# Patient Record
Sex: MALE | Race: BLACK OR AFRICAN AMERICAN | Employment: FULL TIME | URBAN - METROPOLITAN AREA
[De-identification: names, ages, dates, MRNs, and addresses within clinical notes are randomized per-mention and may not be internally consistent; named-entity substitution may affect disease eponyms.]

---

## 2018-01-21 ENCOUNTER — APPOINTMENT (OUTPATIENT)
Dept: GENERAL RADIOLOGY | Age: 63
End: 2018-01-21
Attending: PHYSICIAN ASSISTANT
Payer: COMMERCIAL

## 2018-01-21 ENCOUNTER — HOSPITAL ENCOUNTER (EMERGENCY)
Age: 63
Discharge: HOME | End: 2018-01-21
Attending: EMERGENCY MEDICINE
Payer: COMMERCIAL

## 2018-01-21 VITALS
OXYGEN SATURATION: 100 % | DIASTOLIC BLOOD PRESSURE: 99 MMHG | SYSTOLIC BLOOD PRESSURE: 189 MMHG | HEIGHT: 65 IN | TEMPERATURE: 98.2 F | BODY MASS INDEX: 34.16 KG/M2 | HEART RATE: 79 BPM | WEIGHT: 205.03 LBS | RESPIRATION RATE: 16 BRPM

## 2018-01-21 DIAGNOSIS — M54.31 BILATERAL SCIATICA: ICD-10-CM

## 2018-01-21 DIAGNOSIS — M54.32 BILATERAL SCIATICA: ICD-10-CM

## 2018-01-21 DIAGNOSIS — M54.42 ACUTE BILATERAL LOW BACK PAIN WITH BILATERAL SCIATICA: Primary | ICD-10-CM

## 2018-01-21 DIAGNOSIS — M54.41 ACUTE BILATERAL LOW BACK PAIN WITH BILATERAL SCIATICA: Primary | ICD-10-CM

## 2018-01-21 PROCEDURE — 74011250637 HC RX REV CODE- 250/637: Performed by: PHYSICIAN ASSISTANT

## 2018-01-21 PROCEDURE — 99283 EMERGENCY DEPT VISIT LOW MDM: CPT

## 2018-01-21 PROCEDURE — 72100 X-RAY EXAM L-S SPINE 2/3 VWS: CPT

## 2018-01-21 RX ORDER — OXYCODONE AND ACETAMINOPHEN 5; 325 MG/1; MG/1
1 TABLET ORAL
Status: COMPLETED | OUTPATIENT
Start: 2018-01-21 | End: 2018-01-21

## 2018-01-21 RX ORDER — OXYCODONE AND ACETAMINOPHEN 5; 325 MG/1; MG/1
1 TABLET ORAL
Qty: 12 TAB | Refills: 0 | Status: SHIPPED | OUTPATIENT
Start: 2018-01-21 | End: 2018-01-21

## 2018-01-21 RX ORDER — DIAZEPAM 5 MG/1
5 TABLET ORAL
Qty: 10 TAB | Refills: 0 | OUTPATIENT
Start: 2018-01-21 | End: 2022-04-02

## 2018-01-21 RX ORDER — DIAZEPAM 5 MG/1
5 TABLET ORAL
Status: COMPLETED | OUTPATIENT
Start: 2018-01-21 | End: 2018-01-21

## 2018-01-21 RX ORDER — OXYCODONE AND ACETAMINOPHEN 5; 325 MG/1; MG/1
1 TABLET ORAL
Qty: 12 TAB | Refills: 0 | OUTPATIENT
Start: 2018-01-21 | End: 2022-04-02

## 2018-01-21 RX ORDER — PREDNISONE 5 MG/1
TABLET ORAL
Qty: 21 TAB | Refills: 0 | Status: SHIPPED | OUTPATIENT
Start: 2018-01-21

## 2018-01-21 RX ADMIN — DIAZEPAM 5 MG: 5 TABLET ORAL at 10:54

## 2018-01-21 RX ADMIN — OXYCODONE HYDROCHLORIDE AND ACETAMINOPHEN 1 TABLET: 5; 325 TABLET ORAL at 10:54

## 2018-01-21 NOTE — ED PROVIDER NOTES
EMERGENCY DEPARTMENT HISTORY AND PHYSICAL EXAM      Date: 1/21/2018  Patient Name: Debra Cao    History of Presenting Illness     Chief Complaint   Patient presents with    Leg Pain     patient ambulatory to [facility] with steady gait and complain of left hip pain radiating down leg while laying down. patient states that he slipped and fell last week        History Provided By: Patient    HPI: Debra Cao, 63 y.o. male with PMHx significant for DM and HTN, presents ambulatory to the ED with cc of gradual onset, worsening, moderate left hip/knee and leg pain that began 5 days ago. Pt describes the pain as \"throbbing\" and \"cramping\". The pain is exacerbated after sitting for long periods of time. He notes that he hit his leg 1 month ago and twisted it 1 week ago. He did not seek medical evaluation for his sxs at that time. Pt does not have any allergies. He specifically denies back pain or fever. PCP: Jacobo Tripp NP     Social Hx: - Tobacco, + EtOH, - Illicit Drugs    There are no other complaints, changes, or physical findings at this time. Current Facility-Administered Medications   Medication Dose Route Frequency Provider Last Rate Last Dose    oxyCODONE-acetaminophen (PERCOCET) 5-325 mg per tablet 1 Tab  1 Tab Oral NOW ANTONIETTA Alva        diazePAM (VALIUM) tablet 5 mg  5 mg Oral NOW ANTONIETTA Alva         Current Outpatient Prescriptions   Medication Sig Dispense Refill    predniSONE (STERAPRED) 5 mg dose pack See administration instruction per 5mg dose pack 21 Tab 0    diazePAM (VALIUM) 5 mg tablet Take 1 Tab by mouth every twelve (12) hours as needed (spasm). Max Daily Amount: 10 mg. 10 Tab 0    oxyCODONE-acetaminophen (PERCOCET) 5-325 mg per tablet Take 1 Tab by mouth every six (6) hours as needed for Pain. Max Daily Amount: 4 Tabs. 12 Tab 0    glipiZIDE (GLUCOTROL) 5 mg tablet Take 1 Tab by mouth two (2) times a day.  1/2 hour ac breakfast and supper 60 Tab 1    lisinopril (PRINIVIL, ZESTRIL) 40 mg tablet Take 1 Tab by mouth daily. For blood pressure. 90 Tab 1    Hydrochlorothiazide (HYDRODIURIL) 12.5 mg tablet Take 1 Tab by mouth daily. 90 Tab 1    metFORMIN ER (GLUCOPHAGE XR) 500 mg tablet Take 1 Tab by mouth daily (with dinner). 90 Tab 1    simvastatin (ZOCOR) 20 mg tablet Take 1 Tab by mouth nightly. For cholesterol 90 Tab 1     Past History     Past Medical History:  Past Medical History:   Diagnosis Date    Diabetes mellitus, type 2 ([address] 2010    Hypertension      Past Surgical History:  Past Surgical History:   Procedure Laterality Date    HX COLONOSCOPY  2009    Had polyps told to return 5 yrs, not done     Family History:  Family History   Problem Relation Age of Onset    Hypertension Mother      Social History:  Social History   Substance Use Topics    Smoking status: Former Smoker     Quit date: 3/25/1985    Smokeless tobacco: Not on file      Comment: pt quit smoking 35 years ago    Alcohol use 7.2 oz/week     12 Standard drinks or equivalent per week      Comment: 12 beers on weekend     Allergies:  No Known Allergies    Review of Systems   Review of Systems   Constitutional: Negative for fever. Musculoskeletal: Positive for arthralgias (L hip pain) and myalgias (L leg pain). Negative for back pain. All other systems reviewed and are negative. Physical Exam   Physical Exam   Constitutional: He is oriented to person, place, and time. He appears well-developed and well-nourished. No distress. HENT:   Head: Normocephalic and atraumatic. Right Ear: External ear normal.   Left Ear: External ear normal.   Nose: Nose normal.   Mouth/Throat: Oropharynx is clear and moist. No oropharyngeal exudate. Eyes: Conjunctivae and EOM are normal. Pupils are equal, round, and reactive to light. Right eye exhibits no discharge. Left eye exhibits no discharge. No scleral icterus. Neck: Normal range of motion. Neck supple. No JVD present.  No tracheal deviation present. Cardiovascular: Normal rate, regular rhythm, normal heart sounds and intact distal pulses. Exam reveals no gallop and no friction rub. No murmur heard. Pulmonary/Chest: Effort normal and breath sounds normal. No respiratory distress. He has no wheezes. He has no rales. He exhibits no tenderness. Abdominal: Soft. Bowel sounds are normal. He exhibits no distension and no mass. There is no tenderness. There is no rebound and no guarding. Musculoskeletal: Normal range of motion. He exhibits no edema or tenderness. Negative straight leg raise    Lymphadenopathy:     He has no cervical adenopathy. Neurological: He is alert and oriented to person, place, and time. He has normal reflexes. No cranial nerve deficit. He exhibits normal muscle tone. Coordination normal.   Skin: Skin is warm and dry. He is not diaphoretic. Psychiatric: He has a normal mood and affect. His behavior is normal. Judgment and thought content normal.   Nursing note and vitals reviewed. Diagnostic Study Results     Labs -   No results found for this or any previous visit (from the past 12 hour(s)). Radiologic Studies -   EXAM:  XR SPINE LUMB 2 OR 3 V     INDICATION:   Back Pain     COMPARISON: None.     FINDINGS: AP, lateral and spot lateral views of the lumbar spine demonstrate  normal bone mineralization and vertebral body height. Minimal wedge-shaped  contour of the T11 and T12 vertebral bodies could be projectional. There is  borderline retrolisthesis at L4-5  there is mild degenerative disc space  narrowing through T10-11. There is moderate L2-3 and L4-5 disc space narrowing  with small endplate marginal osteophytes. There is probably moderate bilateral  facet osteoarthrosis at L4-5 and L5-S1. There is no pedicle asymmetry.  The  visualized portions of the sacrum and SI joints are normal.      IMPRESSION  IMPRESSION:  Degenerative findings as above.     Medical Decision Making   I am the first provider for this patient. I reviewed the vital signs, available nursing notes, past medical history, past surgical history, family history and social history. Vital Signs-Reviewed the patient's vital signs. Patient Vitals for the past 12 hrs:   Temp Pulse Resp BP SpO2   01/21/18 0931 98.2 °F (36.8 °C) 79 16 (!) 189/99 100 %     Pulse Oximetry Analysis - 100% on RA    Cardiac Monitor:   Rate: 79 bpm  Rhythm: Normal Sinus Rhythm     Records Reviewed: Nursing Notes and Old Medical Records    Provider Notes (Medical Decision Making):   DDx: DJD, DDD, sciatica     ED Course:   Initial assessment performed. The patients presenting problems have been discussed, and they are in agreement with the care plan formulated and outlined with them. I have encouraged them to ask questions as they arise throughout their visit. Disposition:  Discharge Note:  10:52 AM  The patient has been re-evaluated and is ready for discharge. Reviewed available results with patient. Counseled patient/parent/guardian on diagnosis and care plan. Patient has expressed understanding, and all questions have been answered. Patient agrees with plan and agrees to follow up as recommended, or return to the ED if their symptoms worsen. Discharge instructions have been provided and explained to the patient, along with reasons to return to the ED. PLAN:  1. Current Discharge Medication List      START taking these medications    Details   predniSONE (STERAPRED) 5 mg dose pack See administration instruction per 5mg dose pack  Qty: 21 Tab, Refills: 0      diazePAM (VALIUM) 5 mg tablet Take 1 Tab by mouth every twelve (12) hours as needed (spasm). Max Daily Amount: 10 mg.  Qty: 10 Tab, Refills: 0    Associated Diagnoses: Acute bilateral low back pain with bilateral sciatica; Bilateral sciatica      oxyCODONE-acetaminophen (PERCOCET) 5-325 mg per tablet Take 1 Tab by mouth every six (6) hours as needed for Pain. Max Daily Amount: 4 Tabs.   Qty: 12 Tab, Refills: 0    Associated Diagnoses: Acute bilateral low back pain with bilateral sciatica; Bilateral sciatica           2. Follow-up Information     Follow up With Details Comments [address] Carissa Schreiber NP In 2 days As needed [facility]  [address] [facility]      Kaylan Kearney MD In 2 days As needed [address]  [address] [address]  [facility] [place]  [phone]          Return to ED if worse     Diagnosis     Clinical Impression:   1. Acute bilateral low back pain with bilateral sciatica    2. Bilateral sciatica      Attestations:    Attestation: This note is prepared by Manasa Burk, acting as scribe for YANI Michel PA-C: The scribe's documentation has been prepared under my direction and personally reviewed by me in its entirety. I confirm that the note above accurately reflects all work, treatment, procedures, and medical decision making performed by me.

## 2018-01-21 NOTE — DISCHARGE INSTRUCTIONS
Back Pain: Care Instructions  Your Care Instructions    Back pain has many possible causes. It is often related to problems with muscles and ligaments of the back. It may also be related to problems with the nerves, discs, or bones of the back. Moving, lifting, standing, sitting, or sleeping in an awkward way can strain the back. Sometimes you don't notice the injury until later. Arthritis is another common cause of back pain. Although it may hurt a lot, back pain usually improves on its own within several weeks. Most people recover in 12 weeks or less. Using good home treatment and being careful not to stress your back can help you feel better sooner. Follow-up care is a key part of your treatment and safety. Be sure to make and go to all appointments, and call your doctor if you are having problems. It's also a good idea to know your test results and keep a list of the medicines you take. How can you care for yourself at home? · Sit or lie in positions that are most comfortable and reduce your pain. Try one of these positions when you lie down:  ¨ Lie on your back with your knees bent and supported by large pillows. ¨ Lie on the floor with your legs on the seat of a sofa or chair. [place] on your side with your knees and hips bent and a pillow between your legs. ¨ Lie on your stomach if it does not make pain worse. · Do not sit up in bed, and avoid soft couches and twisted positions. Bed rest can help relieve pain at first, but it delays healing. Avoid bed rest after the first day of back pain. · Change positions every 30 minutes. If you must sit for long periods of time, take breaks from sitting. Get up and walk around, or lie in a comfortable position. · Try using a heating pad on a low or medium setting for 15 to 20 minutes every 2 or 3 hours. Try a warm shower in place of one session with the heating pad. · You can also try an ice pack for 10 to 15 minutes every 2 to 3 hours.  Put a thin cloth between the ice pack and your skin. · Take pain medicines exactly as directed. ¨ If the doctor gave you a prescription medicine for pain, take it as prescribed. ¨ If you are not taking a prescription pain medicine, ask your doctor if you can take an over-the-counter medicine. · Take short walks several times a day. You can start with 5 to 10 minutes, 3 or 4 times a day, and work up to longer walks. Walk on level surfaces and avoid hills and stairs until your back is better. · Return to work and other activities as soon as you can. Continued rest without activity is usually not good for your back. · To prevent future back pain, do exercises to stretch and strengthen your back and stomach. Learn how to use good posture, safe lifting techniques, and proper body mechanics. When should you call for help? Call your doctor now or seek immediate medical care if:  ? · You have new or worsening numbness in your legs. ? · You have new or worsening weakness in your legs. (This could make it hard to stand up.)   ? · You lose control of your bladder or bowels. ? Watch closely for changes in your health, and be sure to contact your doctor if:  ? · Your pain gets worse. ? · You are not getting better after 2 weeks. Where can you learn more? Go to http://nay-muriel.info/. Enter F344 in the search box to learn more about \"Back Pain: Care Instructions. \"  Current as of: March 21, 2017  Content Version: 11.4  © 8856-2496 [employer]. Care instructions adapted under license by [employer] (which disclaims liability or warranty for this information). If you have questions about a medical condition or this instruction, always ask your healthcare professional. [place] [address] any warranty or liability for your use of this information.          Sciatica: Care Instructions  Your Care Instructions    Sciatica (say \"bst-FA-ga-kuh\") is an irritation of one of the sciatic nerves, which come from the spinal cord in the lower back. The sciatic nerves and their branches extend down through the buttock to the foot. Sciatica can develop when an injured disc in the back presses against a spinal nerve root. Its main symptom is pain, numbness, or weakness that is often worse in the leg or foot than in the back. Sciatica often will improve and go away with time. Early treatment usually includes medicines and exercises to relieve pain. Follow-up care is a key part of your treatment and safety. Be sure to make and go to all appointments, and call your doctor if you are having problems. It's also a good idea to know your test results and keep a list of the medicines you take. How can you care for yourself at home? · Take pain medicines exactly as directed. ¨ If the doctor gave you a prescription medicine for pain, take it as prescribed. ¨ If you are not taking a prescription pain medicine, ask your doctor if you can take an over-the-counter medicine. · Use heat or ice to relieve pain. ¨ To apply heat, put a warm water bottle, heating pad set on low, or warm cloth on your back. Do not go to sleep with a heating pad on your skin. ¨ To use ice, put ice or a cold pack on the area for 10 to 20 minutes at a time. Put a thin cloth between the ice and your skin. · Avoid sitting if possible, unless it feels better than standing. · Alternate lying down with short walks. Increase your walking distance as you are able to without making your symptoms worse. · Do not do anything that makes your symptoms worse. When should you call for help? Call 911 anytime you think you may need emergency care. For example, call if:  · You are unable to move a leg at all. Call your doctor now or seek immediate medical care if:  · You have new or worse symptoms in your legs or buttocks. Symptoms may include:  ¨ Numbness or tingling. ¨ Weakness. ¨ Pain. · You lose bladder or bowel control.   Watch closely for changes in your health, and be sure to contact your doctor if:  · You are not getting better as expected. Where can you learn more? Go to http://nay-muriel.info/. Enter [phone] in the search box to learn more about \"Sciatica: Care Instructions. \"  Current as of: March 21, 2017  Content Version: 11.4  © 3125-0654 [employer]. Care instructions adapted under license by [employer] (which disclaims liability or warranty for this information). If you have questions about a medical condition or this instruction, always ask your healthcare professional. [place] [address] any warranty or liability for your use of this information.

## 2018-01-21 NOTE — LETTER
[address] 
[facility] EMERGENCY DEPT 
[address] [place] [address] [address] [id number] 
[phone] Work/School Note Date: 1/21/2018 To Whom It May concern: 
 
Demi Russell was seen and treated today in the emergency room by the following provider(s): 
Attending Provider: Wily Merino. Sonu Driver MD 
Physician Assistant: ANTONIETTA Cheema. Demi Russell No work 48 hours. Sincerely, ANTONIETTA Cheema

## 2018-01-21 NOTE — ED NOTES
Assumed care of patient. Patient is alert and oriented, does not appear to be in distress. Patient ambulatory to Ed with c/o left hip pain x one week radiating down left hip. Patient positioned for comfort with call bell within reach. Side rails up for safety.  PA bedside for exam .

## 2022-04-02 ENCOUNTER — HOSPITAL ENCOUNTER (EMERGENCY)
Age: 67
Discharge: HOME | End: 2022-04-02
Attending: EMERGENCY MEDICINE

## 2022-04-02 ENCOUNTER — APPOINTMENT (OUTPATIENT)
Dept: CT IMAGING | Age: 67
End: 2022-04-02
Attending: EMERGENCY MEDICINE

## 2022-04-02 VITALS
RESPIRATION RATE: 18 BRPM | SYSTOLIC BLOOD PRESSURE: 194 MMHG | DIASTOLIC BLOOD PRESSURE: 114 MMHG | HEART RATE: 93 BPM | TEMPERATURE: 99 F | HEIGHT: 67 IN | OXYGEN SATURATION: 98 % | WEIGHT: 195.33 LBS | BODY MASS INDEX: 30.66 KG/M2

## 2022-04-02 DIAGNOSIS — S02.92XA MULTIPLE CLOSED FRACTURES OF FACIAL BONE, INITIAL ENCOUNTER (HCC): Primary | ICD-10-CM

## 2022-04-02 PROCEDURE — 99284 EMERGENCY DEPT VISIT MOD MDM: CPT

## 2022-04-02 PROCEDURE — 70486 CT MAXILLOFACIAL W/O DYE: CPT

## 2022-04-02 RX ORDER — HYDROCODONE BITARTRATE AND ACETAMINOPHEN 5; 325 MG/1; MG/1
1 TABLET ORAL
Status: DISCONTINUED | OUTPATIENT
Start: 2022-04-02 | End: 2022-04-02

## 2022-04-02 RX ORDER — NAPROXEN 500 MG/1
500 TABLET ORAL 2 TIMES DAILY WITH MEALS
Qty: 20 TABLET | Refills: 0 | Status: SHIPPED | OUTPATIENT
Start: 2022-04-02 | End: 2022-04-12

## 2022-04-02 RX ORDER — HYDROCODONE BITARTRATE AND ACETAMINOPHEN 5; 325 MG/1; MG/1
1 TABLET ORAL
Qty: 10 TABLET | Refills: 0 | Status: SHIPPED | OUTPATIENT
Start: 2022-04-02 | End: 2022-04-05

## 2022-04-03 NOTE — ED NOTES
Discharge instructions reviewed with patient. Pt provided with an ice pack prior to d/c. Pt verbalizes understanding of plan of care and follow up. Pt left the ED with all personal belongings. Pt states that his brother is driving him home.
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
## 2022-04-03 NOTE — ED NOTES
Dr. Arnie Giron notified of high blood pressures. Pt has hx of HTN and is med compliant. No new orders. Ok to discharge home per Dr. Arnie Giron.

## 2022-04-03 NOTE — DISCHARGE INSTRUCTIONS
Your CT:  Bones: Left inferior orbital wall fracture extends into the infraorbital neural foramen. 5 mm depression into the left maxillary sinus. Close approximation to the left inferior rectus muscle. Nondisplaced fracture of the left lateral orbital wall. 7 mm depressed fracture of the anterior wall of the left maxillary  sinus. Subtle nondisplaced fracture of the posterior wall of left maxillary sinus. Nondisplaced left zygomatic arch fractures. Mandible is intact. Please make sure to keep a cool compress or ice to that area to help with swelling. Please sleep with your head up at 30 degrees. Please do not blow your nose very hard. Can take Tylenol for pain but if you need something stronger can take Norco which is hydrocodone with acetaminophen. Can take Colace stool softener if you start to become constipated. Can also take anti-inflammatories. You need to follow-up with ENT for these facial fractures. Follow-up with your eye doctor as needed.

## 2022-07-07 ENCOUNTER — HOSPITAL ENCOUNTER (EMERGENCY)
Age: 67
Discharge: HOME | End: 2022-07-07
Attending: EMERGENCY MEDICINE | Admitting: EMERGENCY MEDICINE
Payer: COMMERCIAL

## 2022-07-07 VITALS
WEIGHT: 191.8 LBS | HEIGHT: 66 IN | HEART RATE: 71 BPM | SYSTOLIC BLOOD PRESSURE: 183 MMHG | BODY MASS INDEX: 30.82 KG/M2 | RESPIRATION RATE: 18 BRPM | OXYGEN SATURATION: 97 % | DIASTOLIC BLOOD PRESSURE: 97 MMHG | TEMPERATURE: 98.5 F

## 2022-07-07 DIAGNOSIS — R51.9 FACIAL PAIN: Primary | ICD-10-CM

## 2022-07-07 DIAGNOSIS — R03.0 ELEVATED BLOOD PRESSURE READING: ICD-10-CM

## 2022-07-07 PROCEDURE — 99283 EMERGENCY DEPT VISIT LOW MDM: CPT

## 2022-07-07 RX ORDER — GABAPENTIN 100 MG/1
100 CAPSULE ORAL 3 TIMES DAILY
Qty: 10 CAPSULE | Refills: 0 | Status: SHIPPED | OUTPATIENT
Start: 2022-07-07 | End: 2022-07-10
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
## 2023-05-21 RX ORDER — LISINOPRIL 40 MG/1
40 TABLET ORAL DAILY
COMMUNITY
Start: 2015-12-09

## 2023-05-21 RX ORDER — GLIPIZIDE 5 MG/1
5 TABLET ORAL 2 TIMES DAILY
COMMUNITY
Start: 2016-02-03

## 2023-05-21 RX ORDER — HYDROCHLOROTHIAZIDE 12.5 MG/1
12.5 TABLET ORAL DAILY
COMMUNITY
Start: 2015-12-09

## 2023-05-21 RX ORDER — METFORMIN HYDROCHLORIDE 500 MG/1
500 TABLET, EXTENDED RELEASE ORAL
COMMUNITY
Start: 2015-12-09

## 2023-05-21 RX ORDER — PREDNISONE 5 MG/1
TABLET ORAL
COMMUNITY
Start: 2018-01-21

## 2023-05-21 RX ORDER — SIMVASTATIN 20 MG
20 TABLET ORAL
COMMUNITY
Start: 2015-12-09

## 2023-10-04 ENCOUNTER — APPOINTMENT (OUTPATIENT)
Facility: HOSPITAL | Age: 68
DRG: 853 | End: 2023-10-04
Payer: COMMERCIAL

## 2023-10-04 ENCOUNTER — HOSPITAL ENCOUNTER (INPATIENT)
Facility: HOSPITAL | Age: 68
LOS: 15 days | Discharge: NURSING, REHAB OR LONG-TERM CARE | DRG: 853 | End: 2023-10-19
Attending: INTERNAL MEDICINE | Admitting: INTERNAL MEDICINE
Payer: COMMERCIAL

## 2023-10-04 DIAGNOSIS — E86.0 DEHYDRATION: ICD-10-CM

## 2023-10-04 DIAGNOSIS — E11.29 TYPE 2 DIABETES MELLITUS WITH OTHER DIABETIC KIDNEY COMPLICATION, WITHOUT LONG-TERM CURRENT USE OF INSULIN (HCC): ICD-10-CM

## 2023-10-04 DIAGNOSIS — R07.9 CHEST PAIN: ICD-10-CM

## 2023-10-04 DIAGNOSIS — A41.9 SEPTICEMIA (HCC): ICD-10-CM

## 2023-10-04 DIAGNOSIS — I21.4 NSTEMI (NON-ST ELEVATED MYOCARDIAL INFARCTION) (HCC): ICD-10-CM

## 2023-10-04 DIAGNOSIS — E87.6 HYPOKALEMIA: ICD-10-CM

## 2023-10-04 DIAGNOSIS — N17.9 ACUTE KIDNEY INJURY (HCC): ICD-10-CM

## 2023-10-04 LAB
ALBUMIN SERPL-MCNC: 2.3 G/DL (ref 3.5–5)
ALBUMIN SERPL-MCNC: 2.4 G/DL (ref 3.5–5)
ALBUMIN/GLOB SERPL: 0.5 (ref 1.1–2.2)
ALBUMIN/GLOB SERPL: 0.5 (ref 1.1–2.2)
ALP SERPL-CCNC: 58 U/L (ref 45–117)
ALP SERPL-CCNC: 60 U/L (ref 45–117)
ALT SERPL-CCNC: 57 U/L (ref 12–78)
ALT SERPL-CCNC: 95 U/L (ref 12–78)
AMMONIA PLAS-SCNC: 37 UMOL/L
ANION GAP BLD CALC-SCNC: 13 (ref 10–20)
ANION GAP BLD CALC-SCNC: 15 (ref 10–20)
ANION GAP SERPL CALC-SCNC: 9 MMOL/L (ref 5–15)
ANION GAP SERPL CALC-SCNC: 9 MMOL/L (ref 5–15)
APPEARANCE UR: CLEAR
ARTERIAL PATENCY WRIST A: ABNORMAL
AST SERPL-CCNC: 196 U/L (ref 15–37)
AST SERPL-CCNC: 350 U/L (ref 15–37)
BACTERIA URNS QL MICRO: ABNORMAL /HPF
BASE DEFICIT BLD-SCNC: 2.1 MMOL/L
BASE DEFICIT BLDA-SCNC: 2.8 MMOL/L
BASE EXCESS BLD CALC-SCNC: 0.5 MMOL/L
BASOPHILS # BLD: 0 K/UL (ref 0–0.1)
BASOPHILS # BLD: 0 K/UL (ref 0–0.1)
BASOPHILS NFR BLD: 0 % (ref 0–1)
BASOPHILS NFR BLD: 0 % (ref 0–1)
BDY SITE: ABNORMAL
BILIRUB DIRECT SERPL-MCNC: 0.4 MG/DL (ref 0–0.2)
BILIRUB SERPL-MCNC: 1.5 MG/DL (ref 0.2–1)
BILIRUB SERPL-MCNC: 1.5 MG/DL (ref 0.2–1)
BILIRUB UR QL: NEGATIVE
BUN SERPL-MCNC: 20 MG/DL (ref 6–20)
BUN SERPL-MCNC: 20 MG/DL (ref 6–20)
BUN/CREAT SERPL: 9 (ref 12–20)
BUN/CREAT SERPL: 9 (ref 12–20)
CA-I BLD-MCNC: 1.02 MMOL/L (ref 1.12–1.32)
CA-I BLD-MCNC: 1.02 MMOL/L (ref 1.12–1.32)
CALCIUM SERPL-MCNC: 7.4 MG/DL (ref 8.5–10.1)
CALCIUM SERPL-MCNC: 7.8 MG/DL (ref 8.5–10.1)
CHLORIDE BLD-SCNC: 95 MMOL/L (ref 100–108)
CHLORIDE BLD-SCNC: 98 MMOL/L (ref 100–108)
CHLORIDE SERPL-SCNC: 100 MMOL/L (ref 97–108)
CHLORIDE SERPL-SCNC: 96 MMOL/L (ref 97–108)
CO2 BLD-SCNC: 22 MMOL/L (ref 19–24)
CO2 BLD-SCNC: 23 MMOL/L (ref 19–24)
CO2 SERPL-SCNC: 22 MMOL/L (ref 21–32)
CO2 SERPL-SCNC: 26 MMOL/L (ref 21–32)
COLOR UR: ABNORMAL
COMMENT:: NORMAL
CREAT SERPL-MCNC: 2.25 MG/DL (ref 0.7–1.3)
CREAT SERPL-MCNC: 2.29 MG/DL (ref 0.7–1.3)
CREAT UR-MCNC: 1.5 MG/DL (ref 0.6–1.3)
CREAT UR-MCNC: 1.9 MG/DL (ref 0.6–1.3)
D DIMER PPP FEU-MCNC: 1.64 MG/L FEU (ref 0–0.65)
DIFFERENTIAL METHOD BLD: ABNORMAL
DIFFERENTIAL METHOD BLD: ABNORMAL
EKG ATRIAL RATE: 96 BPM
EKG DIAGNOSIS: NORMAL
EKG P AXIS: 65 DEGREES
EKG P-R INTERVAL: 188 MS
EKG Q-T INTERVAL: 346 MS
EKG QRS DURATION: 114 MS
EKG QTC CALCULATION (BAZETT): 437 MS
EKG R AXIS: 0 DEGREES
EKG T AXIS: 33 DEGREES
EKG VENTRICULAR RATE: 96 BPM
EOSINOPHIL # BLD: 0 K/UL (ref 0–0.4)
EOSINOPHIL # BLD: 0.1 K/UL (ref 0–0.4)
EOSINOPHIL NFR BLD: 0 % (ref 0–7)
EOSINOPHIL NFR BLD: 1 % (ref 0–7)
EPITH CASTS URNS QL MICRO: ABNORMAL /LPF
ERYTHROCYTE [DISTWIDTH] IN BLOOD BY AUTOMATED COUNT: 12.5 % (ref 11.5–14.5)
ERYTHROCYTE [DISTWIDTH] IN BLOOD BY AUTOMATED COUNT: 12.6 % (ref 11.5–14.5)
FIO2 ON VENT: 100 %
FLUAV AG NPH QL IA: NEGATIVE
FLUBV AG NOSE QL IA: NEGATIVE
GAS FLOW.O2 O2 DELIVERY SYS: 6 L/MIN
GLOBULIN SER CALC-MCNC: 4.2 G/DL (ref 2–4)
GLOBULIN SER CALC-MCNC: 4.8 G/DL (ref 2–4)
GLUCOSE BLD STRIP.AUTO-MCNC: 129 MG/DL (ref 65–117)
GLUCOSE BLD STRIP.AUTO-MCNC: 145 MG/DL (ref 65–117)
GLUCOSE BLD STRIP.AUTO-MCNC: 171 MG/DL (ref 74–106)
GLUCOSE BLD STRIP.AUTO-MCNC: 215 MG/DL (ref 74–106)
GLUCOSE SERPL-MCNC: 211 MG/DL (ref 65–100)
GLUCOSE SERPL-MCNC: 216 MG/DL (ref 65–100)
GLUCOSE UR STRIP.AUTO-MCNC: 500 MG/DL
HCO3 BLDA-SCNC: 21 MMOL/L (ref 22–26)
HCO3 BLDA-SCNC: 22 MMOL/L
HCO3 BLDA-SCNC: 23 MMOL/L
HCT VFR BLD AUTO: 39.3 % (ref 36.6–50.3)
HCT VFR BLD AUTO: 39.8 % (ref 36.6–50.3)
HGB BLD-MCNC: 13.7 G/DL (ref 12.1–17)
HGB BLD-MCNC: 14.2 G/DL (ref 12.1–17)
HGB UR QL STRIP: ABNORMAL
IMM GRANULOCYTES # BLD AUTO: 0 K/UL (ref 0–0.04)
IMM GRANULOCYTES # BLD AUTO: 0.1 K/UL (ref 0–0.04)
IMM GRANULOCYTES NFR BLD AUTO: 0 % (ref 0–0.5)
IMM GRANULOCYTES NFR BLD AUTO: 1 % (ref 0–0.5)
KETONES UR QL STRIP.AUTO: ABNORMAL MG/DL
LACTATE BLD-SCNC: 0.99 MMOL/L (ref 0.4–2)
LACTATE BLD-SCNC: 2 MMOL/L (ref 0.4–2)
LACTATE BLD-SCNC: 2.7 MMOL/L (ref 0.4–2)
LACTATE BLD-SCNC: 2.79 MMOL/L (ref 0.4–2)
LACTATE SERPL-SCNC: 2.1 MMOL/L (ref 0.4–2)
LEUKOCYTE ESTERASE UR QL STRIP.AUTO: NEGATIVE
LIPASE SERPL-CCNC: 46 U/L (ref 13–75)
LYMPHOCYTES # BLD: 1.7 K/UL (ref 0.8–3.5)
LYMPHOCYTES # BLD: 1.9 K/UL (ref 0.8–3.5)
LYMPHOCYTES NFR BLD: 13 % (ref 12–49)
LYMPHOCYTES NFR BLD: 14 % (ref 12–49)
MAGNESIUM SERPL-MCNC: 1.8 MG/DL (ref 1.6–2.4)
MCH RBC QN AUTO: 28.3 PG (ref 26–34)
MCH RBC QN AUTO: 28.5 PG (ref 26–34)
MCHC RBC AUTO-ENTMCNC: 34.9 G/DL (ref 30–36.5)
MCHC RBC AUTO-ENTMCNC: 35.7 G/DL (ref 30–36.5)
MCV RBC AUTO: 79.9 FL (ref 80–99)
MCV RBC AUTO: 81.2 FL (ref 80–99)
MONOCYTES # BLD: 0.9 K/UL (ref 0–1)
MONOCYTES # BLD: 1.2 K/UL (ref 0–1)
MONOCYTES NFR BLD: 7 % (ref 5–13)
MONOCYTES NFR BLD: 8 % (ref 5–13)
NEUTS BAND NFR BLD MANUAL: 1 %
NEUTS SEG # BLD: 11.3 K/UL (ref 1.8–8)
NEUTS SEG # BLD: 9.8 K/UL (ref 1.8–8)
NEUTS SEG NFR BLD: 77 % (ref 32–75)
NEUTS SEG NFR BLD: 78 % (ref 32–75)
NITRITE UR QL STRIP.AUTO: NEGATIVE
NRBC # BLD: 0 K/UL (ref 0–0.01)
NRBC # BLD: 0 K/UL (ref 0–0.01)
NRBC BLD-RTO: 0 PER 100 WBC
NRBC BLD-RTO: 0 PER 100 WBC
PCO2 BLDA: 33 MMHG (ref 35–45)
PCO2 BLDV: 31.1 MMHG (ref 41–51)
PCO2 BLDV: 36.9 MMHG (ref 41–51)
PH BLDA: 7.42 (ref 7.35–7.45)
PH BLDV: 7.39 (ref 7.32–7.42)
PH BLDV: 7.48 (ref 7.32–7.42)
PH UR STRIP: 6.5 (ref 5–8)
PLATELET # BLD AUTO: 238 K/UL (ref 150–400)
PLATELET # BLD AUTO: 244 K/UL (ref 150–400)
PMV BLD AUTO: 11.1 FL (ref 8.9–12.9)
PMV BLD AUTO: 11.3 FL (ref 8.9–12.9)
PO2 BLDA: 161 MMHG (ref 80–100)
PO2 BLDV: 28 MMHG (ref 25–40)
PO2 BLDV: 31 MMHG (ref 25–40)
POTASSIUM BLD-SCNC: 2.9 MMOL/L (ref 3.5–5.5)
POTASSIUM BLD-SCNC: 3.5 MMOL/L (ref 3.5–5.5)
POTASSIUM SERPL-SCNC: 2.9 MMOL/L (ref 3.5–5.1)
POTASSIUM SERPL-SCNC: 3.7 MMOL/L (ref 3.5–5.1)
PROCALCITONIN SERPL-MCNC: 0.22 NG/ML
PROT SERPL-MCNC: 6.5 G/DL (ref 6.4–8.2)
PROT SERPL-MCNC: 7.2 G/DL (ref 6.4–8.2)
PROT UR STRIP-MCNC: >300 MG/DL
RBC # BLD AUTO: 4.84 M/UL (ref 4.1–5.7)
RBC # BLD AUTO: 4.98 M/UL (ref 4.1–5.7)
RBC #/AREA URNS HPF: ABNORMAL /HPF (ref 0–5)
RBC MORPH BLD: ABNORMAL
SAO2 % BLD: 59 %
SAO2 % BLD: 60 %
SAO2 % BLD: 99 % (ref 92–97)
SAO2% DEVICE SAO2% SENSOR NAME: ABNORMAL
SARS-COV-2 RDRP RESP QL NAA+PROBE: NOT DETECTED
SERVICE CMNT-IMP: ABNORMAL
SODIUM BLD-SCNC: 133 MMOL/L (ref 136–145)
SODIUM BLD-SCNC: 133 MMOL/L (ref 136–145)
SODIUM SERPL-SCNC: 131 MMOL/L (ref 136–145)
SODIUM SERPL-SCNC: 131 MMOL/L (ref 136–145)
SOURCE: NORMAL
SP GR UR REFRACTOMETRY: 1.02
SPECIMEN HOLD: NORMAL
SPECIMEN SITE: ABNORMAL
TROPONIN I SERPL HS-MCNC: 4166 NG/L (ref 0–76)
TROPONIN I SERPL HS-MCNC: 4260 NG/L (ref 0–76)
UFH PPP CHRO-ACNC: 0.67 IU/ML
UROBILINOGEN UR QL STRIP.AUTO: 0.2 EU/DL (ref 0.2–1)
WBC # BLD AUTO: 12.4 K/UL (ref 4.1–11.1)
WBC # BLD AUTO: 14.6 K/UL (ref 4.1–11.1)
WBC MORPH BLD: ABNORMAL
WBC URNS QL MICRO: ABNORMAL /HPF (ref 0–4)

## 2023-10-04 PROCEDURE — 83690 ASSAY OF LIPASE: CPT

## 2023-10-04 PROCEDURE — 82330 ASSAY OF CALCIUM: CPT

## 2023-10-04 PROCEDURE — 80053 COMPREHEN METABOLIC PANEL: CPT

## 2023-10-04 PROCEDURE — 84145 PROCALCITONIN (PCT): CPT

## 2023-10-04 PROCEDURE — 71045 X-RAY EXAM CHEST 1 VIEW: CPT

## 2023-10-04 PROCEDURE — 84295 ASSAY OF SERUM SODIUM: CPT

## 2023-10-04 PROCEDURE — 2580000003 HC RX 258: Performed by: INTERNAL MEDICINE

## 2023-10-04 PROCEDURE — 36600 WITHDRAWAL OF ARTERIAL BLOOD: CPT

## 2023-10-04 PROCEDURE — 96375 TX/PRO/DX INJ NEW DRUG ADDON: CPT

## 2023-10-04 PROCEDURE — 6370000000 HC RX 637 (ALT 250 FOR IP): Performed by: INTERNAL MEDICINE

## 2023-10-04 PROCEDURE — 70450 CT HEAD/BRAIN W/O DYE: CPT

## 2023-10-04 PROCEDURE — 3430000000 HC RX DIAGNOSTIC RADIOPHARMACEUTICAL: Performed by: INTERNAL MEDICINE

## 2023-10-04 PROCEDURE — 6360000002 HC RX W HCPCS

## 2023-10-04 PROCEDURE — 6370000000 HC RX 637 (ALT 250 FOR IP)

## 2023-10-04 PROCEDURE — 96374 THER/PROPH/DIAG INJ IV PUSH: CPT

## 2023-10-04 PROCEDURE — 82962 GLUCOSE BLOOD TEST: CPT

## 2023-10-04 PROCEDURE — 84132 ASSAY OF SERUM POTASSIUM: CPT

## 2023-10-04 PROCEDURE — 82803 BLOOD GASES ANY COMBINATION: CPT

## 2023-10-04 PROCEDURE — 84484 ASSAY OF TROPONIN QUANT: CPT

## 2023-10-04 PROCEDURE — 83605 ASSAY OF LACTIC ACID: CPT

## 2023-10-04 PROCEDURE — 96361 HYDRATE IV INFUSION ADD-ON: CPT

## 2023-10-04 PROCEDURE — 6360000002 HC RX W HCPCS: Performed by: INTERNAL MEDICINE

## 2023-10-04 PROCEDURE — 2000000000 HC ICU R&B

## 2023-10-04 PROCEDURE — 82140 ASSAY OF AMMONIA: CPT

## 2023-10-04 PROCEDURE — 87804 INFLUENZA ASSAY W/OPTIC: CPT

## 2023-10-04 PROCEDURE — 85520 HEPARIN ASSAY: CPT

## 2023-10-04 PROCEDURE — 93005 ELECTROCARDIOGRAM TRACING: CPT

## 2023-10-04 PROCEDURE — 85379 FIBRIN DEGRADATION QUANT: CPT

## 2023-10-04 PROCEDURE — 74176 CT ABD & PELVIS W/O CONTRAST: CPT

## 2023-10-04 PROCEDURE — 85025 COMPLETE CBC W/AUTO DIFF WBC: CPT

## 2023-10-04 PROCEDURE — A9540 TC99M MAA: HCPCS | Performed by: INTERNAL MEDICINE

## 2023-10-04 PROCEDURE — 2580000003 HC RX 258

## 2023-10-04 PROCEDURE — 81001 URINALYSIS AUTO W/SCOPE: CPT

## 2023-10-04 PROCEDURE — 87040 BLOOD CULTURE FOR BACTERIA: CPT

## 2023-10-04 PROCEDURE — 6370000000 HC RX 637 (ALT 250 FOR IP): Performed by: STUDENT IN AN ORGANIZED HEALTH CARE EDUCATION/TRAINING PROGRAM

## 2023-10-04 PROCEDURE — 80076 HEPATIC FUNCTION PANEL: CPT

## 2023-10-04 PROCEDURE — 96376 TX/PRO/DX INJ SAME DRUG ADON: CPT

## 2023-10-04 PROCEDURE — 36415 COLL VENOUS BLD VENIPUNCTURE: CPT

## 2023-10-04 PROCEDURE — 6360000002 HC RX W HCPCS: Performed by: NURSE PRACTITIONER

## 2023-10-04 PROCEDURE — 87635 SARS-COV-2 COVID-19 AMP PRB: CPT

## 2023-10-04 PROCEDURE — 99285 EMERGENCY DEPT VISIT HI MDM: CPT

## 2023-10-04 PROCEDURE — 83735 ASSAY OF MAGNESIUM: CPT

## 2023-10-04 PROCEDURE — 82947 ASSAY GLUCOSE BLOOD QUANT: CPT

## 2023-10-04 PROCEDURE — 78580 LUNG PERFUSION IMAGING: CPT

## 2023-10-04 RX ORDER — HEPARIN SODIUM 1000 [USP'U]/ML
4000 INJECTION, SOLUTION INTRAVENOUS; SUBCUTANEOUS PRN
Status: DISCONTINUED | OUTPATIENT
Start: 2023-10-04 | End: 2023-10-07

## 2023-10-04 RX ORDER — HEPARIN SODIUM 1000 [USP'U]/ML
2000 INJECTION, SOLUTION INTRAVENOUS; SUBCUTANEOUS PRN
Status: DISCONTINUED | OUTPATIENT
Start: 2023-10-04 | End: 2023-10-07

## 2023-10-04 RX ORDER — ACETAMINOPHEN 325 MG/1
650 TABLET ORAL EVERY 6 HOURS PRN
Status: DISCONTINUED | OUTPATIENT
Start: 2023-10-04 | End: 2023-10-19 | Stop reason: HOSPADM

## 2023-10-04 RX ORDER — LISINOPRIL 20 MG/1
20 TABLET ORAL ONCE
Status: COMPLETED | OUTPATIENT
Start: 2023-10-04 | End: 2023-10-04

## 2023-10-04 RX ORDER — LORAZEPAM 1 MG/1
2 TABLET ORAL
Status: DISCONTINUED | OUTPATIENT
Start: 2023-10-04 | End: 2023-10-05

## 2023-10-04 RX ORDER — AMLODIPINE BESYLATE 5 MG/1
5 TABLET ORAL ONCE
Status: COMPLETED | OUTPATIENT
Start: 2023-10-04 | End: 2023-10-04

## 2023-10-04 RX ORDER — HEPARIN SODIUM 10000 [USP'U]/100ML
5-30 INJECTION, SOLUTION INTRAVENOUS CONTINUOUS
Status: DISCONTINUED | OUTPATIENT
Start: 2023-10-04 | End: 2023-10-07

## 2023-10-04 RX ORDER — SODIUM CHLORIDE 0.9 % (FLUSH) 0.9 %
5-40 SYRINGE (ML) INJECTION PRN
Status: DISCONTINUED | OUTPATIENT
Start: 2023-10-04 | End: 2023-10-08

## 2023-10-04 RX ORDER — THIAMINE HYDROCHLORIDE 100 MG/ML
100 INJECTION, SOLUTION INTRAMUSCULAR; INTRAVENOUS DAILY
Status: DISCONTINUED | OUTPATIENT
Start: 2023-10-05 | End: 2023-10-04

## 2023-10-04 RX ORDER — LORAZEPAM 1 MG/1
1 TABLET ORAL
Status: DISCONTINUED | OUTPATIENT
Start: 2023-10-04 | End: 2023-10-07

## 2023-10-04 RX ORDER — HYDRALAZINE HYDROCHLORIDE 20 MG/ML
10 INJECTION INTRAMUSCULAR; INTRAVENOUS EVERY 6 HOURS PRN
Status: DISCONTINUED | OUTPATIENT
Start: 2023-10-04 | End: 2023-10-19 | Stop reason: HOSPADM

## 2023-10-04 RX ORDER — LORAZEPAM 2 MG/ML
3 INJECTION INTRAMUSCULAR
Status: DISCONTINUED | OUTPATIENT
Start: 2023-10-04 | End: 2023-10-04

## 2023-10-04 RX ORDER — ACETAMINOPHEN 650 MG/1
650 SUPPOSITORY RECTAL EVERY 6 HOURS PRN
Status: DISCONTINUED | OUTPATIENT
Start: 2023-10-04 | End: 2023-10-19 | Stop reason: HOSPADM

## 2023-10-04 RX ORDER — ASPIRIN 81 MG/1
81 TABLET, CHEWABLE ORAL DAILY
Status: DISCONTINUED | OUTPATIENT
Start: 2023-10-04 | End: 2023-10-06

## 2023-10-04 RX ORDER — POTASSIUM CHLORIDE 7.45 MG/ML
10 INJECTION INTRAVENOUS
Status: COMPLETED | OUTPATIENT
Start: 2023-10-04 | End: 2023-10-04

## 2023-10-04 RX ORDER — ONDANSETRON 2 MG/ML
4 INJECTION INTRAMUSCULAR; INTRAVENOUS EVERY 6 HOURS PRN
Status: DISCONTINUED | OUTPATIENT
Start: 2023-10-04 | End: 2023-10-19 | Stop reason: HOSPADM

## 2023-10-04 RX ORDER — ENOXAPARIN SODIUM 100 MG/ML
40 INJECTION SUBCUTANEOUS DAILY
Status: DISCONTINUED | OUTPATIENT
Start: 2023-10-04 | End: 2023-10-04

## 2023-10-04 RX ORDER — INSULIN LISPRO 100 [IU]/ML
0-4 INJECTION, SOLUTION INTRAVENOUS; SUBCUTANEOUS NIGHTLY
Status: DISCONTINUED | OUTPATIENT
Start: 2023-10-04 | End: 2023-10-05

## 2023-10-04 RX ORDER — 0.9 % SODIUM CHLORIDE 0.9 %
1000 INTRAVENOUS SOLUTION INTRAVENOUS ONCE
Status: COMPLETED | OUTPATIENT
Start: 2023-10-04 | End: 2023-10-04

## 2023-10-04 RX ORDER — LORAZEPAM 2 MG/ML
1 INJECTION INTRAMUSCULAR
Status: DISCONTINUED | OUTPATIENT
Start: 2023-10-04 | End: 2023-10-07

## 2023-10-04 RX ORDER — ONDANSETRON 4 MG/1
4 TABLET, ORALLY DISINTEGRATING ORAL EVERY 8 HOURS PRN
Status: DISCONTINUED | OUTPATIENT
Start: 2023-10-04 | End: 2023-10-19 | Stop reason: HOSPADM

## 2023-10-04 RX ORDER — LORAZEPAM 1 MG/1
4 TABLET ORAL
Status: DISCONTINUED | OUTPATIENT
Start: 2023-10-04 | End: 2023-10-04

## 2023-10-04 RX ORDER — SODIUM CHLORIDE 0.9 % (FLUSH) 0.9 %
5-40 SYRINGE (ML) INJECTION EVERY 12 HOURS SCHEDULED
Status: DISCONTINUED | OUTPATIENT
Start: 2023-10-05 | End: 2023-10-08

## 2023-10-04 RX ORDER — AMLODIPINE BESYLATE 5 MG/1
5 TABLET ORAL DAILY
Status: DISCONTINUED | OUTPATIENT
Start: 2023-10-04 | End: 2023-10-06

## 2023-10-04 RX ORDER — POTASSIUM CHLORIDE 750 MG/1
40 TABLET, FILM COATED, EXTENDED RELEASE ORAL 2 TIMES DAILY
Status: DISCONTINUED | OUTPATIENT
Start: 2023-10-04 | End: 2023-10-04

## 2023-10-04 RX ORDER — SODIUM CHLORIDE 9 MG/ML
INJECTION, SOLUTION INTRAVENOUS CONTINUOUS
Status: DISCONTINUED | OUTPATIENT
Start: 2023-10-04 | End: 2023-10-05

## 2023-10-04 RX ORDER — ATORVASTATIN CALCIUM 20 MG/1
20 TABLET, FILM COATED ORAL DAILY
Status: DISCONTINUED | OUTPATIENT
Start: 2023-10-04 | End: 2023-10-19 | Stop reason: HOSPADM

## 2023-10-04 RX ORDER — POLYETHYLENE GLYCOL 3350 17 G/17G
17 POWDER, FOR SOLUTION ORAL DAILY PRN
Status: DISCONTINUED | OUTPATIENT
Start: 2023-10-04 | End: 2023-10-19 | Stop reason: HOSPADM

## 2023-10-04 RX ORDER — POTASSIUM CHLORIDE 750 MG/1
40 TABLET, FILM COATED, EXTENDED RELEASE ORAL ONCE
Status: COMPLETED | OUTPATIENT
Start: 2023-10-04 | End: 2023-10-04

## 2023-10-04 RX ORDER — LORAZEPAM 2 MG/ML
0.5 INJECTION INTRAMUSCULAR ONCE
Status: COMPLETED | OUTPATIENT
Start: 2023-10-04 | End: 2023-10-04

## 2023-10-04 RX ORDER — AMLODIPINE BESYLATE 5 MG/1
5 TABLET ORAL DAILY
Status: ON HOLD | COMMUNITY
End: 2023-10-19 | Stop reason: SDUPTHER

## 2023-10-04 RX ORDER — SODIUM CHLORIDE 0.9 % (FLUSH) 0.9 %
5-40 SYRINGE (ML) INJECTION EVERY 12 HOURS SCHEDULED
Status: DISCONTINUED | OUTPATIENT
Start: 2023-10-04 | End: 2023-10-08

## 2023-10-04 RX ORDER — SODIUM CHLORIDE 9 MG/ML
INJECTION, SOLUTION INTRAVENOUS PRN
Status: DISCONTINUED | OUTPATIENT
Start: 2023-10-04 | End: 2023-10-07

## 2023-10-04 RX ORDER — LORAZEPAM 2 MG/ML
2 INJECTION INTRAMUSCULAR
Status: DISCONTINUED | OUTPATIENT
Start: 2023-10-04 | End: 2023-10-05

## 2023-10-04 RX ORDER — HEPARIN SODIUM 1000 [USP'U]/ML
4000 INJECTION, SOLUTION INTRAVENOUS; SUBCUTANEOUS ONCE
Status: COMPLETED | OUTPATIENT
Start: 2023-10-04 | End: 2023-10-04

## 2023-10-04 RX ORDER — INSULIN LISPRO 100 [IU]/ML
0-4 INJECTION, SOLUTION INTRAVENOUS; SUBCUTANEOUS
Status: DISCONTINUED | OUTPATIENT
Start: 2023-10-04 | End: 2023-10-05

## 2023-10-04 RX ORDER — ATORVASTATIN CALCIUM 20 MG/1
20 TABLET, FILM COATED ORAL DAILY
Status: ON HOLD | COMMUNITY
End: 2023-10-19 | Stop reason: SDUPTHER

## 2023-10-04 RX ORDER — LORAZEPAM 2 MG/ML
4 INJECTION INTRAMUSCULAR
Status: DISCONTINUED | OUTPATIENT
Start: 2023-10-04 | End: 2023-10-04

## 2023-10-04 RX ORDER — SODIUM CHLORIDE 9 MG/ML
INJECTION, SOLUTION INTRAVENOUS PRN
Status: DISCONTINUED | OUTPATIENT
Start: 2023-10-04 | End: 2023-10-08

## 2023-10-04 RX ORDER — ONDANSETRON 2 MG/ML
4 INJECTION INTRAMUSCULAR; INTRAVENOUS ONCE
Status: COMPLETED | OUTPATIENT
Start: 2023-10-04 | End: 2023-10-04

## 2023-10-04 RX ORDER — LORAZEPAM 1 MG/1
3 TABLET ORAL
Status: DISCONTINUED | OUTPATIENT
Start: 2023-10-04 | End: 2023-10-04

## 2023-10-04 RX ORDER — 0.9 % SODIUM CHLORIDE 0.9 %
2000 INTRAVENOUS SOLUTION INTRAVENOUS ONCE
Status: COMPLETED | OUTPATIENT
Start: 2023-10-04 | End: 2023-10-04

## 2023-10-04 RX ADMIN — VANCOMYCIN HYDROCHLORIDE 2000 MG: 10 INJECTION, POWDER, LYOPHILIZED, FOR SOLUTION INTRAVENOUS at 18:53

## 2023-10-04 RX ADMIN — ENOXAPARIN SODIUM 40 MG: 100 INJECTION SUBCUTANEOUS at 12:37

## 2023-10-04 RX ADMIN — ACETAMINOPHEN 650 MG: 325 TABLET ORAL at 12:51

## 2023-10-04 RX ADMIN — AMLODIPINE BESYLATE 5 MG: 5 TABLET ORAL at 11:20

## 2023-10-04 RX ADMIN — SODIUM CHLORIDE 1000 ML: 9 INJECTION, SOLUTION INTRAVENOUS at 09:20

## 2023-10-04 RX ADMIN — ASPIRIN 81 MG: 81 TABLET, CHEWABLE ORAL at 16:44

## 2023-10-04 RX ADMIN — SODIUM CHLORIDE 1000 ML: 9 INJECTION, SOLUTION INTRAVENOUS at 08:41

## 2023-10-04 RX ADMIN — SODIUM CHLORIDE, PRESERVATIVE FREE 10 ML: 5 INJECTION INTRAVENOUS at 20:31

## 2023-10-04 RX ADMIN — KIT FOR THE PREPARATION OF TECHNETIUM TC 99M ALBUMIN AGGREGATED 4.1 MILLICURIE: 2.5 INJECTION, POWDER, FOR SOLUTION INTRAVENOUS at 15:04

## 2023-10-04 RX ADMIN — POTASSIUM CHLORIDE 40 MEQ: 750 TABLET, FILM COATED, EXTENDED RELEASE ORAL at 12:37

## 2023-10-04 RX ADMIN — AMLODIPINE BESYLATE 5 MG: 5 TABLET ORAL at 17:43

## 2023-10-04 RX ADMIN — ATORVASTATIN CALCIUM 20 MG: 20 TABLET, FILM COATED ORAL at 17:43

## 2023-10-04 RX ADMIN — POTASSIUM CHLORIDE 10 MEQ: 7.46 INJECTION, SOLUTION INTRAVENOUS at 10:33

## 2023-10-04 RX ADMIN — POTASSIUM CHLORIDE 10 MEQ: 7.46 INJECTION, SOLUTION INTRAVENOUS at 11:39

## 2023-10-04 RX ADMIN — HEPARIN SODIUM 4000 UNITS: 1000 INJECTION INTRAVENOUS; SUBCUTANEOUS at 16:01

## 2023-10-04 RX ADMIN — SODIUM CHLORIDE: 9 INJECTION, SOLUTION INTRAVENOUS at 12:45

## 2023-10-04 RX ADMIN — PIPERACILLIN AND TAZOBACTAM 3375 MG: 3; .375 INJECTION, POWDER, LYOPHILIZED, FOR SOLUTION INTRAVENOUS at 23:17

## 2023-10-04 RX ADMIN — POTASSIUM CHLORIDE 40 MEQ: 750 TABLET, FILM COATED, EXTENDED RELEASE ORAL at 09:11

## 2023-10-04 RX ADMIN — ONDANSETRON 4 MG: 2 INJECTION INTRAMUSCULAR; INTRAVENOUS at 08:41

## 2023-10-04 RX ADMIN — LORAZEPAM 0.5 MG: 2 INJECTION INTRAMUSCULAR; INTRAVENOUS at 20:55

## 2023-10-04 RX ADMIN — SODIUM CHLORIDE 1000 MG: 900 INJECTION INTRAVENOUS at 12:37

## 2023-10-04 RX ADMIN — POTASSIUM CHLORIDE 10 MEQ: 7.46 INJECTION, SOLUTION INTRAVENOUS at 12:39

## 2023-10-04 RX ADMIN — PIPERACILLIN AND TAZOBACTAM 4500 MG: 4; .5 INJECTION, POWDER, LYOPHILIZED, FOR SOLUTION INTRAVENOUS at 17:38

## 2023-10-04 RX ADMIN — POTASSIUM CHLORIDE 10 MEQ: 7.46 INJECTION, SOLUTION INTRAVENOUS at 09:20

## 2023-10-04 RX ADMIN — ACETAMINOPHEN 650 MG: 650 SUPPOSITORY RECTAL at 19:41

## 2023-10-04 RX ADMIN — SODIUM CHLORIDE 2000 ML: 9 INJECTION, SOLUTION INTRAVENOUS at 11:27

## 2023-10-04 RX ADMIN — LISINOPRIL 20 MG: 20 TABLET ORAL at 11:20

## 2023-10-04 ASSESSMENT — ENCOUNTER SYMPTOMS
SINUS PAIN: 0
CHEST TIGHTNESS: 0
SHORTNESS OF BREATH: 0
VOMITING: 0
DIARRHEA: 1
SORE THROAT: 0
NAUSEA: 1
ABDOMINAL PAIN: 0
COUGH: 0

## 2023-10-04 ASSESSMENT — PAIN SCALES - GENERAL
PAINLEVEL_OUTOF10: 0

## 2023-10-04 NOTE — ED NOTES
Pharmacy called to state that the blue top that was drawn prior to heparin drip was hemolyzed. Krishna with [facility] in pharmacy and was told to hold off drawing another until the 6 hour aleksandr.      Eda Ramirez RN  10/04/23 1132

## 2023-10-04 NOTE — ED NOTES
Spoke with provider and confirmed to d/c additional fluids as sepsis fluids protocol met. Also discussed pt has had minimal urine output since fluids initiated. Per provider, bladder scan. Zev Domingo RN  10/04/23 1205    Bladder scan completed; 165 mL.      Zev Domingo RN  10/04/23 1202

## 2023-10-04 NOTE — ED NOTES
Spoke with MD Anahi regarding additional PO Potassium 40 mEq. Pt was ordered and administered previous at 0911. Provider confirmed to administer additional dose.      Fredo Angelo RN  10/04/23 3472

## 2023-10-04 NOTE — Clinical Note
RAPID RESPONSE TEAM    Overhead rapid response paged to room #2110 at ****     Reason for rapid response: AMS, hypoxia     Initial assessment: Pt in bed, spo2 80's noted on monitor, pt tachypnic, briefly awakened to voice. Respiratory placing patient on NRB, spo2 improved. Per RN pt recently transferred from ED, became progressively weaker and lethargic before becoming hypoxic. Dr. Lexx Carney at bedside, orders received for the following Interventions:    Outcome:  pt to remain in room #     Please call with any questions or concerns      Rapid Response Team  Ext [phone]    Recent Results (from the past 8 hour(s))   POCT Blood Gas & Electrolytes    Collection Time: 10/04/23 11:55 AM   Result Value Ref Range    PH, VENOUS (POC) 7.39 7.32 - 7.42      PCO2, Rosy, POC 36.9 (L) 41 - 51 MMHG    PO2, VENOUS (POC) 31 25 - 40 mmHg    HCO3, Arterial 22 mmol/L    BASE DEFICIT (POC) 2.1 mmol/L    POC O2 SAT 60 %    POC Sodium 133 (L) 136 - 145 MMOL/L    POC Potassium 3.5 3.5 - 5.5 MMOL/L    POC Chloride 98 (L) 100 - 108 MMOL/L    POC TCO2 22 19 - 24 MMOL/L    Anion Gap, POC 13 10 - 20      POC Glucose 171 (H) 74 - 106 MG/DL    POC Creatinine 1.9 (H) 0.6 - 1.3 MG/DL    eGFR, POC 38 (L) >60 ml/min/1.73m2    POC Ionized Calcium 1.02 (L) 1.12 - 1.32 mmol/L    POC Lactic Acid 2.00 0.40 - 2.00 mmol/L    Source VENOUS BLOOD     Procalcitonin    Collection Time: 10/04/23 12:05 PM   Result Value Ref Range    Procalcitonin 0.22 ng/mL   Troponin    Collection Time: 10/04/23 12:05 PM   Result Value Ref Range    Troponin, High Sensitivity 4,260 (HH) 0 - 76 ng/L   Extra Tubes Hold    Collection Time: 10/04/23 12:11 PM   Result Value Ref Range    Specimen HOld LAV,SST     Comment:        Add-on orders for these samples will be processed based on acceptable specimen integrity and analyte stability, which may vary by analyte.    COVID-19, Rapid    Collection Time: 10/04/23  1:12 PM    Specimen: Nasopharyngeal   Result Value Ref Range    Source

## 2023-10-04 NOTE — ED NOTES
Sent the following message to provider:    Lab called with critical Troponin of 4,260. Domingo Sanchez RN  10/04/23 5061      Provider responded with an order for a cardiology consult.      Domingo Sanchez RN  10/04/23 8171

## 2023-10-04 NOTE — ED NOTES
TRANSFER - OUT REPORT:    Verbal report given to CHARIS Hutton on Gurjit Yo  being transferred to [facility] for routine progression of patient care       Report consisted of patient's Situation, Background, Assessment and   Recommendations(SBAR). Information from the following report(s) Nurse Handoff Report, ED Encounter Summary, ED SBAR, and [facility] was reviewed with the receiving nurse. [place] Fall Assessment:    Presents to emergency department  because of falls (Syncope, seizure, or loss of consciousness): No  Age > 70: No  Altered Mental Status, Intoxication with alcohol or substance confusion (Disorientation, impaired judgment, poor safety awaremess, or inability to follow instructions): No  Impaired Mobility: Ambulates or transfers with assistive devices or assistance; Unable to ambulate or transer.: No  Nursing Judgement: No          Lines:   Peripheral IV 10/04/23 Right Antecubital (Active)       Peripheral IV 10/04/23 Left Antecubital (Active)   Site Assessment Clean, dry & intact 10/04/23 1607   Line Status Blood return noted 10/04/23 1607   Phlebitis Assessment No symptoms 10/04/23 1607   Infiltration Assessment 0 10/04/23 1607   Dressing Status New dressing applied 10/04/23 1607        Opportunity for questions and clarification was provided.       Patient transported with:  Monitor, O2 @ 2lpm, and Registered Nurse           Esthela Green RN  10/04/23 9793

## 2023-10-04 NOTE — ED PROVIDER NOTES
Physician in the absence of a cardiologist.  Please see their note for interpretation of EKG. RADIOLOGY:  Non-plain film images such as CT, Ultrasound and MRI are read by the radiologist. Plain radiographic images are visualized and preliminarily interpreted by the ED Provider with the below findings:     Interpretation per the Radiologist below, if available at the time of this note:     NM LUNG SCAN PERFUSION ONLY    Result Date: 10/4/2023  INDICATION:  Sepsis, unspecified organism / Reason for exam:->elev dimer elev dimer EXAM: Nuclear perfusion lung scan is performed with planar scintigraphic pulmonary imaging in multiple projections with 4.1 mCi Tc 99m MAA IV. COMPARISON: CXR, same date. FINDINGS: Pulmonary perfusion is relatively uniform. There are no well-defined wedge shaped segmental or subsegmental sized defects. Low probability for pulmonary embolism. XR CHEST PORTABLE    Result Date: 10/4/2023  EXAM: XR CHEST PORTABLE INDICATION: Weakness COMPARISON: 3/19/2015 TECHNIQUE: AP portable upright view of the chest. FINDINGS: The lungs are hypoinflated, resulting in accentuation of lung markings. No definite acute infiltrates. The cardiomediastinal configuration is within normal limits. No acute bony abnormalities. No acute cardiopulmonary abnormalities. Lungs are hypoinflated.        PROCEDURES   Unless otherwise noted below, none  Procedures     CRITICAL CARE TIME   CRITICAL CARE NOTE :    3:49 PM    IMPENDING DETERIORATION -Metabolic  ASSOCIATED RISK FACTORS - Shock, Metabolic changes, and Dehydration  MANAGEMENT- Bedside Assessment and Supervision of Care  INTERPRETATION -  Xrays, Blood Gases, ECG, Blood Pressure, and Cardiac Output Measures   INTERVENTIONS - hemodynamic mgmt and Metabolic interventions  CASE REVIEW - Hospitalist/Intensivist, Nursing, and Family  TREATMENT RESPONSE -Stable  PERFORMED BY - Self    NOTES   :  I have spent 30 minutes of critical care time involved in lab

## 2023-10-04 NOTE — ED NOTES
Provider sent the following perfect serve:    Repeat trop was 6791 cardiolology consulted. Demarcus Caro RN  10/04/23 6775    Dr. Brooke Arthur responded with dw cards, recommended heparin drip.        Demarcus Caro RN  10/04/23 8585

## 2023-10-04 NOTE — ED NOTES
Pt O2 dropped between 86-88%. Pt placed on 2L NC. Provider aware.      Antonio Machado RN  10/04/23 1862

## 2023-10-04 NOTE — ED NOTES
Perfect Served MD Christian:    \"Pt temp was 98.7 at 3:28 pm. In process of getting pt ready for transport noticed pt was visibly shaking, less arousable, and felt warm. Oral temp 100. 0. \"       Temo Bahena RN  10/04/23 6669    MD Christian aware of sudden onset of changes in pt status. Provider eval'd pt prior to transport. Pt transported to unit. Unit CHARIS Irvin made aware of sudden onset of changes in pt status as well.       Temo Bahena RN  10/04/23 4058

## 2023-10-05 ENCOUNTER — APPOINTMENT (OUTPATIENT)
Facility: HOSPITAL | Age: 68
DRG: 853 | End: 2023-10-05
Attending: INTERNAL MEDICINE
Payer: COMMERCIAL

## 2023-10-05 ENCOUNTER — APPOINTMENT (OUTPATIENT)
Facility: HOSPITAL | Age: 68
DRG: 853 | End: 2023-10-05
Payer: COMMERCIAL

## 2023-10-05 ENCOUNTER — HOSPITAL ENCOUNTER (INPATIENT)
Facility: HOSPITAL | Age: 68
Discharge: HOME | DRG: 853 | End: 2023-10-08
Payer: COMMERCIAL

## 2023-10-05 LAB
-: NORMAL
ALBUMIN SERPL-MCNC: 1.9 G/DL (ref 3.5–5)
ALBUMIN SERPL-MCNC: 1.9 G/DL (ref 3.5–5)
ALBUMIN SERPL-MCNC: 2.2 G/DL (ref 3.5–5)
ALBUMIN/GLOB SERPL: 0.5 (ref 1.1–2.2)
ALBUMIN/GLOB SERPL: 0.5 (ref 1.1–2.2)
ALBUMIN/GLOB SERPL: 0.6 (ref 1.1–2.2)
ALP SERPL-CCNC: 45 U/L (ref 45–117)
ALP SERPL-CCNC: 50 U/L (ref 45–117)
ALP SERPL-CCNC: 59 U/L (ref 45–117)
ALT SERPL-CCNC: 102 U/L (ref 12–78)
ALT SERPL-CCNC: 116 U/L (ref 12–78)
ALT SERPL-CCNC: 120 U/L (ref 12–78)
AMMONIA PLAS-SCNC: 25 UMOL/L
AMPHET UR QL SCN: NEGATIVE
ANION GAP SERPL CALC-SCNC: 6 MMOL/L (ref 5–15)
ANION GAP SERPL CALC-SCNC: 7 MMOL/L (ref 5–15)
ANION GAP SERPL CALC-SCNC: 8 MMOL/L (ref 5–15)
APPEARANCE CSF: ABNORMAL
APPEARANCE CSF: ABNORMAL
APPEARANCE UR: ABNORMAL
APPEARANCE UR: CLEAR
ARTERIAL PATENCY WRIST A: POSITIVE
ARTERIAL PATENCY WRIST A: YES
AST SERPL-CCNC: 370 U/L (ref 15–37)
AST SERPL-CCNC: 404 U/L (ref 15–37)
AST SERPL-CCNC: 414 U/L (ref 15–37)
B PERT DNA SPEC QL NAA+PROBE: NOT DETECTED
BACTERIA URNS QL MICRO: NEGATIVE /HPF
BARBITURATES UR QL SCN: NEGATIVE
BASE DEFICIT BLD-SCNC: 4 MMOL/L
BASE DEFICIT BLDA-SCNC: 5.4 MMOL/L
BASOPHILS # BLD: 0.1 K/UL (ref 0–0.1)
BASOPHILS NFR BLD: 0 % (ref 0–1)
BDY SITE: ABNORMAL
BDY SITE: ABNORMAL
BENZODIAZ UR QL: NEGATIVE
BILIRUB DIRECT SERPL-MCNC: 0.4 MG/DL (ref 0–0.2)
BILIRUB SERPL-MCNC: 0.8 MG/DL (ref 0.2–1)
BILIRUB SERPL-MCNC: 0.9 MG/DL (ref 0.2–1)
BILIRUB SERPL-MCNC: 1.7 MG/DL (ref 0.2–1)
BILIRUB UR QL: NEGATIVE
BILIRUB UR QL: NEGATIVE
BORDETELLA PARAPERTUSSIS BY PCR: NOT DETECTED
BUN SERPL-MCNC: 21 MG/DL (ref 6–20)
BUN SERPL-MCNC: 21 MG/DL (ref 6–20)
BUN SERPL-MCNC: 26 MG/DL (ref 6–20)
BUN SERPL-MCNC: 27 MG/DL (ref 6–20)
BUN SERPL-MCNC: 29 MG/DL (ref 6–20)
BUN/CREAT SERPL: 10 (ref 12–20)
BUN/CREAT SERPL: 11 (ref 12–20)
BUN/CREAT SERPL: 12 (ref 12–20)
BUN/CREAT SERPL: 13 (ref 12–20)
BUN/CREAT SERPL: 9 (ref 12–20)
C GATTII+NEOFOR DNA CSF QL NAA+NON-PROBE: NOT DETECTED
C PNEUM DNA SPEC QL NAA+PROBE: NOT DETECTED
CALCIUM SERPL-MCNC: 6.7 MG/DL (ref 8.5–10.1)
CALCIUM SERPL-MCNC: 6.7 MG/DL (ref 8.5–10.1)
CALCIUM SERPL-MCNC: 6.8 MG/DL (ref 8.5–10.1)
CALCIUM SERPL-MCNC: 6.9 MG/DL (ref 8.5–10.1)
CALCIUM SERPL-MCNC: 7.5 MG/DL (ref 8.5–10.1)
CANNABINOIDS UR QL SCN: NEGATIVE
CHLORIDE SERPL-SCNC: 106 MMOL/L (ref 97–108)
CHLORIDE SERPL-SCNC: 107 MMOL/L (ref 97–108)
CHLORIDE SERPL-SCNC: 107 MMOL/L (ref 97–108)
CHLORIDE SERPL-SCNC: 108 MMOL/L (ref 97–108)
CHLORIDE SERPL-SCNC: 111 MMOL/L (ref 97–108)
CK SERPL-CCNC: ABNORMAL U/L (ref 39–308)
CMV DNA CSF QL NAA+NON-PROBE: NOT DETECTED
CO2 SERPL-SCNC: 19 MMOL/L (ref 21–32)
CO2 SERPL-SCNC: 21 MMOL/L (ref 21–32)
CO2 SERPL-SCNC: 25 MMOL/L (ref 21–32)
CO2 SERPL-SCNC: 25 MMOL/L (ref 21–32)
CO2 SERPL-SCNC: 26 MMOL/L (ref 21–32)
COCAINE UR QL SCN: NEGATIVE
COLOR CSF: COLORLESS
COLOR CSF: COLORLESS
COLOR UR: ABNORMAL
COLOR UR: ABNORMAL
COMMENT:: NORMAL
COMMENT:: NORMAL
CREAT SERPL-MCNC: 2.13 MG/DL (ref 0.7–1.3)
CREAT SERPL-MCNC: 2.24 MG/DL (ref 0.7–1.3)
CREAT SERPL-MCNC: 2.29 MG/DL (ref 0.7–1.3)
CREAT SERPL-MCNC: 2.31 MG/DL (ref 0.7–1.3)
CREAT SERPL-MCNC: 2.33 MG/DL (ref 0.7–1.3)
CRP SERPL-MCNC: <0.29 MG/DL (ref 0–0.6)
DIFFERENTIAL METHOD BLD: ABNORMAL
E COLI K1 DNA CSF QL NAA+NON-PROBE: NOT DETECTED
ECHO AO ASC DIAM: 3.3 CM
ECHO AO ASCENDING AORTA INDEX: 1.72 CM/M2
ECHO AV AREA PEAK VELOCITY: 1 CM2
ECHO AV AREA VTI: 1.3 CM2
ECHO AV AREA/BSA PEAK VELOCITY: 0.5 CM2/M2
ECHO AV AREA/BSA VTI: 0.7 CM2/M2
ECHO AV MEAN GRADIENT: 27 MMHG
ECHO AV MEAN VELOCITY: 2.5 M/S
ECHO AV PEAK GRADIENT: 45 MMHG
ECHO AV PEAK VELOCITY: 3.4 M/S
ECHO AV VELOCITY RATIO: 0.32
ECHO AV VTI: 48.3 CM
ECHO BSA: 1.95 M2
ECHO LA DIAMETER INDEX: 2.34 CM/M2
ECHO LA DIAMETER: 4.5 CM
ECHO LA VOL 2C: 56 ML (ref 18–58)
ECHO LA VOL 2C: 58 ML (ref 18–58)
ECHO LA VOL 4C: 57 ML (ref 18–58)
ECHO LA VOL 4C: 63 ML (ref 18–58)
ECHO LA VOLUME AREA LENGTH: 62 ML
ECHO LA VOLUME INDEX AREA LENGTH: 32 ML/M2 (ref 16–34)
ECHO LV E' LATERAL VELOCITY: 8 CM/S
ECHO LV E' SEPTAL VELOCITY: 5 CM/S
ECHO LV EDV A4C: 105 ML
ECHO LV EDV INDEX A4C: 55 ML/M2
ECHO LV EJECTION FRACTION A4C: 52 %
ECHO LV ESV A4C: 50 ML
ECHO LV ESV INDEX A4C: 26 ML/M2
ECHO LV FRACTIONAL SHORTENING: 24 % (ref 28–44)
ECHO LV INTERNAL DIMENSION DIASTOLE INDEX: 2.55 CM/M2
ECHO LV INTERNAL DIMENSION DIASTOLIC: 4.9 CM (ref 4.2–5.9)
ECHO LV INTERNAL DIMENSION SYSTOLIC INDEX: 1.93 CM/M2
ECHO LV INTERNAL DIMENSION SYSTOLIC: 3.7 CM
ECHO LV IVSD: 1.5 CM (ref 0.6–1)
ECHO LV MASS 2D: 282.6 G (ref 88–224)
ECHO LV MASS INDEX 2D: 147.2 G/M2 (ref 49–115)
ECHO LV POSTERIOR WALL DIASTOLIC: 1.3 CM (ref 0.6–1)
ECHO LV RELATIVE WALL THICKNESS RATIO: 0.53
ECHO LVOT AREA: 3.1 CM2
ECHO LVOT AV VTI INDEX: 0.4
ECHO LVOT DIAM: 2 CM
ECHO LVOT MEAN GRADIENT: 2 MMHG
ECHO LVOT PEAK GRADIENT: 5 MMHG
ECHO LVOT PEAK VELOCITY: 1.1 M/S
ECHO LVOT STROKE VOLUME INDEX: 31.7 ML/M2
ECHO LVOT SV: 60.9 ML
ECHO LVOT VTI: 19.4 CM
ECHO MV AREA VTI: 3.5 CM2
ECHO MV LVOT VTI INDEX: 0.9
ECHO MV MAX VELOCITY: 1.2 M/S
ECHO MV MEAN GRADIENT: 3 MMHG
ECHO MV MEAN VELOCITY: 0.9 M/S
ECHO MV PEAK GRADIENT: 6 MMHG
ECHO MV VTI: 17.5 CM
ECHO RV FREE WALL PEAK S': 29 CM/S
ECHO RV TAPSE: 2.2 CM (ref 1.7–?)
ECHO TV REGURGITANT MAX VELOCITY: 2.53 M/S
ECHO TV REGURGITANT PEAK GRADIENT: 26 MMHG
EOSINOPHIL # BLD: 0 K/UL (ref 0–0.4)
EOSINOPHIL NFR BLD: 0 % (ref 0–7)
EPITH CASTS URNS QL MICRO: ABNORMAL /LPF
ERYTHROCYTE [DISTWIDTH] IN BLOOD BY AUTOMATED COUNT: 12.6 % (ref 11.5–14.5)
ERYTHROCYTE [DISTWIDTH] IN BLOOD BY AUTOMATED COUNT: 13.1 % (ref 11.5–14.5)
ERYTHROCYTE [SEDIMENTATION RATE] IN BLOOD: 49 MM/HR (ref 0–20)
EV RNA CSF QL NAA+NON-PROBE: NOT DETECTED
FLUAV SUBTYP SPEC NAA+PROBE: NOT DETECTED
FLUBV RNA SPEC QL NAA+PROBE: NOT DETECTED
GAS FLOW.O2 O2 DELIVERY SYS: ABNORMAL
GLOBULIN SER CALC-MCNC: 3.3 G/DL (ref 2–4)
GLOBULIN SER CALC-MCNC: 3.9 G/DL (ref 2–4)
GLOBULIN SER CALC-MCNC: 4.4 G/DL (ref 2–4)
GLUCOSE BLD STRIP.AUTO-MCNC: 130 MG/DL (ref 65–117)
GLUCOSE BLD STRIP.AUTO-MCNC: 131 MG/DL (ref 65–117)
GLUCOSE BLD STRIP.AUTO-MCNC: 153 MG/DL (ref 65–117)
GLUCOSE CSF-MCNC: 68 MG/DL (ref 40–70)
GLUCOSE SERPL-MCNC: 113 MG/DL (ref 65–100)
GLUCOSE SERPL-MCNC: 119 MG/DL (ref 65–100)
GLUCOSE SERPL-MCNC: 125 MG/DL (ref 65–100)
GLUCOSE SERPL-MCNC: 159 MG/DL (ref 65–100)
GLUCOSE SERPL-MCNC: 162 MG/DL (ref 65–100)
GLUCOSE UR STRIP.AUTO-MCNC: 250 MG/DL
GLUCOSE UR STRIP.AUTO-MCNC: 500 MG/DL
GP B STREP DNA CSF QL NAA+NON-PROBE: NOT DETECTED
HADV DNA SPEC QL NAA+PROBE: NOT DETECTED
HAEM INFLU DNA CSF QL NAA+NON-PROBE: NOT DETECTED
HAV IGM SER QL: NONREACTIVE
HAV IGM SER QL: NONREACTIVE
HBV CORE IGM SER QL: NONREACTIVE
HBV SURFACE AG SER QL: <0.1 INDEX
HBV SURFACE AG SER QL: NEGATIVE
HCO3 BLD-SCNC: 19.9 MMOL/L (ref 22–26)
HCO3 BLDA-SCNC: 18 MMOL/L (ref 22–26)
HCOV 229E RNA SPEC QL NAA+PROBE: NOT DETECTED
HCOV HKU1 RNA SPEC QL NAA+PROBE: NOT DETECTED
HCOV NL63 RNA SPEC QL NAA+PROBE: NOT DETECTED
HCOV OC43 RNA SPEC QL NAA+PROBE: NOT DETECTED
HCT VFR BLD AUTO: 33.4 % (ref 36.6–50.3)
HCT VFR BLD AUTO: 40.3 % (ref 36.6–50.3)
HCV AB SER IA-ACNC: <0.02 INDEX
HCV AB SERPL QL IA: NONREACTIVE
HGB BLD-MCNC: 11.7 G/DL (ref 12.1–17)
HGB BLD-MCNC: 14.2 G/DL (ref 12.1–17)
HGB UR QL STRIP: ABNORMAL
HGB UR QL STRIP: ABNORMAL
HHV6 DNA CSF QL NAA+NON-PROBE: NOT DETECTED
HIV 1+2 AB+HIV1 P24 AG SERPL QL IA: NONREACTIVE
HIV 1/2 RESULT COMMENT: NORMAL
HMPV RNA SPEC QL NAA+PROBE: NOT DETECTED
HPIV1 RNA SPEC QL NAA+PROBE: NOT DETECTED
HPIV2 RNA SPEC QL NAA+PROBE: NOT DETECTED
HPIV3 RNA SPEC QL NAA+PROBE: NOT DETECTED
HPIV4 RNA SPEC QL NAA+PROBE: NOT DETECTED
HSV1 DNA CSF QL NAA+PROBE: NOT DETECTED
HSV2 DNA CSF QL NAA+NON-PROBE: NOT DETECTED
IMM GRANULOCYTES # BLD AUTO: 0.1 K/UL (ref 0–0.04)
IMM GRANULOCYTES NFR BLD AUTO: 0 % (ref 0–0.5)
INR PPP: 1.1 (ref 0.9–1.1)
KETONES UR QL STRIP.AUTO: ABNORMAL MG/DL
KETONES UR QL STRIP.AUTO: NEGATIVE MG/DL
L MONOCYTOG DNA CSF QL NAA+NON-PROBE: NOT DETECTED
LACTATE SERPL-SCNC: 1.6 MMOL/L (ref 0.4–2)
LACTATE SERPL-SCNC: 1.9 MMOL/L (ref 0.4–2)
LEUKOCYTE ESTERASE UR QL STRIP.AUTO: ABNORMAL
LEUKOCYTE ESTERASE UR QL STRIP.AUTO: NEGATIVE
LYMPHOCYTES # BLD: 2.5 K/UL (ref 0.8–3.5)
LYMPHOCYTES NFR BLD: 15 % (ref 12–49)
LYMPHOCYTES NFR CSF MANUAL: 22 %
LYMPHOCYTES NFR CSF MANUAL: 25 % (ref 28–96)
Lab: NORMAL
M PNEUMO DNA SPEC QL NAA+PROBE: NOT DETECTED
MAGNESIUM SERPL-MCNC: 1.8 MG/DL (ref 1.6–2.4)
MAGNESIUM SERPL-MCNC: 1.9 MG/DL (ref 1.6–2.4)
MCH RBC QN AUTO: 28.3 PG (ref 26–34)
MCH RBC QN AUTO: 28.9 PG (ref 26–34)
MCHC RBC AUTO-ENTMCNC: 35 G/DL (ref 30–36.5)
MCHC RBC AUTO-ENTMCNC: 35.2 G/DL (ref 30–36.5)
MCV RBC AUTO: 80.7 FL (ref 80–99)
MCV RBC AUTO: 81.9 FL (ref 80–99)
METHADONE UR QL: NEGATIVE
MONOCYTES # BLD: 1.6 K/UL (ref 0–1)
MONOCYTES NFR BLD: 9 % (ref 5–13)
MONOCYTES NFR CSF MANUAL: 6 % (ref 16–56)
MONOCYTES NFR CSF MANUAL: 7 %
MONONUC CELLS NFR CSF MANUAL: 1 %
MONONUC CELLS NFR CSF MANUAL: 1 %
N MEN DNA CSF QL NAA+NON-PROBE: NOT DETECTED
NEUTROPHILS NFR CSF MANUAL: 68 % (ref 0–7)
NEUTROPHILS NFR CSF MANUAL: 70 %
NEUTS SEG # BLD: 13.3 K/UL (ref 1.8–8)
NEUTS SEG NFR BLD: 76 % (ref 32–75)
NITRITE UR QL STRIP.AUTO: NEGATIVE
NITRITE UR QL STRIP.AUTO: NEGATIVE
NRBC # BLD: 0 K/UL (ref 0–0.01)
NRBC # BLD: 0 K/UL (ref 0–0.01)
NRBC BLD-RTO: 0 PER 100 WBC
NRBC BLD-RTO: 0 PER 100 WBC
O2/TOTAL GAS SETTING VFR VENT: 10 %
OPIATES UR QL: NEGATIVE
PARECHOVIRUS A RNA CSF QL NAA+NON-PROBE: NOT DETECTED
PCO2 BLD: 32.2 MMHG (ref 35–45)
PCO2 BLDA: 29 MMHG (ref 35–45)
PCP UR QL: NEGATIVE
PH BLD: 7.4 (ref 7.35–7.45)
PH BLDA: 7.41 (ref 7.35–7.45)
PH UR STRIP: 6 (ref 5–8)
PH UR STRIP: 6.5 (ref 5–8)
PHOSPHATE SERPL-MCNC: 2.1 MG/DL (ref 2.6–4.7)
PLATELET # BLD AUTO: 204 K/UL (ref 150–400)
PLATELET # BLD AUTO: 253 K/UL (ref 150–400)
PMV BLD AUTO: 11.1 FL (ref 8.9–12.9)
PMV BLD AUTO: 11.4 FL (ref 8.9–12.9)
PO2 BLD: 98 MMHG (ref 80–100)
PO2 BLDA: 147 MMHG (ref 80–100)
POTASSIUM SERPL-SCNC: 3.5 MMOL/L (ref 3.5–5.1)
POTASSIUM SERPL-SCNC: 3.6 MMOL/L (ref 3.5–5.1)
POTASSIUM SERPL-SCNC: 3.7 MMOL/L (ref 3.5–5.1)
POTASSIUM SERPL-SCNC: 3.8 MMOL/L (ref 3.5–5.1)
POTASSIUM SERPL-SCNC: 4 MMOL/L (ref 3.5–5.1)
PROCALCITONIN SERPL-MCNC: 0.25 NG/ML
PROCALCITONIN SERPL-MCNC: 0.79 NG/ML
PROT CSF-MCNC: 109 MG/DL (ref 15–45)
PROT SERPL-MCNC: 5.2 G/DL (ref 6.4–8.2)
PROT SERPL-MCNC: 5.8 G/DL (ref 6.4–8.2)
PROT SERPL-MCNC: 6.6 G/DL (ref 6.4–8.2)
PROT UR STRIP-MCNC: >300 MG/DL
PROT UR STRIP-MCNC: >300 MG/DL
PROTHROMBIN TIME: 11.6 SEC (ref 9–11.1)
RBC # BLD AUTO: 4.14 M/UL (ref 4.1–5.7)
RBC # BLD AUTO: 4.92 M/UL (ref 4.1–5.7)
RBC # CSF: 28 /CU MM
RBC # CSF: 35 /CU MM
RBC #/AREA URNS HPF: >100 /HPF (ref 0–5)
RSV RNA SPEC QL NAA+PROBE: NOT DETECTED
RV+EV RNA SPEC QL NAA+PROBE: NOT DETECTED
S PNEUM DNA CSF QL NAA+NON-PROBE: NOT DETECTED
SAO2 % BLD: 97.7 % (ref 92–97)
SAO2 % BLD: 99 % (ref 92–97)
SAO2% DEVICE SAO2% SENSOR NAME: ABNORMAL
SARS-COV-2 RNA RESP QL NAA+PROBE: NOT DETECTED
SERVICE CMNT-IMP: ABNORMAL
SODIUM SERPL-SCNC: 136 MMOL/L (ref 136–145)
SODIUM SERPL-SCNC: 137 MMOL/L (ref 136–145)
SODIUM SERPL-SCNC: 139 MMOL/L (ref 136–145)
SODIUM SERPL-SCNC: 140 MMOL/L (ref 136–145)
SODIUM SERPL-SCNC: 140 MMOL/L (ref 136–145)
SP GR UR REFRACTOMETRY: 1.02
SP GR UR REFRACTOMETRY: 1.02 (ref 1–1.03)
SPECIMEN HOLD: NORMAL
SPECIMEN HOLD: NORMAL
SPECIMEN SITE: ABNORMAL
SPECIMEN TYPE: ABNORMAL
TROPONIN I SERPL HS-MCNC: 4244 NG/L (ref 0–76)
TROPONIN I SERPL HS-MCNC: 4781 NG/L (ref 0–76)
TROPONIN I SERPL HS-MCNC: 5522 NG/L (ref 0–76)
TUBE # CSF: 1
TUBE # CSF: 1
TUBE # CSF: 4
TUBE # CSF: 4
UFH PPP CHRO-ACNC: <0.1 IU/ML
URINE CULTURE IF INDICATED: ABNORMAL
UROBILINOGEN UR QL STRIP.AUTO: 0.2 EU/DL (ref 0.2–1)
UROBILINOGEN UR QL STRIP.AUTO: 1 EU/DL (ref 0.2–1)
VZV DNA CSF QL NAA+NON-PROBE: NOT DETECTED
WBC # BLD AUTO: 16.9 K/UL (ref 4.1–11.1)
WBC # BLD AUTO: 17.6 K/UL (ref 4.1–11.1)
WBC # CSF: 144 /CU MM (ref 0–5)
WBC # CSF: 335 /CU MM (ref 0–5)
WBC URNS QL MICRO: ABNORMAL /HPF (ref 0–4)

## 2023-10-05 PROCEDURE — 2580000003 HC RX 258: Performed by: NURSE PRACTITIONER

## 2023-10-05 PROCEDURE — 2700000000 HC OXYGEN THERAPY PER DAY

## 2023-10-05 PROCEDURE — 84484 ASSAY OF TROPONIN QUANT: CPT

## 2023-10-05 PROCEDURE — 6360000002 HC RX W HCPCS: Performed by: HOSPITALIST

## 2023-10-05 PROCEDURE — 6360000002 HC RX W HCPCS: Performed by: INTERNAL MEDICINE

## 2023-10-05 PROCEDURE — 87476 LYME DIS DNA AMP PROBE: CPT

## 2023-10-05 PROCEDURE — 82140 ASSAY OF AMMONIA: CPT

## 2023-10-05 PROCEDURE — 87205 SMEAR GRAM STAIN: CPT

## 2023-10-05 PROCEDURE — 82962 GLUCOSE BLOOD TEST: CPT

## 2023-10-05 PROCEDURE — 6370000000 HC RX 637 (ALT 250 FOR IP): Performed by: INTERNAL MEDICINE

## 2023-10-05 PROCEDURE — 86709 HEPATITIS A IGM ANTIBODY: CPT

## 2023-10-05 PROCEDURE — 80076 HEPATIC FUNCTION PANEL: CPT

## 2023-10-05 PROCEDURE — 0202U NFCT DS 22 TRGT SARS-COV-2: CPT

## 2023-10-05 PROCEDURE — 82945 GLUCOSE OTHER FLUID: CPT

## 2023-10-05 PROCEDURE — 87449 NOS EACH ORGANISM AG IA: CPT

## 2023-10-05 PROCEDURE — 80048 BASIC METABOLIC PNL TOTAL CA: CPT

## 2023-10-05 PROCEDURE — 74018 RADEX ABDOMEN 1 VIEW: CPT

## 2023-10-05 PROCEDURE — 81001 URINALYSIS AUTO W/SCOPE: CPT

## 2023-10-05 PROCEDURE — 84145 PROCALCITONIN (PCT): CPT

## 2023-10-05 PROCEDURE — 36556 INSERT NON-TUNNEL CV CATH: CPT

## 2023-10-05 PROCEDURE — 85025 COMPLETE CBC W/AUTO DIFF WBC: CPT

## 2023-10-05 PROCEDURE — 2500000003 HC RX 250 WO HCPCS: Performed by: INTERNAL MEDICINE

## 2023-10-05 PROCEDURE — 88108 CYTOPATH CONCENTRATE TECH: CPT

## 2023-10-05 PROCEDURE — 86140 C-REACTIVE PROTEIN: CPT

## 2023-10-05 PROCEDURE — 80053 COMPREHEN METABOLIC PANEL: CPT

## 2023-10-05 PROCEDURE — 82550 ASSAY OF CK (CPK): CPT

## 2023-10-05 PROCEDURE — 87389 HIV-1 AG W/HIV-1&-2 AB AG IA: CPT

## 2023-10-05 PROCEDURE — 87483 CNS DNA AMP PROBE TYPE 12-25: CPT

## 2023-10-05 PROCEDURE — 6360000002 HC RX W HCPCS: Performed by: NURSE PRACTITIONER

## 2023-10-05 PROCEDURE — 85652 RBC SED RATE AUTOMATED: CPT

## 2023-10-05 PROCEDURE — 86789 WEST NILE VIRUS ANTIBODY: CPT

## 2023-10-05 PROCEDURE — 009U3ZX DRAINAGE OF SPINAL CANAL, PERCUTANEOUS APPROACH, DIAGNOSTIC: ICD-10-PCS | Performed by: STUDENT IN AN ORGANIZED HEALTH CARE EDUCATION/TRAINING PROGRAM

## 2023-10-05 PROCEDURE — 02HV33Z INSERTION OF INFUSION DEVICE INTO SUPERIOR VENA CAVA, PERCUTANEOUS APPROACH: ICD-10-PCS | Performed by: INTERNAL MEDICINE

## 2023-10-05 PROCEDURE — 36415 COLL VENOUS BLD VENIPUNCTURE: CPT

## 2023-10-05 PROCEDURE — 86592 SYPHILIS TEST NON-TREP QUAL: CPT

## 2023-10-05 PROCEDURE — 2580000003 HC RX 258: Performed by: INTERNAL MEDICINE

## 2023-10-05 PROCEDURE — 85027 COMPLETE CBC AUTOMATED: CPT

## 2023-10-05 PROCEDURE — 87040 BLOOD CULTURE FOR BACTERIA: CPT

## 2023-10-05 PROCEDURE — 85610 PROTHROMBIN TIME: CPT

## 2023-10-05 PROCEDURE — 2000000000 HC ICU R&B

## 2023-10-05 PROCEDURE — 80074 ACUTE HEPATITIS PANEL: CPT

## 2023-10-05 PROCEDURE — 86788 WEST NILE VIRUS AB IGM: CPT

## 2023-10-05 PROCEDURE — 86780 TREPONEMA PALLIDUM: CPT

## 2023-10-05 PROCEDURE — 89050 BODY FLUID CELL COUNT: CPT

## 2023-10-05 PROCEDURE — 83735 ASSAY OF MAGNESIUM: CPT

## 2023-10-05 PROCEDURE — 84100 ASSAY OF PHOSPHORUS: CPT

## 2023-10-05 PROCEDURE — 36600 WITHDRAWAL OF ARTERIAL BLOOD: CPT

## 2023-10-05 PROCEDURE — 71045 X-RAY EXAM CHEST 1 VIEW: CPT

## 2023-10-05 PROCEDURE — 87070 CULTURE OTHR SPECIMN AEROBIC: CPT

## 2023-10-05 PROCEDURE — 82803 BLOOD GASES ANY COMBINATION: CPT

## 2023-10-05 PROCEDURE — 82164 ANGIOTENSIN I ENZYME TEST: CPT

## 2023-10-05 PROCEDURE — 2580000003 HC RX 258: Performed by: HOSPITALIST

## 2023-10-05 PROCEDURE — 86618 LYME DISEASE ANTIBODY: CPT

## 2023-10-05 PROCEDURE — 87255 GENET VIRUS ISOLATE HSV: CPT

## 2023-10-05 PROCEDURE — 62328 DX LMBR SPI PNXR W/FLUOR/CT: CPT

## 2023-10-05 PROCEDURE — 85520 HEPARIN ASSAY: CPT

## 2023-10-05 PROCEDURE — 80307 DRUG TEST PRSMV CHEM ANLYZR: CPT

## 2023-10-05 PROCEDURE — 84157 ASSAY OF PROTEIN OTHER: CPT

## 2023-10-05 PROCEDURE — 93306 TTE W/DOPPLER COMPLETE: CPT

## 2023-10-05 PROCEDURE — 2500000003 HC RX 250 WO HCPCS: Performed by: NURSE PRACTITIONER

## 2023-10-05 PROCEDURE — 83605 ASSAY OF LACTIC ACID: CPT

## 2023-10-05 RX ORDER — SODIUM CHLORIDE 0.9 % (FLUSH) 0.9 %
5-40 SYRINGE (ML) INJECTION EVERY 12 HOURS SCHEDULED
Status: DISCONTINUED | OUTPATIENT
Start: 2023-10-05 | End: 2023-10-19 | Stop reason: HOSPADM

## 2023-10-05 RX ORDER — POTASSIUM CHLORIDE 29.8 MG/ML
20 INJECTION INTRAVENOUS ONCE
Status: COMPLETED | OUTPATIENT
Start: 2023-10-05 | End: 2023-10-05

## 2023-10-05 RX ORDER — DEXTROSE MONOHYDRATE 100 MG/ML
INJECTION, SOLUTION INTRAVENOUS CONTINUOUS PRN
Status: DISCONTINUED | OUTPATIENT
Start: 2023-10-05 | End: 2023-10-19 | Stop reason: HOSPADM

## 2023-10-05 RX ORDER — INSULIN LISPRO 100 [IU]/ML
0-8 INJECTION, SOLUTION INTRAVENOUS; SUBCUTANEOUS EVERY 6 HOURS
Status: DISCONTINUED | OUTPATIENT
Start: 2023-10-05 | End: 2023-10-07

## 2023-10-05 RX ORDER — KETOROLAC TROMETHAMINE 30 MG/ML
15 INJECTION, SOLUTION INTRAMUSCULAR; INTRAVENOUS ONCE
Status: COMPLETED | OUTPATIENT
Start: 2023-10-05 | End: 2023-10-05

## 2023-10-05 RX ORDER — MEPERIDINE HYDROCHLORIDE 25 MG/ML
25 INJECTION INTRAMUSCULAR; INTRAVENOUS; SUBCUTANEOUS EVERY 4 HOURS PRN
Status: DISCONTINUED | OUTPATIENT
Start: 2023-10-05 | End: 2023-10-07

## 2023-10-05 RX ORDER — FENTANYL CITRATE 50 UG/ML
25 INJECTION, SOLUTION INTRAMUSCULAR; INTRAVENOUS ONCE
Status: COMPLETED | OUTPATIENT
Start: 2023-10-05 | End: 2023-10-05

## 2023-10-05 RX ORDER — SODIUM CHLORIDE 9 MG/ML
INJECTION, SOLUTION INTRAVENOUS PRN
Status: DISCONTINUED | OUTPATIENT
Start: 2023-10-05 | End: 2023-10-19 | Stop reason: SDUPTHER

## 2023-10-05 RX ORDER — METOPROLOL TARTRATE 5 MG/5ML
5 INJECTION INTRAVENOUS EVERY 4 HOURS PRN
Status: DISCONTINUED | OUTPATIENT
Start: 2023-10-05 | End: 2023-10-07

## 2023-10-05 RX ORDER — SODIUM CHLORIDE 0.9 % (FLUSH) 0.9 %
5-40 SYRINGE (ML) INJECTION PRN
Status: DISCONTINUED | OUTPATIENT
Start: 2023-10-05 | End: 2023-10-19 | Stop reason: HOSPADM

## 2023-10-05 RX ORDER — SODIUM CHLORIDE 9 MG/ML
INJECTION, SOLUTION INTRAVENOUS CONTINUOUS
Status: DISCONTINUED | OUTPATIENT
Start: 2023-10-05 | End: 2023-10-05

## 2023-10-05 RX ORDER — LORAZEPAM 2 MG/ML
1 INJECTION INTRAMUSCULAR EVERY 5 MIN PRN
Status: DISCONTINUED | OUTPATIENT
Start: 2023-10-05 | End: 2023-10-05

## 2023-10-05 RX ADMIN — ACETAMINOPHEN 650 MG: 650 SUPPOSITORY RECTAL at 03:21

## 2023-10-05 RX ADMIN — SODIUM CHLORIDE, PRESERVATIVE FREE 10 ML: 5 INJECTION INTRAVENOUS at 20:12

## 2023-10-05 RX ADMIN — ACETAMINOPHEN 650 MG: 650 SUPPOSITORY RECTAL at 23:04

## 2023-10-05 RX ADMIN — MEPERIDINE HYDROCHLORIDE 25 MG: 25 INJECTION INTRAMUSCULAR; INTRAVENOUS; SUBCUTANEOUS at 23:51

## 2023-10-05 RX ADMIN — Medication 1 AMPULE: at 20:16

## 2023-10-05 RX ADMIN — AMPICILLIN SODIUM 2000 MG: 2 INJECTION, POWDER, FOR SOLUTION INTRAVENOUS at 23:58

## 2023-10-05 RX ADMIN — VANCOMYCIN HYDROCHLORIDE 1000 MG: 1 INJECTION, POWDER, LYOPHILIZED, FOR SOLUTION INTRAVENOUS at 17:12

## 2023-10-05 RX ADMIN — POTASSIUM CHLORIDE 20 MEQ: 29.8 INJECTION, SOLUTION INTRAVENOUS at 20:12

## 2023-10-05 RX ADMIN — ACETAMINOPHEN 650 MG: 650 SUPPOSITORY RECTAL at 00:17

## 2023-10-05 RX ADMIN — AMPICILLIN SODIUM 2000 MG: 2 INJECTION, POWDER, FOR SOLUTION INTRAVENOUS at 17:40

## 2023-10-05 RX ADMIN — ACYCLOVIR SODIUM 650 MG: 50 INJECTION, SOLUTION INTRAVENOUS at 03:48

## 2023-10-05 RX ADMIN — AMPICILLIN SODIUM 2000 MG: 2 INJECTION, POWDER, FOR SOLUTION INTRAVENOUS at 06:45

## 2023-10-05 RX ADMIN — CEFTRIAXONE SODIUM 2000 MG: 2 INJECTION, POWDER, FOR SOLUTION INTRAMUSCULAR; INTRAVENOUS at 01:29

## 2023-10-05 RX ADMIN — DEXMEDETOMIDINE 0.2 MCG/KG/HR: 100 INJECTION, SOLUTION INTRAVENOUS at 17:50

## 2023-10-05 RX ADMIN — MEPERIDINE HYDROCHLORIDE 25 MG: 25 INJECTION INTRAMUSCULAR; INTRAVENOUS; SUBCUTANEOUS at 09:49

## 2023-10-05 RX ADMIN — ACYCLOVIR SODIUM 650 MG: 50 INJECTION, SOLUTION INTRAVENOUS at 17:24

## 2023-10-05 RX ADMIN — SODIUM CHLORIDE, PRESERVATIVE FREE 10 ML: 5 INJECTION INTRAVENOUS at 09:15

## 2023-10-05 RX ADMIN — SODIUM CHLORIDE, PRESERVATIVE FREE 10 ML: 5 INJECTION INTRAVENOUS at 20:13

## 2023-10-05 RX ADMIN — SODIUM CHLORIDE, PRESERVATIVE FREE 10 ML: 5 INJECTION INTRAVENOUS at 08:30

## 2023-10-05 RX ADMIN — SODIUM BICARBONATE 50 MEQ: 84 INJECTION, SOLUTION INTRAVENOUS at 11:41

## 2023-10-05 RX ADMIN — SODIUM CHLORIDE: 9 INJECTION, SOLUTION INTRAVENOUS at 01:28

## 2023-10-05 RX ADMIN — KETOROLAC TROMETHAMINE 15 MG: 30 INJECTION, SOLUTION INTRAMUSCULAR; INTRAVENOUS at 11:41

## 2023-10-05 RX ADMIN — AMPICILLIN SODIUM 2000 MG: 2 INJECTION, POWDER, FOR SOLUTION INTRAVENOUS at 02:02

## 2023-10-05 RX ADMIN — FENTANYL CITRATE 25 MCG: 50 INJECTION INTRAMUSCULAR; INTRAVENOUS at 08:37

## 2023-10-05 RX ADMIN — CEFTRIAXONE SODIUM 2000 MG: 2 INJECTION, POWDER, FOR SOLUTION INTRAMUSCULAR; INTRAVENOUS at 14:53

## 2023-10-05 RX ADMIN — THIAMINE HYDROCHLORIDE 100 MG: 100 INJECTION, SOLUTION INTRAMUSCULAR; INTRAVENOUS at 11:25

## 2023-10-05 RX ADMIN — LORAZEPAM 1 MG: 2 INJECTION INTRAMUSCULAR; INTRAVENOUS at 08:34

## 2023-10-05 RX ADMIN — SODIUM CHLORIDE, PRESERVATIVE FREE 10 ML: 5 INJECTION INTRAVENOUS at 08:50

## 2023-10-05 RX ADMIN — AMPICILLIN SODIUM 2000 MG: 2 INJECTION, POWDER, FOR SOLUTION INTRAVENOUS at 12:01

## 2023-10-05 RX ADMIN — HYDRALAZINE HYDROCHLORIDE 10 MG: 20 INJECTION INTRAMUSCULAR; INTRAVENOUS at 06:26

## 2023-10-05 RX ADMIN — SODIUM BICARBONATE: 84 INJECTION, SOLUTION INTRAVENOUS at 11:00

## 2023-10-05 RX ADMIN — Medication 1 AMPULE: at 11:33

## 2023-10-05 NOTE — INTERDISCIPLINARY ROUNDS
Critical care interdisciplinary rounds today. Following members present: Case Management, , Clinical Lead, Diabetes team, Nutrition, Pharmacy, and Physician. Family invited to participate. Plan of care discussed. See clinical pathway for plan of care and interventions and desired outcomes. Plan is to go to IR for lumbar puncture today. Zoll CVC insertion for temperature management at some point today. Keep Fitzgerald per .

## 2023-10-06 ENCOUNTER — APPOINTMENT (OUTPATIENT)
Facility: HOSPITAL | Age: 68
DRG: 853 | End: 2023-10-06
Payer: COMMERCIAL

## 2023-10-06 PROBLEM — G03.9 MENINGITIS: Status: ACTIVE | Noted: 2023-10-06

## 2023-10-06 LAB
-: NORMAL
ALBUMIN SERPL-MCNC: 2.1 G/DL (ref 3.5–5)
ALBUMIN/GLOB SERPL: 0.6 (ref 1.1–2.2)
ALP SERPL-CCNC: 43 U/L (ref 45–117)
ALT SERPL-CCNC: 98 U/L (ref 12–78)
ANION GAP SERPL CALC-SCNC: 5 MMOL/L (ref 5–15)
ANION GAP SERPL CALC-SCNC: 6 MMOL/L (ref 5–15)
ANION GAP SERPL CALC-SCNC: 7 MMOL/L (ref 5–15)
APTT PPP: 54.2 SEC (ref 22.1–31)
AST SERPL-CCNC: 307 U/L (ref 15–37)
BILIRUB SERPL-MCNC: 0.8 MG/DL (ref 0.2–1)
BUN SERPL-MCNC: 30 MG/DL (ref 6–20)
BUN SERPL-MCNC: 31 MG/DL (ref 6–20)
BUN SERPL-MCNC: 32 MG/DL (ref 6–20)
BUN/CREAT SERPL: 13 (ref 12–20)
BUN/CREAT SERPL: 14 (ref 12–20)
BUN/CREAT SERPL: 14 (ref 12–20)
CALCIUM SERPL-MCNC: 6.6 MG/DL (ref 8.5–10.1)
CALCIUM SERPL-MCNC: 6.7 MG/DL (ref 8.5–10.1)
CALCIUM SERPL-MCNC: 6.9 MG/DL (ref 8.5–10.1)
CHLORIDE SERPL-SCNC: 109 MMOL/L (ref 97–108)
CHLORIDE SERPL-SCNC: 110 MMOL/L (ref 97–108)
CHLORIDE SERPL-SCNC: 111 MMOL/L (ref 97–108)
CK SERPL-CCNC: ABNORMAL U/L (ref 39–308)
CK SERPL-CCNC: ABNORMAL U/L (ref 39–308)
CO2 SERPL-SCNC: 24 MMOL/L (ref 21–32)
CO2 SERPL-SCNC: 25 MMOL/L (ref 21–32)
CO2 SERPL-SCNC: 26 MMOL/L (ref 21–32)
COMMENT:: NORMAL
COMMENT:: NORMAL
CREAT SERPL-MCNC: 2.16 MG/DL (ref 0.7–1.3)
CREAT SERPL-MCNC: 2.27 MG/DL (ref 0.7–1.3)
CREAT SERPL-MCNC: 2.3 MG/DL (ref 0.7–1.3)
ERYTHROCYTE [DISTWIDTH] IN BLOOD BY AUTOMATED COUNT: 13.2 % (ref 11.5–14.5)
GLOBULIN SER CALC-MCNC: 3.5 G/DL (ref 2–4)
GLUCOSE BLD STRIP.AUTO-MCNC: 103 MG/DL (ref 65–117)
GLUCOSE BLD STRIP.AUTO-MCNC: 112 MG/DL (ref 65–117)
GLUCOSE BLD STRIP.AUTO-MCNC: 116 MG/DL (ref 65–117)
GLUCOSE BLD STRIP.AUTO-MCNC: 134 MG/DL (ref 65–117)
GLUCOSE SERPL-MCNC: 113 MG/DL (ref 65–100)
GLUCOSE SERPL-MCNC: 126 MG/DL (ref 65–100)
GLUCOSE SERPL-MCNC: 130 MG/DL (ref 65–100)
HAV IGM SER QL: NONREACTIVE
HBV CORE IGM SER QL: NONREACTIVE
HBV SURFACE AG SER QL: <0.1 INDEX
HBV SURFACE AG SER QL: NEGATIVE
HCT VFR BLD AUTO: 30.1 % (ref 36.6–50.3)
HCV AB SER IA-ACNC: <0.02 INDEX
HCV AB SERPL QL IA: NONREACTIVE
HGB BLD-MCNC: 10.5 G/DL (ref 12.1–17)
HIV 1+2 AB+HIV1 P24 AG SERPL QL IA: NONREACTIVE
HIV 1/2 RESULT COMMENT: NORMAL
MAGNESIUM SERPL-MCNC: 1.9 MG/DL (ref 1.6–2.4)
MAGNESIUM SERPL-MCNC: 2 MG/DL (ref 1.6–2.4)
MCH RBC QN AUTO: 29 PG (ref 26–34)
MCHC RBC AUTO-ENTMCNC: 34.9 G/DL (ref 30–36.5)
MCV RBC AUTO: 83.1 FL (ref 80–99)
NRBC # BLD: 0 K/UL (ref 0–0.01)
NRBC BLD-RTO: 0 PER 100 WBC
PHOSPHATE SERPL-MCNC: 2.4 MG/DL (ref 2.6–4.7)
PLATELET # BLD AUTO: 175 K/UL (ref 150–400)
PMV BLD AUTO: 11.5 FL (ref 8.9–12.9)
POTASSIUM SERPL-SCNC: 3.2 MMOL/L (ref 3.5–5.1)
POTASSIUM SERPL-SCNC: 3.6 MMOL/L (ref 3.5–5.1)
POTASSIUM SERPL-SCNC: 3.9 MMOL/L (ref 3.5–5.1)
PROT SERPL-MCNC: 5.6 G/DL (ref 6.4–8.2)
RBC # BLD AUTO: 3.62 M/UL (ref 4.1–5.7)
RPR SER QL: NONREACTIVE
SERVICE CMNT-IMP: ABNORMAL
SERVICE CMNT-IMP: NORMAL
SODIUM SERPL-SCNC: 140 MMOL/L (ref 136–145)
SODIUM SERPL-SCNC: 141 MMOL/L (ref 136–145)
SODIUM SERPL-SCNC: 142 MMOL/L (ref 136–145)
SPECIMEN HOLD: NORMAL
SPECIMEN HOLD: NORMAL
T PALLIDUM AB SER QL IA: NON REACTIVE
THERAPEUTIC RANGE: ABNORMAL SECS (ref 58–77)
UFH PPP CHRO-ACNC: 0.47 IU/ML
UFH PPP CHRO-ACNC: 0.68 IU/ML
UFH PPP CHRO-ACNC: 0.69 IU/ML
VANCOMYCIN SERPL-MCNC: 13 UG/ML
VANCOMYCIN SERPL-MCNC: 13.5 UG/ML
WBC # BLD AUTO: 13.9 K/UL (ref 4.1–11.1)

## 2023-10-06 PROCEDURE — 99223 1ST HOSP IP/OBS HIGH 75: CPT | Performed by: INTERNAL MEDICINE

## 2023-10-06 PROCEDURE — 36415 COLL VENOUS BLD VENIPUNCTURE: CPT

## 2023-10-06 PROCEDURE — 80053 COMPREHEN METABOLIC PANEL: CPT

## 2023-10-06 PROCEDURE — 87327 CRYPTOCOCCUS NEOFORM AG IA: CPT

## 2023-10-06 PROCEDURE — 85730 THROMBOPLASTIN TIME PARTIAL: CPT

## 2023-10-06 PROCEDURE — 83735 ASSAY OF MAGNESIUM: CPT

## 2023-10-06 PROCEDURE — 99291 CRITICAL CARE FIRST HOUR: CPT | Performed by: INTERNAL MEDICINE

## 2023-10-06 PROCEDURE — 2580000003 HC RX 258: Performed by: INTERNAL MEDICINE

## 2023-10-06 PROCEDURE — 6360000002 HC RX W HCPCS: Performed by: NURSE PRACTITIONER

## 2023-10-06 PROCEDURE — P9045 ALBUMIN (HUMAN), 5%, 250 ML: HCPCS | Performed by: NURSE PRACTITIONER

## 2023-10-06 PROCEDURE — 70551 MRI BRAIN STEM W/O DYE: CPT

## 2023-10-06 PROCEDURE — 6360000002 HC RX W HCPCS: Performed by: HOSPITALIST

## 2023-10-06 PROCEDURE — 82962 GLUCOSE BLOOD TEST: CPT

## 2023-10-06 PROCEDURE — 95816 EEG AWAKE AND DROWSY: CPT | Performed by: INTERNAL MEDICINE

## 2023-10-06 PROCEDURE — 2000000000 HC ICU R&B

## 2023-10-06 PROCEDURE — 80048 BASIC METABOLIC PNL TOTAL CA: CPT

## 2023-10-06 PROCEDURE — 80074 ACUTE HEPATITIS PANEL: CPT

## 2023-10-06 PROCEDURE — 6360000002 HC RX W HCPCS: Performed by: INTERNAL MEDICINE

## 2023-10-06 PROCEDURE — 6360000002 HC RX W HCPCS

## 2023-10-06 PROCEDURE — 6370000000 HC RX 637 (ALT 250 FOR IP): Performed by: INTERNAL MEDICINE

## 2023-10-06 PROCEDURE — 84100 ASSAY OF PHOSPHORUS: CPT

## 2023-10-06 PROCEDURE — 82550 ASSAY OF CK (CPK): CPT

## 2023-10-06 PROCEDURE — 85520 HEPARIN ASSAY: CPT

## 2023-10-06 PROCEDURE — 87389 HIV-1 AG W/HIV-1&-2 AB AG IA: CPT

## 2023-10-06 PROCEDURE — 86592 SYPHILIS TEST NON-TREP QUAL: CPT

## 2023-10-06 PROCEDURE — 6370000000 HC RX 637 (ALT 250 FOR IP): Performed by: STUDENT IN AN ORGANIZED HEALTH CARE EDUCATION/TRAINING PROGRAM

## 2023-10-06 PROCEDURE — 2700000000 HC OXYGEN THERAPY PER DAY

## 2023-10-06 PROCEDURE — 2500000003 HC RX 250 WO HCPCS: Performed by: NURSE PRACTITIONER

## 2023-10-06 PROCEDURE — 80202 ASSAY OF VANCOMYCIN: CPT

## 2023-10-06 PROCEDURE — 87040 BLOOD CULTURE FOR BACTERIA: CPT

## 2023-10-06 PROCEDURE — 2580000003 HC RX 258: Performed by: HOSPITALIST

## 2023-10-06 PROCEDURE — 85027 COMPLETE CBC AUTOMATED: CPT

## 2023-10-06 PROCEDURE — 95819 EEG AWAKE AND ASLEEP: CPT

## 2023-10-06 PROCEDURE — 2580000003 HC RX 258: Performed by: NURSE PRACTITIONER

## 2023-10-06 RX ORDER — ALBUMIN, HUMAN INJ 5% 5 %
25 SOLUTION INTRAVENOUS EVERY 6 HOURS
Status: COMPLETED | OUTPATIENT
Start: 2023-10-06 | End: 2023-10-06

## 2023-10-06 RX ORDER — ASPIRIN 300 MG/1
300 SUPPOSITORY RECTAL DAILY
Status: DISCONTINUED | OUTPATIENT
Start: 2023-10-06 | End: 2023-10-10

## 2023-10-06 RX ORDER — DEXTROSE, SODIUM CHLORIDE, SODIUM LACTATE, POTASSIUM CHLORIDE, AND CALCIUM CHLORIDE 5; .6; .31; .03; .02 G/100ML; G/100ML; G/100ML; G/100ML; G/100ML
INJECTION, SOLUTION INTRAVENOUS CONTINUOUS
Status: DISCONTINUED | OUTPATIENT
Start: 2023-10-06 | End: 2023-10-07

## 2023-10-06 RX ORDER — SODIUM CHLORIDE, SODIUM LACTATE, POTASSIUM CHLORIDE, AND CALCIUM CHLORIDE .6; .31; .03; .02 G/100ML; G/100ML; G/100ML; G/100ML
500 INJECTION, SOLUTION INTRAVENOUS ONCE
Status: COMPLETED | OUTPATIENT
Start: 2023-10-06 | End: 2023-10-06

## 2023-10-06 RX ORDER — HYDRALAZINE HYDROCHLORIDE 20 MG/ML
INJECTION INTRAMUSCULAR; INTRAVENOUS
Status: COMPLETED
Start: 2023-10-06 | End: 2023-10-06

## 2023-10-06 RX ADMIN — SODIUM CHLORIDE, PRESERVATIVE FREE 10 ML: 5 INJECTION INTRAVENOUS at 20:29

## 2023-10-06 RX ADMIN — AMPICILLIN SODIUM 2000 MG: 2 INJECTION, POWDER, FOR SOLUTION INTRAVENOUS at 18:15

## 2023-10-06 RX ADMIN — LORAZEPAM 1 MG: 2 INJECTION INTRAMUSCULAR; INTRAVENOUS at 23:25

## 2023-10-06 RX ADMIN — SODIUM CHLORIDE, PRESERVATIVE FREE 10 ML: 5 INJECTION INTRAVENOUS at 20:33

## 2023-10-06 RX ADMIN — Medication 1 AMPULE: at 08:53

## 2023-10-06 RX ADMIN — SODIUM CHLORIDE, SODIUM LACTATE, POTASSIUM CHLORIDE, CALCIUM CHLORIDE AND DEXTROSE MONOHYDRATE: 5; 600; 310; 30; 20 INJECTION, SOLUTION INTRAVENOUS at 10:49

## 2023-10-06 RX ADMIN — VANCOMYCIN HYDROCHLORIDE 1000 MG: 1 INJECTION, POWDER, LYOPHILIZED, FOR SOLUTION INTRAVENOUS at 17:04

## 2023-10-06 RX ADMIN — ACETAMINOPHEN 650 MG: 650 SUPPOSITORY RECTAL at 15:10

## 2023-10-06 RX ADMIN — ASPIRIN 300 MG: 300 SUPPOSITORY RECTAL at 09:14

## 2023-10-06 RX ADMIN — SODIUM CHLORIDE, PRESERVATIVE FREE 10 ML: 5 INJECTION INTRAVENOUS at 08:52

## 2023-10-06 RX ADMIN — HEPARIN SODIUM AND DEXTROSE 14 UNITS/KG/HR: 10000; 5 INJECTION INTRAVENOUS at 17:38

## 2023-10-06 RX ADMIN — CEFTRIAXONE SODIUM 2000 MG: 2 INJECTION, POWDER, FOR SOLUTION INTRAMUSCULAR; INTRAVENOUS at 00:48

## 2023-10-06 RX ADMIN — SODIUM CHLORIDE, PRESERVATIVE FREE 10 ML: 5 INJECTION INTRAVENOUS at 20:31

## 2023-10-06 RX ADMIN — HYDRALAZINE HYDROCHLORIDE 10 MG: 20 INJECTION INTRAMUSCULAR; INTRAVENOUS at 15:30

## 2023-10-06 RX ADMIN — ACYCLOVIR SODIUM 650 MG: 50 INJECTION, SOLUTION INTRAVENOUS at 15:51

## 2023-10-06 RX ADMIN — SODIUM CHLORIDE, POTASSIUM CHLORIDE, SODIUM LACTATE AND CALCIUM CHLORIDE 500 ML: 600; 310; 30; 20 INJECTION, SOLUTION INTRAVENOUS at 00:44

## 2023-10-06 RX ADMIN — AMPICILLIN SODIUM 2000 MG: 2 INJECTION, POWDER, FOR SOLUTION INTRAVENOUS at 23:33

## 2023-10-06 RX ADMIN — HYDRALAZINE HYDROCHLORIDE 20 MG: 20 INJECTION, SOLUTION INTRAMUSCULAR; INTRAVENOUS at 22:14

## 2023-10-06 RX ADMIN — AMPICILLIN SODIUM 2000 MG: 2 INJECTION, POWDER, FOR SOLUTION INTRAVENOUS at 06:03

## 2023-10-06 RX ADMIN — ACYCLOVIR SODIUM 650 MG: 50 INJECTION, SOLUTION INTRAVENOUS at 03:38

## 2023-10-06 RX ADMIN — MEPERIDINE HYDROCHLORIDE 25 MG: 25 INJECTION INTRAMUSCULAR; INTRAVENOUS; SUBCUTANEOUS at 15:18

## 2023-10-06 RX ADMIN — MEPERIDINE HYDROCHLORIDE 25 MG: 25 INJECTION INTRAMUSCULAR; INTRAVENOUS; SUBCUTANEOUS at 23:23

## 2023-10-06 RX ADMIN — AMPICILLIN SODIUM 2000 MG: 2 INJECTION, POWDER, FOR SOLUTION INTRAVENOUS at 12:34

## 2023-10-06 RX ADMIN — SODIUM CHLORIDE, PRESERVATIVE FREE 10 ML: 5 INJECTION INTRAVENOUS at 08:53

## 2023-10-06 RX ADMIN — SODIUM CHLORIDE, PRESERVATIVE FREE 10 ML: 5 INJECTION INTRAVENOUS at 20:30

## 2023-10-06 RX ADMIN — ALBUMIN (HUMAN) 25 G: 12.5 INJECTION, SOLUTION INTRAVENOUS at 06:07

## 2023-10-06 RX ADMIN — ALBUMIN (HUMAN) 25 G: 12.5 INJECTION, SOLUTION INTRAVENOUS at 00:42

## 2023-10-06 RX ADMIN — ACETAMINOPHEN 650 MG: 650 SUPPOSITORY RECTAL at 05:56

## 2023-10-06 RX ADMIN — DEXMEDETOMIDINE 0.4 MCG/KG/HR: 100 INJECTION, SOLUTION INTRAVENOUS at 03:54

## 2023-10-06 RX ADMIN — HEPARIN SODIUM 2000 UNITS: 1000 INJECTION INTRAVENOUS; SUBCUTANEOUS at 03:12

## 2023-10-06 RX ADMIN — THIAMINE HYDROCHLORIDE 100 MG: 100 INJECTION, SOLUTION INTRAMUSCULAR; INTRAVENOUS at 08:51

## 2023-10-06 RX ADMIN — Medication 1 AMPULE: at 20:23

## 2023-10-06 RX ADMIN — ACETAMINOPHEN 650 MG: 650 SUPPOSITORY RECTAL at 23:30

## 2023-10-06 RX ADMIN — CEFTRIAXONE SODIUM 2000 MG: 2 INJECTION, POWDER, FOR SOLUTION INTRAMUSCULAR; INTRAVENOUS at 13:10

## 2023-10-06 RX ADMIN — DEXMEDETOMIDINE 0.3 MCG/KG/HR: 100 INJECTION, SOLUTION INTRAVENOUS at 17:41

## 2023-10-06 ASSESSMENT — PAIN SCALES - GENERAL: PAINLEVEL_OUTOF10: 0

## 2023-10-06 NOTE — INTERDISCIPLINARY ROUNDS
Critical care interdisciplinary rounds today. Following members present: Case Management, Diabetes Treatment, Nursing, Nutrition, Pharmacy, and Physician. Plan of care discussed. See clinical pathway for plan of care and interventions and desired outcomes.

## 2023-10-06 NOTE — PROCEDURES
EEG REPORT    Patient Name: Jackelyn Kimball  :   Age: 79 y.o. Ordering physician: No referring provider defined for this encounter. Date of EEG start time: 10/6/2023 at 10:55 AM  Date of EEG end time: 10/6/2023 at 11:28 AM  EEG procedure number: [id number]  Diagnosis: AMS  Interpreting physician: Alcides Hensley. Bob Ziegler. Procedure: EEG    CLINICAL INDICATION: The patient is a 79 y.o. male who is being evaluated for baseline electro cerebral activities and to rule out seizure focus.       Current Facility-Administered Medications   Medication Dose Route Frequency    aspirin suppository 300 mg  300 mg Rectal Daily    dextrose 5 % in lactated ringers infusion   IntraVENous Continuous    cefTRIAXone (ROCEPHIN) 2,000 mg in sodium chloride 0.9 % 50 mL IVPB (mini-bag)  2,000 mg IntraVENous Q12H    acyclovir (ZOVIRAX) 650 mg in sodium chloride 0.9 % 100 mL IVPB  10 mg/kg (Ideal) IntraVENous Q12H    insulin lispro (HUMALOG) injection vial 0-8 Units  0-8 Units SubCUTAneous Q6H    glucose chewable tablet 16 g  4 tablet Oral PRN    dextrose bolus 10% 125 mL  125 mL IntraVENous PRN    Or    dextrose bolus 10% 250 mL  250 mL IntraVENous PRN    glucagon injection 1 mg  1 mg SubCUTAneous PRN    dextrose 10 % infusion   IntraVENous Continuous PRN    ampicillin (OMNIPEN) 2,000 mg in sodium chloride 0.9 % 100 mL IVPB (mini-bag)  2,000 mg IntraVENous 4 times per day    alcohol 62% (NOZIN) nasal  1 ampule  1 ampule Topical Q12H    sodium chloride flush 0.9 % injection 5-40 mL  5-40 mL IntraVENous 2 times per day    sodium chloride flush 0.9 % injection 5-40 mL  5-40 mL IntraVENous PRN    0.9 % sodium chloride infusion   IntraVENous PRN    metoprolol (LOPRESSOR) injection 5 mg  5 mg IntraVENous Q4H PRN    meperidine (DEMEROL) injection 25 mg  25 mg IntraVENous Q4H PRN    sodium chloride flush 0.9 % injection 5-40 mL  5-40 mL IntraVENous 2 times per day    sodium chloride flush 0.9 % injection 5-40 mL  5-40 mL
[facility]      Procedure Note - Central Venous Access:   Performed by Calli Dee MD    Obtained informed Consent. Immediately prior to the procedure, the patient was reevaluated and found suitable for the planned procedure and any planned medications. Immediately prior to the procedure a time out was called to verify the correct patient, procedure, equipment, staff, and marking as appropriate. The site was prepped with ChloraPrep and Sterile draping. Using Seldinger technique a triple lumen zoll cooling cath was placed in the Right, Femoral Vein via direct cannulation with 2 number of attempts for Monitoring, Blood Drawing, IV Access, and temperature management. Ultrasound Guidance was utilized. Verbally/Visually confirmed wire removal with RN. Wire in IJ confirmation with US prior to dilation. There was good blood return. The following complications were encountered: None. A follow-up chest x-ray was ordered post procedure. The procedure was tolerated well.       Calli Dee MD [address] [place] [address]  [phone]
No

## 2023-10-07 PROBLEM — M62.82 NON-TRAUMATIC RHABDOMYOLYSIS: Status: ACTIVE | Noted: 2023-10-07

## 2023-10-07 PROBLEM — J69.0 ASPIRATION PNEUMONITIS (HCC): Status: ACTIVE | Noted: 2023-10-07

## 2023-10-07 PROBLEM — F10.10 ALCOHOL ABUSE: Status: ACTIVE | Noted: 2023-10-07

## 2023-10-07 PROBLEM — Z87.898 HISTORY OF DIARRHEA: Status: ACTIVE | Noted: 2023-10-07

## 2023-10-07 PROBLEM — G93.41 ACUTE METABOLIC ENCEPHALOPATHY: Status: ACTIVE | Noted: 2023-10-07

## 2023-10-07 PROBLEM — G03.0 ASEPTIC MENINGITIS: Status: ACTIVE | Noted: 2023-10-06

## 2023-10-07 PROBLEM — I21.4 NSTEMI (NON-ST ELEVATED MYOCARDIAL INFARCTION) (HCC): Status: ACTIVE | Noted: 2023-10-07

## 2023-10-07 PROBLEM — R65.20 SEVERE SEPSIS (HCC): Status: ACTIVE | Noted: 2023-10-04

## 2023-10-07 PROBLEM — R19.7 DIARRHEA: Status: ACTIVE | Noted: 2023-10-07

## 2023-10-07 LAB
ALBUMIN SERPL-MCNC: 1.9 G/DL (ref 3.5–5)
ALBUMIN/GLOB SERPL: 0.5 (ref 1.1–2.2)
ALP SERPL-CCNC: 39 U/L (ref 45–117)
ALT SERPL-CCNC: 93 U/L (ref 12–78)
ANION GAP SERPL CALC-SCNC: 8 MMOL/L (ref 5–15)
AST SERPL-CCNC: 264 U/L (ref 15–37)
B BURGDOR DNA SPEC QL NAA+PROBE: NEGATIVE
B BURGDOR DNA SPEC QL NAA+PROBE: NEGATIVE
BILIRUB DIRECT SERPL-MCNC: 0.1 MG/DL (ref 0–0.2)
BILIRUB SERPL-MCNC: 0.5 MG/DL (ref 0.2–1)
BUN SERPL-MCNC: 33 MG/DL (ref 6–20)
BUN/CREAT SERPL: 14 (ref 12–20)
CALCIUM SERPL-MCNC: 6.8 MG/DL (ref 8.5–10.1)
CHLORIDE SERPL-SCNC: 111 MMOL/L (ref 97–108)
CO2 SERPL-SCNC: 24 MMOL/L (ref 21–32)
COMMENT:: NORMAL
CREAT SERPL-MCNC: 2.42 MG/DL (ref 0.7–1.3)
CRYPTOC AG CSF QL IA: NEGATIVE
ERYTHROCYTE [DISTWIDTH] IN BLOOD BY AUTOMATED COUNT: 13.5 % (ref 11.5–14.5)
GLOBULIN SER CALC-MCNC: 3.6 G/DL (ref 2–4)
GLUCOSE BLD STRIP.AUTO-MCNC: 187 MG/DL (ref 65–117)
GLUCOSE BLD STRIP.AUTO-MCNC: 198 MG/DL (ref 65–117)
GLUCOSE BLD STRIP.AUTO-MCNC: 209 MG/DL (ref 65–117)
GLUCOSE BLD STRIP.AUTO-MCNC: 225 MG/DL (ref 65–117)
GLUCOSE BLD STRIP.AUTO-MCNC: 271 MG/DL (ref 65–117)
GLUCOSE SERPL-MCNC: 187 MG/DL (ref 65–100)
HCT VFR BLD AUTO: 29.3 % (ref 36.6–50.3)
HGB BLD-MCNC: 10.2 G/DL (ref 12.1–17)
MAGNESIUM SERPL-MCNC: 2 MG/DL (ref 1.6–2.4)
MCH RBC QN AUTO: 29.3 PG (ref 26–34)
MCHC RBC AUTO-ENTMCNC: 34.8 G/DL (ref 30–36.5)
MCV RBC AUTO: 84.2 FL (ref 80–99)
NRBC # BLD: 0 K/UL (ref 0–0.01)
NRBC BLD-RTO: 0 PER 100 WBC
PHOSPHATE SERPL-MCNC: 2.5 MG/DL (ref 2.6–4.7)
PLATELET # BLD AUTO: 173 K/UL (ref 150–400)
PMV BLD AUTO: 11.6 FL (ref 8.9–12.9)
POTASSIUM SERPL-SCNC: 3.3 MMOL/L (ref 3.5–5.1)
PROT SERPL-MCNC: 5.5 G/DL (ref 6.4–8.2)
RBC # BLD AUTO: 3.48 M/UL (ref 4.1–5.7)
SERVICE CMNT-IMP: ABNORMAL
SODIUM SERPL-SCNC: 143 MMOL/L (ref 136–145)
SPECIMEN HOLD: NORMAL
SPECIMEN SOURCE: NORMAL
SPECIMEN SOURCE: NORMAL
UFH PPP CHRO-ACNC: 0.6 IU/ML
WBC # BLD AUTO: 10.1 K/UL (ref 4.1–11.1)

## 2023-10-07 PROCEDURE — 6360000002 HC RX W HCPCS: Performed by: HOSPITALIST

## 2023-10-07 PROCEDURE — 2580000003 HC RX 258: Performed by: INTERNAL MEDICINE

## 2023-10-07 PROCEDURE — 6360000002 HC RX W HCPCS: Performed by: NURSE PRACTITIONER

## 2023-10-07 PROCEDURE — 82164 ANGIOTENSIN I ENZYME TEST: CPT

## 2023-10-07 PROCEDURE — 84100 ASSAY OF PHOSPHORUS: CPT

## 2023-10-07 PROCEDURE — 85520 HEPARIN ASSAY: CPT

## 2023-10-07 PROCEDURE — 2000000000 HC ICU R&B

## 2023-10-07 PROCEDURE — 6360000002 HC RX W HCPCS: Performed by: INTERNAL MEDICINE

## 2023-10-07 PROCEDURE — 2700000000 HC OXYGEN THERAPY PER DAY

## 2023-10-07 PROCEDURE — 85027 COMPLETE CBC AUTOMATED: CPT

## 2023-10-07 PROCEDURE — 2580000003 HC RX 258: Performed by: NURSE PRACTITIONER

## 2023-10-07 PROCEDURE — 36415 COLL VENOUS BLD VENIPUNCTURE: CPT

## 2023-10-07 PROCEDURE — 80048 BASIC METABOLIC PNL TOTAL CA: CPT

## 2023-10-07 PROCEDURE — 6370000000 HC RX 637 (ALT 250 FOR IP): Performed by: INTERNAL MEDICINE

## 2023-10-07 PROCEDURE — 80076 HEPATIC FUNCTION PANEL: CPT

## 2023-10-07 PROCEDURE — 83735 ASSAY OF MAGNESIUM: CPT

## 2023-10-07 PROCEDURE — 2580000003 HC RX 258: Performed by: HOSPITALIST

## 2023-10-07 PROCEDURE — 99233 SBSQ HOSP IP/OBS HIGH 50: CPT | Performed by: PSYCHIATRY & NEUROLOGY

## 2023-10-07 PROCEDURE — 6370000000 HC RX 637 (ALT 250 FOR IP): Performed by: STUDENT IN AN ORGANIZED HEALTH CARE EDUCATION/TRAINING PROGRAM

## 2023-10-07 PROCEDURE — 86617 LYME DISEASE ANTIBODY: CPT

## 2023-10-07 PROCEDURE — 82962 GLUCOSE BLOOD TEST: CPT

## 2023-10-07 PROCEDURE — 6360000002 HC RX W HCPCS: Performed by: STUDENT IN AN ORGANIZED HEALTH CARE EDUCATION/TRAINING PROGRAM

## 2023-10-07 PROCEDURE — 2500000003 HC RX 250 WO HCPCS: Performed by: INTERNAL MEDICINE

## 2023-10-07 PROCEDURE — 6370000000 HC RX 637 (ALT 250 FOR IP): Performed by: NURSE PRACTITIONER

## 2023-10-07 RX ORDER — POTASSIUM CHLORIDE 29.8 MG/ML
20 INJECTION INTRAVENOUS ONCE
Status: COMPLETED | OUTPATIENT
Start: 2023-10-07 | End: 2023-10-07

## 2023-10-07 RX ORDER — MEPERIDINE HYDROCHLORIDE 25 MG/ML
25 INJECTION INTRAMUSCULAR; INTRAVENOUS; SUBCUTANEOUS EVERY 6 HOURS PRN
Status: DISCONTINUED | OUTPATIENT
Start: 2023-10-07 | End: 2023-10-09

## 2023-10-07 RX ORDER — ENOXAPARIN SODIUM 100 MG/ML
40 INJECTION SUBCUTANEOUS DAILY
Status: DISCONTINUED | OUTPATIENT
Start: 2023-10-07 | End: 2023-10-19 | Stop reason: HOSPADM

## 2023-10-07 RX ORDER — METOPROLOL TARTRATE 5 MG/5ML
5 INJECTION INTRAVENOUS EVERY 4 HOURS PRN
Status: DISCONTINUED | OUTPATIENT
Start: 2023-10-07 | End: 2023-10-19 | Stop reason: HOSPADM

## 2023-10-07 RX ORDER — SODIUM CHLORIDE, SODIUM LACTATE, POTASSIUM CHLORIDE, CALCIUM CHLORIDE 600; 310; 30; 20 MG/100ML; MG/100ML; MG/100ML; MG/100ML
INJECTION, SOLUTION INTRAVENOUS CONTINUOUS
Status: DISCONTINUED | OUTPATIENT
Start: 2023-10-07 | End: 2023-10-11

## 2023-10-07 RX ORDER — INSULIN LISPRO 100 [IU]/ML
0-8 INJECTION, SOLUTION INTRAVENOUS; SUBCUTANEOUS EVERY 4 HOURS
Status: DISCONTINUED | OUTPATIENT
Start: 2023-10-08 | End: 2023-10-09

## 2023-10-07 RX ORDER — METRONIDAZOLE 500 MG/100ML
500 INJECTION, SOLUTION INTRAVENOUS EVERY 12 HOURS
Status: DISCONTINUED | OUTPATIENT
Start: 2023-10-07 | End: 2023-10-08

## 2023-10-07 RX ORDER — CASTOR OIL AND BALSAM, PERU 788; 87 MG/G; MG/G
OINTMENT TOPICAL 2 TIMES DAILY
Status: DISCONTINUED | OUTPATIENT
Start: 2023-10-07 | End: 2023-10-19 | Stop reason: HOSPADM

## 2023-10-07 RX ADMIN — CEFTRIAXONE SODIUM 2000 MG: 2 INJECTION, POWDER, FOR SOLUTION INTRAMUSCULAR; INTRAVENOUS at 13:03

## 2023-10-07 RX ADMIN — SODIUM CHLORIDE, PRESERVATIVE FREE 10 ML: 5 INJECTION INTRAVENOUS at 20:37

## 2023-10-07 RX ADMIN — SODIUM CHLORIDE, SODIUM LACTATE, POTASSIUM CHLORIDE, CALCIUM CHLORIDE AND DEXTROSE MONOHYDRATE: 5; 600; 310; 30; 20 INJECTION, SOLUTION INTRAVENOUS at 11:57

## 2023-10-07 RX ADMIN — SODIUM CHLORIDE, PRESERVATIVE FREE 10 ML: 5 INJECTION INTRAVENOUS at 09:27

## 2023-10-07 RX ADMIN — THIAMINE HYDROCHLORIDE 100 MG: 100 INJECTION, SOLUTION INTRAMUSCULAR; INTRAVENOUS at 09:25

## 2023-10-07 RX ADMIN — SODIUM CHLORIDE, SODIUM LACTATE, POTASSIUM CHLORIDE, CALCIUM CHLORIDE AND DEXTROSE MONOHYDRATE: 5; 600; 310; 30; 20 INJECTION, SOLUTION INTRAVENOUS at 00:49

## 2023-10-07 RX ADMIN — METRONIDAZOLE 500 MG: 500 INJECTION, SOLUTION INTRAVENOUS at 13:40

## 2023-10-07 RX ADMIN — AMPICILLIN SODIUM 2000 MG: 2 INJECTION, POWDER, FOR SOLUTION INTRAVENOUS at 23:33

## 2023-10-07 RX ADMIN — Medication 1 AMPULE: at 20:38

## 2023-10-07 RX ADMIN — ASPIRIN 300 MG: 300 SUPPOSITORY RECTAL at 10:32

## 2023-10-07 RX ADMIN — HYDRALAZINE HYDROCHLORIDE 10 MG: 20 INJECTION INTRAMUSCULAR; INTRAVENOUS at 22:22

## 2023-10-07 RX ADMIN — MEPERIDINE HYDROCHLORIDE 25 MG: 25 INJECTION INTRAMUSCULAR; INTRAVENOUS; SUBCUTANEOUS at 18:24

## 2023-10-07 RX ADMIN — METRONIDAZOLE 500 MG: 500 INJECTION, SOLUTION INTRAVENOUS at 01:42

## 2023-10-07 RX ADMIN — HYDRALAZINE HYDROCHLORIDE 10 MG: 20 INJECTION INTRAMUSCULAR; INTRAVENOUS at 15:05

## 2023-10-07 RX ADMIN — SODIUM CHLORIDE, PRESERVATIVE FREE 10 ML: 5 INJECTION INTRAVENOUS at 09:28

## 2023-10-07 RX ADMIN — VANCOMYCIN HYDROCHLORIDE 1000 MG: 1 INJECTION, POWDER, LYOPHILIZED, FOR SOLUTION INTRAVENOUS at 17:06

## 2023-10-07 RX ADMIN — POTASSIUM CHLORIDE 20 MEQ: 29.8 INJECTION, SOLUTION INTRAVENOUS at 10:25

## 2023-10-07 RX ADMIN — Medication: at 20:37

## 2023-10-07 RX ADMIN — MEPERIDINE HYDROCHLORIDE 25 MG: 25 INJECTION INTRAMUSCULAR; INTRAVENOUS; SUBCUTANEOUS at 03:57

## 2023-10-07 RX ADMIN — ENOXAPARIN SODIUM 40 MG: 100 INJECTION SUBCUTANEOUS at 15:07

## 2023-10-07 RX ADMIN — SODIUM CHLORIDE, POTASSIUM CHLORIDE, SODIUM LACTATE AND CALCIUM CHLORIDE: 600; 310; 30; 20 INJECTION, SOLUTION INTRAVENOUS at 20:31

## 2023-10-07 RX ADMIN — ACYCLOVIR SODIUM 650 MG: 50 INJECTION, SOLUTION INTRAVENOUS at 03:59

## 2023-10-07 RX ADMIN — POTASSIUM CHLORIDE 20 MEQ: 29.8 INJECTION, SOLUTION INTRAVENOUS at 07:49

## 2023-10-07 RX ADMIN — AMPICILLIN SODIUM 2000 MG: 2 INJECTION, POWDER, FOR SOLUTION INTRAVENOUS at 11:32

## 2023-10-07 RX ADMIN — CEFTRIAXONE SODIUM 2000 MG: 2 INJECTION, POWDER, FOR SOLUTION INTRAMUSCULAR; INTRAVENOUS at 00:58

## 2023-10-07 RX ADMIN — AMPICILLIN SODIUM 2000 MG: 2 INJECTION, POWDER, FOR SOLUTION INTRAVENOUS at 18:16

## 2023-10-07 RX ADMIN — INSULIN LISPRO 4 UNITS: 100 INJECTION, SOLUTION INTRAVENOUS; SUBCUTANEOUS at 18:35

## 2023-10-07 RX ADMIN — SODIUM CHLORIDE, PRESERVATIVE FREE 10 ML: 5 INJECTION INTRAVENOUS at 20:36

## 2023-10-07 RX ADMIN — AMPICILLIN SODIUM 2000 MG: 2 INJECTION, POWDER, FOR SOLUTION INTRAVENOUS at 05:59

## 2023-10-07 RX ADMIN — INSULIN LISPRO 2 UNITS: 100 INJECTION, SOLUTION INTRAVENOUS; SUBCUTANEOUS at 23:40

## 2023-10-07 RX ADMIN — HYDRALAZINE HYDROCHLORIDE 10 MG: 20 INJECTION INTRAMUSCULAR; INTRAVENOUS at 08:37

## 2023-10-07 RX ADMIN — Medication 1 AMPULE: at 09:26

## 2023-10-07 RX ADMIN — INSULIN LISPRO 2 UNITS: 100 INJECTION, SOLUTION INTRAVENOUS; SUBCUTANEOUS at 01:48

## 2023-10-07 RX ADMIN — METOPROLOL TARTRATE 5 MG: 5 INJECTION INTRAVENOUS at 17:06

## 2023-10-07 ASSESSMENT — PAIN SCALES - GENERAL
PAINLEVEL_OUTOF10: 2
PAINLEVEL_OUTOF10: 3
PAINLEVEL_OUTOF10: 2
PAINLEVEL_OUTOF10: 3

## 2023-10-07 ASSESSMENT — PAIN DESCRIPTION - LOCATION
LOCATION: BACK
LOCATION: BACK
LOCATION: SHOULDER

## 2023-10-07 ASSESSMENT — PAIN - FUNCTIONAL ASSESSMENT
PAIN_FUNCTIONAL_ASSESSMENT: PREVENTS OR INTERFERES SOME ACTIVE ACTIVITIES AND ADLS
PAIN_FUNCTIONAL_ASSESSMENT: ACTIVITIES ARE NOT PREVENTED

## 2023-10-07 ASSESSMENT — PAIN SCALES - WONG BAKER
WONGBAKER_NUMERICALRESPONSE: 2
WONGBAKER_NUMERICALRESPONSE: 2

## 2023-10-07 ASSESSMENT — PAIN DESCRIPTION - PAIN TYPE: TYPE: ACUTE PAIN

## 2023-10-07 ASSESSMENT — PAIN DESCRIPTION - ORIENTATION: ORIENTATION: LOWER

## 2023-10-08 ENCOUNTER — APPOINTMENT (OUTPATIENT)
Facility: HOSPITAL | Age: 68
DRG: 853 | End: 2023-10-08
Payer: COMMERCIAL

## 2023-10-08 LAB
ALBUMIN SERPL-MCNC: 1.9 G/DL (ref 3.5–5)
ALBUMIN/GLOB SERPL: 0.5 (ref 1.1–2.2)
ALP SERPL-CCNC: 45 U/L (ref 45–117)
ALT SERPL-CCNC: 89 U/L (ref 12–78)
ANION GAP SERPL CALC-SCNC: 4 MMOL/L (ref 5–15)
AST SERPL-CCNC: 208 U/L (ref 15–37)
BILIRUB DIRECT SERPL-MCNC: 0.1 MG/DL (ref 0–0.2)
BILIRUB SERPL-MCNC: 0.5 MG/DL (ref 0.2–1)
BUN SERPL-MCNC: 30 MG/DL (ref 6–20)
BUN/CREAT SERPL: 13 (ref 12–20)
CALCIUM SERPL-MCNC: 7.2 MG/DL (ref 8.5–10.1)
CHLORIDE SERPL-SCNC: 116 MMOL/L (ref 97–108)
CK SERPL-CCNC: 4853 U/L (ref 39–308)
CO2 SERPL-SCNC: 26 MMOL/L (ref 21–32)
CREAT SERPL-MCNC: 2.3 MG/DL (ref 0.7–1.3)
ERYTHROCYTE [DISTWIDTH] IN BLOOD BY AUTOMATED COUNT: 13.8 % (ref 11.5–14.5)
GLOBULIN SER CALC-MCNC: 3.8 G/DL (ref 2–4)
GLUCOSE BLD STRIP.AUTO-MCNC: 156 MG/DL (ref 65–117)
GLUCOSE BLD STRIP.AUTO-MCNC: 165 MG/DL (ref 65–117)
GLUCOSE BLD STRIP.AUTO-MCNC: 170 MG/DL (ref 65–117)
GLUCOSE BLD STRIP.AUTO-MCNC: 174 MG/DL (ref 65–117)
GLUCOSE BLD STRIP.AUTO-MCNC: 181 MG/DL (ref 65–117)
GLUCOSE BLD STRIP.AUTO-MCNC: 188 MG/DL (ref 65–117)
GLUCOSE SERPL-MCNC: 197 MG/DL (ref 65–100)
HCT VFR BLD AUTO: 27.1 % (ref 36.6–50.3)
HGB BLD-MCNC: 9.3 G/DL (ref 12.1–17)
HSV SPEC CULT: NEGATIVE
MAGNESIUM SERPL-MCNC: 2.2 MG/DL (ref 1.6–2.4)
MCH RBC QN AUTO: 29 PG (ref 26–34)
MCHC RBC AUTO-ENTMCNC: 34.3 G/DL (ref 30–36.5)
MCV RBC AUTO: 84.4 FL (ref 80–99)
NRBC # BLD: 0 K/UL (ref 0–0.01)
NRBC BLD-RTO: 0 PER 100 WBC
PHOSPHATE SERPL-MCNC: 2 MG/DL (ref 2.6–4.7)
PLATELET # BLD AUTO: 210 K/UL (ref 150–400)
PMV BLD AUTO: 11.9 FL (ref 8.9–12.9)
POTASSIUM SERPL-SCNC: 3.2 MMOL/L (ref 3.5–5.1)
PROCALCITONIN SERPL-MCNC: 0.51 NG/ML
PROT SERPL-MCNC: 5.7 G/DL (ref 6.4–8.2)
RBC # BLD AUTO: 3.21 M/UL (ref 4.1–5.7)
SERVICE CMNT-IMP: ABNORMAL
SODIUM SERPL-SCNC: 146 MMOL/L (ref 136–145)
SPECIMEN SOURCE: NORMAL
WBC # BLD AUTO: 9.3 K/UL (ref 4.1–11.1)

## 2023-10-08 PROCEDURE — 2580000003 HC RX 258: Performed by: INTERNAL MEDICINE

## 2023-10-08 PROCEDURE — 2000000000 HC ICU R&B

## 2023-10-08 PROCEDURE — 2500000003 HC RX 250 WO HCPCS: Performed by: NURSE PRACTITIONER

## 2023-10-08 PROCEDURE — 6370000000 HC RX 637 (ALT 250 FOR IP): Performed by: STUDENT IN AN ORGANIZED HEALTH CARE EDUCATION/TRAINING PROGRAM

## 2023-10-08 PROCEDURE — 2700000000 HC OXYGEN THERAPY PER DAY

## 2023-10-08 PROCEDURE — 86225 DNA ANTIBODY NATIVE: CPT

## 2023-10-08 PROCEDURE — 80048 BASIC METABOLIC PNL TOTAL CA: CPT

## 2023-10-08 PROCEDURE — 6360000002 HC RX W HCPCS: Performed by: INTERNAL MEDICINE

## 2023-10-08 PROCEDURE — 84100 ASSAY OF PHOSPHORUS: CPT

## 2023-10-08 PROCEDURE — 86256 FLUORESCENT ANTIBODY TITER: CPT

## 2023-10-08 PROCEDURE — 85027 COMPLETE CBC AUTOMATED: CPT

## 2023-10-08 PROCEDURE — 6360000002 HC RX W HCPCS: Performed by: NURSE PRACTITIONER

## 2023-10-08 PROCEDURE — 82164 ANGIOTENSIN I ENZYME TEST: CPT

## 2023-10-08 PROCEDURE — 86235 NUCLEAR ANTIGEN ANTIBODY: CPT

## 2023-10-08 PROCEDURE — 99232 SBSQ HOSP IP/OBS MODERATE 35: CPT | Performed by: PSYCHIATRY & NEUROLOGY

## 2023-10-08 PROCEDURE — 71045 X-RAY EXAM CHEST 1 VIEW: CPT

## 2023-10-08 PROCEDURE — 6360000002 HC RX W HCPCS: Performed by: STUDENT IN AN ORGANIZED HEALTH CARE EDUCATION/TRAINING PROGRAM

## 2023-10-08 PROCEDURE — 2500000003 HC RX 250 WO HCPCS: Performed by: INTERNAL MEDICINE

## 2023-10-08 PROCEDURE — 83735 ASSAY OF MAGNESIUM: CPT

## 2023-10-08 PROCEDURE — 6370000000 HC RX 637 (ALT 250 FOR IP): Performed by: INTERNAL MEDICINE

## 2023-10-08 PROCEDURE — 80076 HEPATIC FUNCTION PANEL: CPT

## 2023-10-08 PROCEDURE — 2580000003 HC RX 258: Performed by: HOSPITALIST

## 2023-10-08 PROCEDURE — 82962 GLUCOSE BLOOD TEST: CPT

## 2023-10-08 PROCEDURE — 2580000003 HC RX 258: Performed by: NURSE PRACTITIONER

## 2023-10-08 PROCEDURE — 82550 ASSAY OF CK (CPK): CPT

## 2023-10-08 PROCEDURE — 83516 IMMUNOASSAY NONANTIBODY: CPT

## 2023-10-08 PROCEDURE — 99231 SBSQ HOSP IP/OBS SF/LOW 25: CPT | Performed by: INTERNAL MEDICINE

## 2023-10-08 PROCEDURE — 84145 PROCALCITONIN (PCT): CPT

## 2023-10-08 PROCEDURE — 6360000002 HC RX W HCPCS: Performed by: HOSPITALIST

## 2023-10-08 RX ORDER — METRONIDAZOLE 500 MG/100ML
500 INJECTION, SOLUTION INTRAVENOUS EVERY 8 HOURS
Status: DISCONTINUED | OUTPATIENT
Start: 2023-10-08 | End: 2023-10-09

## 2023-10-08 RX ADMIN — CEFTRIAXONE SODIUM 2000 MG: 2 INJECTION, POWDER, FOR SOLUTION INTRAMUSCULAR; INTRAVENOUS at 00:53

## 2023-10-08 RX ADMIN — THIAMINE HYDROCHLORIDE 100 MG: 100 INJECTION, SOLUTION INTRAMUSCULAR; INTRAVENOUS at 09:56

## 2023-10-08 RX ADMIN — METRONIDAZOLE 500 MG: 500 INJECTION, SOLUTION INTRAVENOUS at 10:46

## 2023-10-08 RX ADMIN — AMPICILLIN SODIUM 2000 MG: 2 INJECTION, POWDER, FOR SOLUTION INTRAVENOUS at 17:36

## 2023-10-08 RX ADMIN — METOPROLOL TARTRATE 5 MG: 5 INJECTION INTRAVENOUS at 00:29

## 2023-10-08 RX ADMIN — Medication: at 21:00

## 2023-10-08 RX ADMIN — Medication 1 AMPULE: at 21:00

## 2023-10-08 RX ADMIN — AMPICILLIN SODIUM 2000 MG: 2 INJECTION, POWDER, FOR SOLUTION INTRAVENOUS at 05:48

## 2023-10-08 RX ADMIN — Medication 1 AMPULE: at 08:33

## 2023-10-08 RX ADMIN — AMPICILLIN SODIUM 2000 MG: 2 INJECTION, POWDER, FOR SOLUTION INTRAVENOUS at 11:58

## 2023-10-08 RX ADMIN — SODIUM CHLORIDE, PRESERVATIVE FREE 10 ML: 5 INJECTION INTRAVENOUS at 21:00

## 2023-10-08 RX ADMIN — AMPICILLIN SODIUM 2000 MG: 2 INJECTION, POWDER, FOR SOLUTION INTRAVENOUS at 23:52

## 2023-10-08 RX ADMIN — METRONIDAZOLE 500 MG: 500 INJECTION, SOLUTION INTRAVENOUS at 18:52

## 2023-10-08 RX ADMIN — HYDRALAZINE HYDROCHLORIDE 10 MG: 20 INJECTION INTRAMUSCULAR; INTRAVENOUS at 23:53

## 2023-10-08 RX ADMIN — SODIUM CHLORIDE, POTASSIUM CHLORIDE, SODIUM LACTATE AND CALCIUM CHLORIDE: 600; 310; 30; 20 INJECTION, SOLUTION INTRAVENOUS at 13:05

## 2023-10-08 RX ADMIN — VANCOMYCIN HYDROCHLORIDE 1000 MG: 1 INJECTION, POWDER, LYOPHILIZED, FOR SOLUTION INTRAVENOUS at 16:37

## 2023-10-08 RX ADMIN — HYDRALAZINE HYDROCHLORIDE 10 MG: 20 INJECTION INTRAMUSCULAR; INTRAVENOUS at 17:31

## 2023-10-08 RX ADMIN — METRONIDAZOLE 500 MG: 500 INJECTION, SOLUTION INTRAVENOUS at 01:23

## 2023-10-08 RX ADMIN — CEFTRIAXONE SODIUM 2000 MG: 2 INJECTION, POWDER, FOR SOLUTION INTRAMUSCULAR; INTRAVENOUS at 13:00

## 2023-10-08 RX ADMIN — POTASSIUM PHOSPHATE, MONOBASIC POTASSIUM PHOSPHATE, DIBASIC 20 MMOL: 224; 236 INJECTION, SOLUTION, CONCENTRATE INTRAVENOUS at 08:34

## 2023-10-08 RX ADMIN — HYDRALAZINE HYDROCHLORIDE 10 MG: 20 INJECTION INTRAMUSCULAR; INTRAVENOUS at 11:45

## 2023-10-08 RX ADMIN — ENOXAPARIN SODIUM 40 MG: 100 INJECTION SUBCUTANEOUS at 15:25

## 2023-10-08 RX ADMIN — SODIUM CHLORIDE, PRESERVATIVE FREE 10 ML: 5 INJECTION INTRAVENOUS at 08:35

## 2023-10-08 RX ADMIN — HYDRALAZINE HYDROCHLORIDE 10 MG: 20 INJECTION INTRAMUSCULAR; INTRAVENOUS at 04:57

## 2023-10-08 RX ADMIN — Medication: at 08:33

## 2023-10-08 RX ADMIN — SODIUM CHLORIDE, POTASSIUM CHLORIDE, SODIUM LACTATE AND CALCIUM CHLORIDE: 600; 310; 30; 20 INJECTION, SOLUTION INTRAVENOUS at 04:55

## 2023-10-08 RX ADMIN — ASPIRIN 300 MG: 300 SUPPOSITORY RECTAL at 10:15

## 2023-10-08 ASSESSMENT — PAIN SCALES - GENERAL
PAINLEVEL_OUTOF10: 0

## 2023-10-09 LAB
ACE SERPL-CCNC: 15 U/L (ref 14–82)
ACE SERPL-CCNC: 16 U/L (ref 14–82)
ANION GAP SERPL CALC-SCNC: 8 MMOL/L (ref 5–15)
ANTI-YO (PURKINJE CELL): NEGATIVE TITER
BUN SERPL-MCNC: 23 MG/DL (ref 6–20)
BUN/CREAT SERPL: 13 (ref 12–20)
CALCIUM SERPL-MCNC: 7 MG/DL (ref 8.5–10.1)
CENTROMERE B AB SER-ACNC: <0.2 AI (ref 0–0.9)
CHLORIDE SERPL-SCNC: 112 MMOL/L (ref 97–108)
CHROMATIN AB SERPL-ACNC: <0.2 AI (ref 0–0.9)
CK SERPL-CCNC: 2659 U/L (ref 39–308)
CO2 SERPL-SCNC: 22 MMOL/L (ref 21–32)
CREAT SERPL-MCNC: 1.78 MG/DL (ref 0.7–1.3)
DSDNA AB SER-ACNC: <1 IU/ML (ref 0–9)
ENA JO1 AB SER-ACNC: <0.2 AI (ref 0–0.9)
ENA RNP AB SER-ACNC: <0.2 AI (ref 0–0.9)
ENA SCL70 AB SER-ACNC: <0.2 AI (ref 0–0.9)
ENA SM AB SER-ACNC: <0.2 AI (ref 0–0.9)
ENA SM+RNP AB SER-ACNC: <0.2 AI (ref 0–0.9)
ENA SS-A AB SER-ACNC: <0.2 AI (ref 0–0.9)
ENA SS-B AB SER-ACNC: <0.2 AI (ref 0–0.9)
ERYTHROCYTE [DISTWIDTH] IN BLOOD BY AUTOMATED COUNT: 13.9 % (ref 11.5–14.5)
GLUCOSE BLD STRIP.AUTO-MCNC: 124 MG/DL (ref 65–117)
GLUCOSE BLD STRIP.AUTO-MCNC: 144 MG/DL (ref 65–117)
GLUCOSE BLD STRIP.AUTO-MCNC: 156 MG/DL (ref 65–117)
GLUCOSE BLD STRIP.AUTO-MCNC: 159 MG/DL (ref 65–117)
GLUCOSE SERPL-MCNC: 167 MG/DL (ref 65–100)
HCT VFR BLD AUTO: 26.1 % (ref 36.6–50.3)
HGB BLD-MCNC: 8.9 G/DL (ref 12.1–17)
HU ANTIBODY: NEGATIVE TITER
MAGNESIUM SERPL-MCNC: 2.1 MG/DL (ref 1.6–2.4)
MCH RBC QN AUTO: 28.9 PG (ref 26–34)
MCHC RBC AUTO-ENTMCNC: 34.1 G/DL (ref 30–36.5)
MCV RBC AUTO: 84.7 FL (ref 80–99)
NRBC # BLD: 0 K/UL (ref 0–0.01)
NRBC BLD-RTO: 0 PER 100 WBC
PHOSPHATE SERPL-MCNC: 3 MG/DL (ref 2.6–4.7)
PLATELET # BLD AUTO: 209 K/UL (ref 150–400)
PMV BLD AUTO: 11.1 FL (ref 8.9–12.9)
POTASSIUM SERPL-SCNC: 3.2 MMOL/L (ref 3.5–5.1)
RBC # BLD AUTO: 3.08 M/UL (ref 4.1–5.7)
REAGIN AB CSF QL: NON REACTIVE
RI ANTIBODY: NEGATIVE TITER
RIBOSOMAL P AB SER-ACNC: <0.2 AI (ref 0–0.9)
SEE BELOW:, 164879: NORMAL
SERVICE CMNT-IMP: ABNORMAL
SODIUM SERPL-SCNC: 142 MMOL/L (ref 136–145)
VANCOMYCIN SERPL-MCNC: 12.5 UG/ML
WBC # BLD AUTO: 7.9 K/UL (ref 4.1–11.1)

## 2023-10-09 PROCEDURE — 85027 COMPLETE CBC AUTOMATED: CPT

## 2023-10-09 PROCEDURE — 6360000002 HC RX W HCPCS: Performed by: INTERNAL MEDICINE

## 2023-10-09 PROCEDURE — 82550 ASSAY OF CK (CPK): CPT

## 2023-10-09 PROCEDURE — 6370000000 HC RX 637 (ALT 250 FOR IP): Performed by: STUDENT IN AN ORGANIZED HEALTH CARE EDUCATION/TRAINING PROGRAM

## 2023-10-09 PROCEDURE — 6360000002 HC RX W HCPCS: Performed by: STUDENT IN AN ORGANIZED HEALTH CARE EDUCATION/TRAINING PROGRAM

## 2023-10-09 PROCEDURE — 97535 SELF CARE MNGMENT TRAINING: CPT

## 2023-10-09 PROCEDURE — 82962 GLUCOSE BLOOD TEST: CPT

## 2023-10-09 PROCEDURE — 2580000003 HC RX 258: Performed by: HOSPITALIST

## 2023-10-09 PROCEDURE — 6370000000 HC RX 637 (ALT 250 FOR IP): Performed by: INTERNAL MEDICINE

## 2023-10-09 PROCEDURE — 2700000000 HC OXYGEN THERAPY PER DAY

## 2023-10-09 PROCEDURE — 97162 PT EVAL MOD COMPLEX 30 MIN: CPT

## 2023-10-09 PROCEDURE — 6360000002 HC RX W HCPCS: Performed by: NURSE PRACTITIONER

## 2023-10-09 PROCEDURE — 99233 SBSQ HOSP IP/OBS HIGH 50: CPT | Performed by: INTERNAL MEDICINE

## 2023-10-09 PROCEDURE — 2000000000 HC ICU R&B

## 2023-10-09 PROCEDURE — 80048 BASIC METABOLIC PNL TOTAL CA: CPT

## 2023-10-09 PROCEDURE — 2580000003 HC RX 258: Performed by: NURSE PRACTITIONER

## 2023-10-09 PROCEDURE — 36415 COLL VENOUS BLD VENIPUNCTURE: CPT

## 2023-10-09 PROCEDURE — 6360000002 HC RX W HCPCS: Performed by: HOSPITALIST

## 2023-10-09 PROCEDURE — 80202 ASSAY OF VANCOMYCIN: CPT

## 2023-10-09 PROCEDURE — 97530 THERAPEUTIC ACTIVITIES: CPT

## 2023-10-09 PROCEDURE — 2580000003 HC RX 258: Performed by: INTERNAL MEDICINE

## 2023-10-09 PROCEDURE — 83735 ASSAY OF MAGNESIUM: CPT

## 2023-10-09 PROCEDURE — 84100 ASSAY OF PHOSPHORUS: CPT

## 2023-10-09 PROCEDURE — 99232 SBSQ HOSP IP/OBS MODERATE 35: CPT | Performed by: PSYCHIATRY & NEUROLOGY

## 2023-10-09 PROCEDURE — 97166 OT EVAL MOD COMPLEX 45 MIN: CPT

## 2023-10-09 RX ORDER — POTASSIUM CHLORIDE 29.8 MG/ML
20 INJECTION INTRAVENOUS
Status: COMPLETED | OUTPATIENT
Start: 2023-10-09 | End: 2023-10-09

## 2023-10-09 RX ORDER — INSULIN LISPRO 100 [IU]/ML
0-8 INJECTION, SOLUTION INTRAVENOUS; SUBCUTANEOUS EVERY 6 HOURS SCHEDULED
Status: DISCONTINUED | OUTPATIENT
Start: 2023-10-09 | End: 2023-10-10

## 2023-10-09 RX ADMIN — SODIUM CHLORIDE, POTASSIUM CHLORIDE, SODIUM LACTATE AND CALCIUM CHLORIDE: 600; 310; 30; 20 INJECTION, SOLUTION INTRAVENOUS at 05:37

## 2023-10-09 RX ADMIN — AMPICILLIN SODIUM 2000 MG: 2 INJECTION, POWDER, FOR SOLUTION INTRAVENOUS at 05:40

## 2023-10-09 RX ADMIN — Medication 1 AMPULE: at 08:15

## 2023-10-09 RX ADMIN — AMPICILLIN SODIUM 2000 MG: 2 INJECTION, POWDER, FOR SOLUTION INTRAVENOUS at 17:48

## 2023-10-09 RX ADMIN — ASPIRIN 300 MG: 300 SUPPOSITORY RECTAL at 08:31

## 2023-10-09 RX ADMIN — THIAMINE HYDROCHLORIDE 100 MG: 100 INJECTION, SOLUTION INTRAMUSCULAR; INTRAVENOUS at 08:16

## 2023-10-09 RX ADMIN — SODIUM CHLORIDE, POTASSIUM CHLORIDE, SODIUM LACTATE AND CALCIUM CHLORIDE: 600; 310; 30; 20 INJECTION, SOLUTION INTRAVENOUS at 13:58

## 2023-10-09 RX ADMIN — VANCOMYCIN HYDROCHLORIDE 1500 MG: 1.5 INJECTION, POWDER, LYOPHILIZED, FOR SOLUTION INTRAVENOUS at 13:52

## 2023-10-09 RX ADMIN — Medication: at 20:17

## 2023-10-09 RX ADMIN — HYDRALAZINE HYDROCHLORIDE 10 MG: 20 INJECTION INTRAMUSCULAR; INTRAVENOUS at 13:38

## 2023-10-09 RX ADMIN — HYDRALAZINE HYDROCHLORIDE 10 MG: 20 INJECTION INTRAMUSCULAR; INTRAVENOUS at 05:56

## 2023-10-09 RX ADMIN — POTASSIUM CHLORIDE 20 MEQ: 29.8 INJECTION, SOLUTION INTRAVENOUS at 10:07

## 2023-10-09 RX ADMIN — SODIUM CHLORIDE, PRESERVATIVE FREE 10 ML: 5 INJECTION INTRAVENOUS at 20:17

## 2023-10-09 RX ADMIN — ENOXAPARIN SODIUM 40 MG: 100 INJECTION SUBCUTANEOUS at 15:02

## 2023-10-09 RX ADMIN — HYDRALAZINE HYDROCHLORIDE 10 MG: 20 INJECTION INTRAMUSCULAR; INTRAVENOUS at 20:14

## 2023-10-09 RX ADMIN — Medication 1 AMPULE: at 20:16

## 2023-10-09 RX ADMIN — SODIUM CHLORIDE, POTASSIUM CHLORIDE, SODIUM LACTATE AND CALCIUM CHLORIDE: 600; 310; 30; 20 INJECTION, SOLUTION INTRAVENOUS at 22:19

## 2023-10-09 RX ADMIN — AMPICILLIN SODIUM 2000 MG: 2 INJECTION, POWDER, FOR SOLUTION INTRAVENOUS at 12:10

## 2023-10-09 RX ADMIN — Medication: at 08:15

## 2023-10-09 RX ADMIN — METRONIDAZOLE 500 MG: 500 INJECTION, SOLUTION INTRAVENOUS at 02:59

## 2023-10-09 RX ADMIN — CEFTRIAXONE SODIUM 2000 MG: 2 INJECTION, POWDER, FOR SOLUTION INTRAMUSCULAR; INTRAVENOUS at 01:27

## 2023-10-09 RX ADMIN — SODIUM CHLORIDE, PRESERVATIVE FREE 10 ML: 5 INJECTION INTRAVENOUS at 08:15

## 2023-10-09 RX ADMIN — POTASSIUM CHLORIDE 20 MEQ: 29.8 INJECTION, SOLUTION INTRAVENOUS at 11:01

## 2023-10-09 RX ADMIN — CEFTRIAXONE SODIUM 2000 MG: 2 INJECTION, POWDER, FOR SOLUTION INTRAMUSCULAR; INTRAVENOUS at 13:23

## 2023-10-09 ASSESSMENT — PAIN SCALES - WONG BAKER: WONGBAKER_NUMERICALRESPONSE: 0

## 2023-10-09 ASSESSMENT — PAIN SCALES - GENERAL: PAINLEVEL_OUTOF10: 0

## 2023-10-09 NOTE — WOUND CARE
Wound care nurse consult for POA bruising on hips and right upper back. 78 y/o AAM admitted for acute kidney injury and on r/o for acute bacterial meningitis. Past Medical History:   Diagnosis Date    Diabetes mellitus, type 2 ([address]) 2010    Hypertension      Past Surgical History:   Procedure Laterality Date    COLONOSCOPY  2009    Had polyps told to return 5 yrs, not done     Patient has dark skin and unable to vince even healthy skin due to pigmentation            Patient currently lethargic and drowsy, confused - poor historian    Recommend: Venelex to bruises of back and hips.     [address], RN, CWON

## 2023-10-10 ENCOUNTER — APPOINTMENT (OUTPATIENT)
Facility: HOSPITAL | Age: 68
DRG: 853 | End: 2023-10-10
Payer: COMMERCIAL

## 2023-10-10 LAB
ANGIOTENSIN CONVERTING ENZYME CSF: <1.5 U/L (ref 0–3.1)
ANION GAP SERPL CALC-SCNC: 9 MMOL/L (ref 5–15)
BACTERIA SPEC CULT: NORMAL
BACTERIA SPEC CULT: NORMAL
BASOPHILS # BLD: 0 K/UL (ref 0–0.1)
BASOPHILS NFR BLD: 1 % (ref 0–1)
BUN SERPL-MCNC: 21 MG/DL (ref 6–20)
BUN/CREAT SERPL: 13 (ref 12–20)
CALCIUM SERPL-MCNC: 6.6 MG/DL (ref 8.5–10.1)
CHLORIDE SERPL-SCNC: 111 MMOL/L (ref 97–108)
CO2 SERPL-SCNC: 21 MMOL/L (ref 21–32)
CREAT SERPL-MCNC: 1.65 MG/DL (ref 0.7–1.3)
DIFFERENTIAL METHOD BLD: ABNORMAL
EOSINOPHIL # BLD: 0.2 K/UL (ref 0–0.4)
EOSINOPHIL NFR BLD: 3 % (ref 0–7)
ERYTHROCYTE [DISTWIDTH] IN BLOOD BY AUTOMATED COUNT: 13.8 % (ref 11.5–14.5)
GLUCOSE BLD STRIP.AUTO-MCNC: 115 MG/DL (ref 65–117)
GLUCOSE BLD STRIP.AUTO-MCNC: 120 MG/DL (ref 65–117)
GLUCOSE BLD STRIP.AUTO-MCNC: 129 MG/DL (ref 65–117)
GLUCOSE BLD STRIP.AUTO-MCNC: 132 MG/DL (ref 65–117)
GLUCOSE BLD STRIP.AUTO-MCNC: 208 MG/DL (ref 65–117)
GLUCOSE SERPL-MCNC: 119 MG/DL (ref 65–100)
HCT VFR BLD AUTO: 25.2 % (ref 36.6–50.3)
HGB BLD-MCNC: 8.5 G/DL (ref 12.1–17)
IMM GRANULOCYTES # BLD AUTO: 0.1 K/UL (ref 0–0.04)
IMM GRANULOCYTES NFR BLD AUTO: 1 % (ref 0–0.5)
LYMPHOCYTES # BLD: 0.9 K/UL (ref 0.8–3.5)
LYMPHOCYTES NFR BLD: 14 % (ref 12–49)
MAGNESIUM SERPL-MCNC: 1.9 MG/DL (ref 1.6–2.4)
MCH RBC QN AUTO: 28.7 PG (ref 26–34)
MCHC RBC AUTO-ENTMCNC: 33.7 G/DL (ref 30–36.5)
MCV RBC AUTO: 85.1 FL (ref 80–99)
MONOCYTES # BLD: 0.7 K/UL (ref 0–1)
MONOCYTES NFR BLD: 10 % (ref 5–13)
NEUTS SEG # BLD: 4.6 K/UL (ref 1.8–8)
NEUTS SEG NFR BLD: 71 % (ref 32–75)
NRBC # BLD: 0 K/UL (ref 0–0.01)
NRBC BLD-RTO: 0 PER 100 WBC
PHOSPHATE SERPL-MCNC: 2.9 MG/DL (ref 2.6–4.7)
PLATELET # BLD AUTO: 255 K/UL (ref 150–400)
PMV BLD AUTO: 11.1 FL (ref 8.9–12.9)
POTASSIUM SERPL-SCNC: 3.3 MMOL/L (ref 3.5–5.1)
RBC # BLD AUTO: 2.96 M/UL (ref 4.1–5.7)
SERVICE CMNT-IMP: ABNORMAL
SERVICE CMNT-IMP: NORMAL
SODIUM SERPL-SCNC: 141 MMOL/L (ref 136–145)
WBC # BLD AUTO: 6.5 K/UL (ref 4.1–11.1)
WNV IGG SER QL IA: NEGATIVE
WNV IGM SER QL IA: POSITIVE

## 2023-10-10 PROCEDURE — 2580000003 HC RX 258: Performed by: HOSPITALIST

## 2023-10-10 PROCEDURE — 36415 COLL VENOUS BLD VENIPUNCTURE: CPT

## 2023-10-10 PROCEDURE — 51798 US URINE CAPACITY MEASURE: CPT

## 2023-10-10 PROCEDURE — 6360000002 HC RX W HCPCS: Performed by: HOSPITALIST

## 2023-10-10 PROCEDURE — 2500000003 HC RX 250 WO HCPCS: Performed by: INTERNAL MEDICINE

## 2023-10-10 PROCEDURE — 97530 THERAPEUTIC ACTIVITIES: CPT

## 2023-10-10 PROCEDURE — 2580000003 HC RX 258: Performed by: INTERNAL MEDICINE

## 2023-10-10 PROCEDURE — 83735 ASSAY OF MAGNESIUM: CPT

## 2023-10-10 PROCEDURE — 6360000002 HC RX W HCPCS: Performed by: STUDENT IN AN ORGANIZED HEALTH CARE EDUCATION/TRAINING PROGRAM

## 2023-10-10 PROCEDURE — 6370000000 HC RX 637 (ALT 250 FOR IP): Performed by: INTERNAL MEDICINE

## 2023-10-10 PROCEDURE — 6370000000 HC RX 637 (ALT 250 FOR IP): Performed by: HOSPITALIST

## 2023-10-10 PROCEDURE — 6360000002 HC RX W HCPCS: Performed by: INTERNAL MEDICINE

## 2023-10-10 PROCEDURE — 73020 X-RAY EXAM OF SHOULDER: CPT

## 2023-10-10 PROCEDURE — 2700000000 HC OXYGEN THERAPY PER DAY

## 2023-10-10 PROCEDURE — 6360000002 HC RX W HCPCS: Performed by: NURSE PRACTITIONER

## 2023-10-10 PROCEDURE — 6370000000 HC RX 637 (ALT 250 FOR IP): Performed by: STUDENT IN AN ORGANIZED HEALTH CARE EDUCATION/TRAINING PROGRAM

## 2023-10-10 PROCEDURE — 2580000003 HC RX 258: Performed by: NURSE PRACTITIONER

## 2023-10-10 PROCEDURE — 80048 BASIC METABOLIC PNL TOTAL CA: CPT

## 2023-10-10 PROCEDURE — 92610 EVALUATE SWALLOWING FUNCTION: CPT

## 2023-10-10 PROCEDURE — 2060000000 HC ICU INTERMEDIATE R&B

## 2023-10-10 PROCEDURE — 82962 GLUCOSE BLOOD TEST: CPT

## 2023-10-10 PROCEDURE — 51701 INSERT BLADDER CATHETER: CPT

## 2023-10-10 PROCEDURE — 84100 ASSAY OF PHOSPHORUS: CPT

## 2023-10-10 PROCEDURE — 85025 COMPLETE CBC W/AUTO DIFF WBC: CPT

## 2023-10-10 RX ORDER — INSULIN LISPRO 100 [IU]/ML
0-8 INJECTION, SOLUTION INTRAVENOUS; SUBCUTANEOUS
Status: DISCONTINUED | OUTPATIENT
Start: 2023-10-10 | End: 2023-10-19 | Stop reason: HOSPADM

## 2023-10-10 RX ORDER — METOPROLOL SUCCINATE 25 MG/1
25 TABLET, EXTENDED RELEASE ORAL DAILY
Status: DISCONTINUED | OUTPATIENT
Start: 2023-10-10 | End: 2023-10-12

## 2023-10-10 RX ORDER — ASPIRIN 81 MG/1
81 TABLET ORAL DAILY
Status: DISCONTINUED | OUTPATIENT
Start: 2023-10-10 | End: 2023-10-16

## 2023-10-10 RX ORDER — MAGNESIUM SULFATE 1 G/100ML
1000 INJECTION INTRAVENOUS ONCE
Status: COMPLETED | OUTPATIENT
Start: 2023-10-10 | End: 2023-10-10

## 2023-10-10 RX ORDER — AMLODIPINE BESYLATE 5 MG/1
5 TABLET ORAL DAILY
Status: DISCONTINUED | OUTPATIENT
Start: 2023-10-10 | End: 2023-10-12

## 2023-10-10 RX ORDER — LABETALOL HYDROCHLORIDE 5 MG/ML
5 INJECTION, SOLUTION INTRAVENOUS ONCE AS NEEDED
Status: COMPLETED | OUTPATIENT
Start: 2023-10-10 | End: 2023-10-10

## 2023-10-10 RX ORDER — GAUZE BANDAGE 2" X 2"
100 BANDAGE TOPICAL DAILY
Status: DISCONTINUED | OUTPATIENT
Start: 2023-10-11 | End: 2023-10-19 | Stop reason: HOSPADM

## 2023-10-10 RX ADMIN — SODIUM CHLORIDE, PRESERVATIVE FREE 10 ML: 5 INJECTION INTRAVENOUS at 22:11

## 2023-10-10 RX ADMIN — HYDRALAZINE HYDROCHLORIDE 10 MG: 20 INJECTION INTRAMUSCULAR; INTRAVENOUS at 00:06

## 2023-10-10 RX ADMIN — AMPICILLIN SODIUM 2000 MG: 2 INJECTION, POWDER, FOR SOLUTION INTRAVENOUS at 22:03

## 2023-10-10 RX ADMIN — CEFTRIAXONE SODIUM 2000 MG: 2 INJECTION, POWDER, FOR SOLUTION INTRAMUSCULAR; INTRAVENOUS at 12:12

## 2023-10-10 RX ADMIN — HYDRALAZINE HYDROCHLORIDE 10 MG: 20 INJECTION INTRAMUSCULAR; INTRAVENOUS at 08:17

## 2023-10-10 RX ADMIN — SODIUM CHLORIDE, POTASSIUM CHLORIDE, SODIUM LACTATE AND CALCIUM CHLORIDE: 600; 310; 30; 20 INJECTION, SOLUTION INTRAVENOUS at 13:14

## 2023-10-10 RX ADMIN — METOPROLOL SUCCINATE 25 MG: 25 TABLET, EXTENDED RELEASE ORAL at 11:20

## 2023-10-10 RX ADMIN — SODIUM CHLORIDE, POTASSIUM CHLORIDE, SODIUM LACTATE AND CALCIUM CHLORIDE: 600; 310; 30; 20 INJECTION, SOLUTION INTRAVENOUS at 22:41

## 2023-10-10 RX ADMIN — SODIUM CHLORIDE, POTASSIUM CHLORIDE, SODIUM LACTATE AND CALCIUM CHLORIDE: 600; 310; 30; 20 INJECTION, SOLUTION INTRAVENOUS at 06:27

## 2023-10-10 RX ADMIN — SODIUM CHLORIDE, PRESERVATIVE FREE 10 ML: 5 INJECTION INTRAVENOUS at 08:17

## 2023-10-10 RX ADMIN — AMPICILLIN SODIUM 2000 MG: 2 INJECTION, POWDER, FOR SOLUTION INTRAVENOUS at 05:25

## 2023-10-10 RX ADMIN — POTASSIUM BICARBONATE 40 MEQ: 782 TABLET, EFFERVESCENT ORAL at 05:50

## 2023-10-10 RX ADMIN — THIAMINE HYDROCHLORIDE 100 MG: 100 INJECTION, SOLUTION INTRAMUSCULAR; INTRAVENOUS at 09:21

## 2023-10-10 RX ADMIN — LABETALOL HYDROCHLORIDE 5 MG: 5 INJECTION INTRAVENOUS at 13:06

## 2023-10-10 RX ADMIN — ENOXAPARIN SODIUM 40 MG: 100 INJECTION SUBCUTANEOUS at 15:41

## 2023-10-10 RX ADMIN — AMPICILLIN SODIUM 2000 MG: 2 INJECTION, POWDER, FOR SOLUTION INTRAVENOUS at 11:25

## 2023-10-10 RX ADMIN — ACETAMINOPHEN 650 MG: 325 TABLET ORAL at 12:19

## 2023-10-10 RX ADMIN — METOPROLOL TARTRATE 5 MG: 5 INJECTION INTRAVENOUS at 02:26

## 2023-10-10 RX ADMIN — Medication: at 08:18

## 2023-10-10 RX ADMIN — AMLODIPINE BESYLATE 5 MG: 5 TABLET ORAL at 09:18

## 2023-10-10 RX ADMIN — ASPIRIN 81 MG: 81 TABLET, COATED ORAL at 10:16

## 2023-10-10 RX ADMIN — VANCOMYCIN HYDROCHLORIDE 1500 MG: 1.5 INJECTION, POWDER, LYOPHILIZED, FOR SOLUTION INTRAVENOUS at 13:12

## 2023-10-10 RX ADMIN — MAGNESIUM SULFATE HEPTAHYDRATE 1000 MG: 1 INJECTION, SOLUTION INTRAVENOUS at 09:27

## 2023-10-10 RX ADMIN — Medication 1 AMPULE: at 10:16

## 2023-10-10 RX ADMIN — CEFTRIAXONE SODIUM 2000 MG: 2 INJECTION, POWDER, FOR SOLUTION INTRAMUSCULAR; INTRAVENOUS at 02:05

## 2023-10-10 RX ADMIN — Medication 1 AMPULE: at 03:51

## 2023-10-10 RX ADMIN — AMPICILLIN SODIUM 2000 MG: 2 INJECTION, POWDER, FOR SOLUTION INTRAVENOUS at 00:06

## 2023-10-10 ASSESSMENT — PAIN SCALES - GENERAL
PAINLEVEL_OUTOF10: 0
PAINLEVEL_OUTOF10: 8

## 2023-10-10 ASSESSMENT — PAIN SCALES - WONG BAKER
WONGBAKER_NUMERICALRESPONSE: 0
WONGBAKER_NUMERICALRESPONSE: 0

## 2023-10-10 ASSESSMENT — PAIN DESCRIPTION - LOCATION: LOCATION: SHOULDER

## 2023-10-10 ASSESSMENT — PAIN DESCRIPTION - ORIENTATION: ORIENTATION: RIGHT

## 2023-10-10 NOTE — INTERDISCIPLINARY ROUNDS
Critical care interdisciplinary rounds today. Following members present: Case Management, , Clinical Lead, Diabetes team, Nutrition, Pharmacy, and Physician. Family invited to participate. Plan of care discussed. See clinical pathway for plan of care and interventions and desired outcomes. Remove cecilia per Dr. Abner Huang. Transfer out of CCU.

## 2023-10-11 PROBLEM — A92.30: Status: ACTIVE | Noted: 2023-10-11

## 2023-10-11 LAB
ANION GAP SERPL CALC-SCNC: 10 MMOL/L (ref 5–15)
B BURGDOR IGG PATRN SER IB-IMP: NEGATIVE
B BURGDOR IGM PATRN SER IB-IMP: NEGATIVE
B BURGDOR18KD IGG SER QL IB: NORMAL
B BURGDOR23KD IGG SER QL IB: NORMAL
B BURGDOR23KD IGM SER QL IB: NORMAL
B BURGDOR28KD IGG SER QL IB: NORMAL
B BURGDOR30KD IGG SER QL IB: NORMAL
B BURGDOR39KD IGG SER QL IB: NORMAL
B BURGDOR39KD IGM SER QL IB: NORMAL
B BURGDOR41KD IGG SER QL IB: NORMAL
B BURGDOR41KD IGM SER QL IB: NORMAL
B BURGDOR45KD IGG SER QL IB: NORMAL
B BURGDOR58KD IGG SER QL IB: NORMAL
B BURGDOR66KD IGG SER QL IB: NORMAL
B BURGDOR93KD IGG SER QL IB: NORMAL
BACTERIA SPEC CULT: NORMAL
BASOPHILS # BLD: 0 K/UL (ref 0–0.1)
BASOPHILS NFR BLD: 0 % (ref 0–1)
BUN SERPL-MCNC: 20 MG/DL (ref 6–20)
BUN/CREAT SERPL: 12 (ref 12–20)
CALCIUM SERPL-MCNC: 7 MG/DL (ref 8.5–10.1)
CHLORIDE SERPL-SCNC: 110 MMOL/L (ref 97–108)
CO2 SERPL-SCNC: 21 MMOL/L (ref 21–32)
CREAT SERPL-MCNC: 1.68 MG/DL (ref 0.7–1.3)
DIFFERENTIAL METHOD BLD: ABNORMAL
EOSINOPHIL # BLD: 0.1 K/UL (ref 0–0.4)
EOSINOPHIL NFR BLD: 1 % (ref 0–7)
ERYTHROCYTE [DISTWIDTH] IN BLOOD BY AUTOMATED COUNT: 13.5 % (ref 11.5–14.5)
GLUCOSE BLD STRIP.AUTO-MCNC: 168 MG/DL (ref 65–117)
GLUCOSE BLD STRIP.AUTO-MCNC: 191 MG/DL (ref 65–117)
GLUCOSE BLD STRIP.AUTO-MCNC: 201 MG/DL (ref 65–117)
GLUCOSE BLD STRIP.AUTO-MCNC: 204 MG/DL (ref 65–117)
GLUCOSE SERPL-MCNC: 207 MG/DL (ref 65–100)
HCT VFR BLD AUTO: 25.1 % (ref 36.6–50.3)
HGB BLD-MCNC: 8.5 G/DL (ref 12.1–17)
IMM GRANULOCYTES # BLD AUTO: 0 K/UL
IMM GRANULOCYTES NFR BLD AUTO: 0 %
LYMPHOCYTES # BLD: 0.2 K/UL (ref 0.8–3.5)
LYMPHOCYTES NFR BLD: 4 % (ref 12–49)
MCH RBC QN AUTO: 28.7 PG (ref 26–34)
MCHC RBC AUTO-ENTMCNC: 33.9 G/DL (ref 30–36.5)
MCV RBC AUTO: 84.8 FL (ref 80–99)
MONOCYTES # BLD: 0.4 K/UL (ref 0–1)
MONOCYTES NFR BLD: 7 % (ref 5–13)
NEUTS SEG # BLD: 4.4 K/UL (ref 1.8–8)
NEUTS SEG NFR BLD: 88 % (ref 32–75)
NRBC # BLD: 0 K/UL (ref 0–0.01)
NRBC BLD-RTO: 0 PER 100 WBC
PLATELET # BLD AUTO: 306 K/UL (ref 150–400)
PLATELET COMMENT: ABNORMAL
PMV BLD AUTO: 10.7 FL (ref 8.9–12.9)
POTASSIUM SERPL-SCNC: 3.2 MMOL/L (ref 3.5–5.1)
RBC # BLD AUTO: 2.96 M/UL (ref 4.1–5.7)
RBC MORPH BLD: ABNORMAL
SERVICE CMNT-IMP: ABNORMAL
SERVICE CMNT-IMP: NORMAL
SODIUM SERPL-SCNC: 141 MMOL/L (ref 136–145)
VANCOMYCIN SERPL-MCNC: 19.8 UG/ML
WBC # BLD AUTO: 5.1 K/UL (ref 4.1–11.1)
WNV IGG SPEC QL IA: POSITIVE
WNV IGM CSF QL IA: POSITIVE

## 2023-10-11 PROCEDURE — 6370000000 HC RX 637 (ALT 250 FOR IP): Performed by: STUDENT IN AN ORGANIZED HEALTH CARE EDUCATION/TRAINING PROGRAM

## 2023-10-11 PROCEDURE — 2060000000 HC ICU INTERMEDIATE R&B

## 2023-10-11 PROCEDURE — 6360000002 HC RX W HCPCS: Performed by: HOSPITALIST

## 2023-10-11 PROCEDURE — 6360000002 HC RX W HCPCS: Performed by: INTERNAL MEDICINE

## 2023-10-11 PROCEDURE — 6370000000 HC RX 637 (ALT 250 FOR IP): Performed by: INTERNAL MEDICINE

## 2023-10-11 PROCEDURE — 2580000003 HC RX 258: Performed by: NURSE PRACTITIONER

## 2023-10-11 PROCEDURE — 36415 COLL VENOUS BLD VENIPUNCTURE: CPT

## 2023-10-11 PROCEDURE — 82962 GLUCOSE BLOOD TEST: CPT

## 2023-10-11 PROCEDURE — 85025 COMPLETE CBC W/AUTO DIFF WBC: CPT

## 2023-10-11 PROCEDURE — 86789 WEST NILE VIRUS ANTIBODY: CPT

## 2023-10-11 PROCEDURE — 2580000003 HC RX 258: Performed by: INTERNAL MEDICINE

## 2023-10-11 PROCEDURE — 6360000002 HC RX W HCPCS: Performed by: STUDENT IN AN ORGANIZED HEALTH CARE EDUCATION/TRAINING PROGRAM

## 2023-10-11 PROCEDURE — 6370000000 HC RX 637 (ALT 250 FOR IP): Performed by: HOSPITALIST

## 2023-10-11 PROCEDURE — 99233 SBSQ HOSP IP/OBS HIGH 50: CPT | Performed by: INTERNAL MEDICINE

## 2023-10-11 PROCEDURE — 97530 THERAPEUTIC ACTIVITIES: CPT

## 2023-10-11 PROCEDURE — 86788 WEST NILE VIRUS AB IGM: CPT

## 2023-10-11 PROCEDURE — 2580000003 HC RX 258: Performed by: HOSPITALIST

## 2023-10-11 PROCEDURE — 80048 BASIC METABOLIC PNL TOTAL CA: CPT

## 2023-10-11 PROCEDURE — 6360000002 HC RX W HCPCS: Performed by: NURSE PRACTITIONER

## 2023-10-11 PROCEDURE — 51798 US URINE CAPACITY MEASURE: CPT

## 2023-10-11 PROCEDURE — 80202 ASSAY OF VANCOMYCIN: CPT

## 2023-10-11 PROCEDURE — 92526 ORAL FUNCTION THERAPY: CPT

## 2023-10-11 RX ORDER — FUROSEMIDE 10 MG/ML
20 INJECTION INTRAMUSCULAR; INTRAVENOUS ONCE
Status: COMPLETED | OUTPATIENT
Start: 2023-10-11 | End: 2023-10-11

## 2023-10-11 RX ADMIN — METOPROLOL SUCCINATE 25 MG: 25 TABLET, EXTENDED RELEASE ORAL at 08:25

## 2023-10-11 RX ADMIN — INSULIN LISPRO 2 UNITS: 100 INJECTION, SOLUTION INTRAVENOUS; SUBCUTANEOUS at 16:19

## 2023-10-11 RX ADMIN — ENOXAPARIN SODIUM 40 MG: 100 INJECTION SUBCUTANEOUS at 16:19

## 2023-10-11 RX ADMIN — INSULIN LISPRO 2 UNITS: 100 INJECTION, SOLUTION INTRAVENOUS; SUBCUTANEOUS at 11:48

## 2023-10-11 RX ADMIN — SODIUM CHLORIDE, PRESERVATIVE FREE 10 ML: 5 INJECTION INTRAVENOUS at 08:26

## 2023-10-11 RX ADMIN — FUROSEMIDE 20 MG: 10 INJECTION, SOLUTION INTRAMUSCULAR; INTRAVENOUS at 16:19

## 2023-10-11 RX ADMIN — POTASSIUM BICARBONATE 40 MEQ: 782 TABLET, EFFERVESCENT ORAL at 09:39

## 2023-10-11 RX ADMIN — SODIUM CHLORIDE, POTASSIUM CHLORIDE, SODIUM LACTATE AND CALCIUM CHLORIDE: 600; 310; 30; 20 INJECTION, SOLUTION INTRAVENOUS at 08:37

## 2023-10-11 RX ADMIN — CEFTRIAXONE SODIUM 2000 MG: 2 INJECTION, POWDER, FOR SOLUTION INTRAMUSCULAR; INTRAVENOUS at 12:35

## 2023-10-11 RX ADMIN — Medication: at 21:59

## 2023-10-11 RX ADMIN — AMLODIPINE BESYLATE 5 MG: 5 TABLET ORAL at 08:25

## 2023-10-11 RX ADMIN — CEFTRIAXONE SODIUM 2000 MG: 2 INJECTION, POWDER, FOR SOLUTION INTRAMUSCULAR; INTRAVENOUS at 02:22

## 2023-10-11 RX ADMIN — VANCOMYCIN HYDROCHLORIDE 1500 MG: 1.5 INJECTION, POWDER, LYOPHILIZED, FOR SOLUTION INTRAVENOUS at 12:36

## 2023-10-11 RX ADMIN — AMPICILLIN SODIUM 2000 MG: 2 INJECTION, POWDER, FOR SOLUTION INTRAVENOUS at 18:29

## 2023-10-11 RX ADMIN — Medication 1 AMPULE: at 08:26

## 2023-10-11 RX ADMIN — Medication: at 04:51

## 2023-10-11 RX ADMIN — Medication 100 MG: at 08:25

## 2023-10-11 RX ADMIN — AMPICILLIN SODIUM 2000 MG: 2 INJECTION, POWDER, FOR SOLUTION INTRAVENOUS at 04:50

## 2023-10-11 RX ADMIN — SODIUM CHLORIDE, PRESERVATIVE FREE 10 ML: 5 INJECTION INTRAVENOUS at 21:55

## 2023-10-11 RX ADMIN — Medication: at 08:26

## 2023-10-11 RX ADMIN — AMPICILLIN SODIUM 2000 MG: 2 INJECTION, POWDER, FOR SOLUTION INTRAVENOUS at 11:00

## 2023-10-11 RX ADMIN — HYDRALAZINE HYDROCHLORIDE 10 MG: 20 INJECTION INTRAMUSCULAR; INTRAVENOUS at 12:04

## 2023-10-11 RX ADMIN — ASPIRIN 81 MG: 81 TABLET, COATED ORAL at 08:25

## 2023-10-11 ASSESSMENT — PAIN SCALES - GENERAL: PAINLEVEL_OUTOF10: 0

## 2023-10-12 LAB
ALBUMIN SERPL-MCNC: 1.7 G/DL (ref 3.5–5)
ALBUMIN/GLOB SERPL: 0.4 (ref 1.1–2.2)
ALP SERPL-CCNC: 58 U/L (ref 45–117)
ALT SERPL-CCNC: 119 U/L (ref 12–78)
ANION GAP SERPL CALC-SCNC: 8 MMOL/L (ref 5–15)
AST SERPL-CCNC: 160 U/L (ref 15–37)
B BURGDOR IGG CSF-ACNC: < 0.08 INDEX
B BURGDOR IGM CSF-ACNC: 0.18 INDEX
BACTERIA SPEC CULT: NORMAL
BASOPHILS # BLD: 0 K/UL (ref 0–0.1)
BASOPHILS NFR BLD: 0 % (ref 0–1)
BILIRUB DIRECT SERPL-MCNC: 0.1 MG/DL (ref 0–0.2)
BILIRUB SERPL-MCNC: 0.6 MG/DL (ref 0.2–1)
BUN SERPL-MCNC: 15 MG/DL (ref 6–20)
BUN/CREAT SERPL: 9 (ref 12–20)
CALCIUM SERPL-MCNC: 7.3 MG/DL (ref 8.5–10.1)
CHLORIDE SERPL-SCNC: 110 MMOL/L (ref 97–108)
CK SERPL-CCNC: 1000 U/L (ref 39–308)
CO2 SERPL-SCNC: 24 MMOL/L (ref 21–32)
CREAT SERPL-MCNC: 1.61 MG/DL (ref 0.7–1.3)
DIFFERENTIAL METHOD BLD: ABNORMAL
EOSINOPHIL # BLD: 0.1 K/UL (ref 0–0.4)
EOSINOPHIL NFR BLD: 2 % (ref 0–7)
ERYTHROCYTE [DISTWIDTH] IN BLOOD BY AUTOMATED COUNT: 13.4 % (ref 11.5–14.5)
GLOBULIN SER CALC-MCNC: 4.1 G/DL (ref 2–4)
GLUCOSE BLD STRIP.AUTO-MCNC: 186 MG/DL (ref 65–117)
GLUCOSE BLD STRIP.AUTO-MCNC: 198 MG/DL (ref 65–117)
GLUCOSE BLD STRIP.AUTO-MCNC: 226 MG/DL (ref 65–117)
GLUCOSE BLD STRIP.AUTO-MCNC: 290 MG/DL (ref 65–117)
GLUCOSE SERPL-MCNC: 165 MG/DL (ref 65–100)
GRAM STN SPEC: NORMAL
GRAM STN SPEC: NORMAL
HCT VFR BLD AUTO: 29 % (ref 36.6–50.3)
HGB BLD-MCNC: 9.8 G/DL (ref 12.1–17)
IMM GRANULOCYTES # BLD AUTO: 0 K/UL (ref 0–0.04)
IMM GRANULOCYTES NFR BLD AUTO: 0 % (ref 0–0.5)
LYMPHOCYTES # BLD: 0.5 K/UL (ref 0.8–3.5)
LYMPHOCYTES NFR BLD: 8 % (ref 12–49)
MCH RBC QN AUTO: 28.7 PG (ref 26–34)
MCHC RBC AUTO-ENTMCNC: 33.8 G/DL (ref 30–36.5)
MCV RBC AUTO: 85 FL (ref 80–99)
MONOCYTES # BLD: 0.3 K/UL (ref 0–1)
MONOCYTES NFR BLD: 5 % (ref 5–13)
NEUTS SEG # BLD: 5.3 K/UL (ref 1.8–8)
NEUTS SEG NFR BLD: 85 % (ref 32–75)
NRBC # BLD: 0 K/UL (ref 0–0.01)
NRBC BLD-RTO: 0 PER 100 WBC
PLATELET # BLD AUTO: 400 K/UL (ref 150–400)
PLATELET COMMENT: ABNORMAL
PMV BLD AUTO: 10.3 FL (ref 8.9–12.9)
POTASSIUM SERPL-SCNC: 3.2 MMOL/L (ref 3.5–5.1)
PROT SERPL-MCNC: 5.8 G/DL (ref 6.4–8.2)
RBC # BLD AUTO: 3.41 M/UL (ref 4.1–5.7)
RBC MORPH BLD: ABNORMAL
SERVICE CMNT-IMP: ABNORMAL
SERVICE CMNT-IMP: NORMAL
SODIUM SERPL-SCNC: 142 MMOL/L (ref 136–145)
WBC # BLD AUTO: 6.2 K/UL (ref 4.1–11.1)
WBC MORPH BLD: ABNORMAL

## 2023-10-12 PROCEDURE — 6370000000 HC RX 637 (ALT 250 FOR IP): Performed by: INTERNAL MEDICINE

## 2023-10-12 PROCEDURE — 2060000000 HC ICU INTERMEDIATE R&B

## 2023-10-12 PROCEDURE — 6370000000 HC RX 637 (ALT 250 FOR IP): Performed by: HOSPITALIST

## 2023-10-12 PROCEDURE — 85025 COMPLETE CBC W/AUTO DIFF WBC: CPT

## 2023-10-12 PROCEDURE — 6370000000 HC RX 637 (ALT 250 FOR IP): Performed by: STUDENT IN AN ORGANIZED HEALTH CARE EDUCATION/TRAINING PROGRAM

## 2023-10-12 PROCEDURE — 6360000002 HC RX W HCPCS: Performed by: INTERNAL MEDICINE

## 2023-10-12 PROCEDURE — 80076 HEPATIC FUNCTION PANEL: CPT

## 2023-10-12 PROCEDURE — 97530 THERAPEUTIC ACTIVITIES: CPT

## 2023-10-12 PROCEDURE — 82962 GLUCOSE BLOOD TEST: CPT

## 2023-10-12 PROCEDURE — 36415 COLL VENOUS BLD VENIPUNCTURE: CPT

## 2023-10-12 PROCEDURE — 2580000003 HC RX 258: Performed by: INTERNAL MEDICINE

## 2023-10-12 PROCEDURE — 97116 GAIT TRAINING THERAPY: CPT

## 2023-10-12 PROCEDURE — 6360000002 HC RX W HCPCS: Performed by: STUDENT IN AN ORGANIZED HEALTH CARE EDUCATION/TRAINING PROGRAM

## 2023-10-12 PROCEDURE — 82550 ASSAY OF CK (CPK): CPT

## 2023-10-12 PROCEDURE — 2500000003 HC RX 250 WO HCPCS: Performed by: INTERNAL MEDICINE

## 2023-10-12 PROCEDURE — 80048 BASIC METABOLIC PNL TOTAL CA: CPT

## 2023-10-12 RX ORDER — METOPROLOL TARTRATE 50 MG/1
50 TABLET, FILM COATED ORAL 2 TIMES DAILY
Status: DISCONTINUED | OUTPATIENT
Start: 2023-10-12 | End: 2023-10-13

## 2023-10-12 RX ORDER — METOPROLOL SUCCINATE 50 MG/1
50 TABLET, EXTENDED RELEASE ORAL DAILY
Status: DISCONTINUED | OUTPATIENT
Start: 2023-10-13 | End: 2023-10-12

## 2023-10-12 RX ORDER — METOPROLOL TARTRATE 50 MG/1
50 TABLET, FILM COATED ORAL 2 TIMES DAILY
Status: DISCONTINUED | OUTPATIENT
Start: 2023-10-12 | End: 2023-10-12

## 2023-10-12 RX ORDER — AMLODIPINE BESYLATE 5 MG/1
10 TABLET ORAL DAILY
Status: DISCONTINUED | OUTPATIENT
Start: 2023-10-12 | End: 2023-10-16

## 2023-10-12 RX ADMIN — AMPICILLIN SODIUM 2000 MG: 2 INJECTION, POWDER, FOR SOLUTION INTRAVENOUS at 00:00

## 2023-10-12 RX ADMIN — HYDRALAZINE HYDROCHLORIDE 10 MG: 20 INJECTION INTRAMUSCULAR; INTRAVENOUS at 09:42

## 2023-10-12 RX ADMIN — HYDRALAZINE HYDROCHLORIDE 10 MG: 20 INJECTION INTRAMUSCULAR; INTRAVENOUS at 01:45

## 2023-10-12 RX ADMIN — Medication: at 08:43

## 2023-10-12 RX ADMIN — INSULIN LISPRO 4 UNITS: 100 INJECTION, SOLUTION INTRAVENOUS; SUBCUTANEOUS at 21:59

## 2023-10-12 RX ADMIN — SODIUM CHLORIDE, PRESERVATIVE FREE 10 ML: 5 INJECTION INTRAVENOUS at 22:14

## 2023-10-12 RX ADMIN — Medication 1 AMPULE: at 08:41

## 2023-10-12 RX ADMIN — AMLODIPINE BESYLATE 10 MG: 5 TABLET ORAL at 08:37

## 2023-10-12 RX ADMIN — SODIUM CHLORIDE, PRESERVATIVE FREE 10 ML: 5 INJECTION INTRAVENOUS at 08:42

## 2023-10-12 RX ADMIN — ENOXAPARIN SODIUM 40 MG: 100 INJECTION SUBCUTANEOUS at 17:11

## 2023-10-12 RX ADMIN — ASPIRIN 81 MG: 81 TABLET, COATED ORAL at 08:36

## 2023-10-12 RX ADMIN — METOPROLOL TARTRATE 50 MG: 50 TABLET ORAL at 21:59

## 2023-10-12 RX ADMIN — INSULIN LISPRO 2 UNITS: 100 INJECTION, SOLUTION INTRAVENOUS; SUBCUTANEOUS at 17:10

## 2023-10-12 RX ADMIN — METOPROLOL SUCCINATE 25 MG: 25 TABLET, EXTENDED RELEASE ORAL at 08:41

## 2023-10-12 RX ADMIN — Medication 100 MG: at 08:37

## 2023-10-12 RX ADMIN — METOPROLOL TARTRATE 5 MG: 5 INJECTION INTRAVENOUS at 05:51

## 2023-10-12 RX ADMIN — METOPROLOL TARTRATE 25 MG: 25 TABLET, FILM COATED ORAL at 11:45

## 2023-10-12 ASSESSMENT — PAIN SCALES - GENERAL
PAINLEVEL_OUTOF10: 0
PAINLEVEL_OUTOF10: 0

## 2023-10-13 LAB
ANION GAP SERPL CALC-SCNC: 6 MMOL/L (ref 5–15)
BASOPHILS # BLD: 0.1 K/UL (ref 0–0.1)
BASOPHILS NFR BLD: 1 % (ref 0–1)
BUN SERPL-MCNC: 17 MG/DL (ref 6–20)
BUN/CREAT SERPL: 10 (ref 12–20)
CALCIUM SERPL-MCNC: 7.3 MG/DL (ref 8.5–10.1)
CHLORIDE SERPL-SCNC: 107 MMOL/L (ref 97–108)
CO2 SERPL-SCNC: 25 MMOL/L (ref 21–32)
CREAT SERPL-MCNC: 1.69 MG/DL (ref 0.7–1.3)
DIFFERENTIAL METHOD BLD: ABNORMAL
EOSINOPHIL # BLD: 0 K/UL (ref 0–0.4)
EOSINOPHIL NFR BLD: 0 % (ref 0–7)
ERYTHROCYTE [DISTWIDTH] IN BLOOD BY AUTOMATED COUNT: 13.6 % (ref 11.5–14.5)
EST. AVERAGE GLUCOSE BLD GHB EST-MCNC: 171 MG/DL
GLUCOSE BLD STRIP.AUTO-MCNC: 247 MG/DL (ref 65–117)
GLUCOSE BLD STRIP.AUTO-MCNC: 252 MG/DL (ref 65–117)
GLUCOSE BLD STRIP.AUTO-MCNC: 266 MG/DL (ref 65–117)
GLUCOSE BLD STRIP.AUTO-MCNC: 326 MG/DL (ref 65–117)
GLUCOSE BLD STRIP.AUTO-MCNC: 332 MG/DL (ref 65–117)
GLUCOSE SERPL-MCNC: 280 MG/DL (ref 65–100)
HBA1C MFR BLD: 7.6 % (ref 4–5.6)
HCT VFR BLD AUTO: 27.6 % (ref 36.6–50.3)
HGB BLD-MCNC: 9.3 G/DL (ref 12.1–17)
IMM GRANULOCYTES # BLD AUTO: 0 K/UL
IMM GRANULOCYTES NFR BLD AUTO: 0 %
LYMPHOCYTES # BLD: 0.6 K/UL (ref 0.8–3.5)
LYMPHOCYTES NFR BLD: 8 % (ref 12–49)
MCH RBC QN AUTO: 28.6 PG (ref 26–34)
MCHC RBC AUTO-ENTMCNC: 33.7 G/DL (ref 30–36.5)
MCV RBC AUTO: 84.9 FL (ref 80–99)
MONOCYTES # BLD: 0.9 K/UL (ref 0–1)
MONOCYTES NFR BLD: 12 % (ref 5–13)
MYELOCYTES NFR BLD MANUAL: 1 %
NEUTS SEG # BLD: 5.5 K/UL (ref 1.8–8)
NEUTS SEG NFR BLD: 78 % (ref 32–75)
NRBC # BLD: 0 K/UL (ref 0–0.01)
NRBC BLD-RTO: 0 PER 100 WBC
PLATELET # BLD AUTO: 432 K/UL (ref 150–400)
PMV BLD AUTO: 10.2 FL (ref 8.9–12.9)
POTASSIUM SERPL-SCNC: 3.2 MMOL/L (ref 3.5–5.1)
RBC # BLD AUTO: 3.25 M/UL (ref 4.1–5.7)
RBC MORPH BLD: ABNORMAL
SERVICE CMNT-IMP: ABNORMAL
SODIUM SERPL-SCNC: 138 MMOL/L (ref 136–145)
WBC # BLD AUTO: 7.1 K/UL (ref 4.1–11.1)

## 2023-10-13 PROCEDURE — 6360000002 HC RX W HCPCS: Performed by: STUDENT IN AN ORGANIZED HEALTH CARE EDUCATION/TRAINING PROGRAM

## 2023-10-13 PROCEDURE — 99233 SBSQ HOSP IP/OBS HIGH 50: CPT | Performed by: INTERNAL MEDICINE

## 2023-10-13 PROCEDURE — 36415 COLL VENOUS BLD VENIPUNCTURE: CPT

## 2023-10-13 PROCEDURE — 6370000000 HC RX 637 (ALT 250 FOR IP): Performed by: STUDENT IN AN ORGANIZED HEALTH CARE EDUCATION/TRAINING PROGRAM

## 2023-10-13 PROCEDURE — 97535 SELF CARE MNGMENT TRAINING: CPT

## 2023-10-13 PROCEDURE — 85025 COMPLETE CBC W/AUTO DIFF WBC: CPT

## 2023-10-13 PROCEDURE — 97530 THERAPEUTIC ACTIVITIES: CPT

## 2023-10-13 PROCEDURE — 6370000000 HC RX 637 (ALT 250 FOR IP): Performed by: HOSPITALIST

## 2023-10-13 PROCEDURE — 2580000003 HC RX 258: Performed by: INTERNAL MEDICINE

## 2023-10-13 PROCEDURE — 80048 BASIC METABOLIC PNL TOTAL CA: CPT

## 2023-10-13 PROCEDURE — 82962 GLUCOSE BLOOD TEST: CPT

## 2023-10-13 PROCEDURE — 83036 HEMOGLOBIN GLYCOSYLATED A1C: CPT

## 2023-10-13 PROCEDURE — 2060000000 HC ICU INTERMEDIATE R&B

## 2023-10-13 PROCEDURE — 6370000000 HC RX 637 (ALT 250 FOR IP): Performed by: INTERNAL MEDICINE

## 2023-10-13 RX ORDER — HYDRALAZINE HYDROCHLORIDE 25 MG/1
25 TABLET, FILM COATED ORAL EVERY 8 HOURS SCHEDULED
Status: DISCONTINUED | OUTPATIENT
Start: 2023-10-13 | End: 2023-10-19 | Stop reason: HOSPADM

## 2023-10-13 RX ORDER — INSULIN GLARGINE 100 [IU]/ML
15 INJECTION, SOLUTION SUBCUTANEOUS NIGHTLY
Status: DISCONTINUED | OUTPATIENT
Start: 2023-10-13 | End: 2023-10-15

## 2023-10-13 RX ORDER — POTASSIUM CHLORIDE 750 MG/1
40 TABLET, FILM COATED, EXTENDED RELEASE ORAL ONCE
Status: COMPLETED | OUTPATIENT
Start: 2023-10-13 | End: 2023-10-13

## 2023-10-13 RX ORDER — METOPROLOL TARTRATE 50 MG/1
100 TABLET, FILM COATED ORAL 2 TIMES DAILY
Status: DISCONTINUED | OUTPATIENT
Start: 2023-10-13 | End: 2023-10-19 | Stop reason: HOSPADM

## 2023-10-13 RX ADMIN — SODIUM CHLORIDE, PRESERVATIVE FREE 10 ML: 5 INJECTION INTRAVENOUS at 21:25

## 2023-10-13 RX ADMIN — ENOXAPARIN SODIUM 40 MG: 100 INJECTION SUBCUTANEOUS at 17:26

## 2023-10-13 RX ADMIN — SODIUM CHLORIDE, PRESERVATIVE FREE 10 ML: 5 INJECTION INTRAVENOUS at 08:37

## 2023-10-13 RX ADMIN — INSULIN LISPRO 6 UNITS: 100 INJECTION, SOLUTION INTRAVENOUS; SUBCUTANEOUS at 21:23

## 2023-10-13 RX ADMIN — Medication 1 AMPULE: at 23:26

## 2023-10-13 RX ADMIN — AMLODIPINE BESYLATE 10 MG: 5 TABLET ORAL at 08:37

## 2023-10-13 RX ADMIN — Medication 1 AMPULE: at 08:36

## 2023-10-13 RX ADMIN — METOPROLOL TARTRATE 100 MG: 50 TABLET ORAL at 08:37

## 2023-10-13 RX ADMIN — INSULIN GLARGINE 15 UNITS: 100 INJECTION, SOLUTION SUBCUTANEOUS at 21:24

## 2023-10-13 RX ADMIN — Medication: at 08:43

## 2023-10-13 RX ADMIN — INSULIN LISPRO 4 UNITS: 100 INJECTION, SOLUTION INTRAVENOUS; SUBCUTANEOUS at 17:25

## 2023-10-13 RX ADMIN — POTASSIUM CHLORIDE 40 MEQ: 750 TABLET, FILM COATED, EXTENDED RELEASE ORAL at 17:26

## 2023-10-13 RX ADMIN — Medication: at 21:26

## 2023-10-13 RX ADMIN — INSULIN LISPRO 4 UNITS: 100 INJECTION, SOLUTION INTRAVENOUS; SUBCUTANEOUS at 08:36

## 2023-10-13 RX ADMIN — ASPIRIN 81 MG: 81 TABLET, COATED ORAL at 08:36

## 2023-10-13 RX ADMIN — METOPROLOL TARTRATE 100 MG: 50 TABLET ORAL at 21:25

## 2023-10-13 RX ADMIN — HYDRALAZINE HYDROCHLORIDE 25 MG: 25 TABLET, FILM COATED ORAL at 21:25

## 2023-10-13 RX ADMIN — INSULIN LISPRO 2 UNITS: 100 INJECTION, SOLUTION INTRAVENOUS; SUBCUTANEOUS at 11:51

## 2023-10-13 RX ADMIN — Medication 100 MG: at 08:37

## 2023-10-13 RX ADMIN — HYDRALAZINE HYDROCHLORIDE 25 MG: 25 TABLET, FILM COATED ORAL at 17:26

## 2023-10-13 NOTE — ADT AUTH CERT
10/6/23-10/10/23 CLINICAL FOR [address] [facility] 2 CRITICAL CARE   [address]   [place] [address]   [email or web address] [id number]   [email or web address]   Auth number: [id number]       RETURN CONTACT:   Feleciaalec Garcia Ph. [phone]   Fax: [phone]   Last edited by Felecia Garcia on 10/05/23 at [phone]      Patient Demographics     Name Patient ID SSN Gender Identity Birth Date   Marletta Button [id number]  Male [birth year] (79 yrs)      Address Phone Email Employer     [address] [facility] [phone] (U)   [phone] (M) -- [address] Race Occupation Emp Status     Domingo Ramírez / Jacqueline Guadarrama -- Retired        Reg Status PCP Date Last Verified Next Review Date     Verified Armand Arreguin - BRAYAN  [phone] 10/04/23 11/03/23        Admission Date Discharge Date Admitting Provider       10/04/23 -- Elaina Ruelas MD          Marital Status Restorationism         Single None            Emergency Contact 1 Emergency Contact 2   Mars Das (A)   [phone] (S)   [phone] Archie Mcgowan (C)   [place]   [phone] Antonia Parrish [de-identified]  [address] [facility]  Name/Sex/Relation Subscriber  Subscriber Address/Phone Subscriber Emp/Emp Phone   1.  [facility]   [id number] Marletta Button - Male   (Self)  [address] [place] [address]   [phone](M) RETIRED       Utilization Reviews        10/10   Last Updated by Steven Verdin RN on 10/11/2023 1250     Review Status Review Type Associated Date Created By   In Primary --  Steven Verdin RN      Criteria Review   DATE: 10/10/2023 Intermediate Care        PERTINENT UPDATES:  per RN- 0006: Hydralazine given for BP of 163/74   0226: Metoprolol given for BP of 167/80   0300: Consulted APRN-NP for BP systolic sustained >895, advised order put in for Labetalol for sustained systolic >242  5746: Pt systolic 538-404 sustained, continue to
tender, non distended   Neuro: moves all extremities   Skin: warm and dry   Extremity: no edema, warm to touch            MD CONSULTS/ASSESSMENT AND PLAN:  PER CARDIOLOGY:  Assessment:     Problem list:   NSTEMI with troponin of 4-5ks, stable, in the setting of sepsis  Sepsis, mild leukocytosis, fever of 103, diarrhea, weakness, lethargy  Encephalopathy, aseptic meningitis  Hypoxic respiratory distress   Renal insufficiency with creatinine of 2.25  Hypertension  Hyperlipidemia  Diabetes  Lactic acidosis, resolved  Alcohol use       Works as a , lives by himself, children      Trop 4200 > 4100   WBC count 12.4  Hemoglobin 14.2  Creatinine 2.25  Low probability for PE on VQ scan     Echo 10/4/2023 - EF 55-60%, AV Mean gradient 27       Recommendations:     SOB- d./c IV fluid and give a dose of lasix      Continue aspirin  Completed anticoagulation for 72 hours, LV ejection fraction was normal, okay to stop heparin  CK elevated - holding statin   Treatment of sepsis/meningitis per primary team  Cont  amlodipine and Metoprolol             MEDICATIONS:  Norvasc 5mg poqd  Omnipen 2g iv qid  Rocephin 2 g ivq12h  Lasix 20mg ivx1  Effer K 40meq po x 1  Vancomycin 1500 mg ivx1  [email or web address]        ORDERS:  Reg diet  Droplet isolation  Fall precautions  Accucheck qid  SSI per protocol  PT/OT  Neurochecks  I&O  Seizure precautions        CM ASSESSMENT OR NOTES:  Rehab when stable and insurance auth
elevated - holding statin   Treatment of sepsis per primary team     NEUROLOGY PN:  IMPRESSION/RECOMMENDATIONS:  Raheem Fisher is a 79 y.o. male who presents with prodromal illness with diarrhea for approximately one 1 week prior to presentation admitted on 10/4 for severe sepsis, TERESA and NSTEMI, found to be febrile to 104, tachycardic, with lactic acidosis, TERESA started on broad-spectrum antibiotics and acyclovir as well as heparin drip for NSTEMI with rapid response called for acute hypoxic respiratory distress and altered mental status, transferred to the ICU for further work-up. Concern for meningitis versus encephalitis versus aseptic meningitis. LP was performed which showed colorless, hazy CSF appearance with WBCs 335, RBC 35, neutrophilic predominance, protein 109, glucose 68. Correction for RBCs in the CSF would still allow for approximately 100 WBCs in the CSF, again with a neutrophilic predominance. Meningitis encephalitis panel in the CSF was unremarkable. Preliminary CSF culture is unremarkable. West Nile virus in the CSF is pending, Lyme and VDRL in the CSF is pending. HIV in the serum negative. Initial head CT noncontrast unremarkable for any acute pathology. I suspect given the prodrome of illness as well as sepsis, altered mental status, initial CSF results, that the picture is most consistent with bacterial meningitis given the neutrophilic predominance. However in early cases of viral encephalitis there may be a neutrophilic predominance present. Given that the HSV PCR is negative, I would recommend discontinuing acyclovir. While the meningitis/encephalitis CSF panel checks for many markers, it is still not completely all-encompassing for every possible virus or bacterial etiology. Therefore I would recommend and consider strongly having infectious disease experts on board.   I would continue antibiotics for now, broad-spectrum, until CSF culture has been finalized as well as

## 2023-10-14 LAB
ALBUMIN SERPL-MCNC: 1.8 G/DL (ref 3.5–5)
ALBUMIN/GLOB SERPL: 0.4 (ref 1.1–2.2)
ALP SERPL-CCNC: 55 U/L (ref 45–117)
ALT SERPL-CCNC: 85 U/L (ref 12–78)
ANION GAP SERPL CALC-SCNC: 6 MMOL/L (ref 5–15)
AST SERPL-CCNC: 74 U/L (ref 15–37)
BASOPHILS # BLD: 0 K/UL (ref 0–0.1)
BASOPHILS NFR BLD: 0 % (ref 0–1)
BILIRUB DIRECT SERPL-MCNC: 0.1 MG/DL (ref 0–0.2)
BILIRUB SERPL-MCNC: 0.5 MG/DL (ref 0.2–1)
BUN SERPL-MCNC: 17 MG/DL (ref 6–20)
BUN/CREAT SERPL: 10 (ref 12–20)
CALCIUM SERPL-MCNC: 7.9 MG/DL (ref 8.5–10.1)
CHLORIDE SERPL-SCNC: 112 MMOL/L (ref 97–108)
CK SERPL-CCNC: 501 U/L (ref 39–308)
CO2 SERPL-SCNC: 25 MMOL/L (ref 21–32)
CREAT SERPL-MCNC: 1.78 MG/DL (ref 0.7–1.3)
DIFFERENTIAL METHOD BLD: ABNORMAL
EOSINOPHIL # BLD: 0.1 K/UL (ref 0–0.4)
EOSINOPHIL NFR BLD: 2 % (ref 0–7)
ERYTHROCYTE [DISTWIDTH] IN BLOOD BY AUTOMATED COUNT: 14.2 % (ref 11.5–14.5)
EST. AVERAGE GLUCOSE BLD GHB EST-MCNC: 192 MG/DL
GLOBULIN SER CALC-MCNC: 4.1 G/DL (ref 2–4)
GLUCOSE BLD STRIP.AUTO-MCNC: 104 MG/DL (ref 65–117)
GLUCOSE BLD STRIP.AUTO-MCNC: 111 MG/DL (ref 65–117)
GLUCOSE BLD STRIP.AUTO-MCNC: 215 MG/DL (ref 65–117)
GLUCOSE BLD STRIP.AUTO-MCNC: 231 MG/DL (ref 65–117)
GLUCOSE BLD STRIP.AUTO-MCNC: 273 MG/DL (ref 65–117)
GLUCOSE SERPL-MCNC: 61 MG/DL (ref 65–100)
HBA1C MFR BLD: 8.3 % (ref 4–5.6)
HCT VFR BLD AUTO: 26.3 % (ref 36.6–50.3)
HGB BLD-MCNC: 8.6 G/DL (ref 12.1–17)
IMM GRANULOCYTES # BLD AUTO: 0.2 K/UL (ref 0–0.04)
IMM GRANULOCYTES NFR BLD AUTO: 2 % (ref 0–0.5)
LYMPHOCYTES # BLD: 1.6 K/UL (ref 0.8–3.5)
LYMPHOCYTES NFR BLD: 22 % (ref 12–49)
MCH RBC QN AUTO: 28.5 PG (ref 26–34)
MCHC RBC AUTO-ENTMCNC: 32.7 G/DL (ref 30–36.5)
MCV RBC AUTO: 87.1 FL (ref 80–99)
MONOCYTES # BLD: 0.8 K/UL (ref 0–1)
MONOCYTES NFR BLD: 10 % (ref 5–13)
NEUTS SEG # BLD: 4.7 K/UL (ref 1.8–8)
NEUTS SEG NFR BLD: 64 % (ref 32–75)
NRBC # BLD: 0 K/UL (ref 0–0.01)
NRBC BLD-RTO: 0 PER 100 WBC
PLATELET # BLD AUTO: 483 K/UL (ref 150–400)
PMV BLD AUTO: 10.1 FL (ref 8.9–12.9)
POTASSIUM SERPL-SCNC: 3.3 MMOL/L (ref 3.5–5.1)
PROT SERPL-MCNC: 5.9 G/DL (ref 6.4–8.2)
RBC # BLD AUTO: 3.02 M/UL (ref 4.1–5.7)
SERVICE CMNT-IMP: ABNORMAL
SERVICE CMNT-IMP: NORMAL
SERVICE CMNT-IMP: NORMAL
SODIUM SERPL-SCNC: 143 MMOL/L (ref 136–145)
WBC # BLD AUTO: 7.4 K/UL (ref 4.1–11.1)

## 2023-10-14 PROCEDURE — 6370000000 HC RX 637 (ALT 250 FOR IP): Performed by: STUDENT IN AN ORGANIZED HEALTH CARE EDUCATION/TRAINING PROGRAM

## 2023-10-14 PROCEDURE — 83036 HEMOGLOBIN GLYCOSYLATED A1C: CPT

## 2023-10-14 PROCEDURE — 6370000000 HC RX 637 (ALT 250 FOR IP): Performed by: HOSPITALIST

## 2023-10-14 PROCEDURE — 82550 ASSAY OF CK (CPK): CPT

## 2023-10-14 PROCEDURE — 6360000002 HC RX W HCPCS: Performed by: STUDENT IN AN ORGANIZED HEALTH CARE EDUCATION/TRAINING PROGRAM

## 2023-10-14 PROCEDURE — 2060000000 HC ICU INTERMEDIATE R&B

## 2023-10-14 PROCEDURE — 82962 GLUCOSE BLOOD TEST: CPT

## 2023-10-14 PROCEDURE — 80048 BASIC METABOLIC PNL TOTAL CA: CPT

## 2023-10-14 PROCEDURE — 6370000000 HC RX 637 (ALT 250 FOR IP): Performed by: INTERNAL MEDICINE

## 2023-10-14 PROCEDURE — 2580000003 HC RX 258: Performed by: INTERNAL MEDICINE

## 2023-10-14 PROCEDURE — 36415 COLL VENOUS BLD VENIPUNCTURE: CPT

## 2023-10-14 PROCEDURE — 85025 COMPLETE CBC W/AUTO DIFF WBC: CPT

## 2023-10-14 PROCEDURE — 80076 HEPATIC FUNCTION PANEL: CPT

## 2023-10-14 PROCEDURE — P9047 ALBUMIN (HUMAN), 25%, 50ML: HCPCS | Performed by: STUDENT IN AN ORGANIZED HEALTH CARE EDUCATION/TRAINING PROGRAM

## 2023-10-14 RX ORDER — ALBUMIN (HUMAN) 12.5 G/50ML
25 SOLUTION INTRAVENOUS ONCE
Status: COMPLETED | OUTPATIENT
Start: 2023-10-14 | End: 2023-10-14

## 2023-10-14 RX ADMIN — ENOXAPARIN SODIUM 40 MG: 100 INJECTION SUBCUTANEOUS at 16:06

## 2023-10-14 RX ADMIN — HYDRALAZINE HYDROCHLORIDE 25 MG: 25 TABLET, FILM COATED ORAL at 13:45

## 2023-10-14 RX ADMIN — ASPIRIN 81 MG: 81 TABLET, COATED ORAL at 08:51

## 2023-10-14 RX ADMIN — HYDRALAZINE HYDROCHLORIDE 25 MG: 25 TABLET, FILM COATED ORAL at 22:13

## 2023-10-14 RX ADMIN — HYDRALAZINE HYDROCHLORIDE 25 MG: 25 TABLET, FILM COATED ORAL at 06:31

## 2023-10-14 RX ADMIN — AMLODIPINE BESYLATE 10 MG: 5 TABLET ORAL at 08:51

## 2023-10-14 RX ADMIN — INSULIN LISPRO 2 UNITS: 100 INJECTION, SOLUTION INTRAVENOUS; SUBCUTANEOUS at 12:31

## 2023-10-14 RX ADMIN — Medication: at 22:13

## 2023-10-14 RX ADMIN — SODIUM CHLORIDE, PRESERVATIVE FREE 10 ML: 5 INJECTION INTRAVENOUS at 08:52

## 2023-10-14 RX ADMIN — INSULIN GLARGINE 15 UNITS: 100 INJECTION, SOLUTION SUBCUTANEOUS at 22:12

## 2023-10-14 RX ADMIN — Medication 1 AMPULE: at 09:08

## 2023-10-14 RX ADMIN — INSULIN LISPRO 4 UNITS: 100 INJECTION, SOLUTION INTRAVENOUS; SUBCUTANEOUS at 22:12

## 2023-10-14 RX ADMIN — METOPROLOL TARTRATE 100 MG: 50 TABLET ORAL at 22:13

## 2023-10-14 RX ADMIN — Medication 100 MG: at 08:51

## 2023-10-14 RX ADMIN — INSULIN LISPRO 2 UNITS: 100 INJECTION, SOLUTION INTRAVENOUS; SUBCUTANEOUS at 17:26

## 2023-10-14 RX ADMIN — SODIUM CHLORIDE, PRESERVATIVE FREE 10 ML: 5 INJECTION INTRAVENOUS at 22:14

## 2023-10-14 RX ADMIN — POTASSIUM BICARBONATE 40 MEQ: 782 TABLET, EFFERVESCENT ORAL at 08:50

## 2023-10-14 RX ADMIN — METOPROLOL TARTRATE 100 MG: 50 TABLET ORAL at 08:51

## 2023-10-14 RX ADMIN — ALBUMIN (HUMAN) 25 G: 0.25 INJECTION, SOLUTION INTRAVENOUS at 11:42

## 2023-10-14 RX ADMIN — Medication: at 08:58

## 2023-10-14 ASSESSMENT — PAIN SCALES - GENERAL
PAINLEVEL_OUTOF10: 0

## 2023-10-14 ASSESSMENT — PAIN SCALES - WONG BAKER
WONGBAKER_NUMERICALRESPONSE: 0

## 2023-10-15 LAB
ALBUMIN SERPL-MCNC: 1.9 G/DL (ref 3.5–5)
ALBUMIN/GLOB SERPL: 0.5 (ref 1.1–2.2)
ALP SERPL-CCNC: 55 U/L (ref 45–117)
ALT SERPL-CCNC: 69 U/L (ref 12–78)
ANION GAP SERPL CALC-SCNC: 5 MMOL/L (ref 5–15)
AST SERPL-CCNC: 54 U/L (ref 15–37)
BASOPHILS # BLD: 0 K/UL (ref 0–0.1)
BASOPHILS NFR BLD: 0 % (ref 0–1)
BILIRUB DIRECT SERPL-MCNC: 0.1 MG/DL (ref 0–0.2)
BILIRUB SERPL-MCNC: 0.6 MG/DL (ref 0.2–1)
BUN SERPL-MCNC: 16 MG/DL (ref 6–20)
BUN/CREAT SERPL: 9 (ref 12–20)
CALCIUM SERPL-MCNC: 7.7 MG/DL (ref 8.5–10.1)
CHLORIDE SERPL-SCNC: 110 MMOL/L (ref 97–108)
CK SERPL-CCNC: 364 U/L (ref 39–308)
CO2 SERPL-SCNC: 25 MMOL/L (ref 21–32)
CREAT SERPL-MCNC: 1.87 MG/DL (ref 0.7–1.3)
DIFFERENTIAL METHOD BLD: ABNORMAL
EOSINOPHIL # BLD: 0.1 K/UL (ref 0–0.4)
EOSINOPHIL NFR BLD: 1 % (ref 0–7)
ERYTHROCYTE [DISTWIDTH] IN BLOOD BY AUTOMATED COUNT: 14.7 % (ref 11.5–14.5)
GLOBULIN SER CALC-MCNC: 4 G/DL (ref 2–4)
GLUCOSE BLD STRIP.AUTO-MCNC: 156 MG/DL (ref 65–117)
GLUCOSE BLD STRIP.AUTO-MCNC: 163 MG/DL (ref 65–117)
GLUCOSE BLD STRIP.AUTO-MCNC: 180 MG/DL (ref 65–117)
GLUCOSE BLD STRIP.AUTO-MCNC: 183 MG/DL (ref 65–117)
GLUCOSE BLD STRIP.AUTO-MCNC: 238 MG/DL (ref 65–117)
GLUCOSE BLD STRIP.AUTO-MCNC: 239 MG/DL (ref 65–117)
GLUCOSE BLD STRIP.AUTO-MCNC: 297 MG/DL (ref 65–117)
GLUCOSE BLD STRIP.AUTO-MCNC: 417 MG/DL (ref 65–117)
GLUCOSE SERPL-MCNC: 170 MG/DL (ref 65–100)
HCT VFR BLD AUTO: 25.9 % (ref 36.6–50.3)
HGB BLD-MCNC: 8.4 G/DL (ref 12.1–17)
IMM GRANULOCYTES # BLD AUTO: 0.1 K/UL (ref 0–0.04)
IMM GRANULOCYTES NFR BLD AUTO: 1 % (ref 0–0.5)
LYMPHOCYTES # BLD: 1.4 K/UL (ref 0.8–3.5)
LYMPHOCYTES NFR BLD: 21 % (ref 12–49)
MCH RBC QN AUTO: 29.1 PG (ref 26–34)
MCHC RBC AUTO-ENTMCNC: 32.4 G/DL (ref 30–36.5)
MCV RBC AUTO: 89.6 FL (ref 80–99)
MONOCYTES # BLD: 0.6 K/UL (ref 0–1)
MONOCYTES NFR BLD: 9 % (ref 5–13)
NEUTS SEG # BLD: 4.6 K/UL (ref 1.8–8)
NEUTS SEG NFR BLD: 68 % (ref 32–75)
NRBC # BLD: 0 K/UL (ref 0–0.01)
NRBC BLD-RTO: 0 PER 100 WBC
PLATELET # BLD AUTO: 468 K/UL (ref 150–400)
PMV BLD AUTO: 9.9 FL (ref 8.9–12.9)
POTASSIUM SERPL-SCNC: 3.5 MMOL/L (ref 3.5–5.1)
PROT SERPL-MCNC: 5.9 G/DL (ref 6.4–8.2)
RBC # BLD AUTO: 2.89 M/UL (ref 4.1–5.7)
SERVICE CMNT-IMP: ABNORMAL
SODIUM SERPL-SCNC: 140 MMOL/L (ref 136–145)
WBC # BLD AUTO: 6.8 K/UL (ref 4.1–11.1)

## 2023-10-15 PROCEDURE — 82550 ASSAY OF CK (CPK): CPT

## 2023-10-15 PROCEDURE — 94640 AIRWAY INHALATION TREATMENT: CPT

## 2023-10-15 PROCEDURE — 80076 HEPATIC FUNCTION PANEL: CPT

## 2023-10-15 PROCEDURE — 6360000002 HC RX W HCPCS: Performed by: STUDENT IN AN ORGANIZED HEALTH CARE EDUCATION/TRAINING PROGRAM

## 2023-10-15 PROCEDURE — 2060000000 HC ICU INTERMEDIATE R&B

## 2023-10-15 PROCEDURE — 6360000002 HC RX W HCPCS: Performed by: INTERNAL MEDICINE

## 2023-10-15 PROCEDURE — 6370000000 HC RX 637 (ALT 250 FOR IP): Performed by: STUDENT IN AN ORGANIZED HEALTH CARE EDUCATION/TRAINING PROGRAM

## 2023-10-15 PROCEDURE — 80048 BASIC METABOLIC PNL TOTAL CA: CPT

## 2023-10-15 PROCEDURE — 2700000000 HC OXYGEN THERAPY PER DAY

## 2023-10-15 PROCEDURE — 2500000003 HC RX 250 WO HCPCS: Performed by: INTERNAL MEDICINE

## 2023-10-15 PROCEDURE — 85025 COMPLETE CBC W/AUTO DIFF WBC: CPT

## 2023-10-15 PROCEDURE — 6370000000 HC RX 637 (ALT 250 FOR IP): Performed by: INTERNAL MEDICINE

## 2023-10-15 PROCEDURE — 82962 GLUCOSE BLOOD TEST: CPT

## 2023-10-15 PROCEDURE — 36415 COLL VENOUS BLD VENIPUNCTURE: CPT

## 2023-10-15 PROCEDURE — 6370000000 HC RX 637 (ALT 250 FOR IP): Performed by: HOSPITALIST

## 2023-10-15 PROCEDURE — 2580000003 HC RX 258: Performed by: INTERNAL MEDICINE

## 2023-10-15 RX ORDER — INSULIN GLARGINE 100 [IU]/ML
20 INJECTION, SOLUTION SUBCUTANEOUS NIGHTLY
Status: DISCONTINUED | OUTPATIENT
Start: 2023-10-15 | End: 2023-10-19 | Stop reason: HOSPADM

## 2023-10-15 RX ORDER — BUMETANIDE 0.25 MG/ML
1 INJECTION INTRAMUSCULAR; INTRAVENOUS 2 TIMES DAILY
Status: DISCONTINUED | OUTPATIENT
Start: 2023-10-15 | End: 2023-10-16

## 2023-10-15 RX ADMIN — SODIUM CHLORIDE, PRESERVATIVE FREE 10 ML: 5 INJECTION INTRAVENOUS at 08:37

## 2023-10-15 RX ADMIN — METOPROLOL TARTRATE 100 MG: 50 TABLET ORAL at 08:36

## 2023-10-15 RX ADMIN — Medication 1 AMPULE: at 08:36

## 2023-10-15 RX ADMIN — ENOXAPARIN SODIUM 40 MG: 100 INJECTION SUBCUTANEOUS at 14:46

## 2023-10-15 RX ADMIN — Medication: at 22:33

## 2023-10-15 RX ADMIN — HYDRALAZINE HYDROCHLORIDE 25 MG: 25 TABLET, FILM COATED ORAL at 05:39

## 2023-10-15 RX ADMIN — BUMETANIDE 1 MG: 0.25 INJECTION INTRAMUSCULAR; INTRAVENOUS at 14:47

## 2023-10-15 RX ADMIN — BUMETANIDE 1 MG: 0.25 INJECTION INTRAMUSCULAR; INTRAVENOUS at 22:34

## 2023-10-15 RX ADMIN — ASPIRIN 81 MG: 81 TABLET, COATED ORAL at 08:36

## 2023-10-15 RX ADMIN — HYDRALAZINE HYDROCHLORIDE 25 MG: 25 TABLET, FILM COATED ORAL at 22:34

## 2023-10-15 RX ADMIN — INSULIN LISPRO 4 UNITS: 100 INJECTION, SOLUTION INTRAVENOUS; SUBCUTANEOUS at 22:32

## 2023-10-15 RX ADMIN — ALBUTEROL SULFATE 2.5 MG: 2.5 SOLUTION RESPIRATORY (INHALATION) at 06:07

## 2023-10-15 RX ADMIN — METOPROLOL TARTRATE 100 MG: 50 TABLET ORAL at 22:34

## 2023-10-15 RX ADMIN — SODIUM CHLORIDE, PRESERVATIVE FREE 10 ML: 5 INJECTION INTRAVENOUS at 22:35

## 2023-10-15 RX ADMIN — AMLODIPINE BESYLATE 10 MG: 5 TABLET ORAL at 08:36

## 2023-10-15 RX ADMIN — INSULIN GLARGINE 20 UNITS: 100 INJECTION, SOLUTION SUBCUTANEOUS at 22:23

## 2023-10-15 RX ADMIN — INSULIN LISPRO 2 UNITS: 100 INJECTION, SOLUTION INTRAVENOUS; SUBCUTANEOUS at 12:25

## 2023-10-15 RX ADMIN — Medication: at 08:36

## 2023-10-15 RX ADMIN — HYDRALAZINE HYDROCHLORIDE 25 MG: 25 TABLET, FILM COATED ORAL at 14:47

## 2023-10-15 RX ADMIN — Medication 100 MG: at 08:36

## 2023-10-15 ASSESSMENT — PAIN SCALES - GENERAL
PAINLEVEL_OUTOF10: 0
PAINLEVEL_OUTOF10: 0

## 2023-10-15 ASSESSMENT — PAIN SCALES - WONG BAKER: WONGBAKER_NUMERICALRESPONSE: 0

## 2023-10-16 ENCOUNTER — APPOINTMENT (OUTPATIENT)
Facility: HOSPITAL | Age: 68
DRG: 853 | End: 2023-10-16
Payer: COMMERCIAL

## 2023-10-16 LAB
ALBUMIN SERPL-MCNC: 1.9 G/DL (ref 3.5–5)
ALBUMIN SERPL-MCNC: 2.1 G/DL (ref 3.5–5)
ALBUMIN/GLOB SERPL: 0.5 (ref 1.1–2.2)
ALP SERPL-CCNC: 64 U/L (ref 45–117)
ALT SERPL-CCNC: 63 U/L (ref 12–78)
ANION GAP SERPL CALC-SCNC: 6 MMOL/L (ref 5–15)
AST SERPL-CCNC: 55 U/L (ref 15–37)
BILIRUB DIRECT SERPL-MCNC: 0.2 MG/DL (ref 0–0.2)
BILIRUB SERPL-MCNC: 0.7 MG/DL (ref 0.2–1)
BUN SERPL-MCNC: 15 MG/DL (ref 6–20)
BUN/CREAT SERPL: 9 (ref 12–20)
CALCIUM SERPL-MCNC: 8.2 MG/DL (ref 8.5–10.1)
CHLORIDE SERPL-SCNC: 108 MMOL/L (ref 97–108)
CK SERPL-CCNC: 335 U/L (ref 39–308)
CO2 SERPL-SCNC: 26 MMOL/L (ref 21–32)
CREAT SERPL-MCNC: 1.71 MG/DL (ref 0.7–1.3)
CREAT UR-MCNC: 23.4 MG/DL
CREAT UR-MCNC: 46.2 MG/DL
GLOBULIN SER CALC-MCNC: 3.9 G/DL (ref 2–4)
GLUCOSE BLD STRIP.AUTO-MCNC: 255 MG/DL (ref 65–117)
GLUCOSE BLD STRIP.AUTO-MCNC: 256 MG/DL (ref 65–117)
GLUCOSE BLD STRIP.AUTO-MCNC: 327 MG/DL (ref 65–117)
GLUCOSE BLD STRIP.AUTO-MCNC: 89 MG/DL (ref 65–117)
GLUCOSE BLD STRIP.AUTO-MCNC: NORMAL MG/DL (ref 65–117)
GLUCOSE SERPL-MCNC: 118 MG/DL (ref 65–100)
PHOSPHATE SERPL-MCNC: 4.1 MG/DL (ref 2.6–4.7)
POTASSIUM SERPL-SCNC: 3.3 MMOL/L (ref 3.5–5.1)
PROT SERPL-MCNC: 6 G/DL (ref 6.4–8.2)
PROT UR-MCNC: 150 MG/DL (ref 0–11.9)
PROT UR-MCNC: 243 MG/DL (ref 0–11.9)
PROT/CREAT UR-RTO: 5.3
PROT/CREAT UR-RTO: 6.4
SERVICE CMNT-IMP: ABNORMAL
SERVICE CMNT-IMP: NORMAL
SERVICE CMNT-IMP: NORMAL
SODIUM SERPL-SCNC: 140 MMOL/L (ref 136–145)
WNV IGG SER QL IA: POSITIVE
WNV IGM SER QL IA: POSITIVE

## 2023-10-16 PROCEDURE — 82962 GLUCOSE BLOOD TEST: CPT

## 2023-10-16 PROCEDURE — 80069 RENAL FUNCTION PANEL: CPT

## 2023-10-16 PROCEDURE — 82550 ASSAY OF CK (CPK): CPT

## 2023-10-16 PROCEDURE — 6370000000 HC RX 637 (ALT 250 FOR IP): Performed by: INTERNAL MEDICINE

## 2023-10-16 PROCEDURE — 6370000000 HC RX 637 (ALT 250 FOR IP): Performed by: STUDENT IN AN ORGANIZED HEALTH CARE EDUCATION/TRAINING PROGRAM

## 2023-10-16 PROCEDURE — 2500000003 HC RX 250 WO HCPCS: Performed by: INTERNAL MEDICINE

## 2023-10-16 PROCEDURE — 84156 ASSAY OF PROTEIN URINE: CPT

## 2023-10-16 PROCEDURE — 80076 HEPATIC FUNCTION PANEL: CPT

## 2023-10-16 PROCEDURE — 94640 AIRWAY INHALATION TREATMENT: CPT

## 2023-10-16 PROCEDURE — 82570 ASSAY OF URINE CREATININE: CPT

## 2023-10-16 PROCEDURE — 6370000000 HC RX 637 (ALT 250 FOR IP): Performed by: HOSPITALIST

## 2023-10-16 PROCEDURE — 36415 COLL VENOUS BLD VENIPUNCTURE: CPT

## 2023-10-16 PROCEDURE — 83516 IMMUNOASSAY NONANTIBODY: CPT

## 2023-10-16 PROCEDURE — 6360000002 HC RX W HCPCS: Performed by: INTERNAL MEDICINE

## 2023-10-16 PROCEDURE — 2700000000 HC OXYGEN THERAPY PER DAY

## 2023-10-16 PROCEDURE — 99233 SBSQ HOSP IP/OBS HIGH 50: CPT | Performed by: INTERNAL MEDICINE

## 2023-10-16 PROCEDURE — 86160 COMPLEMENT ANTIGEN: CPT

## 2023-10-16 PROCEDURE — 6360000002 HC RX W HCPCS: Performed by: STUDENT IN AN ORGANIZED HEALTH CARE EDUCATION/TRAINING PROGRAM

## 2023-10-16 PROCEDURE — 84165 PROTEIN E-PHORESIS SERUM: CPT

## 2023-10-16 PROCEDURE — 2060000000 HC ICU INTERMEDIATE R&B

## 2023-10-16 PROCEDURE — 86037 ANCA TITER EACH ANTIBODY: CPT

## 2023-10-16 PROCEDURE — 2580000003 HC RX 258: Performed by: INTERNAL MEDICINE

## 2023-10-16 RX ORDER — POTASSIUM CHLORIDE 20 MEQ/1
40 TABLET, EXTENDED RELEASE ORAL
Status: DISCONTINUED | OUTPATIENT
Start: 2023-10-16 | End: 2023-10-19 | Stop reason: HOSPADM

## 2023-10-16 RX ORDER — BUMETANIDE 0.25 MG/ML
2 INJECTION INTRAMUSCULAR; INTRAVENOUS 2 TIMES DAILY
Status: DISCONTINUED | OUTPATIENT
Start: 2023-10-16 | End: 2023-10-19 | Stop reason: HOSPADM

## 2023-10-16 RX ADMIN — METOPROLOL TARTRATE 100 MG: 50 TABLET ORAL at 21:15

## 2023-10-16 RX ADMIN — ALBUTEROL SULFATE 2.5 MG: 2.5 SOLUTION RESPIRATORY (INHALATION) at 04:52

## 2023-10-16 RX ADMIN — INSULIN LISPRO 4 UNITS: 100 INJECTION, SOLUTION INTRAVENOUS; SUBCUTANEOUS at 16:11

## 2023-10-16 RX ADMIN — Medication 1 AMPULE: at 21:25

## 2023-10-16 RX ADMIN — HYDRALAZINE HYDROCHLORIDE 25 MG: 25 TABLET, FILM COATED ORAL at 05:41

## 2023-10-16 RX ADMIN — Medication 100 MG: at 09:28

## 2023-10-16 RX ADMIN — AMLODIPINE BESYLATE 10 MG: 5 TABLET ORAL at 09:48

## 2023-10-16 RX ADMIN — ENOXAPARIN SODIUM 40 MG: 100 INJECTION SUBCUTANEOUS at 14:30

## 2023-10-16 RX ADMIN — METOPROLOL TARTRATE 100 MG: 50 TABLET ORAL at 09:48

## 2023-10-16 RX ADMIN — BUMETANIDE 1 MG: 0.25 INJECTION INTRAMUSCULAR; INTRAVENOUS at 09:18

## 2023-10-16 RX ADMIN — SODIUM CHLORIDE, PRESERVATIVE FREE 10 ML: 5 INJECTION INTRAVENOUS at 08:06

## 2023-10-16 RX ADMIN — Medication: at 08:06

## 2023-10-16 RX ADMIN — INSULIN GLARGINE 20 UNITS: 100 INJECTION, SOLUTION SUBCUTANEOUS at 21:11

## 2023-10-16 RX ADMIN — HYDRALAZINE HYDROCHLORIDE 25 MG: 25 TABLET, FILM COATED ORAL at 14:03

## 2023-10-16 RX ADMIN — SODIUM CHLORIDE, PRESERVATIVE FREE 10 ML: 5 INJECTION INTRAVENOUS at 21:16

## 2023-10-16 RX ADMIN — Medication 1 AMPULE: at 09:28

## 2023-10-16 RX ADMIN — INSULIN LISPRO 4 UNITS: 100 INJECTION, SOLUTION INTRAVENOUS; SUBCUTANEOUS at 11:45

## 2023-10-16 RX ADMIN — POTASSIUM CHLORIDE 40 MEQ: 1500 TABLET, EXTENDED RELEASE ORAL at 12:13

## 2023-10-16 RX ADMIN — HYDRALAZINE HYDROCHLORIDE 25 MG: 25 TABLET, FILM COATED ORAL at 21:15

## 2023-10-16 RX ADMIN — INSULIN LISPRO 6 UNITS: 100 INJECTION, SOLUTION INTRAVENOUS; SUBCUTANEOUS at 21:32

## 2023-10-16 RX ADMIN — ASPIRIN 81 MG: 81 TABLET, COATED ORAL at 09:28

## 2023-10-16 RX ADMIN — BUMETANIDE 2 MG: 0.25 INJECTION INTRAMUSCULAR; INTRAVENOUS at 21:12

## 2023-10-16 RX ADMIN — Medication: at 21:12

## 2023-10-17 ENCOUNTER — APPOINTMENT (OUTPATIENT)
Facility: HOSPITAL | Age: 68
DRG: 853 | End: 2023-10-17
Payer: COMMERCIAL

## 2023-10-17 LAB
ALBUMIN SERPL-MCNC: 1.9 G/DL (ref 3.5–5)
ANION GAP SERPL CALC-SCNC: 4 MMOL/L (ref 5–15)
BASOPHILS # BLD: 0 K/UL (ref 0–0.1)
BASOPHILS NFR BLD: 0 % (ref 0–1)
BUN SERPL-MCNC: 16 MG/DL (ref 6–20)
BUN/CREAT SERPL: 8 (ref 12–20)
C-ANCA TITR SER IF: NORMAL TITER
C3 SERPL-MCNC: 164 MG/DL (ref 82–167)
C4 SERPL-MCNC: 35 MG/DL (ref 12–38)
CALCIUM SERPL-MCNC: 8.5 MG/DL (ref 8.5–10.1)
CHLORIDE SERPL-SCNC: 107 MMOL/L (ref 97–108)
CO2 SERPL-SCNC: 28 MMOL/L (ref 21–32)
CREAT SERPL-MCNC: 1.96 MG/DL (ref 0.7–1.3)
DIFFERENTIAL METHOD BLD: ABNORMAL
EOSINOPHIL # BLD: 0.1 K/UL (ref 0–0.4)
EOSINOPHIL NFR BLD: 1 % (ref 0–7)
ERYTHROCYTE [DISTWIDTH] IN BLOOD BY AUTOMATED COUNT: 15.4 % (ref 11.5–14.5)
GLUCOSE BLD STRIP.AUTO-MCNC: 159 MG/DL (ref 65–117)
GLUCOSE BLD STRIP.AUTO-MCNC: 231 MG/DL (ref 65–117)
GLUCOSE BLD STRIP.AUTO-MCNC: 241 MG/DL (ref 65–117)
GLUCOSE BLD STRIP.AUTO-MCNC: 378 MG/DL (ref 65–117)
GLUCOSE SERPL-MCNC: 287 MG/DL (ref 65–100)
HCT VFR BLD AUTO: 25.1 % (ref 36.6–50.3)
HGB BLD-MCNC: 8.3 G/DL (ref 12.1–17)
IMM GRANULOCYTES # BLD AUTO: 0 K/UL (ref 0–0.04)
IMM GRANULOCYTES NFR BLD AUTO: 1 % (ref 0–0.5)
LYMPHOCYTES # BLD: 1 K/UL (ref 0.8–3.5)
LYMPHOCYTES NFR BLD: 19 % (ref 12–49)
MCH RBC QN AUTO: 29.2 PG (ref 26–34)
MCHC RBC AUTO-ENTMCNC: 33.1 G/DL (ref 30–36.5)
MCV RBC AUTO: 88.4 FL (ref 80–99)
MONOCYTES # BLD: 0.4 K/UL (ref 0–1)
MONOCYTES NFR BLD: 8 % (ref 5–13)
MYELOPEROXIDASE AB SER IA-ACNC: <0.2 UNITS (ref 0–0.9)
NEUTS SEG # BLD: 3.9 K/UL (ref 1.8–8)
NEUTS SEG NFR BLD: 71 % (ref 32–75)
NRBC # BLD: 0 K/UL (ref 0–0.01)
NRBC BLD-RTO: 0 PER 100 WBC
P-ANCA ATYPICAL TITR SER IF: NORMAL TITER
P-ANCA TITR SER IF: NORMAL TITER
PHOSPHATE SERPL-MCNC: 4.3 MG/DL (ref 2.6–4.7)
PLATELET # BLD AUTO: 445 K/UL (ref 150–400)
PMV BLD AUTO: 10 FL (ref 8.9–12.9)
POTASSIUM SERPL-SCNC: 3.5 MMOL/L (ref 3.5–5.1)
PROTEINASE3 AB SER IA-ACNC: <0.2 UNITS (ref 0–0.9)
RBC # BLD AUTO: 2.84 M/UL (ref 4.1–5.7)
SERVICE CMNT-IMP: ABNORMAL
SODIUM SERPL-SCNC: 139 MMOL/L (ref 136–145)
WBC # BLD AUTO: 5.5 K/UL (ref 4.1–11.1)

## 2023-10-17 PROCEDURE — 6370000000 HC RX 637 (ALT 250 FOR IP): Performed by: STUDENT IN AN ORGANIZED HEALTH CARE EDUCATION/TRAINING PROGRAM

## 2023-10-17 PROCEDURE — 6370000000 HC RX 637 (ALT 250 FOR IP): Performed by: HOSPITALIST

## 2023-10-17 PROCEDURE — 2580000003 HC RX 258: Performed by: INTERNAL MEDICINE

## 2023-10-17 PROCEDURE — 3430000000 HC RX DIAGNOSTIC RADIOPHARMACEUTICAL: Performed by: STUDENT IN AN ORGANIZED HEALTH CARE EDUCATION/TRAINING PROGRAM

## 2023-10-17 PROCEDURE — 97530 THERAPEUTIC ACTIVITIES: CPT

## 2023-10-17 PROCEDURE — 6360000002 HC RX W HCPCS: Performed by: STUDENT IN AN ORGANIZED HEALTH CARE EDUCATION/TRAINING PROGRAM

## 2023-10-17 PROCEDURE — 82962 GLUCOSE BLOOD TEST: CPT

## 2023-10-17 PROCEDURE — 85025 COMPLETE CBC W/AUTO DIFF WBC: CPT

## 2023-10-17 PROCEDURE — A9500 TC99M SESTAMIBI: HCPCS | Performed by: STUDENT IN AN ORGANIZED HEALTH CARE EDUCATION/TRAINING PROGRAM

## 2023-10-17 PROCEDURE — 2500000003 HC RX 250 WO HCPCS: Performed by: INTERNAL MEDICINE

## 2023-10-17 PROCEDURE — 78452 HT MUSCLE IMAGE SPECT MULT: CPT

## 2023-10-17 PROCEDURE — 2060000000 HC ICU INTERMEDIATE R&B

## 2023-10-17 PROCEDURE — 93017 CV STRESS TEST TRACING ONLY: CPT

## 2023-10-17 PROCEDURE — 97535 SELF CARE MNGMENT TRAINING: CPT

## 2023-10-17 PROCEDURE — 80069 RENAL FUNCTION PANEL: CPT

## 2023-10-17 PROCEDURE — 6370000000 HC RX 637 (ALT 250 FOR IP): Performed by: INTERNAL MEDICINE

## 2023-10-17 PROCEDURE — 2700000000 HC OXYGEN THERAPY PER DAY

## 2023-10-17 PROCEDURE — 36415 COLL VENOUS BLD VENIPUNCTURE: CPT

## 2023-10-17 PROCEDURE — 97116 GAIT TRAINING THERAPY: CPT

## 2023-10-17 RX ORDER — TETRAKIS(2-METHOXYISOBUTYLISOCYANIDE)COPPER(I) TETRAFLUOROBORATE 1 MG/ML
30 INJECTION, POWDER, LYOPHILIZED, FOR SOLUTION INTRAVENOUS
Status: COMPLETED | OUTPATIENT
Start: 2023-10-17 | End: 2023-10-17

## 2023-10-17 RX ORDER — TETRAKIS(2-METHOXYISOBUTYLISOCYANIDE)COPPER(I) TETRAFLUOROBORATE 1 MG/ML
10.9 INJECTION, POWDER, LYOPHILIZED, FOR SOLUTION INTRAVENOUS
Status: COMPLETED | OUTPATIENT
Start: 2023-10-17 | End: 2023-10-17

## 2023-10-17 RX ORDER — REGADENOSON 0.08 MG/ML
0.4 INJECTION, SOLUTION INTRAVENOUS
Status: COMPLETED | OUTPATIENT
Start: 2023-10-17 | End: 2023-10-17

## 2023-10-17 RX ADMIN — INSULIN GLARGINE 20 UNITS: 100 INJECTION, SOLUTION SUBCUTANEOUS at 20:33

## 2023-10-17 RX ADMIN — BUMETANIDE 2 MG: 0.25 INJECTION INTRAMUSCULAR; INTRAVENOUS at 20:33

## 2023-10-17 RX ADMIN — METOPROLOL TARTRATE 100 MG: 50 TABLET ORAL at 20:33

## 2023-10-17 RX ADMIN — METOPROLOL TARTRATE 100 MG: 50 TABLET ORAL at 09:09

## 2023-10-17 RX ADMIN — Medication: at 20:33

## 2023-10-17 RX ADMIN — INSULIN LISPRO 8 UNITS: 100 INJECTION, SOLUTION INTRAVENOUS; SUBCUTANEOUS at 20:53

## 2023-10-17 RX ADMIN — HYDRALAZINE HYDROCHLORIDE 25 MG: 25 TABLET, FILM COATED ORAL at 20:33

## 2023-10-17 RX ADMIN — SODIUM CHLORIDE, PRESERVATIVE FREE 10 ML: 5 INJECTION INTRAVENOUS at 11:42

## 2023-10-17 RX ADMIN — Medication 1 AMPULE: at 11:42

## 2023-10-17 RX ADMIN — HYDRALAZINE HYDROCHLORIDE 25 MG: 25 TABLET, FILM COATED ORAL at 13:08

## 2023-10-17 RX ADMIN — INSULIN LISPRO 2 UNITS: 100 INJECTION, SOLUTION INTRAVENOUS; SUBCUTANEOUS at 13:08

## 2023-10-17 RX ADMIN — POTASSIUM CHLORIDE 40 MEQ: 1500 TABLET, EXTENDED RELEASE ORAL at 09:09

## 2023-10-17 RX ADMIN — SODIUM CHLORIDE, PRESERVATIVE FREE 10 ML: 5 INJECTION INTRAVENOUS at 20:34

## 2023-10-17 RX ADMIN — INSULIN LISPRO 2 UNITS: 100 INJECTION, SOLUTION INTRAVENOUS; SUBCUTANEOUS at 09:09

## 2023-10-17 RX ADMIN — Medication 100 MG: at 09:09

## 2023-10-17 RX ADMIN — REGADENOSON 0.4 MG: 0.08 INJECTION, SOLUTION INTRAVENOUS at 10:31

## 2023-10-17 RX ADMIN — TETRAKIS(2-METHOXYISOBUTYLISOCYANIDE)COPPER(I) TETRAFLUOROBORATE 10.9 MILLICURIE: 1 INJECTION, POWDER, LYOPHILIZED, FOR SOLUTION INTRAVENOUS at 08:15

## 2023-10-17 RX ADMIN — HYDRALAZINE HYDROCHLORIDE 25 MG: 25 TABLET, FILM COATED ORAL at 05:35

## 2023-10-17 RX ADMIN — BUMETANIDE 2 MG: 0.25 INJECTION INTRAMUSCULAR; INTRAVENOUS at 09:08

## 2023-10-17 RX ADMIN — Medication 1 AMPULE: at 20:54

## 2023-10-17 RX ADMIN — ENOXAPARIN SODIUM 40 MG: 100 INJECTION SUBCUTANEOUS at 17:35

## 2023-10-17 RX ADMIN — TETRAKIS(2-METHOXYISOBUTYLISOCYANIDE)COPPER(I) TETRAFLUOROBORATE 30 MILLICURIE: 1 INJECTION, POWDER, LYOPHILIZED, FOR SOLUTION INTRAVENOUS at 10:30

## 2023-10-17 RX ADMIN — Medication: at 09:09

## 2023-10-18 LAB
ACT BLD: 245 SECS (ref 79–138)
ALBUMIN SERPL ELPH-MCNC: 2.2 G/DL (ref 2.9–4.4)
ALBUMIN SERPL-MCNC: 1.9 G/DL (ref 3.5–5)
ALBUMIN/GLOB SERPL: 0.7 (ref 0.7–1.7)
ALPHA1 GLOB SERPL ELPH-MCNC: 0.3 G/DL (ref 0–0.4)
ALPHA2 GLOB SERPL ELPH-MCNC: 1 G/DL (ref 0.4–1)
ANION GAP SERPL CALC-SCNC: 6 MMOL/L (ref 5–15)
B-GLOBULIN SERPL ELPH-MCNC: 1 G/DL (ref 0.7–1.3)
BASOPHILS # BLD: 0 K/UL (ref 0–0.1)
BASOPHILS NFR BLD: 1 % (ref 0–1)
BUN SERPL-MCNC: 15 MG/DL (ref 6–20)
BUN/CREAT SERPL: 8 (ref 12–20)
CALCIUM SERPL-MCNC: 8.7 MG/DL (ref 8.5–10.1)
CHLORIDE SERPL-SCNC: 103 MMOL/L (ref 97–108)
CO2 SERPL-SCNC: 29 MMOL/L (ref 21–32)
CREAT SERPL-MCNC: 1.82 MG/DL (ref 0.7–1.3)
DIFFERENTIAL METHOD BLD: ABNORMAL
ECHO BSA: 1.95 M2
EKG DIAGNOSIS: NORMAL
EOSINOPHIL # BLD: 0.1 K/UL (ref 0–0.4)
EOSINOPHIL NFR BLD: 1 % (ref 0–7)
ERYTHROCYTE [DISTWIDTH] IN BLOOD BY AUTOMATED COUNT: 15.5 % (ref 11.5–14.5)
GAMMA GLOB SERPL ELPH-MCNC: 0.8 G/DL (ref 0.4–1.8)
GLOBULIN SER CALC-MCNC: 3.1 G/DL (ref 2.2–3.9)
GLUCOSE BLD STRIP.AUTO-MCNC: 146 MG/DL (ref 65–117)
GLUCOSE BLD STRIP.AUTO-MCNC: 155 MG/DL (ref 65–117)
GLUCOSE BLD STRIP.AUTO-MCNC: 200 MG/DL (ref 65–117)
GLUCOSE BLD STRIP.AUTO-MCNC: 212 MG/DL (ref 65–117)
GLUCOSE BLD STRIP.AUTO-MCNC: 377 MG/DL (ref 65–117)
GLUCOSE BLD STRIP.AUTO-MCNC: 384 MG/DL (ref 65–117)
GLUCOSE SERPL-MCNC: 264 MG/DL (ref 65–100)
HCT VFR BLD AUTO: 27.5 % (ref 36.6–50.3)
HGB BLD-MCNC: 9.2 G/DL (ref 12.1–17)
IMM GRANULOCYTES # BLD AUTO: 0 K/UL (ref 0–0.04)
IMM GRANULOCYTES NFR BLD AUTO: 0 % (ref 0–0.5)
LYMPHOCYTES # BLD: 0.9 K/UL (ref 0.8–3.5)
LYMPHOCYTES NFR BLD: 16 % (ref 12–49)
M PROTEIN SERPL ELPH-MCNC: ABNORMAL G/DL
MCH RBC QN AUTO: 29.2 PG (ref 26–34)
MCHC RBC AUTO-ENTMCNC: 33.5 G/DL (ref 30–36.5)
MCV RBC AUTO: 87.3 FL (ref 80–99)
MONOCYTES # BLD: 0.4 K/UL (ref 0–1)
MONOCYTES NFR BLD: 6 % (ref 5–13)
NEUTS SEG # BLD: 4.4 K/UL (ref 1.8–8)
NEUTS SEG NFR BLD: 76 % (ref 32–75)
NRBC # BLD: 0 K/UL (ref 0–0.01)
NRBC BLD-RTO: 0 PER 100 WBC
PHOSPHATE SERPL-MCNC: 4 MG/DL (ref 2.6–4.7)
PHOSPHOLIPASE A2 RECEPTOR, IGG: <1.8 RU/ML (ref 0–19.9)
PLATELET # BLD AUTO: 498 K/UL (ref 150–400)
PMV BLD AUTO: 10 FL (ref 8.9–12.9)
POTASSIUM SERPL-SCNC: 3.1 MMOL/L (ref 3.5–5.1)
PROT SERPL-MCNC: 5.3 G/DL (ref 6–8.5)
RBC # BLD AUTO: 3.15 M/UL (ref 4.1–5.7)
SERVICE CMNT-IMP: ABNORMAL
SODIUM SERPL-SCNC: 138 MMOL/L (ref 136–145)
STRESS BASELINE DIAS BP: 91 MMHG
STRESS BASELINE HR: 68 BPM
STRESS BASELINE SYS BP: 155 MMHG
STRESS ESTIMATED WORKLOAD: 1 METS
STRESS O2 SAT PEAK: 96 %
STRESS O2 SAT REST: 90 %
STRESS PEAK DIAS BP: 86 MMHG
STRESS PEAK SYS BP: 157 MMHG
STRESS PERCENT HR ACHIEVED: 53 %
STRESS POST PEAK HR: 81 BPM
STRESS RATE PRESSURE PRODUCT: NORMAL BPM*MMHG
STRESS TARGET HR: 154 BPM
WBC # BLD AUTO: 5.8 K/UL (ref 4.1–11.1)

## 2023-10-18 PROCEDURE — 2060000000 HC ICU INTERMEDIATE R&B

## 2023-10-18 PROCEDURE — 99153 MOD SED SAME PHYS/QHP EA: CPT | Performed by: STUDENT IN AN ORGANIZED HEALTH CARE EDUCATION/TRAINING PROGRAM

## 2023-10-18 PROCEDURE — 6370000000 HC RX 637 (ALT 250 FOR IP): Performed by: INTERNAL MEDICINE

## 2023-10-18 PROCEDURE — B2151ZZ FLUOROSCOPY OF LEFT HEART USING LOW OSMOLAR CONTRAST: ICD-10-PCS | Performed by: STUDENT IN AN ORGANIZED HEALTH CARE EDUCATION/TRAINING PROGRAM

## 2023-10-18 PROCEDURE — 97535 SELF CARE MNGMENT TRAINING: CPT | Performed by: OCCUPATIONAL THERAPIST

## 2023-10-18 PROCEDURE — 82962 GLUCOSE BLOOD TEST: CPT

## 2023-10-18 PROCEDURE — 2580000003 HC RX 258: Performed by: INTERNAL MEDICINE

## 2023-10-18 PROCEDURE — 6370000000 HC RX 637 (ALT 250 FOR IP): Performed by: HOSPITALIST

## 2023-10-18 PROCEDURE — 36415 COLL VENOUS BLD VENIPUNCTURE: CPT

## 2023-10-18 PROCEDURE — B2111ZZ FLUOROSCOPY OF MULTIPLE CORONARY ARTERIES USING LOW OSMOLAR CONTRAST: ICD-10-PCS | Performed by: STUDENT IN AN ORGANIZED HEALTH CARE EDUCATION/TRAINING PROGRAM

## 2023-10-18 PROCEDURE — C1753 CATH, INTRAVAS ULTRASOUND: HCPCS | Performed by: STUDENT IN AN ORGANIZED HEALTH CARE EDUCATION/TRAINING PROGRAM

## 2023-10-18 PROCEDURE — C1874 STENT, COATED/COV W/DEL SYS: HCPCS | Performed by: STUDENT IN AN ORGANIZED HEALTH CARE EDUCATION/TRAINING PROGRAM

## 2023-10-18 PROCEDURE — C1713 ANCHOR/SCREW BN/BN,TIS/BN: HCPCS | Performed by: STUDENT IN AN ORGANIZED HEALTH CARE EDUCATION/TRAINING PROGRAM

## 2023-10-18 PROCEDURE — 6370000000 HC RX 637 (ALT 250 FOR IP): Performed by: STUDENT IN AN ORGANIZED HEALTH CARE EDUCATION/TRAINING PROGRAM

## 2023-10-18 PROCEDURE — C1894 INTRO/SHEATH, NON-LASER: HCPCS | Performed by: STUDENT IN AN ORGANIZED HEALTH CARE EDUCATION/TRAINING PROGRAM

## 2023-10-18 PROCEDURE — 92978 ENDOLUMINL IVUS OCT C 1ST: CPT | Performed by: STUDENT IN AN ORGANIZED HEALTH CARE EDUCATION/TRAINING PROGRAM

## 2023-10-18 PROCEDURE — 85347 COAGULATION TIME ACTIVATED: CPT

## 2023-10-18 PROCEDURE — 80069 RENAL FUNCTION PANEL: CPT

## 2023-10-18 PROCEDURE — 4A023N7 MEASUREMENT OF CARDIAC SAMPLING AND PRESSURE, LEFT HEART, PERCUTANEOUS APPROACH: ICD-10-PCS | Performed by: STUDENT IN AN ORGANIZED HEALTH CARE EDUCATION/TRAINING PROGRAM

## 2023-10-18 PROCEDURE — 93005 ELECTROCARDIOGRAM TRACING: CPT | Performed by: STUDENT IN AN ORGANIZED HEALTH CARE EDUCATION/TRAINING PROGRAM

## 2023-10-18 PROCEDURE — C1725 CATH, TRANSLUMIN NON-LASER: HCPCS | Performed by: STUDENT IN AN ORGANIZED HEALTH CARE EDUCATION/TRAINING PROGRAM

## 2023-10-18 PROCEDURE — 93458 L HRT ARTERY/VENTRICLE ANGIO: CPT | Performed by: STUDENT IN AN ORGANIZED HEALTH CARE EDUCATION/TRAINING PROGRAM

## 2023-10-18 PROCEDURE — 2709999900 HC NON-CHARGEABLE SUPPLY: Performed by: STUDENT IN AN ORGANIZED HEALTH CARE EDUCATION/TRAINING PROGRAM

## 2023-10-18 PROCEDURE — 85025 COMPLETE CBC W/AUTO DIFF WBC: CPT

## 2023-10-18 PROCEDURE — 027034Z DILATION OF CORONARY ARTERY, ONE ARTERY WITH DRUG-ELUTING INTRALUMINAL DEVICE, PERCUTANEOUS APPROACH: ICD-10-PCS | Performed by: STUDENT IN AN ORGANIZED HEALTH CARE EDUCATION/TRAINING PROGRAM

## 2023-10-18 PROCEDURE — 6360000004 HC RX CONTRAST MEDICATION: Performed by: STUDENT IN AN ORGANIZED HEALTH CARE EDUCATION/TRAINING PROGRAM

## 2023-10-18 PROCEDURE — C9600 PERC DRUG-EL COR STENT SING: HCPCS | Performed by: STUDENT IN AN ORGANIZED HEALTH CARE EDUCATION/TRAINING PROGRAM

## 2023-10-18 PROCEDURE — C1887 CATHETER, GUIDING: HCPCS | Performed by: STUDENT IN AN ORGANIZED HEALTH CARE EDUCATION/TRAINING PROGRAM

## 2023-10-18 PROCEDURE — 97530 THERAPEUTIC ACTIVITIES: CPT | Performed by: OCCUPATIONAL THERAPIST

## 2023-10-18 PROCEDURE — C1769 GUIDE WIRE: HCPCS | Performed by: STUDENT IN AN ORGANIZED HEALTH CARE EDUCATION/TRAINING PROGRAM

## 2023-10-18 PROCEDURE — 97116 GAIT TRAINING THERAPY: CPT

## 2023-10-18 PROCEDURE — 6360000002 HC RX W HCPCS: Performed by: STUDENT IN AN ORGANIZED HEALTH CARE EDUCATION/TRAINING PROGRAM

## 2023-10-18 PROCEDURE — B241ZZ3 ULTRASONOGRAPHY OF MULTIPLE CORONARY ARTERIES, INTRAVASCULAR: ICD-10-PCS | Performed by: STUDENT IN AN ORGANIZED HEALTH CARE EDUCATION/TRAINING PROGRAM

## 2023-10-18 PROCEDURE — 99152 MOD SED SAME PHYS/QHP 5/>YRS: CPT | Performed by: STUDENT IN AN ORGANIZED HEALTH CARE EDUCATION/TRAINING PROGRAM

## 2023-10-18 PROCEDURE — 2580000003 HC RX 258: Performed by: STUDENT IN AN ORGANIZED HEALTH CARE EDUCATION/TRAINING PROGRAM

## 2023-10-18 DEVICE — STENT CORONARY ONYX FRONTIER RX 3X22 MM ZOTAROLIMUS ELUT: Type: IMPLANTABLE DEVICE | Status: FUNCTIONAL

## 2023-10-18 RX ORDER — CLOPIDOGREL BISULFATE 75 MG/1
75 TABLET ORAL DAILY
Qty: 30 TABLET | Refills: 3 | Status: SHIPPED | OUTPATIENT
Start: 2023-10-18 | End: 2023-10-19 | Stop reason: SDUPTHER

## 2023-10-18 RX ORDER — ACETAMINOPHEN 325 MG/1
650 TABLET ORAL EVERY 4 HOURS PRN
Status: DISCONTINUED | OUTPATIENT
Start: 2023-10-18 | End: 2023-10-19 | Stop reason: SDUPTHER

## 2023-10-18 RX ORDER — AMLODIPINE BESYLATE 5 MG/1
10 TABLET ORAL DAILY
Status: DISCONTINUED | OUTPATIENT
Start: 2023-10-18 | End: 2023-10-19 | Stop reason: HOSPADM

## 2023-10-18 RX ORDER — HYDRALAZINE HYDROCHLORIDE 20 MG/ML
10 INJECTION INTRAMUSCULAR; INTRAVENOUS EVERY 10 MIN PRN
Status: DISCONTINUED | OUTPATIENT
Start: 2023-10-18 | End: 2023-10-19 | Stop reason: HOSPADM

## 2023-10-18 RX ORDER — SODIUM CHLORIDE 9 MG/ML
INJECTION, SOLUTION INTRAVENOUS PRN
Status: DISCONTINUED | OUTPATIENT
Start: 2023-10-18 | End: 2023-10-19 | Stop reason: HOSPADM

## 2023-10-18 RX ORDER — ONDANSETRON 2 MG/ML
4 INJECTION INTRAMUSCULAR; INTRAVENOUS EVERY 6 HOURS PRN
Status: DISCONTINUED | OUTPATIENT
Start: 2023-10-18 | End: 2023-10-19 | Stop reason: SDUPTHER

## 2023-10-18 RX ORDER — ASPIRIN 81 MG/1
81 TABLET, CHEWABLE ORAL DAILY
Status: DISCONTINUED | OUTPATIENT
Start: 2023-10-18 | End: 2023-10-19 | Stop reason: HOSPADM

## 2023-10-18 RX ORDER — FENTANYL CITRATE 50 UG/ML
INJECTION, SOLUTION INTRAMUSCULAR; INTRAVENOUS PRN
Status: DISCONTINUED | OUTPATIENT
Start: 2023-10-18 | End: 2023-10-18 | Stop reason: HOSPADM

## 2023-10-18 RX ORDER — CLOPIDOGREL BISULFATE 75 MG/1
75 TABLET ORAL DAILY
Status: DISCONTINUED | OUTPATIENT
Start: 2023-10-18 | End: 2023-10-19 | Stop reason: HOSPADM

## 2023-10-18 RX ORDER — CLOPIDOGREL 300 MG/1
TABLET, FILM COATED ORAL PRN
Status: DISCONTINUED | OUTPATIENT
Start: 2023-10-18 | End: 2023-10-18 | Stop reason: HOSPADM

## 2023-10-18 RX ORDER — HEPARIN SODIUM 1000 [USP'U]/ML
INJECTION, SOLUTION INTRAVENOUS; SUBCUTANEOUS PRN
Status: DISCONTINUED | OUTPATIENT
Start: 2023-10-18 | End: 2023-10-18 | Stop reason: HOSPADM

## 2023-10-18 RX ORDER — HEPARIN SODIUM 10000 [USP'U]/ML
INJECTION, SOLUTION INTRAVENOUS; SUBCUTANEOUS PRN
Status: DISCONTINUED | OUTPATIENT
Start: 2023-10-18 | End: 2023-10-18 | Stop reason: HOSPADM

## 2023-10-18 RX ORDER — SODIUM CHLORIDE 0.9 % (FLUSH) 0.9 %
5-40 SYRINGE (ML) INJECTION EVERY 12 HOURS SCHEDULED
Status: DISCONTINUED | OUTPATIENT
Start: 2023-10-18 | End: 2023-10-19 | Stop reason: SDUPTHER

## 2023-10-18 RX ORDER — SODIUM CHLORIDE 0.9 % (FLUSH) 0.9 %
5-40 SYRINGE (ML) INJECTION PRN
Status: DISCONTINUED | OUTPATIENT
Start: 2023-10-18 | End: 2023-10-19 | Stop reason: SDUPTHER

## 2023-10-18 RX ADMIN — INSULIN LISPRO 8 UNITS: 100 INJECTION, SOLUTION INTRAVENOUS; SUBCUTANEOUS at 17:43

## 2023-10-18 RX ADMIN — HYDRALAZINE HYDROCHLORIDE 25 MG: 25 TABLET, FILM COATED ORAL at 12:59

## 2023-10-18 RX ADMIN — AMLODIPINE BESYLATE 10 MG: 5 TABLET ORAL at 08:39

## 2023-10-18 RX ADMIN — METOPROLOL TARTRATE 50 MG: 50 TABLET ORAL at 22:49

## 2023-10-18 RX ADMIN — INSULIN GLARGINE 20 UNITS: 100 INJECTION, SOLUTION SUBCUTANEOUS at 22:42

## 2023-10-18 RX ADMIN — ASPIRIN 81 MG: 81 TABLET, CHEWABLE ORAL at 08:39

## 2023-10-18 RX ADMIN — HYDRALAZINE HYDROCHLORIDE 25 MG: 25 TABLET, FILM COATED ORAL at 05:18

## 2023-10-18 RX ADMIN — Medication 1 AMPULE: at 22:51

## 2023-10-18 RX ADMIN — INSULIN LISPRO 8 UNITS: 100 INJECTION, SOLUTION INTRAVENOUS; SUBCUTANEOUS at 22:41

## 2023-10-18 RX ADMIN — SODIUM CHLORIDE, PRESERVATIVE FREE 10 ML: 5 INJECTION INTRAVENOUS at 08:41

## 2023-10-18 RX ADMIN — INSULIN LISPRO 2 UNITS: 100 INJECTION, SOLUTION INTRAVENOUS; SUBCUTANEOUS at 12:59

## 2023-10-18 RX ADMIN — HYDRALAZINE HYDROCHLORIDE 25 MG: 25 TABLET, FILM COATED ORAL at 22:43

## 2023-10-18 RX ADMIN — Medication 100 MG: at 08:39

## 2023-10-18 RX ADMIN — SODIUM CHLORIDE, PRESERVATIVE FREE 10 ML: 5 INJECTION INTRAVENOUS at 22:43

## 2023-10-18 RX ADMIN — Medication: at 22:56

## 2023-10-18 RX ADMIN — POTASSIUM CHLORIDE 40 MEQ: 1500 TABLET, EXTENDED RELEASE ORAL at 08:39

## 2023-10-18 RX ADMIN — Medication: at 08:40

## 2023-10-18 RX ADMIN — CLOPIDOGREL BISULFATE 75 MG: 75 TABLET ORAL at 17:43

## 2023-10-18 RX ADMIN — Medication 1 AMPULE: at 08:45

## 2023-10-18 RX ADMIN — METOPROLOL TARTRATE 100 MG: 50 TABLET ORAL at 08:39

## 2023-10-18 ASSESSMENT — PAIN SCALES - GENERAL
PAINLEVEL_OUTOF10: 0
PAINLEVEL_OUTOF10: 0

## 2023-10-19 VITALS
OXYGEN SATURATION: 96 % | BODY MASS INDEX: 28.63 KG/M2 | TEMPERATURE: 98.2 F | SYSTOLIC BLOOD PRESSURE: 139 MMHG | WEIGHT: 182.38 LBS | HEIGHT: 67 IN | DIASTOLIC BLOOD PRESSURE: 83 MMHG | RESPIRATION RATE: 18 BRPM | HEART RATE: 66 BPM

## 2023-10-19 DIAGNOSIS — I21.4 NSTEMI (NON-ST ELEVATION MYOCARDIAL INFARCTION) (HCC): Primary | ICD-10-CM

## 2023-10-19 LAB
ALBUMIN SERPL-MCNC: 1.7 G/DL (ref 3.5–5)
ANION GAP SERPL CALC-SCNC: 6 MMOL/L (ref 5–15)
BASOPHILS # BLD: 0 K/UL (ref 0–0.1)
BASOPHILS NFR BLD: 1 % (ref 0–1)
BUN SERPL-MCNC: 13 MG/DL (ref 6–20)
BUN/CREAT SERPL: 8 (ref 12–20)
CALCIUM SERPL-MCNC: 8.4 MG/DL (ref 8.5–10.1)
CHLORIDE SERPL-SCNC: 105 MMOL/L (ref 97–108)
CK SERPL-CCNC: 148 U/L (ref 39–308)
CO2 SERPL-SCNC: 29 MMOL/L (ref 21–32)
CREAT SERPL-MCNC: 1.67 MG/DL (ref 0.7–1.3)
DIFFERENTIAL METHOD BLD: ABNORMAL
EOSINOPHIL # BLD: 0.1 K/UL (ref 0–0.4)
EOSINOPHIL NFR BLD: 1 % (ref 0–7)
ERYTHROCYTE [DISTWIDTH] IN BLOOD BY AUTOMATED COUNT: 15.4 % (ref 11.5–14.5)
GLUCOSE BLD STRIP.AUTO-MCNC: 112 MG/DL (ref 65–117)
GLUCOSE BLD STRIP.AUTO-MCNC: 163 MG/DL (ref 65–117)
GLUCOSE BLD STRIP.AUTO-MCNC: 196 MG/DL (ref 65–117)
GLUCOSE SERPL-MCNC: 73 MG/DL (ref 65–100)
HCT VFR BLD AUTO: 26.3 % (ref 36.6–50.3)
HGB BLD-MCNC: 8.8 G/DL (ref 12.1–17)
IMM GRANULOCYTES # BLD AUTO: 0 K/UL (ref 0–0.04)
IMM GRANULOCYTES NFR BLD AUTO: 0 % (ref 0–0.5)
LYMPHOCYTES # BLD: 1.1 K/UL (ref 0.8–3.5)
LYMPHOCYTES NFR BLD: 19 % (ref 12–49)
MCH RBC QN AUTO: 28.9 PG (ref 26–34)
MCHC RBC AUTO-ENTMCNC: 33.5 G/DL (ref 30–36.5)
MCV RBC AUTO: 86.5 FL (ref 80–99)
MONOCYTES # BLD: 0.6 K/UL (ref 0–1)
MONOCYTES NFR BLD: 11 % (ref 5–13)
NEUTS SEG # BLD: 3.7 K/UL (ref 1.8–8)
NEUTS SEG NFR BLD: 68 % (ref 32–75)
NRBC # BLD: 0 K/UL (ref 0–0.01)
NRBC BLD-RTO: 0 PER 100 WBC
PHOSPHATE SERPL-MCNC: 4 MG/DL (ref 2.6–4.7)
PLATELET # BLD AUTO: 473 K/UL (ref 150–400)
PMV BLD AUTO: 9.9 FL (ref 8.9–12.9)
POTASSIUM SERPL-SCNC: 3 MMOL/L (ref 3.5–5.1)
RBC # BLD AUTO: 3.04 M/UL (ref 4.1–5.7)
SERVICE CMNT-IMP: ABNORMAL
SERVICE CMNT-IMP: ABNORMAL
SERVICE CMNT-IMP: NORMAL
SODIUM SERPL-SCNC: 140 MMOL/L (ref 136–145)
WBC # BLD AUTO: 5.5 K/UL (ref 4.1–11.1)

## 2023-10-19 PROCEDURE — 6370000000 HC RX 637 (ALT 250 FOR IP): Performed by: INTERNAL MEDICINE

## 2023-10-19 PROCEDURE — 94761 N-INVAS EAR/PLS OXIMETRY MLT: CPT

## 2023-10-19 PROCEDURE — 82550 ASSAY OF CK (CPK): CPT

## 2023-10-19 PROCEDURE — 85025 COMPLETE CBC W/AUTO DIFF WBC: CPT

## 2023-10-19 PROCEDURE — 36415 COLL VENOUS BLD VENIPUNCTURE: CPT

## 2023-10-19 PROCEDURE — 6370000000 HC RX 637 (ALT 250 FOR IP): Performed by: STUDENT IN AN ORGANIZED HEALTH CARE EDUCATION/TRAINING PROGRAM

## 2023-10-19 PROCEDURE — 80069 RENAL FUNCTION PANEL: CPT

## 2023-10-19 PROCEDURE — 6370000000 HC RX 637 (ALT 250 FOR IP): Performed by: HOSPITALIST

## 2023-10-19 PROCEDURE — 2700000000 HC OXYGEN THERAPY PER DAY

## 2023-10-19 PROCEDURE — 82962 GLUCOSE BLOOD TEST: CPT

## 2023-10-19 RX ORDER — FUROSEMIDE 40 MG/1
40 TABLET ORAL 2 TIMES DAILY
Qty: 60 TABLET | Refills: 3 | Status: SHIPPED
Start: 2023-10-19

## 2023-10-19 RX ORDER — ASPIRIN 81 MG/1
81 TABLET, CHEWABLE ORAL DAILY
Qty: 30 TABLET | Refills: 11 | Status: SHIPPED
Start: 2023-10-20

## 2023-10-19 RX ORDER — THIAMINE MONONITRATE (VIT B1) 100 MG
100 TABLET ORAL DAILY
Qty: 30 TABLET | Refills: 0 | Status: SHIPPED
Start: 2023-10-20 | End: 2023-11-19

## 2023-10-19 RX ORDER — POTASSIUM CHLORIDE 20 MEQ/1
40 TABLET, EXTENDED RELEASE ORAL
Qty: 20 TABLET | Refills: 0 | Status: SHIPPED
Start: 2023-10-20 | End: 2023-10-30

## 2023-10-19 RX ORDER — AMLODIPINE BESYLATE 10 MG/1
10 TABLET ORAL DAILY
Qty: 30 TABLET | Refills: 11 | Status: SHIPPED | OUTPATIENT
Start: 2023-10-19

## 2023-10-19 RX ORDER — CLOPIDOGREL BISULFATE 75 MG/1
75 TABLET ORAL DAILY
Qty: 30 TABLET | Refills: 11 | Status: SHIPPED | OUTPATIENT
Start: 2023-10-19

## 2023-10-19 RX ORDER — ENOXAPARIN SODIUM 100 MG/ML
40 INJECTION SUBCUTANEOUS DAILY
Qty: 5.6 ML | Refills: 0 | Status: SHIPPED
Start: 2023-10-19 | End: 2023-11-02

## 2023-10-19 RX ORDER — POTASSIUM CHLORIDE 750 MG/1
40 TABLET, FILM COATED, EXTENDED RELEASE ORAL ONCE
Status: COMPLETED | OUTPATIENT
Start: 2023-10-19 | End: 2023-10-19

## 2023-10-19 RX ORDER — METOPROLOL TARTRATE 100 MG/1
100 TABLET ORAL 2 TIMES DAILY
Qty: 60 TABLET | Refills: 11 | Status: SHIPPED
Start: 2023-10-19

## 2023-10-19 RX ORDER — INSULIN GLARGINE 100 [IU]/ML
20 INJECTION, SOLUTION SUBCUTANEOUS NIGHTLY
Qty: 10 ML | Refills: 3 | Status: SHIPPED
Start: 2023-10-19

## 2023-10-19 RX ORDER — ATORVASTATIN CALCIUM 20 MG/1
40 TABLET, FILM COATED ORAL
Qty: 30 TABLET | Refills: 11 | Status: SHIPPED
Start: 2023-10-19

## 2023-10-19 RX ORDER — POLYETHYLENE GLYCOL 3350 17 G/17G
17 POWDER, FOR SOLUTION ORAL DAILY PRN
Qty: 527 G | Refills: 0 | Status: SHIPPED
Start: 2023-10-19 | End: 2023-11-18

## 2023-10-19 RX ORDER — HYDRALAZINE HYDROCHLORIDE 25 MG/1
25 TABLET, FILM COATED ORAL EVERY 8 HOURS SCHEDULED
Qty: 90 TABLET | Refills: 5 | Status: SHIPPED
Start: 2023-10-19

## 2023-10-19 RX ADMIN — ATORVASTATIN CALCIUM 20 MG: 20 TABLET, FILM COATED ORAL at 10:10

## 2023-10-19 RX ADMIN — POTASSIUM CHLORIDE 40 MEQ: 1500 TABLET, EXTENDED RELEASE ORAL at 10:10

## 2023-10-19 RX ADMIN — ASPIRIN 81 MG: 81 TABLET, CHEWABLE ORAL at 10:10

## 2023-10-19 RX ADMIN — POTASSIUM CHLORIDE 40 MEQ: 750 TABLET, FILM COATED, EXTENDED RELEASE ORAL at 15:39

## 2023-10-19 RX ADMIN — METOPROLOL TARTRATE 100 MG: 50 TABLET ORAL at 10:09

## 2023-10-19 RX ADMIN — AMLODIPINE BESYLATE 10 MG: 5 TABLET ORAL at 10:10

## 2023-10-19 RX ADMIN — Medication 100 MG: at 10:11

## 2023-10-19 RX ADMIN — HYDRALAZINE HYDROCHLORIDE 25 MG: 25 TABLET, FILM COATED ORAL at 15:39

## 2023-10-19 RX ADMIN — HYDRALAZINE HYDROCHLORIDE 25 MG: 25 TABLET, FILM COATED ORAL at 06:46

## 2023-10-19 RX ADMIN — CLOPIDOGREL BISULFATE 75 MG: 75 TABLET ORAL at 10:09

## 2023-10-19 NOTE — CARDIO/PULMONARY
Chart reviewed: Patient is 79 y.o. male admitted with Dehydration [E86.0]  Hypokalemia [E87.6]  Septicemia ([address]) [A41.9]  Acute renal failure (ARF) (HCC) [N17.9]  Acute kidney injury ([address]) [N17.9]  Sepsis ([address]) [A41.9]  Type 2 diabetes mellitus with other diabetic kidney complication, without long-term current use of insulin ([address]) [E11.29]    Education: MI education folder, with catheterization brochure, to bedside of Samuel Zee. Educated using teach back method. Reviewed MI diagnosis definition and purpose of intervention. Discussed risk factors for CAD to include the following: family history, elevated BMI, hyperlipidemia, hypertension, diabetes, stress, and smoking. Smoking Cessation Program link added to AVS. Discussed Heart Healthy/Low Sodium (2000 mg) diet. Reviewed the importance of medication compliance, and potential side effects. Discussed follow up appointments with cardiologist, signs and symptoms of angina, and what to report to physician after discharge. Emphasized the value of cardiac rehab. Discussed Cardiac Rehab Program format, benefits, and encouraged enrollment to assist with risk modification and management. Patient, who is lethargic with no family present, would appreciate f/u call after d/c. Samuel Zee verbalized understanding with questions answered.      Marilyn Baldwin RN

## 2023-10-19 NOTE — DISCHARGE SUMMARY
home meds and new meds, coordinating care with case management, doing the discharge papers and the D/C summary    [facility]    Discharge Condition: Stable    Summary of admission H+P(copied from Vamshi Alonzo MD Note):     CHIEF COMPLAINT: Diarrhea     HISTORY OF PRESENT ILLNESS:     Kathy Mac is a 79 y.o.  male with PMHx significant for diabetes mellitus type 2, hypertension, hyperlipidemia who lives alone, brought in by son since he found father in room. Unable to move around. Patient diarrhea started 4 days ago multiple episodes, patient stayed at home, went for work on Monday but supervisor found him sick and was sent home. They notified the son. Son went in to check him on Tuesday and found to be in bed unable to move around. Patient was jittery alert. Was not taking any medications o and eating per him   Patient continues to be the same on Wednesday and son decided to bring him in. Denies any fevers cough sick exposure abdominal pain blood in stool. Denies any chest pain shortness of breath     In the ER spiked temperature of 103, tachycardic, blood pressure holding 161/87 UA negative lactic acid 2.79 trending down chest x-ray unremarkable potassium of 2.9 creatinine of 2.25 unclear baseline. Patient appears to be uncomfortable and jittery. History obtained from son at bedside  we were asked to admit for work up and evaluation of the above problems. Admit head CT read by radiology FINDINGS:  No acute infarct is seen. There is no apparent mass on unenhanced imaging. There  is no bleed, shift, obstructive hydrocephalus or significant extra-axial fluid  collection. Bone windows are unremarkable. IMPRESSION:  No acute intracranial abnormality. pCXR read by radiology FINDINGS:   Cardiomediastinal silhouette is stably enlarged. Mild edema pattern, similar to  prior. No definite pleural effusion or pneumothorax. IMPRESSION:  Similar mild edema pattern.     Right

## 2023-10-19 NOTE — CARE COORDINATION
10/19/23 1408   Discharge Planning   Patient expects to be discharged to: Acute rehab   Services At/After Discharge   Transition of Care Consult (CM Consult) Acute Rehab   Mode of Transport at Discharge BLS   Condition of Participation: Discharge Planning   The Plan for Transition of Care is related to the following treatment goals: to go to [facility]     Patient to go to [facility] today. I will visit with patient to let him know. I talked with family member on the phone so they are aware.  No further needs from [facility]
Care Management Initial Assessment       RUR: 9%  Readmission? No  1st IM letter given? No---N/A  1st  letter given: No--N/A            10/05/23 4020   Service Assessment   Patient Orientation Self;Place; Alert and Oriented   Cognition Alert   History Provided By Child/Family  (Spoke with patient's son. He lives 20 min away from his father. Patient also has a daughter and her name added to patient station. Her name is Hermelindo Gonzalez and her telephone number is [phone] and she lives in [facility].)   [address] is: Legal Next of [facility]   PCP Verified by CM Yes   Last Visit to PCP   (Patient unable to verify the name of his pcp. The daughter and son did not know the name of his pcp. Per son states he saw a pcp in March for his physical.)   Prior Functional Level Independent in ADLs/IADLs   Current Functional Level Assistance with the following:;Bathing;Dressing; Toileting;Feeding;Mobility   Can patient return to prior living arrangement Yes   Family able to assist with home care needs: Yes   Would you like for me to discuss the discharge plan with any other family members/significant others, and if so, who? Yes  (His children are involved .)   Financial Resources Other (Comment)  (Patient has [employer] and [employer].)   [employer] None   Social/Functional History   Lives With Alone   Type of [employer]  (Patient lives in a one story home.)   Home Equipment None   Active  Yes  (Patient works as a .)   Discharge [address] Prior To Admission None   Patient expects to be discharged to: Sae Simmons RN BSN CRM        [phone]
Transition of Care Plan:     RUR:  13%    Prior Level of Functioning: independent    Disposition: [facility] IPR  BC/BS Auth is secured. 9:17 AM   Received notification from Kenzie Encarnacion at [facility] and [place] was approved. CM asked for her to let us know auth details Auth Number and dates [place] is good for. 9 AM  CM completed chart review. Pt will be here for 1-2 more days per hospitalist.  Cardiology to do stress test and will need cardiology clearance. If SNF or IPR: Date FOC offered: 10/10/2023  Date [address] received: 10/11/2023  Accepting facility: [facility]  Date authorization started with reference number: 10/13/2023   Date authorization received and expires: 10/16/23. Auth Reference: [id number] approved from 10/16 to 10/22. Follow up appointments: after rehab. DME needed: per facility  Transportation at discharge: likely BLS  IM/IMM Medicare/ letter given: n/a  Is patient a [place] and connected with VA? If yes, was [employer] transfer form completed and VA notified? Caregiver Contact: Son: Swathi Arias: [phone]  Discharge Caregiver contacted prior to discharge? yes  Care Conference needed? no  Barriers to discharge: medical stability, Cardiology clearance     CM will continue to monitor discharge plan.      Haylie Ortiz, [facility]  Ext [phone]
Transition of Care Plan:     RUR: 14    Prior Level of Functioning: Independent    Disposition: Acute Rehab: Pending  Requires Auth:     11:34 AM  Talked to MD and he will be here through the weekend. CM completed chart review. Referrals were sent to Preferences yesterday:   [facility]: Pending: Still reviewing. [facility]: Accepted. [facility]: Declined. No available bed. Once we get an accepting facility. Insurance Cheryl Laser will need to be secured through BC/BS     If SNF or IPR: Date FOC offered: 10/10  Date FOC received: 10/11  Accepting facility:   Date authorization started with reference number:   Date authorization received and expires: Follow up appointments: after rehab  DME needed: n/a  Transportation at discharge: MOD A x 2 BLS. EMTALA  IM/IMM Medicare/ letter given: to be delivered  Is patient a  and connected with VA? N/a              If yes, was [employer] transfer form completed and VA notified? Caregiver Contact: Son: Kelele Coil: [phone]  Discharge Caregiver contacted prior to discharge? yes  Care Conference needed? N/a  Barriers to discharge: ID and Neuro clearance. Placement and auth. CM will continue to monitor discharge plan.       Fadi Carrasco, [address] [facility]  Ext [phone]
Transition of Care Plan:    RUR:  13%  Prior Level of Functioning: independent  Disposition: [facility]  If SNF or IPR: Date FOC offered: 10/10/2023  Date [address] received: 10/11/2023  Accepting facility: [facility]  Date authorization started with reference number: 10/13/2023 requested for Farhan England to start todau  Date authorization received and expires: Follow up appointments:   DME needed: per facility  Transportation at discharge: likely BLS  IM/IMM Medicare/ letter given: n/a  Is patient a [place] and connected with VA? If yes, was [employer] transfer form completed and VA notified? Caregiver Contact: Son: Milagro Licea: [phone]  Discharge Caregiver contacted prior to discharge? yes  Care Conference needed? no  Barriers to discharge: medical stability, insurance authorization for IPR    CM spoke with Farhan England with [facility] and they will accept pending insurance auth to be started today and pending bed availibility. Not likely to receive authorization per IPR before Monday . Nancy LILLYW,ACM  
Transition of Care Plan:    RUR: 14  Prior Level of Functioning: Independent  Disposition: Acute Rehab: Pending  Requires Auth:    5:32 PM   CM completed chart review. Therapy rec Acute Rehab. Offered FOC to Pt and Son  Referrals were sent to Preferences of:   [facility]: Pending  [facility]: Pending  [facility]: Pending. Once we get an accepting facility. Insurance Terisa Bonnie will need to be secured through BC/BS    If SNF or IPR: Date FOC offered: 10/10  Date FOC received: 10/11  Accepting facility:   Date authorization started with reference number:   Date authorization received and expires: Follow up appointments: after rehab  DME needed: n/a  Transportation at discharge: MOD A x 2 BLS. EMTALA  IM/IMM Medicare/ letter given: to be delivered  Is patient a [place] and connected with VA? N/a   If yes, was [employer] transfer form completed and VA notified? Caregiver Contact: Son: Ilana Dario: [phone]  Discharge Caregiver contacted prior to discharge? yes  Care Conference needed? N/a  Barriers to discharge: ID and Neuro clearance. Placement and auth. CM will continue to monitor discharge plan.      Jaspal Judd, [address]  [phone]
Transition of Care Plan:    RUR: 14%  Prior Level of Functioning: Independent  Disposition: Possible Rehab  If SNF or IPR: Date FOC offered: 10/10/23  Date FOC received: Family and patient to review today. Accepting facility: [facility]  Date authorization started with reference number: TBD  Date authorization received and expires: [facility]  Follow up appointments: To be done by facility  DME needed: None  Transportation at discharge: [facility] or family  IM/IMM Medicare/ letter given: N/A  Is patient a [place] and connected with VA? No    If yes, was [employer] transfer form completed and VA notified? N/A  Caregiver Contact: Son and daughter  Discharge Caregiver contacted prior to discharge? Caregiver to be contacted prior to discharge. Care Conference needed? No  Barriers to discharge:  Medical stability        Physical and occupational therapy working with the patient. Spoke with the patient's son about dcp. He agrees patient will need inpatient rehab when medically stable. He will be up later after work to see his father and talk with him about rehab. List of facilities left in the patient's room for family and patient. Instructed son to choose the first , second and third preferences. He understands. Missy Simmons RN BSN CRM        [phone]
Feedings   [][employer] (TPN)    Financial Resources:  []Medicaid Application Completed    []UAI Completed and copy given to pt/family  and copy given to pt/family  []A screening has previously been completed. []Level II Completed    [] Private pay individual who will not become   financially eligible for Medicaid within 6 months from admission to a [address] [place] [address] facility. [] Individual refused to have screening conducted. []Medicaid Application Completed    []The screening denied because it was determined individual did not need/did not qualify for nursing facility level of care. [] Out of state residents seeking direct admission to a [facility]. [] Individuals who are inpatients of an out of state hospital, or in state or out of state veterans/ hospital and seek direct admission to a [facility]  [] Individuals who are pateints or residents of a state owned/operated facility that is licensed by Department of [employer] (DBS) and seek direct admission to [address] [place] [address]  [] A screening not required for enrollment in [address] [place] [address] as set out in 12 [facility]-  [] [facility]) staff shall perform screenings of the [facility] clients. Advanced Care Plan:  []Surrogate Decision Maker of Care  []POA  []Communicated Code Status and copy sent.     Other:

## 2023-10-20 LAB
EKG ATRIAL RATE: 64 BPM
EKG DIAGNOSIS: NORMAL
EKG P AXIS: 44 DEGREES
EKG P-R INTERVAL: 194 MS
EKG Q-T INTERVAL: 430 MS
EKG QRS DURATION: 98 MS
EKG QTC CALCULATION (BAZETT): 444 MS
EKG R AXIS: -11 DEGREES
EKG T AXIS: 13 DEGREES
EKG VENTRICULAR RATE: 64 BPM

## 2023-10-23 LAB
ALBUMIN SERPL ELPH-MCNC: 2.1 G/DL (ref 2.9–4.4)
ALBUMIN/GLOB SERPL: 0.7 (ref 0.7–1.7)
ALPHA1 GLOB SERPL ELPH-MCNC: 0.3 G/DL (ref 0–0.4)
ALPHA2 GLOB SERPL ELPH-MCNC: 0.9 G/DL (ref 0.4–1)
B-GLOBULIN SERPL ELPH-MCNC: 1.1 G/DL (ref 0.7–1.3)
GAMMA GLOB SERPL ELPH-MCNC: 0.7 G/DL (ref 0.4–1.8)
GLOBULIN SER CALC-MCNC: 3 G/DL (ref 2.2–3.9)
IGA SERPL-MCNC: 559 MG/DL (ref 61–437)
IGG SERPL-MCNC: 860 MG/DL (ref 603–1613)
IGM SERPL-MCNC: 121 MG/DL (ref 20–172)
INTERPRETATION SERPL IEP-IMP: ABNORMAL
KAPPA LC FREE SER-MCNC: 103.5 MG/L (ref 3.3–19.4)
KAPPA LC FREE/LAMBDA FREE SER: 2.44 (ref 0.26–1.65)
LABORATORY COMMENT REPORT: ABNORMAL
LAMBDA LC FREE SERPL-MCNC: 42.4 MG/L (ref 5.7–26.3)
M PROTEIN SERPL ELPH-MCNC: ABNORMAL G/DL
PROT SERPL-MCNC: 5.1 G/DL (ref 6–8.5)
REFLEX TESTING: ABNORMAL

## 2024-01-04 ENCOUNTER — HOSPITAL ENCOUNTER (OUTPATIENT)
Facility: HOSPITAL | Age: 69
Setting detail: RECURRING SERIES
Discharge: HOME | End: 2024-01-07
Payer: COMMERCIAL

## 2024-01-04 VITALS — WEIGHT: 170 LBS | BODY MASS INDEX: 26.63 KG/M2

## 2024-01-04 PROCEDURE — 93798 PHYS/QHP OP CAR RHAB W/ECG: CPT

## 2024-01-04 ASSESSMENT — EXERCISE STRESS TEST
PEAK_BP: 141/80
PEAK_HR: 89
PEAK_RPD: 0
PEAK_BP: 141/80
PEAK_RPE: 12
PEAK_METS: 2.3
PEAK_RPE: 12
PEAK_HR: 89

## 2024-01-04 ASSESSMENT — LIFESTYLE VARIABLES
ALCOHOL_USE: SPECIAL
ALCOHOL_AMOUNT: 3
ALCOHOL_TYPE: BEER

## 2024-01-04 ASSESSMENT — EJECTION FRACTION: EF_VALUE: 55

## 2024-01-04 NOTE — CARDIO/PULMONARY
Patient completed 6MWT and first exercise. PCP faxed for PHQ9 score and recent lipid panel.   
I will SWITCH the dose or number of times a day I take the medications listed below when I get home from the hospital:  None

## 2024-01-08 ENCOUNTER — HOSPITAL ENCOUNTER (OUTPATIENT)
Facility: HOSPITAL | Age: 69
Setting detail: RECURRING SERIES
Discharge: HOME | End: 2024-01-11
Payer: COMMERCIAL

## 2024-01-08 VITALS — WEIGHT: 172.6 LBS | BODY MASS INDEX: 27.03 KG/M2

## 2024-01-08 PROCEDURE — 93798 PHYS/QHP OP CAR RHAB W/ECG: CPT

## 2024-01-08 ASSESSMENT — EXERCISE STRESS TEST
PEAK_RPE: 12
PEAK_HR: 104
PEAK_METS: 2.3

## 2024-01-10 ENCOUNTER — HOSPITAL ENCOUNTER (OUTPATIENT)
Facility: HOSPITAL | Age: 69
Setting detail: RECURRING SERIES
Discharge: HOME | End: 2024-01-13
Payer: COMMERCIAL

## 2024-01-10 VITALS — WEIGHT: 174 LBS | BODY MASS INDEX: 27.25 KG/M2

## 2024-01-10 PROCEDURE — 93798 PHYS/QHP OP CAR RHAB W/ECG: CPT

## 2024-01-10 ASSESSMENT — EXERCISE STRESS TEST
PEAK_HR: 100
PEAK_METS: 2.8
PEAK_RPE: 12

## 2024-01-11 ENCOUNTER — HOSPITAL ENCOUNTER (OUTPATIENT)
Facility: HOSPITAL | Age: 69
Setting detail: RECURRING SERIES
Discharge: HOME | End: 2024-01-14
Payer: COMMERCIAL

## 2024-01-11 VITALS — WEIGHT: 173 LBS | BODY MASS INDEX: 27.1 KG/M2

## 2024-01-11 PROCEDURE — 93797 PHYS/QHP OP CAR RHAB WO ECG: CPT

## 2024-01-11 PROCEDURE — 93798 PHYS/QHP OP CAR RHAB W/ECG: CPT

## 2024-01-11 ASSESSMENT — EXERCISE STRESS TEST
PEAK_HR: 94
PEAK_METS: 2.8
PEAK_RPE: 12

## 2024-01-11 ASSESSMENT — LIFESTYLE VARIABLES
ALCOHOL_USE: SPECIAL
ALCOHOL_TYPE: BEER
ALCOHOL_AMOUNT: 3

## 2024-01-11 NOTE — CARDIO/PULMONARY
is scheduled to participate in Cardiac Rehab group nutrition classes.      PATIENT GOALS:    Weight Goals: maintain.     Nutrition Goals:  Daily Recommendations:  Calories: 1990 /day  (for weight maintenance)  Saturated Fat: no more than 13 g/day  Trans Fat: 0 g/day  Sodium: no more than 2000 mg/day  Fruit: 1-2 cups / day  Vegetables: 3 cups/day    Other:  - read and compare food labels  - include non-starchy vegetables with dinner daily       Keeping a food diary was recommended.            Questions addressed. Follow-up plans discussed. Yefri Vigil verbalized understanding.    Jill Connolly RD

## 2024-01-15 ENCOUNTER — HOSPITAL ENCOUNTER (OUTPATIENT)
Facility: HOSPITAL | Age: 69
Setting detail: RECURRING SERIES
Discharge: HOME | End: 2024-01-18
Payer: COMMERCIAL

## 2024-01-15 VITALS — BODY MASS INDEX: 26.94 KG/M2 | WEIGHT: 172 LBS

## 2024-01-15 PROCEDURE — 93798 PHYS/QHP OP CAR RHAB W/ECG: CPT

## 2024-01-15 ASSESSMENT — EXERCISE STRESS TEST
PEAK_METS: 2.8
PEAK_HR: 84
PEAK_RPE: 12

## 2024-01-17 ENCOUNTER — HOSPITAL ENCOUNTER (OUTPATIENT)
Facility: HOSPITAL | Age: 69
Setting detail: RECURRING SERIES
Discharge: HOME | End: 2024-01-20
Payer: COMMERCIAL

## 2024-01-17 VITALS — BODY MASS INDEX: 26.78 KG/M2 | WEIGHT: 171 LBS

## 2024-01-17 PROCEDURE — 93798 PHYS/QHP OP CAR RHAB W/ECG: CPT

## 2024-01-17 ASSESSMENT — EXERCISE STRESS TEST
PEAK_HR: 85
PEAK_METS: 2.8
PEAK_RPE: 12

## 2024-01-19 ENCOUNTER — HOSPITAL ENCOUNTER (OUTPATIENT)
Facility: HOSPITAL | Age: 69
Setting detail: RECURRING SERIES
Discharge: HOME | End: 2024-01-22
Payer: COMMERCIAL

## 2024-01-19 VITALS — BODY MASS INDEX: 26.63 KG/M2 | WEIGHT: 170 LBS

## 2024-01-19 PROCEDURE — 93798 PHYS/QHP OP CAR RHAB W/ECG: CPT

## 2024-01-19 ASSESSMENT — EXERCISE STRESS TEST
PEAK_RPE: 12
PEAK_HR: 86
PEAK_METS: 2.8

## 2024-01-22 ENCOUNTER — HOSPITAL ENCOUNTER (OUTPATIENT)
Facility: HOSPITAL | Age: 69
Setting detail: RECURRING SERIES
Discharge: HOME | End: 2024-01-25
Payer: COMMERCIAL

## 2024-01-22 VITALS — BODY MASS INDEX: 26.63 KG/M2 | WEIGHT: 170 LBS

## 2024-01-22 PROCEDURE — 93798 PHYS/QHP OP CAR RHAB W/ECG: CPT

## 2024-01-22 ASSESSMENT — EXERCISE STRESS TEST
PEAK_METS: 2.8
PEAK_HR: 110
PEAK_RPE: 13

## 2024-01-24 ENCOUNTER — HOSPITAL ENCOUNTER (OUTPATIENT)
Facility: HOSPITAL | Age: 69
Setting detail: RECURRING SERIES
Discharge: HOME | End: 2024-01-27
Payer: COMMERCIAL

## 2024-01-24 VITALS — BODY MASS INDEX: 26.63 KG/M2 | WEIGHT: 170 LBS

## 2024-01-24 PROCEDURE — 93798 PHYS/QHP OP CAR RHAB W/ECG: CPT

## 2024-01-24 ASSESSMENT — LIFESTYLE VARIABLES
ALCOHOL_USE: SPECIAL
ALCOHOL_AMOUNT: 3
ALCOHOL_TYPE: BEER

## 2024-01-24 ASSESSMENT — EXERCISE STRESS TEST
PEAK_HR: 87
PEAK_METS: 3.5
PEAK_RPE: 13

## 2024-01-24 ASSESSMENT — EJECTION FRACTION: EF_VALUE: 55

## 2024-01-26 ENCOUNTER — HOSPITAL ENCOUNTER (OUTPATIENT)
Facility: HOSPITAL | Age: 69
Setting detail: RECURRING SERIES
Discharge: HOME | End: 2024-01-29
Payer: COMMERCIAL

## 2024-01-26 VITALS — BODY MASS INDEX: 26.47 KG/M2 | WEIGHT: 169 LBS

## 2024-01-26 PROCEDURE — 93798 PHYS/QHP OP CAR RHAB W/ECG: CPT

## 2024-01-26 ASSESSMENT — EXERCISE STRESS TEST
PEAK_HR: 86
PEAK_RPE: 13
PEAK_METS: 3.5

## 2024-01-29 ENCOUNTER — HOSPITAL ENCOUNTER (OUTPATIENT)
Facility: HOSPITAL | Age: 69
Setting detail: RECURRING SERIES
Discharge: HOME | End: 2024-02-01
Payer: COMMERCIAL

## 2024-01-29 VITALS — BODY MASS INDEX: 26.47 KG/M2 | WEIGHT: 169 LBS

## 2024-01-29 PROCEDURE — 93798 PHYS/QHP OP CAR RHAB W/ECG: CPT

## 2024-01-29 ASSESSMENT — EXERCISE STRESS TEST
PEAK_HR: 87
PEAK_METS: 3.5
PEAK_RPE: 13

## 2024-01-31 ENCOUNTER — HOSPITAL ENCOUNTER (OUTPATIENT)
Facility: HOSPITAL | Age: 69
Setting detail: RECURRING SERIES
Discharge: HOME | End: 2024-02-03
Payer: COMMERCIAL

## 2024-01-31 VITALS — BODY MASS INDEX: 26.63 KG/M2 | WEIGHT: 170 LBS

## 2024-01-31 PROCEDURE — 93798 PHYS/QHP OP CAR RHAB W/ECG: CPT

## 2024-01-31 ASSESSMENT — EXERCISE STRESS TEST
PEAK_RPE: 13
PEAK_HR: 89
PEAK_METS: 3.5

## 2024-02-02 ENCOUNTER — HOSPITAL ENCOUNTER (OUTPATIENT)
Facility: HOSPITAL | Age: 69
Setting detail: RECURRING SERIES
Discharge: HOME | End: 2024-02-05
Payer: COMMERCIAL

## 2024-02-02 VITALS — BODY MASS INDEX: 26.47 KG/M2 | WEIGHT: 169 LBS

## 2024-02-02 PROCEDURE — 93798 PHYS/QHP OP CAR RHAB W/ECG: CPT

## 2024-02-02 ASSESSMENT — EXERCISE STRESS TEST
PEAK_HR: 88
PEAK_METS: 3.5
PEAK_RPE: 13

## 2024-02-05 ENCOUNTER — HOSPITAL ENCOUNTER (OUTPATIENT)
Facility: HOSPITAL | Age: 69
Setting detail: RECURRING SERIES
Discharge: HOME | End: 2024-02-08
Payer: COMMERCIAL

## 2024-02-05 VITALS — WEIGHT: 169 LBS | BODY MASS INDEX: 26.47 KG/M2

## 2024-02-05 PROCEDURE — 93798 PHYS/QHP OP CAR RHAB W/ECG: CPT

## 2024-02-05 ASSESSMENT — EXERCISE STRESS TEST
PEAK_RPE: 13
PEAK_METS: 3.5
PEAK_HR: 90

## 2024-02-07 ENCOUNTER — HOSPITAL ENCOUNTER (OUTPATIENT)
Facility: HOSPITAL | Age: 69
Setting detail: RECURRING SERIES
Discharge: HOME | End: 2024-02-10
Payer: COMMERCIAL

## 2024-02-07 VITALS — WEIGHT: 169 LBS | BODY MASS INDEX: 26.47 KG/M2

## 2024-02-07 PROCEDURE — 93798 PHYS/QHP OP CAR RHAB W/ECG: CPT

## 2024-02-07 ASSESSMENT — EXERCISE STRESS TEST
PEAK_RPE: 13
PEAK_HR: 88
PEAK_METS: 3.5

## 2024-02-09 ENCOUNTER — HOSPITAL ENCOUNTER (OUTPATIENT)
Facility: HOSPITAL | Age: 69
Setting detail: RECURRING SERIES
Discharge: HOME | End: 2024-02-12
Payer: COMMERCIAL

## 2024-02-09 VITALS — WEIGHT: 170 LBS | BODY MASS INDEX: 26.63 KG/M2

## 2024-02-09 PROCEDURE — 93797 PHYS/QHP OP CAR RHAB WO ECG: CPT

## 2024-02-09 PROCEDURE — 93798 PHYS/QHP OP CAR RHAB W/ECG: CPT

## 2024-02-12 ENCOUNTER — HOSPITAL ENCOUNTER (OUTPATIENT)
Facility: HOSPITAL | Age: 69
Setting detail: RECURRING SERIES
Discharge: HOME | End: 2024-02-15
Payer: COMMERCIAL

## 2024-02-12 VITALS — BODY MASS INDEX: 26.94 KG/M2 | WEIGHT: 172 LBS

## 2024-02-12 PROCEDURE — 93798 PHYS/QHP OP CAR RHAB W/ECG: CPT

## 2024-02-14 ENCOUNTER — HOSPITAL ENCOUNTER (OUTPATIENT)
Facility: HOSPITAL | Age: 69
Setting detail: RECURRING SERIES
Discharge: HOME | End: 2024-02-17
Payer: COMMERCIAL

## 2024-02-14 VITALS — BODY MASS INDEX: 26.63 KG/M2 | WEIGHT: 170 LBS

## 2024-02-14 PROCEDURE — 93798 PHYS/QHP OP CAR RHAB W/ECG: CPT

## 2024-02-16 ENCOUNTER — HOSPITAL ENCOUNTER (OUTPATIENT)
Facility: HOSPITAL | Age: 69
Setting detail: RECURRING SERIES
Discharge: HOME | End: 2024-02-19
Payer: COMMERCIAL

## 2024-02-16 VITALS — WEIGHT: 172 LBS | BODY MASS INDEX: 26.94 KG/M2

## 2024-02-16 PROCEDURE — 93798 PHYS/QHP OP CAR RHAB W/ECG: CPT

## 2024-02-16 ASSESSMENT — EXERCISE STRESS TEST
PEAK_RPE: 13
PEAK_HR: 99
PEAK_METS: 3.8

## 2024-02-19 ENCOUNTER — HOSPITAL ENCOUNTER (OUTPATIENT)
Facility: HOSPITAL | Age: 69
Setting detail: RECURRING SERIES
Discharge: HOME | End: 2024-02-22
Payer: COMMERCIAL

## 2024-02-19 VITALS — BODY MASS INDEX: 26.63 KG/M2 | WEIGHT: 170 LBS

## 2024-02-19 PROCEDURE — 93798 PHYS/QHP OP CAR RHAB W/ECG: CPT

## 2024-02-19 ASSESSMENT — LIFESTYLE VARIABLES
ALCOHOL_AMOUNT: 3
ALCOHOL_TYPE: BEER
ALCOHOL_USE: SPECIAL

## 2024-02-19 ASSESSMENT — EXERCISE STRESS TEST
PEAK_HR: 94
PEAK_RPE: 13
PEAK_METS: 3.8

## 2024-02-19 ASSESSMENT — EJECTION FRACTION: EF_VALUE: 55

## 2024-02-21 ENCOUNTER — HOSPITAL ENCOUNTER (OUTPATIENT)
Facility: HOSPITAL | Age: 69
Setting detail: RECURRING SERIES
Discharge: HOME | End: 2024-02-24
Payer: COMMERCIAL

## 2024-02-21 VITALS — BODY MASS INDEX: 26.78 KG/M2 | WEIGHT: 171 LBS

## 2024-02-21 PROCEDURE — 93798 PHYS/QHP OP CAR RHAB W/ECG: CPT

## 2024-02-21 ASSESSMENT — EXERCISE STRESS TEST
PEAK_METS: 3.8
PEAK_HR: 91
PEAK_RPE: 13

## 2024-02-23 ENCOUNTER — HOSPITAL ENCOUNTER (OUTPATIENT)
Facility: HOSPITAL | Age: 69
Setting detail: RECURRING SERIES
Discharge: HOME | End: 2024-02-26
Payer: COMMERCIAL

## 2024-02-23 VITALS — WEIGHT: 172 LBS | BODY MASS INDEX: 26.94 KG/M2

## 2024-02-23 PROCEDURE — 93797 PHYS/QHP OP CAR RHAB WO ECG: CPT

## 2024-02-23 PROCEDURE — 93798 PHYS/QHP OP CAR RHAB W/ECG: CPT

## 2024-02-23 ASSESSMENT — EXERCISE STRESS TEST
PEAK_RPE: DID NOT RECORD
PEAK_METS: 3.8
PEAK_HR: 100

## 2024-02-26 ENCOUNTER — HOSPITAL ENCOUNTER (OUTPATIENT)
Facility: HOSPITAL | Age: 69
Setting detail: RECURRING SERIES
Discharge: HOME | End: 2024-02-29
Payer: COMMERCIAL

## 2024-02-26 VITALS — WEIGHT: 171 LBS | BODY MASS INDEX: 26.78 KG/M2

## 2024-02-26 PROCEDURE — 93798 PHYS/QHP OP CAR RHAB W/ECG: CPT

## 2024-02-26 ASSESSMENT — EXERCISE STRESS TEST
PEAK_RPE: 13
PEAK_METS: 4.4
PEAK_HR: 94

## 2024-02-28 ENCOUNTER — HOSPITAL ENCOUNTER (OUTPATIENT)
Facility: HOSPITAL | Age: 69
Setting detail: RECURRING SERIES
Discharge: HOME | End: 2024-03-02
Payer: COMMERCIAL

## 2024-02-28 VITALS — BODY MASS INDEX: 26.78 KG/M2 | WEIGHT: 171 LBS

## 2024-02-28 PROCEDURE — 93798 PHYS/QHP OP CAR RHAB W/ECG: CPT

## 2024-02-28 ASSESSMENT — EXERCISE STRESS TEST
PEAK_RPE: 13
PEAK_METS: 4.4
PEAK_HR: 92

## 2024-03-01 ENCOUNTER — HOSPITAL ENCOUNTER (OUTPATIENT)
Facility: HOSPITAL | Age: 69
Setting detail: RECURRING SERIES
Discharge: HOME | End: 2024-03-04
Payer: COMMERCIAL

## 2024-03-01 VITALS — BODY MASS INDEX: 26.94 KG/M2 | WEIGHT: 172 LBS

## 2024-03-01 PROCEDURE — 93798 PHYS/QHP OP CAR RHAB W/ECG: CPT

## 2024-03-01 ASSESSMENT — EXERCISE STRESS TEST
PEAK_RPE: 13
PEAK_HR: 110
PEAK_METS: 4.4

## 2024-03-04 ENCOUNTER — HOSPITAL ENCOUNTER (OUTPATIENT)
Facility: HOSPITAL | Age: 69
Setting detail: RECURRING SERIES
Discharge: HOME | End: 2024-03-07
Payer: COMMERCIAL

## 2024-03-04 VITALS — WEIGHT: 172 LBS | BODY MASS INDEX: 26.94 KG/M2

## 2024-03-04 PROCEDURE — 93798 PHYS/QHP OP CAR RHAB W/ECG: CPT

## 2024-03-04 ASSESSMENT — EXERCISE STRESS TEST
PEAK_METS: 4.4
PEAK_RPE: 13
PEAK_HR: 111

## 2024-03-06 ENCOUNTER — HOSPITAL ENCOUNTER (OUTPATIENT)
Facility: HOSPITAL | Age: 69
Setting detail: RECURRING SERIES
Discharge: HOME | End: 2024-03-09
Payer: COMMERCIAL

## 2024-03-06 VITALS — WEIGHT: 171 LBS | BODY MASS INDEX: 26.78 KG/M2

## 2024-03-06 PROCEDURE — 93798 PHYS/QHP OP CAR RHAB W/ECG: CPT

## 2024-03-06 ASSESSMENT — EXERCISE STRESS TEST
PEAK_RPE: 12
PEAK_HR: 120
PEAK_METS: 4.4

## 2024-03-08 ENCOUNTER — HOSPITAL ENCOUNTER (OUTPATIENT)
Facility: HOSPITAL | Age: 69
Setting detail: RECURRING SERIES
Discharge: HOME | End: 2024-03-11
Payer: COMMERCIAL

## 2024-03-08 VITALS — BODY MASS INDEX: 26.78 KG/M2 | WEIGHT: 171 LBS

## 2024-03-08 PROCEDURE — 93798 PHYS/QHP OP CAR RHAB W/ECG: CPT

## 2024-03-08 ASSESSMENT — EXERCISE STRESS TEST
PEAK_RPE: 12
PEAK_HR: 118
PEAK_METS: 4.4

## 2024-03-11 ENCOUNTER — APPOINTMENT (OUTPATIENT)
Facility: HOSPITAL | Age: 69
End: 2024-03-11
Payer: COMMERCIAL

## 2024-03-11 ENCOUNTER — HOSPITAL ENCOUNTER (OUTPATIENT)
Facility: HOSPITAL | Age: 69
Setting detail: RECURRING SERIES
Discharge: HOME | End: 2024-03-14
Payer: COMMERCIAL

## 2024-03-11 ENCOUNTER — HOSPITAL ENCOUNTER (EMERGENCY)
Facility: HOSPITAL | Age: 69
Discharge: HOME | End: 2024-03-11
Payer: COMMERCIAL

## 2024-03-11 VITALS
DIASTOLIC BLOOD PRESSURE: 75 MMHG | WEIGHT: 172.4 LBS | RESPIRATION RATE: 16 BRPM | BODY MASS INDEX: 27 KG/M2 | SYSTOLIC BLOOD PRESSURE: 129 MMHG | OXYGEN SATURATION: 99 % | TEMPERATURE: 97.9 F | HEART RATE: 74 BPM

## 2024-03-11 VITALS — WEIGHT: 172 LBS | BODY MASS INDEX: 26.94 KG/M2

## 2024-03-11 DIAGNOSIS — M79.604 RIGHT LEG PAIN: Primary | ICD-10-CM

## 2024-03-11 PROCEDURE — 99284 EMERGENCY DEPT VISIT MOD MDM: CPT

## 2024-03-11 PROCEDURE — 93798 PHYS/QHP OP CAR RHAB W/ECG: CPT

## 2024-03-11 PROCEDURE — 73562 X-RAY EXAM OF KNEE 3: CPT

## 2024-03-11 PROCEDURE — 73552 X-RAY EXAM OF FEMUR 2/>: CPT

## 2024-03-11 PROCEDURE — 93971 EXTREMITY STUDY: CPT

## 2024-03-11 PROCEDURE — 93922 UPR/L XTREMITY ART 2 LEVELS: CPT

## 2024-03-11 RX ORDER — HYDROCODONE BITARTRATE AND ACETAMINOPHEN 5; 325 MG/1; MG/1
1 TABLET ORAL EVERY 6 HOURS PRN
Qty: 10 TABLET | Refills: 0 | Status: SHIPPED | OUTPATIENT
Start: 2024-03-11 | End: 2024-03-14

## 2024-03-11 RX ORDER — TIZANIDINE 2 MG/1
2 TABLET ORAL 3 TIMES DAILY PRN
Qty: 20 TABLET | Refills: 0 | Status: SHIPPED | OUTPATIENT
Start: 2024-03-11

## 2024-03-11 ASSESSMENT — PATIENT HEALTH QUESTIONNAIRE - PHQ9
9. THOUGHTS THAT YOU WOULD BE BETTER OFF DEAD, OR OF HURTING YOURSELF: 0
SUM OF ALL RESPONSES TO PHQ QUESTIONS 1-9: 6
SUM OF ALL RESPONSES TO PHQ9 QUESTIONS 1 & 2: 3
1. LITTLE INTEREST OR PLEASURE IN DOING THINGS: 2
8. MOVING OR SPEAKING SO SLOWLY THAT OTHER PEOPLE COULD HAVE NOTICED. OR THE OPPOSITE, BEING SO FIGETY OR RESTLESS THAT YOU HAVE BEEN MOVING AROUND A LOT MORE THAN USUAL: 0
5. POOR APPETITE OR OVEREATING: 1
6. FEELING BAD ABOUT YOURSELF - OR THAT YOU ARE A FAILURE OR HAVE LET YOURSELF OR YOUR FAMILY DOWN: 0
2. FEELING DOWN, DEPRESSED OR HOPELESS: 1
SUM OF ALL RESPONSES TO PHQ QUESTIONS 1-9: 6
SUM OF ALL RESPONSES TO PHQ QUESTIONS 1-9: 6
4. FEELING TIRED OR HAVING LITTLE ENERGY: 1
SUM OF ALL RESPONSES TO PHQ QUESTIONS 1-9: 6
3. TROUBLE FALLING OR STAYING ASLEEP: 1
7. TROUBLE CONCENTRATING ON THINGS, SUCH AS READING THE NEWSPAPER OR WATCHING TELEVISION: 0

## 2024-03-11 ASSESSMENT — EJECTION FRACTION: EF_VALUE: 55

## 2024-03-11 ASSESSMENT — EXERCISE STRESS TEST
PEAK_METS: 4.4
PEAK_HR: 121
PEAK_RPE: 12

## 2024-03-11 ASSESSMENT — LIFESTYLE VARIABLES
HOW OFTEN DO YOU HAVE A DRINK CONTAINING ALCOHOL: PATIENT DECLINED
ALCOHOL_USE: SPECIAL
ALCOHOL_AMOUNT: 3
ALCOHOL_TYPE: BEER
HOW MANY STANDARD DRINKS CONTAINING ALCOHOL DO YOU HAVE ON A TYPICAL DAY: PATIENT DOES NOT DRINK

## 2024-03-11 ASSESSMENT — PAIN SCALES - GENERAL: PAINLEVEL_OUTOF10: 4

## 2024-03-11 NOTE — DISCHARGE INSTRUCTIONS
If you have an increase in pain, discoloration of your leg, cannot feel your foot, fever, any worsening of symptoms you need to return to the emergency department.

## 2024-03-11 NOTE — ED PROVIDER NOTES
insulin glargine 100 UNIT/ML injection vial  Commonly known as: LANTUS  Inject 20 Units into the skin nightly     metFORMIN 500 MG tablet  Commonly known as: GLUCOPHAGE     metoprolol 100 MG tablet  Commonly known as: LOPRESSOR  Take 1 tablet by mouth 2 times daily     potassium chloride 20 MEQ extended release tablet  Commonly known as: KLOR-CON M  Take 2 tablets by mouth daily (with breakfast) for 10 days     vitamin B-1 100 MG tablet  Commonly known as: THIAMINE  Take 1 tablet by mouth daily               Where to Get Your Medications        These medications were sent to [facility] [id number] [facility] [address] - P [phone] - F [phone] [address] [facility] [address]      Phone: [phone]   HYDROcodone-acetaminophen 5-325 MG per tablet  tiZANidine 2 MG tablet           DISCONTINUED MEDICATIONS:  Current Discharge Medication List          I have discussed the history and physical, results, MDM, and treatment plan with my supervising physician, Dr. Mayorga, who agrees with my plan.     I am the Primary Clinician of Record.   Elizabeth Dueñas PA-C (electronically signed)    (Please note that parts of this dictation were completed with voice recognition software. Quite often unanticipated grammatical, syntax, homophones, and other interpretive errors are inadvertently transcribed by the computer software. Please disregards these errors. Please excuse any errors that have escaped final proofreading.)         Elizabeth Dueñas PA-C  03/12/24 0814

## 2024-03-11 NOTE — ED NOTES
PRISCILLA pruitt chair side for exam- patient amb to chair tr 2 with steady even gait- states is having PT sessions x 3 times weekly, trend mill and bike today at [facility] - reports pain at night while sleeping, no noted redness, swelling. Pending u/s and xrays

## 2024-03-13 ENCOUNTER — HOSPITAL ENCOUNTER (OUTPATIENT)
Facility: HOSPITAL | Age: 69
Setting detail: RECURRING SERIES
Discharge: HOME | End: 2024-03-16
Payer: COMMERCIAL

## 2024-03-13 VITALS — BODY MASS INDEX: 26.78 KG/M2 | WEIGHT: 171 LBS

## 2024-03-13 PROCEDURE — 93798 PHYS/QHP OP CAR RHAB W/ECG: CPT

## 2024-03-13 ASSESSMENT — EXERCISE STRESS TEST
PEAK_METS: 4.4
PEAK_HR: 101
PEAK_RPE: 13

## 2024-03-15 ENCOUNTER — APPOINTMENT (OUTPATIENT)
Facility: HOSPITAL | Age: 69
End: 2024-03-15
Payer: COMMERCIAL

## 2024-03-15 LAB
VAS LEFT ABI: 0.85
VAS LEFT ARM BP: 162 MMHG
VAS LEFT DORSALIS PEDIS BP: 151 MMHG
VAS LEFT PTA BP: 108 MMHG
VAS LEFT TBI: 0.49
VAS LEFT TOE PRESSURE: 87 MMHG
VAS RIGHT ABI: 0.9
VAS RIGHT ARM BP: 178 MMHG
VAS RIGHT DORSALIS PEDIS BP: >240 MMHG
VAS RIGHT PTA BP: 160 MMHG
VAS RIGHT TBI: 0.44
VAS RIGHT TOE PRESSURE: 79 MMHG

## 2024-03-15 NOTE — CARDIO/PULMONARY
Patient reports 5/10 leg pain at rest. His doctor is aware and has prescribed \"muscle relaxers\" for him. Patient will not do exercises today, take it easy over the weekend, and see how he feels on Monday.

## 2024-03-18 ENCOUNTER — HOSPITAL ENCOUNTER (OUTPATIENT)
Facility: HOSPITAL | Age: 69
Setting detail: RECURRING SERIES
End: 2024-03-18
Payer: COMMERCIAL

## 2024-03-20 ENCOUNTER — HOSPITAL ENCOUNTER (OUTPATIENT)
Facility: HOSPITAL | Age: 69
Setting detail: RECURRING SERIES
Discharge: HOME | End: 2024-03-23
Payer: COMMERCIAL

## 2024-03-20 VITALS — WEIGHT: 171 LBS | BODY MASS INDEX: 26.78 KG/M2

## 2024-03-20 PROCEDURE — 93798 PHYS/QHP OP CAR RHAB W/ECG: CPT

## 2024-03-20 ASSESSMENT — EXERCISE STRESS TEST
PEAK_HR: 104
PEAK_RPE: 13
PEAK_METS: 4.4

## 2024-03-22 ENCOUNTER — HOSPITAL ENCOUNTER (OUTPATIENT)
Facility: HOSPITAL | Age: 69
Setting detail: RECURRING SERIES
Discharge: HOME | End: 2024-03-25
Payer: COMMERCIAL

## 2024-03-22 VITALS — WEIGHT: 171 LBS | BODY MASS INDEX: 26.78 KG/M2

## 2024-03-22 PROCEDURE — 93798 PHYS/QHP OP CAR RHAB W/ECG: CPT

## 2024-03-22 ASSESSMENT — EXERCISE STRESS TEST
PEAK_METS: 4.4
PEAK_HR: 110
PEAK_RPE: 13

## 2024-03-25 ENCOUNTER — HOSPITAL ENCOUNTER (OUTPATIENT)
Facility: HOSPITAL | Age: 69
Setting detail: RECURRING SERIES
Discharge: HOME | End: 2024-03-28
Payer: COMMERCIAL

## 2024-03-25 VITALS — BODY MASS INDEX: 26.94 KG/M2 | WEIGHT: 172 LBS

## 2024-03-25 PROCEDURE — 93798 PHYS/QHP OP CAR RHAB W/ECG: CPT

## 2024-03-25 ASSESSMENT — EJECTION FRACTION: EF_VALUE: 55

## 2024-03-25 ASSESSMENT — LIFESTYLE VARIABLES
ALCOHOL_USE: SPECIAL
ALCOHOL_TYPE: BEER
ALCOHOL_AMOUNT: 3
SMOKELESS_TOBACCO: NO

## 2024-03-25 ASSESSMENT — EXERCISE STRESS TEST
PEAK_RPE: 13
PEAK_HR: 106
PEAK_METS: 4.4

## 2024-03-25 NOTE — CARDIO/PULMONARY
DISCHARGE SUMMARY NOTE  3/25/2024      NAME: Yefri Vigil : 1955 AGE: 68 y.o.  GENDER: male    CARDIAC REHAB ADMITTING DIAGNOSIS: Non-ST elevation (NSTEMI) myocardial infarction [I21.4]    REFERRING PHYSICIAN: Rosmery Arnett MD    MEDICAL HX:  Past Medical History:   Diagnosis Date    Diabetes mellitus, type 2 (HCC)     Hypertension        LABS:     No results found for: \"HBA1C\", \"OSU1MEHH\"  No results found for: \"CHOL\", \"CHOLPOCT\", \"CHOLX\", \"CHLST\", \"CHOLV\", \"HDL\", \"HDLPOC\", \"HDLC\", \"LDL\", \"LDLC\", \"VLDLC\", \"VLDL\", \"TGLX\", \"TRIGL\"      ANTHROPOMETRICS:      Ht Readings from Last 1 Encounters:   10/09/23 1.702 m (5' 7\")      Wt Readings from Last 1 Encounters:   24 78 kg (172 lb)        WAIST: 35.5\"         PROGRAM SUMMARY:    Yefri Vigil completed 36/36 sessions in the Cardiac Rehab program. He will continue exercising 3 x week and focus on diet and nutrition at home. He attended 2 classes and is aware of his cardiac risk factors and cardiac medications. He maintained his weight 172 lbs. MET level increase from 2.8 to 4.4. 6MWT distance increased from 320 m to 483 m.      Questions addressed. Discharge plans discussed. Yefri Vigil verbalized understanding.      ZENON SHAH RN

## 2024-05-06 ENCOUNTER — OFFICE VISIT (OUTPATIENT)
Age: 69
End: 2024-05-06
Payer: COMMERCIAL

## 2024-05-06 VITALS
BODY MASS INDEX: 26.31 KG/M2 | OXYGEN SATURATION: 96 % | WEIGHT: 168 LBS | SYSTOLIC BLOOD PRESSURE: 104 MMHG | HEART RATE: 83 BPM | DIASTOLIC BLOOD PRESSURE: 82 MMHG

## 2024-05-06 DIAGNOSIS — Z09 HOSPITAL DISCHARGE FOLLOW-UP: Primary | ICD-10-CM

## 2024-05-06 DIAGNOSIS — I21.4 NSTEMI (NON-ST ELEVATED MYOCARDIAL INFARCTION) (HCC): ICD-10-CM

## 2024-05-06 DIAGNOSIS — Z86.61 PERSONAL HISTORY OF MENINGITIS: ICD-10-CM

## 2024-05-06 DIAGNOSIS — G93.40 ENCEPHALOPATHY: ICD-10-CM

## 2024-05-06 DIAGNOSIS — Z86.19 HISTORY OF WEST NILE VIRUS (WNV) INFECTION: ICD-10-CM

## 2024-05-06 PROBLEM — J69.0 ASPIRATION PNEUMONITIS (HCC): Status: RESOLVED | Noted: 2023-10-07 | Resolved: 2024-05-06

## 2024-05-06 PROBLEM — G93.41 ACUTE METABOLIC ENCEPHALOPATHY: Status: RESOLVED | Noted: 2023-10-07 | Resolved: 2024-05-06

## 2024-05-06 PROBLEM — A41.9 SEVERE SEPSIS (HCC): Status: RESOLVED | Noted: 2023-10-04 | Resolved: 2024-05-06

## 2024-05-06 PROBLEM — N17.9 ACUTE KIDNEY INJURY (HCC): Status: RESOLVED | Noted: 2023-10-04 | Resolved: 2024-05-06

## 2024-05-06 PROBLEM — G03.0 ASEPTIC MENINGITIS: Status: RESOLVED | Noted: 2023-10-06 | Resolved: 2024-05-06

## 2024-05-06 PROBLEM — R65.20 SEVERE SEPSIS (HCC): Status: RESOLVED | Noted: 2023-10-04 | Resolved: 2024-05-06

## 2024-05-06 PROCEDURE — 99215 OFFICE O/P EST HI 40 MIN: CPT | Performed by: PSYCHIATRY & NEUROLOGY

## 2024-05-06 PROCEDURE — 1111F DSCHRG MED/CURRENT MED MERGE: CPT | Performed by: PSYCHIATRY & NEUROLOGY

## 2024-05-06 PROCEDURE — 3074F SYST BP LT 130 MM HG: CPT | Performed by: PSYCHIATRY & NEUROLOGY

## 2024-05-06 PROCEDURE — 1123F ACP DISCUSS/DSCN MKR DOCD: CPT | Performed by: PSYCHIATRY & NEUROLOGY

## 2024-05-06 PROCEDURE — 3079F DIAST BP 80-89 MM HG: CPT | Performed by: PSYCHIATRY & NEUROLOGY

## 2024-05-06 ASSESSMENT — PATIENT HEALTH QUESTIONNAIRE - PHQ9
SUM OF ALL RESPONSES TO PHQ QUESTIONS 1-9: 0
1. LITTLE INTEREST OR PLEASURE IN DOING THINGS: NOT AT ALL
SUM OF ALL RESPONSES TO PHQ QUESTIONS 1-9: 0

## 2024-05-06 NOTE — ASSESSMENT & PLAN NOTE
Followed by cardiology.  Patient does have a murmur on exam today.  Agree with patient following back up with cardiology as recommended by PCP per patient report

## 2024-05-06 NOTE — PROGRESS NOTES
Patient states he is unsure why he is here, hospital follow up  Complains of no energy and trouble falling asleep, mentions sleep apnea symptoms    
nerves 2-12 are intact.      Sensory: Sensation is intact.      Motor: Weakness present. No tremor, atrophy, abnormal muscle tone or pronator drift.      Coordination: Coordination is intact.      Gait: Gait is intact.      Deep Tendon Reflexes: Babinski sign present on the right side.      Comments: Left hand grasp weakness 4/5 otherwise 5/5 throughout  DTRs symmetrical left toe clearly upgoing right toe most probably upgoing   Psychiatric:         Mood and Affect: Mood normal.         Behavior: Behavior normal.         Thought Content: Thought content normal.      Comments: Processing seems a bit slowed and difficult for him but he is able to make his needs and concerns known                  On this date 5/6/2024 I have spent 40 minutes reviewing previous notes, test results and face to face with the patient discussing the diagnosis and importance of compliance with the treatment plan as well as documenting on the day of the visit.      An electronic signature was used to authenticate this note.    --Gloria Rosales, ANP

## 2024-05-06 NOTE — ASSESSMENT & PLAN NOTE
Seemingly still with some processing slowness although he is alert and oriented  Will check EEG and MRI scan as well as some blood work    I reviewed the paperwork he brought in from [employer] and it seems as though they are wanting information from Dr. Arnett his cardiologist  In addition the patient tells me they are wanting information from his brain doctor and we have given him our information to pass along to the insurance company    For now I agree with him living with his son only driving in areas that are familiar to him and allowing his son to help with more complicated matters such as paperwork finances etc.

## 2024-05-06 NOTE — ASSESSMENT & PLAN NOTE
Patient still with what seems like some mild processing issues will check EEG MRI scan and lab work    Might need to consider neuropsych testing in the future but for now would like to get new baseline    For now I agree with staying out of work and living with his son and letting his son be involved with management of more complicated issues

## 2024-05-06 NOTE — PATIENT INSTRUCTIONS
As a reminder:   Please come to your appointment 15 minutes before your office appointment.  This way, you can get checked in at the  and checked in by the nursing staff so you have the full allotment of time with your provider for your visit.  Please bring an up-to-date and accurate list of all your medications.  Or bring all your active prescription bottles with you at the time of your office visit and this includes over-the-counter medications so we can make sure that your medication list is up-to-date.  If you are scheduled for a virtual visit, please be aware that the  will need to check you in and usually the day before to verify insurance and collect co-pays as appropriate.  Please be prepared for the second call which will be from the nurse to go over your medications and any other vital information.  This will probably be done 30 minutes prior to your visit.  The reason why we do this early is that you can get the full benefit of your appointment time with your provider.  Finally you will be given the link for your virtual visit please click into your link 10 minutes prior to your appointment and please wait patiently for the provider to join you        As per discussion  I agree that you need to go back to see your heart doctor I am hearing a murmur.  So follow-up with your primary care about getting you back to your heart doctor or you can just call them and make an appointment your heart doctor I believe is Dr. Arnett    I have made a referral to Dr. Addison for sleep evaluation    I have ordered blood work, MRI of the brain, and EEG    You can notify guardian disability that we are following you here at the [facility] neurology clinic.  And my nurse will give you my business card    I agree with you living with your son mostly and having him help you navigate all of these confusing issues        If testing was ordered, I will briefly go over those results via [employer] as they are

## 2024-05-07 ENCOUNTER — OFFICE VISIT (OUTPATIENT)
Age: 69
End: 2024-05-07

## 2024-05-07 VITALS
TEMPERATURE: 98.3 F | SYSTOLIC BLOOD PRESSURE: 143 MMHG | HEIGHT: 66 IN | DIASTOLIC BLOOD PRESSURE: 85 MMHG | RESPIRATION RATE: 18 BRPM | HEART RATE: 77 BPM | BODY MASS INDEX: 27 KG/M2 | WEIGHT: 168 LBS | OXYGEN SATURATION: 99 %

## 2024-05-07 DIAGNOSIS — J02.8 PHARYNGITIS DUE TO OTHER ORGANISM: ICD-10-CM

## 2024-05-07 DIAGNOSIS — J02.9 SORE THROAT: ICD-10-CM

## 2024-05-07 DIAGNOSIS — J30.2 SEASONAL ALLERGIES: Primary | ICD-10-CM

## 2024-05-07 LAB
ALBUMIN SERPL-MCNC: 3.7 G/DL (ref 3.9–4.9)
ALBUMIN/GLOB SERPL: 1.2 {RATIO} (ref 1.2–2.2)
ALP SERPL-CCNC: 86 IU/L (ref 44–121)
ALT SERPL-CCNC: 20 IU/L (ref 0–44)
AST SERPL-CCNC: 25 IU/L (ref 0–40)
BASOPHILS # BLD AUTO: 0 X10E3/UL (ref 0–0.2)
BASOPHILS NFR BLD AUTO: 0 %
BILIRUB SERPL-MCNC: 0.3 MG/DL (ref 0–1.2)
BUN SERPL-MCNC: 22 MG/DL (ref 8–27)
BUN/CREAT SERPL: 12 (ref 10–24)
CALCIUM SERPL-MCNC: 9.1 MG/DL (ref 8.6–10.2)
CHLORIDE SERPL-SCNC: 102 MMOL/L (ref 96–106)
CO2 SERPL-SCNC: 20 MMOL/L (ref 20–29)
CREAT SERPL-MCNC: 1.81 MG/DL (ref 0.76–1.27)
EGFRCR SERPLBLD CKD-EPI 2021: 41 ML/MIN/1.73
EOSINOPHIL # BLD AUTO: 0 X10E3/UL (ref 0–0.4)
EOSINOPHIL NFR BLD AUTO: 0 %
ERYTHROCYTE [DISTWIDTH] IN BLOOD BY AUTOMATED COUNT: 13.1 % (ref 11.6–15.4)
FOLATE SERPL-MCNC: 4.4 NG/ML
GLOBULIN SER CALC-MCNC: 3.2 G/DL (ref 1.5–4.5)
GLUCOSE SERPL-MCNC: 199 MG/DL (ref 70–99)
HCT VFR BLD AUTO: 33.5 % (ref 37.5–51)
HGB BLD-MCNC: 11.2 G/DL (ref 13–17.7)
IMM GRANULOCYTES # BLD AUTO: 0 X10E3/UL (ref 0–0.1)
IMM GRANULOCYTES NFR BLD AUTO: 0 %
LYMPHOCYTES # BLD AUTO: 1.4 X10E3/UL (ref 0.7–3.1)
LYMPHOCYTES NFR BLD AUTO: 14 %
MCH RBC QN AUTO: 26.8 PG (ref 26.6–33)
MCHC RBC AUTO-ENTMCNC: 33.4 G/DL (ref 31.5–35.7)
MCV RBC AUTO: 80 FL (ref 79–97)
MONOCYTES # BLD AUTO: 0.9 X10E3/UL (ref 0.1–0.9)
MONOCYTES NFR BLD AUTO: 8 %
NEUTROPHILS # BLD AUTO: 7.7 X10E3/UL (ref 1.4–7)
NEUTROPHILS NFR BLD AUTO: 78 %
PLATELET # BLD AUTO: 354 X10E3/UL (ref 150–450)
POTASSIUM SERPL-SCNC: 5.6 MMOL/L (ref 3.5–5.2)
PROT SERPL-MCNC: 6.9 G/DL (ref 6–8.5)
RBC # BLD AUTO: 4.18 X10E6/UL (ref 4.14–5.8)
SODIUM SERPL-SCNC: 136 MMOL/L (ref 134–144)
STREP PYOGENES DNA, POC: NEGATIVE
TSH SERPL DL<=0.005 MIU/L-ACNC: 1.28 UIU/ML (ref 0.45–4.5)
VALID INTERNAL CONTROL, POC: NORMAL
VIT B12 SERPL-MCNC: 678 PG/ML (ref 232–1245)
WBC # BLD AUTO: 10.1 X10E3/UL (ref 3.4–10.8)

## 2024-05-07 NOTE — PROGRESS NOTES
Subjective: (As above and below)     The patient/guardian gave verbal consent to treat.        Chief Complaint   Patient presents with    Pharyngitis     Sore throat since Wednesday.        Yefri Vigil is a 68 y.o. male who presents for evaluation of :  Complains of sore throat x7 days associated with cough. Denies fever, chills, productive cough.       Pharyngitis    Pharyngitis        Review of Systems    Review of Systems - negative except as listed above    Reviewed PmHx, RxHx, FmHx, SocHx, AllgHx and updated in chart.  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    Aspiration pneumonitis (HCC) 10/07/2023    Diabetes mellitus, type 2 ([facility])     High blood cholesterol     Hypertension     Severe sepsis (HCC) 10/04/2023      Social History     Socioeconomic History    Marital status: Single     Spouse name: None    Number of children: None    Years of education: None    Highest education level: None   Tobacco Use    Smoking status: Former     Current packs/day: 0.00     Types: Cigarettes     Quit date: 3/25/1985     Years since quittin.1    Tobacco comments:     Quit smoking: pt quit smoking 35 years ago   Substance and Sexual Activity    Alcohol use: Not Currently     Alcohol/week: 12.0 standard drinks of alcohol     Types: 12 Standard drinks or equivalent per week    Drug use: No          Current Outpatient Medications   Medication Sig    metFORMIN (GLUCOPHAGE) 500 MG tablet Take by mouth 2 times daily (with meals) Unknown dose    atorvastatin (LIPITOR) 20 MG tablet Take 2 tablets by mouth nightly

## 2024-05-09 LAB — VIT B1 BLD-SCNC: 117.9 NMOL/L (ref 66.5–200)

## 2024-05-24 ENCOUNTER — TELEPHONE (OUTPATIENT)
Age: 69
End: 2024-05-24

## 2024-05-24 NOTE — TELEPHONE ENCOUNTER
Confirmed speaking to [employer] and advised that the paperwork was faxed on 5/14/2024  received verbal understanding.

## 2024-05-31 ENCOUNTER — HOSPITAL ENCOUNTER (OUTPATIENT)
Facility: HOSPITAL | Age: 69
End: 2024-05-31
Payer: COMMERCIAL

## 2024-05-31 ENCOUNTER — HOSPITAL ENCOUNTER (OUTPATIENT)
Facility: HOSPITAL | Age: 69
Discharge: HOME | End: 2024-05-31
Payer: COMMERCIAL

## 2024-05-31 DIAGNOSIS — G93.40 ENCEPHALOPATHY: ICD-10-CM

## 2024-05-31 PROBLEM — R41.3 MEMORY LOSS: Status: ACTIVE | Noted: 2024-05-31

## 2024-05-31 PROCEDURE — 70553 MRI BRAIN STEM W/O & W/DYE: CPT

## 2024-05-31 PROCEDURE — A9579 GAD-BASE MR CONTRAST NOS,1ML: HCPCS | Performed by: PSYCHIATRY & NEUROLOGY

## 2024-05-31 PROCEDURE — 95816 EEG AWAKE AND DROWSY: CPT | Performed by: PSYCHIATRY & NEUROLOGY

## 2024-05-31 PROCEDURE — 95816 EEG AWAKE AND DROWSY: CPT

## 2024-05-31 PROCEDURE — 6360000004 HC RX CONTRAST MEDICATION: Performed by: PSYCHIATRY & NEUROLOGY

## 2024-05-31 RX ADMIN — GADOTERIDOL 15 ML: 279.3 INJECTION, SOLUTION INTRAVENOUS at 09:29

## 2024-05-31 NOTE — PROCEDURES
EEG REPORT    Patient Name: eYfri Vigil  : 1955  Age: 68 y.o.    Ordering physician: Gloria Rosales NP  Date of EE2024  8:10 - 8:32  Diagnosis: seizure like activity  Interpreting physician: Tha Law D.O. FAAN    Procedure: EEG    CLINICAL INDICATION: The patient is a 68 y.o. male who is being evaluated for baseline electro cerebral activities and to rule out seizure focus.      Current Outpatient Medications   Medication Sig    tiZANidine (ZANAFLEX) 2 MG tablet Take 1 tablet by mouth 3 times daily as needed (pain) (Patient not taking: Reported on 2024)    metFORMIN (GLUCOPHAGE) 500 MG tablet Take by mouth 2 times daily (with meals) Unknown dose    aspirin 81 MG chewable tablet Take 1 tablet by mouth daily (Patient not taking: Reported on 2024)    albuterol (PROVENTIL) (5 MG/ML) 0.5% nebulizer solution Take 0.5 mLs by nebulization every 6 hours as needed for Wheezing (Patient not taking: Reported on 12/15/2023)    insulin glargine (LANTUS) 100 UNIT/ML injection vial Inject 20 Units into the skin nightly (Patient not taking: Reported on 12/15/2023)    atorvastatin (LIPITOR) 20 MG tablet Take 2 tablets by mouth nightly    hydrALAZINE (APRESOLINE) 25 MG tablet Take 1 tablet by mouth every 8 hours    amLODIPine (NORVASC) 10 MG tablet Take 1 tablet by mouth daily    metoprolol tartrate (LOPRESSOR) 100 MG tablet Take 1 tablet by mouth 2 times daily (Patient not taking: Reported on 2024)    potassium chloride (KLOR-CON M) 20 MEQ extended release tablet Take 2 tablets by mouth daily (with breakfast) for 10 days    clopidogrel (PLAVIX) 75 MG tablet Take 1 tablet by mouth daily (Patient not taking: Reported on 2024)    thiamine mononitrate (THIAMINE) 100 MG tablet Take 1 tablet by mouth daily (Patient not taking: Reported on 2024)    furosemide (LASIX) 40 MG tablet Take 1 tablet by mouth 2 times daily (Patient not taking: Reported on 12/15/2023)     No current  facility-administered medications for this encounter.           DESCRIPTION OF PROCEDURE:     This is a digitally recorded electroencephalogram  Electrodes were applied in accordance with the international 10-20 system of electrode placement.    18 channels of scalp EEG are recorded  A channel was used for EoG  Another channel was used for ECG   The data is stored digitally and reviewed in reformatted montages for optimal display  EEG  was reviewed in both bipolar and referential montages    Description of Activity:    The background of this recording contains a posteriorly-located occipital alpha rhythm of 9-10 hz that attenuates with eye opening.  This was seen maximally over the posterior head region and was symmetric.  There was a small amount of beta activity seen.    Throughout the recording, there were no clear areas of focal slowing nor spike or spike-and-wave discharges seen.     Hyperventilation was not performed. Photic stimulation produced minimal driving response in the posterior head regions at mid frequency ranges.     During drowsiness, there is attenuation of the underlying  frequency and slower theta activity occurs.     During the recording, the patient did not achieve stage II sleep        Clinical Interpretation:    This EEG, performed during wakefulness and drowsiness, is normal.  There was no clear focal abnormalities or epileptiform activity. A normal EEG doesn't not rule out seizures. Clinical correlation recommended.         Tha Law D.O.  SEPIDEH

## 2024-08-21 ENCOUNTER — OFFICE VISIT (OUTPATIENT)
Age: 69
End: 2024-08-21
Payer: MEDICARE

## 2024-08-21 VITALS
SYSTOLIC BLOOD PRESSURE: 120 MMHG | DIASTOLIC BLOOD PRESSURE: 82 MMHG | RESPIRATION RATE: 16 BRPM | OXYGEN SATURATION: 99 % | WEIGHT: 173.8 LBS | HEART RATE: 54 BPM | TEMPERATURE: 98.1 F | HEIGHT: 66 IN | BODY MASS INDEX: 27.93 KG/M2

## 2024-08-21 DIAGNOSIS — G93.40 ENCEPHALOPATHY: Primary | ICD-10-CM

## 2024-08-21 DIAGNOSIS — Z86.61 PERSONAL HISTORY OF MENINGITIS: ICD-10-CM

## 2024-08-21 DIAGNOSIS — F10.11 ALCOHOL ABUSE, IN REMISSION: ICD-10-CM

## 2024-08-21 DIAGNOSIS — Z86.73 HISTORY OF STROKE: ICD-10-CM

## 2024-08-21 PROBLEM — R19.7 DIARRHEA: Status: RESOLVED | Noted: 2023-10-07 | Resolved: 2024-08-21

## 2024-08-21 PROBLEM — M62.82 NON-TRAUMATIC RHABDOMYOLYSIS: Status: RESOLVED | Noted: 2023-10-07 | Resolved: 2024-08-21

## 2024-08-21 PROBLEM — F10.10 ALCOHOL ABUSE: Status: RESOLVED | Noted: 2023-10-07 | Resolved: 2024-08-21

## 2024-08-21 PROCEDURE — G8427 DOCREV CUR MEDS BY ELIG CLIN: HCPCS | Performed by: PSYCHIATRY & NEUROLOGY

## 2024-08-21 PROCEDURE — G8419 CALC BMI OUT NRM PARAM NOF/U: HCPCS | Performed by: PSYCHIATRY & NEUROLOGY

## 2024-08-21 PROCEDURE — 99214 OFFICE O/P EST MOD 30 MIN: CPT | Performed by: PSYCHIATRY & NEUROLOGY

## 2024-08-21 PROCEDURE — 1036F TOBACCO NON-USER: CPT | Performed by: PSYCHIATRY & NEUROLOGY

## 2024-08-21 PROCEDURE — 1123F ACP DISCUSS/DSCN MKR DOCD: CPT | Performed by: PSYCHIATRY & NEUROLOGY

## 2024-08-21 PROCEDURE — 3017F COLORECTAL CA SCREEN DOC REV: CPT | Performed by: PSYCHIATRY & NEUROLOGY

## 2024-08-21 PROCEDURE — 3074F SYST BP LT 130 MM HG: CPT | Performed by: PSYCHIATRY & NEUROLOGY

## 2024-08-21 PROCEDURE — 3079F DIAST BP 80-89 MM HG: CPT | Performed by: PSYCHIATRY & NEUROLOGY

## 2024-08-21 ASSESSMENT — PATIENT HEALTH QUESTIONNAIRE - PHQ9
SUM OF ALL RESPONSES TO PHQ QUESTIONS 1-9: 0
SUM OF ALL RESPONSES TO PHQ QUESTIONS 1-9: 0
2. FEELING DOWN, DEPRESSED OR HOPELESS: NOT AT ALL
1. LITTLE INTEREST OR PLEASURE IN DOING THINGS: NOT AT ALL
SUM OF ALL RESPONSES TO PHQ QUESTIONS 1-9: 0
SUM OF ALL RESPONSES TO PHQ QUESTIONS 1-9: 0
SUM OF ALL RESPONSES TO PHQ9 QUESTIONS 1 & 2: 0

## 2024-08-21 NOTE — PROGRESS NOTES
[facility]  [facility]  [address]  [place] [address]  Phone: [phone] fax: [phone]      Yefri Vigil (:  1955) is a 69 y.o. male,Established patient, here for evaluation of the following chief complaint(s):  Follow-up (Patient states that he was not sure that he had an appointment.  Son is not with him today.  )      ASSESSMENT/PLAN:  Assessment & Plan  Encephalopathy  Patient still with what seems like some mild processing issues.  Neuropsych testing has been ordered  EEG is normal.   MRI with some evidence of small vessel disease: Mild to moderate and small chronic infarcts in the bilateral thalami and floridalma         For now I agree with staying out of work and living with his son and letting his son be involved with management of more complicated issues     Orders:    [facility] [place] Echevarria PsyD, Neuropsychology, [place]    Personal history of meningitis  Continuing with processing difficulties.   Neuropsych testing has been ordered  Patient is presently on disability and I agree with that.   EEG was normal  MRI scan with mild to moderate small vessel ischemic disease as well as small chronic infarcts in the bilateral thalami and floridalma     Orders:    [facility] [place] Echevarria PsyD, Neuropsychology, [place]    History of stroke  Stable without recurrence of symptoms  Patient is to continue on 81 mg ASA, Plavix and Lipitor  DAPT secondary to cardiac issues more than cerebrovascular    Patient is to continue to work to all of his comorbid conditions as managed by PCP and other specialists as appropriate    RECOMMENDATIONS:  - BP goal is less than 140/90  - Goal HbA1c is less than 7.   - LDL less than 70           Alcohol abuse, in remission  Patient states since his hospitalization he has not had any alcohol  Patient's B1 is strongly within normal range               Patient and/or family was given time to ask questions and

## 2024-08-21 NOTE — ASSESSMENT & PLAN NOTE
Stable without recurrence of symptoms  Patient is to continue on 81 mg ASA, Plavix and Lipitor  DAPT secondary to cardiac issues more than cerebrovascular    Patient is to continue to work to all of his comorbid conditions as managed by PCP and other specialists as appropriate    RECOMMENDATIONS:  - BP goal is less than 140/90  - Goal HbA1c is less than 7.   - LDL less than 70

## 2024-08-21 NOTE — PATIENT INSTRUCTIONS
As a reminder:   Please come to your appointment 15 minutes before your office appointment.  This way, you can get checked in at the  and checked in by the nursing staff so you have the full allotment of time with your provider for your visit.  Please bring an up-to-date and accurate list of all your medications.  Or bring all your active prescription bottles with you at the time of your office visit and this includes over-the-counter medications so we can make sure that your medication list is up-to-date.  If you are scheduled for a virtual visit, please be aware that the  will need to check you in and usually the day before to verify insurance and collect co-pays as appropriate.  Please be prepared for the second call which will be from the nurse to go over your medications and any other vital information.  This will probably be done 30 minutes prior to your visit.  The reason why we do this early is that you can get the full benefit of your appointment time with your provider.  Finally you will be given the link for your virtual visit please click into your link 10 minutes prior to your appointment and please wait patiently for the provider to join you        As per discussion  I think you are continuing to do well.  I do agree that you cannot go back to work at this point    I would like to get what is called neuropsych testing completed which will give us a good evaluation as to how well your brain is processing information so we know what your strengths are and where we have areas for improvement.    MRI scan shows that you did have evidence of an old stroke how old I have no idea these are small strokes so you may not of know that they occurred.  Continue on the Plavix and the Lipitor    Continue to follow-up with cardiology as appropriate    I do recommend that you get an old-fashioned day planner so you can write all your doctors appointments and they are and you can review it on a

## 2024-08-21 NOTE — ASSESSMENT & PLAN NOTE
Continuing with processing difficulties.   Neuropsych testing has been ordered  Patient is presently on disability and I agree with that.   EEG was normal  MRI scan with mild to moderate small vessel ischemic disease as well as small chronic infarcts in the bilateral thalami and floridalma     Orders:    [facility] [place] Pyle PsyD, Neuropsychology, [place]

## 2024-08-21 NOTE — ASSESSMENT & PLAN NOTE
Patient still with what seems like some mild processing issues.  Neuropsych testing has been ordered  EEG is normal.   MRI with some evidence of small vessel disease: Mild to moderate and small chronic infarcts in the bilateral thalami and floridalma         For now I agree with staying out of work and living with his son and letting his son be involved with management of more complicated issues     Orders:    [facility] [place] Pyle PsyD, Neuropsychology, [place]

## 2024-08-21 NOTE — ASSESSMENT & PLAN NOTE
Patient states since his hospitalization he has not had any alcohol  Patient's B1 is strongly within normal range

## 2024-10-01 ENCOUNTER — TRANSCRIBE ORDERS (OUTPATIENT)
Facility: HOSPITAL | Age: 69
End: 2024-10-01

## 2024-10-01 DIAGNOSIS — I35.0 AORTIC VALVE STENOSIS, ETIOLOGY OF CARDIAC VALVE DISEASE UNSPECIFIED: Primary | ICD-10-CM

## 2024-10-02 ENCOUNTER — HOSPITAL ENCOUNTER (OUTPATIENT)
Facility: HOSPITAL | Age: 69
Discharge: HOME | End: 2024-10-05
Payer: MEDICARE

## 2024-10-02 ENCOUNTER — TRANSCRIBE ORDERS (OUTPATIENT)
Facility: HOSPITAL | Age: 69
End: 2024-10-02

## 2024-10-02 DIAGNOSIS — I35.0 AORTIC VALVE STENOSIS, ETIOLOGY OF CARDIAC VALVE DISEASE UNSPECIFIED: ICD-10-CM

## 2024-10-02 DIAGNOSIS — I35.0 AORTIC VALVE STENOSIS, ETIOLOGY OF CARDIAC VALVE DISEASE UNSPECIFIED: Primary | ICD-10-CM

## 2024-10-02 PROCEDURE — 71046 X-RAY EXAM CHEST 2 VIEWS: CPT

## 2024-10-07 ENCOUNTER — HOSPITAL ENCOUNTER (OUTPATIENT)
Facility: HOSPITAL | Age: 69
Setting detail: OUTPATIENT SURGERY
Discharge: HOME | End: 2024-10-07
Attending: INTERNAL MEDICINE | Admitting: INTERNAL MEDICINE
Payer: MEDICARE

## 2024-10-07 ENCOUNTER — HOSPITAL ENCOUNTER (OUTPATIENT)
Facility: HOSPITAL | Age: 69
Discharge: HOME | End: 2024-10-09
Attending: INTERNAL MEDICINE
Payer: MEDICARE

## 2024-10-07 VITALS
HEIGHT: 67 IN | OXYGEN SATURATION: 96 % | RESPIRATION RATE: 18 BRPM | TEMPERATURE: 97.6 F | SYSTOLIC BLOOD PRESSURE: 184 MMHG | WEIGHT: 175 LBS | DIASTOLIC BLOOD PRESSURE: 80 MMHG | HEART RATE: 60 BPM | BODY MASS INDEX: 27.47 KG/M2

## 2024-10-07 DIAGNOSIS — I35.0 AORTIC VALVE STENOSIS, ETIOLOGY OF CARDIAC VALVE DISEASE UNSPECIFIED: ICD-10-CM

## 2024-10-07 DIAGNOSIS — I35.0 NODULAR CALCIFIC AORTIC VALVE STENOSIS: ICD-10-CM

## 2024-10-07 LAB
ECHO BSA: 1.93 M2
GLUCOSE BLD STRIP.AUTO-MCNC: 180 MG/DL (ref 65–117)
SERVICE CMNT-IMP: ABNORMAL
VAS LEFT CCA DIST EDV: 19.3 CM/S
VAS LEFT CCA DIST PSV: 63.3 CM/S
VAS LEFT CCA PROX EDV: 19.3 CM/S
VAS LEFT CCA PROX PSV: 91.8 CM/S
VAS LEFT ECA EDV: 0 CM/S
VAS LEFT ECA PSV: 83 CM/S
VAS LEFT ICA DIST EDV: 29 CM/S
VAS LEFT ICA DIST PSV: 89.2 CM/S
VAS LEFT ICA MID EDV: 23.7 CM/S
VAS LEFT ICA MID PSV: 63.3 CM/S
VAS LEFT ICA PROX EDV: 38.2 CM/S
VAS LEFT ICA PROX PSV: 113.1 CM/S
VAS LEFT ICA/CCA PSV: 1.8 NO UNITS
VAS LEFT SUBCLAVIAN PROX EDV: 0 CM/S
VAS LEFT SUBCLAVIAN PROX PSV: 122.2 CM/S
VAS LEFT VERTEBRAL EDV: 9 CM/S
VAS LEFT VERTEBRAL PSV: 36.7 CM/S
VAS RIGHT CCA DIST EDV: 15.5 CM/S
VAS RIGHT CCA DIST PSV: 57.2 CM/S
VAS RIGHT CCA PROX EDV: 16.7 CM/S
VAS RIGHT CCA PROX PSV: 89.2 CM/S
VAS RIGHT ECA EDV: 0 CM/S
VAS RIGHT ECA PSV: 71.3 CM/S
VAS RIGHT ICA DIST EDV: 27 CM/S
VAS RIGHT ICA DIST PSV: 72.1 CM/S
VAS RIGHT ICA MID EDV: 22.8 CM/S
VAS RIGHT ICA MID PSV: 65.8 CM/S
VAS RIGHT ICA PROX EDV: 24.1 CM/S
VAS RIGHT ICA PROX PSV: 78.1 CM/S
VAS RIGHT ICA/CCA PSV: 1.4 NO UNITS
VAS RIGHT SUBCLAVIAN PROX EDV: 0 CM/S
VAS RIGHT SUBCLAVIAN PROX PSV: 114.9 CM/S
VAS RIGHT VERTEBRAL EDV: 17.5 CM/S
VAS RIGHT VERTEBRAL PSV: 43.1 CM/S

## 2024-10-07 PROCEDURE — 93567 NJX CAR CTH SPRVLV AORTGRPHY: CPT | Performed by: INTERNAL MEDICINE

## 2024-10-07 PROCEDURE — 2500000003 HC RX 250 WO HCPCS: Performed by: INTERNAL MEDICINE

## 2024-10-07 PROCEDURE — 6360000004 HC RX CONTRAST MEDICATION: Performed by: INTERNAL MEDICINE

## 2024-10-07 PROCEDURE — 82962 GLUCOSE BLOOD TEST: CPT

## 2024-10-07 PROCEDURE — 6360000002 HC RX W HCPCS: Performed by: INTERNAL MEDICINE

## 2024-10-07 PROCEDURE — 99152 MOD SED SAME PHYS/QHP 5/>YRS: CPT | Performed by: INTERNAL MEDICINE

## 2024-10-07 PROCEDURE — 6370000000 HC RX 637 (ALT 250 FOR IP): Performed by: INTERNAL MEDICINE

## 2024-10-07 PROCEDURE — C1769 GUIDE WIRE: HCPCS | Performed by: INTERNAL MEDICINE

## 2024-10-07 PROCEDURE — 93880 EXTRACRANIAL BILAT STUDY: CPT

## 2024-10-07 PROCEDURE — C1894 INTRO/SHEATH, NON-LASER: HCPCS | Performed by: INTERNAL MEDICINE

## 2024-10-07 PROCEDURE — 2709999900 HC NON-CHARGEABLE SUPPLY: Performed by: INTERNAL MEDICINE

## 2024-10-07 PROCEDURE — 93880 EXTRACRANIAL BILAT STUDY: CPT | Performed by: SURGERY

## 2024-10-07 PROCEDURE — 76937 US GUIDE VASCULAR ACCESS: CPT | Performed by: INTERNAL MEDICINE

## 2024-10-07 PROCEDURE — 93458 L HRT ARTERY/VENTRICLE ANGIO: CPT | Performed by: INTERNAL MEDICINE

## 2024-10-07 RX ORDER — FENTANYL CITRATE 50 UG/ML
INJECTION, SOLUTION INTRAMUSCULAR; INTRAVENOUS PRN
Status: DISCONTINUED | OUTPATIENT
Start: 2024-10-07 | End: 2024-10-07 | Stop reason: HOSPADM

## 2024-10-07 RX ORDER — SODIUM CHLORIDE 0.9 % (FLUSH) 0.9 %
5-40 SYRINGE (ML) INJECTION EVERY 12 HOURS SCHEDULED
Status: DISCONTINUED | OUTPATIENT
Start: 2024-10-07 | End: 2024-10-07 | Stop reason: HOSPADM

## 2024-10-07 RX ORDER — ACETAMINOPHEN 325 MG/1
650 TABLET ORAL EVERY 4 HOURS PRN
Status: DISCONTINUED | OUTPATIENT
Start: 2024-10-07 | End: 2024-10-07 | Stop reason: HOSPADM

## 2024-10-07 RX ORDER — SODIUM CHLORIDE 9 MG/ML
INJECTION, SOLUTION INTRAVENOUS CONTINUOUS
Status: DISCONTINUED | OUTPATIENT
Start: 2024-10-07 | End: 2024-10-07 | Stop reason: HOSPADM

## 2024-10-07 RX ORDER — MIDAZOLAM HYDROCHLORIDE 1 MG/ML
INJECTION INTRAMUSCULAR; INTRAVENOUS PRN
Status: DISCONTINUED | OUTPATIENT
Start: 2024-10-07 | End: 2024-10-07 | Stop reason: HOSPADM

## 2024-10-07 RX ORDER — AMLODIPINE BESYLATE 5 MG/1
10 TABLET ORAL ONCE
Status: COMPLETED | OUTPATIENT
Start: 2024-10-07 | End: 2024-10-07

## 2024-10-07 RX ORDER — IOPAMIDOL 755 MG/ML
INJECTION, SOLUTION INTRAVASCULAR PRN
Status: DISCONTINUED | OUTPATIENT
Start: 2024-10-07 | End: 2024-10-07 | Stop reason: HOSPADM

## 2024-10-07 RX ORDER — HYDRALAZINE HYDROCHLORIDE 25 MG/1
25 TABLET, FILM COATED ORAL ONCE
Status: COMPLETED | OUTPATIENT
Start: 2024-10-07 | End: 2024-10-07

## 2024-10-07 RX ADMIN — AMLODIPINE BESYLATE 10 MG: 5 TABLET ORAL at 14:21

## 2024-10-07 RX ADMIN — HYDRALAZINE HYDROCHLORIDE 25 MG: 25 TABLET ORAL at 14:23

## 2024-10-07 NOTE — PROGRESS NOTES
Pts BP 200s/70s post ambulation. Per MD Strong pt needs to take his doses of 25mg hydralazine and 10mg norvasc. Pt can dc once her takes his BP meds.

## 2024-10-07 NOTE — PROGRESS NOTES
Patient has walked the hallway of recovery. No SOB, light headiness or dizziness noted.      I have reviewed discharge instructions with the patient.  The patient verbalized understanding.    Discharge medications reviewed with patient and appropriate educational materials regarding medications and side effects teaching were provided.    Site care instructions reviewed with patient. Pain management teaching completed.    Patient instructed to make follow up appointments per discharge instructions.     Patient belongings packed up and accounted for with patient and family. All patient belongings sent home with patient.    Telemetry monitor and wires removed      Patient signed discharge instructions after reviewing them, and duplicate copy placed in chart.     Pt left with son.

## 2024-10-07 NOTE — DISCHARGE INSTRUCTIONS
[address], [address]   [phone]  [place] [address]    [email or web address]    Patient Discharge Instructions    Yefri Vigil / [id number] : 1955    Admitted 10/7/2024 Discharged: 10/7/2024       It is important that you take the medication exactly as they are prescribed.   Keep your medication in the bottles provided by the pharmacist and keep a list of the medication names, dosages, and times to be taken in your wallet.   Do not take other medications without consulting your doctor.     BRING ALL OF YOUR MEDICINES TO YOUR OFFICE VISIT.    Follow-up with Cardiac surgery to schedule TAVR.      Cardiac Catheterization  Discharge Instructions    Do not drive, operate any machinery, or sign any legal documents for 24 hours after your procedure.  You must have someone to drive you home.    You may take a shower 24 hours after your cardiac catheterization.  Be sure to get the dressing wet and then remove it; gently wash the area with warm soapy water.  Pat dry and leave open to air.  To help prevent infections, be sure to keep the cath site clean and dry.  No lotions, creams, powders, ointments, etc. in the cath site for approximately 1 week.    Do not take a tub bath, get in a hot tub or swimming pool for approximately 5 days or until the cath site is completely healed.      No strenuous activity or heavy lifting over 10 lbs. for 7 days.    Drink plenty of fluids for 24-48 hours after your cath to flush the contrast dye from your kidneys. No alcoholic beverages for 24 hours.  You may resume your previous diet (low fat, low cholesterol) after your cath.      After your cath, some bruising or discomfort is common during the healing process.  Tylenol, 1-2 tablets every 6 hours as needed, is recommended if you experience any discomfort.  If you experience any signs or symptoms of infection such as fever, chills, or poorly healing incision, persistent tenderness or swelling in the

## 2024-10-07 NOTE — PROGRESS NOTES
Cardiac Cath Lab Recovery Arrival Note:      Yefri Vigil arrived to Cardiac Cath Lab, Recovery Area. Staff introduced to patient. Patient identifiers verified with NAME and DATE OF BIRTH. Procedure verified with patient. Consent forms reviewed and signed by patient or authorized representative and verified. Allergies verified.     Patient and family oriented to department. Patient and family informed of procedure and plan of care.     Questions answered with review. Patient prepped for procedure, per orders from physician, prior to arrival.    Patient on cardiac monitor, non-invasive blood pressure, SPO2 monitor. On RA. Patient is A&Ox 4. Patient reports NO PAIN.     Patient in stretcher, in low position, with side rails up, call bell within reach, patient instructed to call if assistance as needed.    Patient prep in: [facility], [place] [address].   Patient family has pager # N/A  Family in: .   Prep by: ANDREZ

## 2024-10-08 NOTE — PROGRESS NOTES
Patient: Yefri Vigil   Age: 69 y.o.     Patient Care Team:  Unknown, Provider as PCP - Kenny Marshall MD as Consulting Physician (Cardiothoracic Surgery)  John Simon MD as Consulting Physician (Thoracic Surgery)    PCP: Unknown, Provider    Cardiologist: GRACE    Diagnosis/Reason for Consultation: The encounter diagnosis was Aortic stenosis, severe.     HPI: 69 y.o. male with PMHx of CAD, NSTEMI s/p stent to LAD, HTN, HLD, DMII, CKD, hx West Nile encephalitis that is referred [facility] by Dr. Strong for interventional evaluation of severe AS.    Patient is somewhat of a poor historian but endorses CP, SOB, dizziness, fatigue, and palpitations. Fatigue has been a problem since his hospitalization for West Nile encephalitis in October of 2023. He reports slight dizziness with position changes for the past month or so, has notice his heart beating \"hard\" and feeling like he is breathing hard sometimes for the past few weeks, and has noticed a localized, intermittent pain \"almost like a heart burn\" to his left ribs for a month or so or more. Denies PND, orthopnea, LE edema, syncope, and claudication    Past medical/surgical history positive for prior PNA (1-2 years ago) and diabetes (DMII). He thinks he may have been told that he had a stroke or mini stroke during his hospitalization for West Nile encephalitis, but I cannot see any documentation to support this. Denies hx of difficulty swallowing, PRANAY , dysrhythmias, lung disease, kidney disease, liver disease, blood, blackness or tarriness of stool, prostate disease, frequent UTIs, testosterone or other hormonal use, PAD/PVD, vein stripping, issues with bleeding or blood clots, cancer within the past 5 years, previous valve replacements or PPM, and is not considered immunocompromised.    Social history positive for tobacco use (smoked 1/2 PPD x 2 years; quit 40 years ago). Denies alcohol use and drug use. Family history

## 2024-10-09 ENCOUNTER — CLINICAL DOCUMENTATION (OUTPATIENT)
Age: 69
End: 2024-10-09

## 2024-10-09 ENCOUNTER — TELEPHONE (OUTPATIENT)
Age: 69
End: 2024-10-09

## 2024-10-09 NOTE — PROGRESS NOTES
Patient was discussed in valve conference on 10/9/2024 with Dr. Strong, Dr. Garrido, Dr. Ferguson, and Dr. Simon. Plan for TAVR #29 RTF pending dental clearance with Dr. Strong and surgeon TBD on 11/13/2024 tentatively.

## 2024-10-09 NOTE — TELEPHONE ENCOUNTER
This RN called Yefri Vigil : 1955 via telephone for Pre-operative question. Called to verify pre-op dental work needed for potential TAVR.    RN advised patient he needs dental work prior to surgery. Provided information for [facility].      Patient given opportunity for questions and clarification, and instructed to call/ return to office should any further concerns arise.    BRAYAN COSBY notified.

## 2024-10-09 NOTE — CARDIO/PULMONARY
Chart reviewed: Patient is 69 y.o. male admitted with Nodular calcific aortic valve stenosis [I35.0]    S/p RHC w/o intervention. Per cath report, known severe AS, patent stent  EF 55-60% per Echo 2023  Pt is a former smoker, quit in 1985    CP Rehab is not indicated at this time    Levon Harris RN

## 2024-10-17 ENCOUNTER — INITIAL CONSULT (OUTPATIENT)
Age: 69
End: 2024-10-17

## 2024-10-17 VITALS
WEIGHT: 170.6 LBS | SYSTOLIC BLOOD PRESSURE: 122 MMHG | DIASTOLIC BLOOD PRESSURE: 74 MMHG | TEMPERATURE: 97.9 F | BODY MASS INDEX: 27.12 KG/M2 | OXYGEN SATURATION: 100 % | HEART RATE: 65 BPM

## 2024-10-17 DIAGNOSIS — I35.0 AORTIC STENOSIS, SEVERE: Primary | ICD-10-CM

## 2024-10-17 PROCEDURE — 1036F TOBACCO NON-USER: CPT | Performed by: THORACIC SURGERY (CARDIOTHORACIC VASCULAR SURGERY)

## 2024-10-17 PROCEDURE — 3017F COLORECTAL CA SCREEN DOC REV: CPT | Performed by: THORACIC SURGERY (CARDIOTHORACIC VASCULAR SURGERY)

## 2024-10-17 RX ORDER — LISINOPRIL 20 MG/1
20 TABLET ORAL DAILY
COMMUNITY

## 2024-10-17 NOTE — PROGRESS NOTES
Name: Yefri Vigil   : 1955   Home Phone: [phone]     Reviewed record in preparation for visit and have obtained necessary documentation. Identified pt with two pt identifiers (name and ).     1. Have you been to the ER, urgent care clinic since your last visit?  Hospitalized since your last visit? NA    2. Have you seen or consulted any other health care providers outside of the [facility] since your last visit?  Include any pap smears or colon screening.  NA      Yefri Vigil is being seen for TAVR consult .     Patient counseled on Office contact information and instructed to follow up  with primary cardiologist. Patient given opportunity to ask questions and receive clarification.     Current Medications-Reviewed with Patient    Allergies-Reviewed with Patient      Five meter walks  Walk 1: 5.29 Seconds  Walk 2: 5.96 Seconds  Walk 3: 5.22 Seconds  Total: 16.47 Seconds  Average: 5.49       Vitals  Wt 77.4 kg (170 lb 9.6 oz)   BMI 27.12 kg/m²

## 2024-10-23 ENCOUNTER — TELEPHONE (OUTPATIENT)
Age: 69
End: 2024-10-23

## 2024-10-23 NOTE — TELEPHONE ENCOUNTER
I called [facility] to confirm patient's appointment date for dental work.  His appointment is scheduled for 11/1/24 to get right side of mouth started.  The second part is not scheduled yet.  It will be scheduled after his appointment 11/1/24.  It could be several weeks following the 11/1/24, depending on how he heals from right side dental work.

## 2024-10-24 ENCOUNTER — TELEPHONE (OUTPATIENT)
Age: 69
End: 2024-10-24

## 2024-10-24 NOTE — TELEPHONE ENCOUNTER
This RN called Yefri MEZA Musa : 1955 via telephone for Pre-operative question.    RN advised patient we are moving his TAVR date to 2024 to be able to get dental clearance prior to procedure. Patient states he understands and agrees with plan      Patient given opportunity for questions and clarification, and instructed to call/ return to office should any further concerns arise.    BRAYAN COSBY notified.

## 2024-10-30 ENCOUNTER — TELEPHONE (OUTPATIENT)
Age: 69
End: 2024-10-30

## 2024-10-30 NOTE — TELEPHONE ENCOUNTER
HIPAA Verified (if caller is someone other than patient): N/A       Reason for Call:     Records requested by:   [employer]    Release of information on file?   N/A    Fax Number: (if applicable)   FAX [phone]  Reference #[id number]      **Please route records request to  team**        Message: (any additional details from patient/caller not covered above)  Juan with [employer] is asking if we received the fax on October 22. I looked under Media and see it's there. He is asking when this will be completed and sent back to them. Please call him at . Thank you.         Level 1 Calls - attempted to reach practice?      Reason Call Marked High Priority (if applicable):

## 2024-11-05 ENCOUNTER — TELEPHONE (OUTPATIENT)
Age: 69
End: 2024-11-05

## 2024-11-05 NOTE — TELEPHONE ENCOUNTER
I called Denny Tovar.  Patient's next appointment is scheduled for 11/20/24 to have the other part of his dental work completed.

## 2024-11-26 ENCOUNTER — PREP FOR PROCEDURE (OUTPATIENT)
Age: 69
End: 2024-11-26

## 2024-11-26 RX ORDER — SODIUM CHLORIDE 0.9 % (FLUSH) 0.9 %
5-40 SYRINGE (ML) INJECTION PRN
Status: CANCELLED | OUTPATIENT
Start: 2024-11-26

## 2024-11-26 RX ORDER — SODIUM CHLORIDE 9 MG/ML
INJECTION, SOLUTION INTRAVENOUS PRN
Status: CANCELLED | OUTPATIENT
Start: 2024-11-26

## 2024-11-26 RX ORDER — SODIUM CHLORIDE 0.9 % (FLUSH) 0.9 %
5-40 SYRINGE (ML) INJECTION EVERY 12 HOURS SCHEDULED
Status: CANCELLED | OUTPATIENT
Start: 2024-11-26

## 2024-12-04 ENCOUNTER — CLINICAL DOCUMENTATION (OUTPATIENT)
Facility: HOSPITAL | Age: 69
End: 2024-12-04

## 2024-12-04 ENCOUNTER — HOSPITAL ENCOUNTER (OUTPATIENT)
Facility: HOSPITAL | Age: 69
Discharge: HOME | End: 2024-12-07
Payer: MEDICARE

## 2024-12-04 ENCOUNTER — TELEPHONE (OUTPATIENT)
Age: 69
End: 2024-12-04

## 2024-12-04 VITALS
TEMPERATURE: 98.3 F | DIASTOLIC BLOOD PRESSURE: 52 MMHG | SYSTOLIC BLOOD PRESSURE: 105 MMHG | HEIGHT: 66 IN | HEART RATE: 64 BPM | WEIGHT: 175.04 LBS | RESPIRATION RATE: 14 BRPM | BODY MASS INDEX: 28.13 KG/M2 | OXYGEN SATURATION: 98 %

## 2024-12-04 DIAGNOSIS — I35.0 AORTIC STENOSIS, SEVERE: Primary | ICD-10-CM

## 2024-12-04 LAB
ABO + RH BLD: NORMAL
ALBUMIN SERPL-MCNC: 3 G/DL (ref 3.5–5)
ALBUMIN/GLOB SERPL: 0.8 (ref 1.1–2.2)
ALP SERPL-CCNC: 76 U/L (ref 45–117)
ALT SERPL-CCNC: 18 U/L (ref 12–78)
ANION GAP SERPL CALC-SCNC: 8 MMOL/L (ref 2–12)
AST SERPL-CCNC: 21 U/L (ref 15–37)
BASOPHILS # BLD: 0 K/UL (ref 0–0.1)
BASOPHILS NFR BLD: 1 % (ref 0–1)
BILIRUB SERPL-MCNC: 0.7 MG/DL (ref 0.2–1)
BLOOD GROUP ANTIBODIES SERPL: NORMAL
BUN SERPL-MCNC: 29 MG/DL (ref 6–20)
BUN/CREAT SERPL: 14 (ref 12–20)
CALCIUM SERPL-MCNC: 8.7 MG/DL (ref 8.5–10.1)
CHLORIDE SERPL-SCNC: 107 MMOL/L (ref 97–108)
CO2 SERPL-SCNC: 23 MMOL/L (ref 21–32)
CREAT SERPL-MCNC: 2.09 MG/DL (ref 0.7–1.3)
DIFFERENTIAL METHOD BLD: ABNORMAL
EOSINOPHIL # BLD: 0.1 K/UL (ref 0–0.4)
EOSINOPHIL NFR BLD: 3 % (ref 0–7)
ERYTHROCYTE [DISTWIDTH] IN BLOOD BY AUTOMATED COUNT: 15.4 % (ref 11.5–14.5)
ERYTHROCYTE [DISTWIDTH] IN BLOOD BY AUTOMATED COUNT: 15.5 % (ref 11.5–14.5)
EST. AVERAGE GLUCOSE BLD GHB EST-MCNC: 143 MG/DL
GLOBULIN SER CALC-MCNC: 4 G/DL (ref 2–4)
GLUCOSE SERPL-MCNC: 212 MG/DL (ref 65–100)
HBA1C MFR BLD: 6.6 % (ref 4–5.6)
HCT VFR BLD AUTO: 24.3 % (ref 36.6–50.3)
HCT VFR BLD AUTO: 24.5 % (ref 36.6–50.3)
HGB BLD-MCNC: 7.4 G/DL (ref 12.1–17)
HGB BLD-MCNC: 7.5 G/DL (ref 12.1–17)
HISTORY CHECK: NORMAL
IMM GRANULOCYTES # BLD AUTO: 0 K/UL (ref 0–0.04)
IMM GRANULOCYTES NFR BLD AUTO: 0 % (ref 0–0.5)
INR PPP: 1.1 (ref 0.9–1.1)
LYMPHOCYTES # BLD: 1.1 K/UL (ref 0.8–3.5)
LYMPHOCYTES NFR BLD: 25 % (ref 12–49)
MAGNESIUM SERPL-MCNC: 1.4 MG/DL (ref 1.6–2.4)
MCH RBC QN AUTO: 22.8 PG (ref 26–34)
MCH RBC QN AUTO: 22.8 PG (ref 26–34)
MCHC RBC AUTO-ENTMCNC: 30.5 G/DL (ref 30–36.5)
MCHC RBC AUTO-ENTMCNC: 30.6 G/DL (ref 30–36.5)
MCV RBC AUTO: 74.5 FL (ref 80–99)
MCV RBC AUTO: 74.8 FL (ref 80–99)
MONOCYTES # BLD: 0.4 K/UL (ref 0–1)
MONOCYTES NFR BLD: 9 % (ref 5–13)
NEUTS SEG # BLD: 2.7 K/UL (ref 1.8–8)
NEUTS SEG NFR BLD: 62 % (ref 32–75)
NRBC # BLD: 0 K/UL (ref 0–0.01)
NRBC # BLD: 0.02 K/UL (ref 0–0.01)
NRBC BLD-RTO: 0 PER 100 WBC
NRBC BLD-RTO: 0.5 PER 100 WBC
PLATELET # BLD AUTO: 314 K/UL (ref 150–400)
PLATELET # BLD AUTO: 317 K/UL (ref 150–400)
PMV BLD AUTO: 10.3 FL (ref 8.9–12.9)
PMV BLD AUTO: 10.3 FL (ref 8.9–12.9)
POTASSIUM SERPL-SCNC: 3.9 MMOL/L (ref 3.5–5.1)
PROT SERPL-MCNC: 7 G/DL (ref 6.4–8.2)
PROTHROMBIN TIME: 11.2 SEC (ref 9–11.1)
RBC # BLD AUTO: 3.25 M/UL (ref 4.1–5.7)
RBC # BLD AUTO: 3.29 M/UL (ref 4.1–5.7)
SARS-COV-2 RNA RESP QL NAA+PROBE: NOT DETECTED
SODIUM SERPL-SCNC: 138 MMOL/L (ref 136–145)
SOURCE: NORMAL
SPECIMEN EXP DATE BLD: NORMAL
TSH SERPL DL<=0.05 MIU/L-ACNC: 0.9 UIU/ML (ref 0.36–3.74)
WBC # BLD AUTO: 4.2 K/UL (ref 4.1–11.1)
WBC # BLD AUTO: 4.4 K/UL (ref 4.1–11.1)

## 2024-12-04 PROCEDURE — 36415 COLL VENOUS BLD VENIPUNCTURE: CPT

## 2024-12-04 PROCEDURE — 86850 RBC ANTIBODY SCREEN: CPT

## 2024-12-04 PROCEDURE — 87635 SARS-COV-2 COVID-19 AMP PRB: CPT

## 2024-12-04 PROCEDURE — 83036 HEMOGLOBIN GLYCOSYLATED A1C: CPT

## 2024-12-04 PROCEDURE — 86900 BLOOD TYPING SEROLOGIC ABO: CPT

## 2024-12-04 PROCEDURE — 84443 ASSAY THYROID STIM HORMONE: CPT

## 2024-12-04 PROCEDURE — 83735 ASSAY OF MAGNESIUM: CPT

## 2024-12-04 PROCEDURE — 85025 COMPLETE CBC W/AUTO DIFF WBC: CPT

## 2024-12-04 PROCEDURE — 86901 BLOOD TYPING SEROLOGIC RH(D): CPT

## 2024-12-04 PROCEDURE — 85610 PROTHROMBIN TIME: CPT

## 2024-12-04 PROCEDURE — 85027 COMPLETE CBC AUTOMATED: CPT

## 2024-12-04 PROCEDURE — 80053 COMPREHEN METABOLIC PANEL: CPT

## 2024-12-04 RX ORDER — DOCUSATE SODIUM 100 MG/1
100 CAPSULE, LIQUID FILLED ORAL DAILY
Qty: 30 CAPSULE | Refills: 2 | Status: SHIPPED | OUTPATIENT
Start: 2024-12-04 | End: 2025-03-04

## 2024-12-04 RX ORDER — PANTOPRAZOLE SODIUM 40 MG/1
40 TABLET, DELAYED RELEASE ORAL
Qty: 30 TABLET | Refills: 2 | Status: SHIPPED | OUTPATIENT
Start: 2024-12-04

## 2024-12-04 RX ORDER — FERROUS SULFATE 325(65) MG
325 TABLET ORAL
Qty: 30 TABLET | Refills: 2 | Status: SHIPPED | OUTPATIENT
Start: 2024-12-04 | End: 2025-03-04

## 2024-12-04 NOTE — TELEPHONE ENCOUNTER
This RN called patient to discuss new plan given patient's HgB of 7.4 at PAT today. Per Dr. Strong, patient will begin taking protonix, iron and colace and stop BC and plavix immediately. Patient will come in for repeat lab work in 2 weeks and TAVR is now tentatively scheduled for 01/08/2025 pending improved labwork.     Patient received instructions and told to call back if he has any questions. Heart team notified of change.

## 2024-12-04 NOTE — PERIOP NOTE
Called Tah Joseph NP to notify HgB of 7.4 at PAT. NP requested CBC draw. [place] repeat CBC, now pending in lab.

## 2024-12-04 NOTE — PERIOP NOTE

## 2024-12-04 NOTE — PERIOP NOTE
[facility]  Preoperative Instructions        Surgery Date 12/11/24              On the day of your surgery, please report to the main entrance of the hospital ([facility] side). Go up to the gold elevators and report to the 2nd floor registration. This is where you go for the cath lab.     2. Do not have anything to eat or drink (including water, gum, mints, coffee, juice) after midnight Tuesday December 10th.?This may not apply to medications prescribed by your physician. ?(Please note below the special instructions with medications to take the morning of your procedure.)    4. We recommend you do not drink any alcoholic beverages 1 week prior to your surgery. Also, no smoking 1 week prior to surgery.    5. Your surgeon would like vitamins/supplements stopped 1 week prior to procedure.    6. Wear comfortable clothes.  Wear glasses instead of contacts.  Do not bring any money or jewelry. Please bring picture ID, insurance card, and any prearranged co-payment or hospital payment.  Do not wear make-up, particularly mascara the morning of your surgery.  Do not wear nail polish, particularly if you are having foot /hand surgery.  Wear your hair loose or down, no ponytails, buns, calli pins or clips.  All body piercings must be removed.  Please shower with antibacterial soap for three consecutive days before and on the morning of surgery, but do not apply any lotions, powders or deodorants after the shower on the day of surgery. Please use a fresh towels after each shower. Please sleep in clean clothes and change bed linens the night before surgery.  Please do not shave for 48 hours prior to surgery. Shaving of the face is acceptable    7. You should understand that if you do not follow these instructions your surgery may be cancelled.  If your physical condition changes (I.e. fever, cold or flu) please contact your surgeon as soon as possible.    8. It is important that you be on time.  If a

## 2024-12-04 NOTE — PROGRESS NOTES
Cardiac Surgery Care Coordinator-  Met with Yefri Vigil in PAT. Introduced role of the Cardiac Surgery Care Coordinator.  Reviewed plan of care and began pre-op education.  Discussed day of surgery expectations for the pt and family.  Provided TAVR educational folder and reviewed content, including Cardiac Surgery Pathway. Encouraged Yefri Vigil to verbalize and offered emotional support. Silvia Mayorga RN

## 2024-12-05 LAB
EKG ATRIAL RATE: 64 BPM
EKG DIAGNOSIS: NORMAL
EKG P AXIS: 56 DEGREES
EKG P-R INTERVAL: 198 MS
EKG Q-T INTERVAL: 426 MS
EKG QRS DURATION: 110 MS
EKG QTC CALCULATION (BAZETT): 439 MS
EKG R AXIS: -7 DEGREES
EKG T AXIS: 8 DEGREES
EKG VENTRICULAR RATE: 64 BPM

## 2024-12-10 ENCOUNTER — TELEPHONE (OUTPATIENT)
Age: 69
End: 2024-12-10

## 2024-12-10 DIAGNOSIS — I35.0 AORTIC STENOSIS, SEVERE: Primary | ICD-10-CM

## 2024-12-10 DIAGNOSIS — I35.0 AORTIC STENOSIS, SEVERE: ICD-10-CM

## 2024-12-10 NOTE — TELEPHONE ENCOUNTER
This RN called patient on behalf of Dr. Strong. MD received a call from patients son stating he did not look well and was concerned. When MD reached out to patient, he said he was fine and declined to be seen today or go to ER. This RN called patient to inform him that we need stat labwork as he was low on HgB the prior week causing delay in TAVR. Patient states he will try to go today or tomorrow. This RN explained to patient the risks of not being evaluate today and he states he understands.

## 2024-12-11 ENCOUNTER — TELEPHONE (OUTPATIENT)
Age: 69
End: 2024-12-11

## 2024-12-11 LAB
BASOPHILS # BLD AUTO: 0 X10E3/UL (ref 0–0.2)
BASOPHILS NFR BLD AUTO: 0 %
EOSINOPHIL # BLD AUTO: 0.2 X10E3/UL (ref 0–0.4)
EOSINOPHIL NFR BLD AUTO: 4 %
ERYTHROCYTE [DISTWIDTH] IN BLOOD BY AUTOMATED COUNT: 14.7 % (ref 11.6–15.4)
HCT VFR BLD AUTO: 27.9 % (ref 37.5–51)
HGB BLD-MCNC: 8.4 G/DL (ref 13–17.7)
IMM GRANULOCYTES # BLD AUTO: 0 X10E3/UL (ref 0–0.1)
IMM GRANULOCYTES NFR BLD AUTO: 0 %
LYMPHOCYTES # BLD AUTO: 1.2 X10E3/UL (ref 0.7–3.1)
LYMPHOCYTES NFR BLD AUTO: 24 %
MCH RBC QN AUTO: 22.8 PG (ref 26.6–33)
MCHC RBC AUTO-ENTMCNC: 30.1 G/DL (ref 31.5–35.7)
MCV RBC AUTO: 76 FL (ref 79–97)
MONOCYTES # BLD AUTO: 0.5 X10E3/UL (ref 0.1–0.9)
MONOCYTES NFR BLD AUTO: 10 %
NEUTROPHILS # BLD AUTO: 3.2 X10E3/UL (ref 1.4–7)
NEUTROPHILS NFR BLD AUTO: 62 %
PLATELET # BLD AUTO: 354 X10E3/UL (ref 150–450)
RBC # BLD AUTO: 3.69 X10E6/UL (ref 4.14–5.8)
WBC # BLD AUTO: 5.1 X10E3/UL (ref 3.4–10.8)

## 2024-12-11 NOTE — TELEPHONE ENCOUNTER
This RN called patient regarding lab work. RN explained that HgB levels are improving. Patient states he is \"feeling better.\" Denies SOB, dizziness, syncope, and/or bloody stools. Told patient we will need more lab work to ensure that he in continuing to improve to which he understands. MD Strong notified

## 2024-12-15 LAB
ABO + RH BLD: NORMAL
BLD PROD TYP BPU: NORMAL
BLOOD BANK DISPENSE STATUS: NORMAL
BLOOD GROUP ANTIBODIES SERPL: NORMAL
BPU ID: NORMAL
CROSSMATCH RESULT: NORMAL
SPECIMEN EXP DATE BLD: NORMAL
UNIT DIVISION: 0

## 2024-12-19 ENCOUNTER — TELEPHONE (OUTPATIENT)
Age: 69
End: 2024-12-19

## 2024-12-19 NOTE — TELEPHONE ENCOUNTER
RN called patient to remind him to come in for repeat lab work in the coming week to monitor H&H and Cr. Levels. Left a VM stating lab hours and to call back with questions.

## 2024-12-26 ENCOUNTER — TELEPHONE (OUTPATIENT)
Age: 69
End: 2024-12-26

## 2024-12-26 DIAGNOSIS — I35.0 AORTIC STENOSIS, SEVERE: ICD-10-CM

## 2024-12-26 LAB
ANION GAP SERPL CALC-SCNC: 9 MMOL/L (ref 2–12)
BASOPHILS # BLD: 0.1 K/UL (ref 0–0.1)
BASOPHILS NFR BLD: 1 % (ref 0–1)
BUN SERPL-MCNC: 27 MG/DL (ref 6–20)
BUN/CREAT SERPL: 16 (ref 12–20)
CALCIUM SERPL-MCNC: 9.4 MG/DL (ref 8.5–10.1)
CHLORIDE SERPL-SCNC: 105 MMOL/L (ref 97–108)
CO2 SERPL-SCNC: 23 MMOL/L (ref 21–32)
CREAT SERPL-MCNC: 1.73 MG/DL (ref 0.7–1.3)
DIFFERENTIAL METHOD BLD: ABNORMAL
EOSINOPHIL # BLD: 0.1 K/UL (ref 0–0.4)
EOSINOPHIL NFR BLD: 2 % (ref 0–7)
ERYTHROCYTE [DISTWIDTH] IN BLOOD BY AUTOMATED COUNT: 15.9 % (ref 11.5–14.5)
GLUCOSE SERPL-MCNC: 112 MG/DL (ref 65–100)
HCT VFR BLD AUTO: 29 % (ref 36.6–50.3)
HGB BLD-MCNC: 8.6 G/DL (ref 12.1–17)
IMM GRANULOCYTES # BLD AUTO: 0 K/UL (ref 0–0.04)
IMM GRANULOCYTES NFR BLD AUTO: 0 % (ref 0–0.5)
LYMPHOCYTES # BLD: 1.4 K/UL (ref 0.8–3.5)
LYMPHOCYTES NFR BLD: 23 % (ref 12–49)
MCH RBC QN AUTO: 22.7 PG (ref 26–34)
MCHC RBC AUTO-ENTMCNC: 29.7 G/DL (ref 30–36.5)
MCV RBC AUTO: 76.5 FL (ref 80–99)
MONOCYTES # BLD: 0.6 K/UL (ref 0–1)
MONOCYTES NFR BLD: 10 % (ref 5–13)
NEUTS SEG # BLD: 3.8 K/UL (ref 1.8–8)
NEUTS SEG NFR BLD: 64 % (ref 32–75)
NRBC # BLD: 0 K/UL (ref 0–0.01)
NRBC BLD-RTO: 0 PER 100 WBC
PLATELET # BLD AUTO: 352 K/UL (ref 150–400)
PMV BLD AUTO: 10.6 FL (ref 8.9–12.9)
POTASSIUM SERPL-SCNC: 4.4 MMOL/L (ref 3.5–5.1)
RBC # BLD AUTO: 3.79 M/UL (ref 4.1–5.7)
SODIUM SERPL-SCNC: 137 MMOL/L (ref 136–145)
WBC # BLD AUTO: 5.9 K/UL (ref 4.1–11.1)

## 2024-12-26 NOTE — TELEPHONE ENCOUNTER
RN called patient to remind him to get lab work done. Patient states he will come in to outpatient lab today. Patient told to call back with any questions or problems.

## 2025-01-02 ENCOUNTER — HOSPITAL ENCOUNTER (OUTPATIENT)
Facility: HOSPITAL | Age: 70
Discharge: HOME | End: 2025-01-02
Payer: MEDICARE

## 2025-01-02 VITALS
HEART RATE: 65 BPM | TEMPERATURE: 97.5 F | OXYGEN SATURATION: 100 % | WEIGHT: 172.62 LBS | SYSTOLIC BLOOD PRESSURE: 132 MMHG | HEIGHT: 66 IN | BODY MASS INDEX: 27.74 KG/M2 | DIASTOLIC BLOOD PRESSURE: 60 MMHG | RESPIRATION RATE: 18 BRPM

## 2025-01-02 LAB
ABO + RH BLD: NORMAL
ALBUMIN SERPL-MCNC: 2.9 G/DL (ref 3.5–5)
ALBUMIN/GLOB SERPL: 0.7 (ref 1.1–2.2)
ALP SERPL-CCNC: 79 U/L (ref 45–117)
ALT SERPL-CCNC: 18 U/L (ref 12–78)
ANION GAP SERPL CALC-SCNC: 7 MMOL/L (ref 2–12)
AST SERPL-CCNC: 18 U/L (ref 15–37)
BASOPHILS # BLD: 0 K/UL (ref 0–0.1)
BASOPHILS NFR BLD: 0 % (ref 0–1)
BILIRUB SERPL-MCNC: 0.8 MG/DL (ref 0.2–1)
BLOOD GROUP ANTIBODIES SERPL: NORMAL
BUN SERPL-MCNC: 20 MG/DL (ref 6–20)
BUN/CREAT SERPL: 12 (ref 12–20)
CALCIUM SERPL-MCNC: 9 MG/DL (ref 8.5–10.1)
CHLORIDE SERPL-SCNC: 108 MMOL/L (ref 97–108)
CO2 SERPL-SCNC: 23 MMOL/L (ref 21–32)
CREAT SERPL-MCNC: 1.61 MG/DL (ref 0.7–1.3)
DIFFERENTIAL METHOD BLD: ABNORMAL
EKG ATRIAL RATE: 67 BPM
EKG DIAGNOSIS: NORMAL
EKG P AXIS: 62 DEGREES
EKG P-R INTERVAL: 210 MS
EKG Q-T INTERVAL: 414 MS
EKG QRS DURATION: 96 MS
EKG QTC CALCULATION (BAZETT): 437 MS
EKG T AXIS: 20 DEGREES
EKG VENTRICULAR RATE: 67 BPM
EOSINOPHIL # BLD: 0.2 K/UL (ref 0–0.4)
EOSINOPHIL NFR BLD: 3 % (ref 0–7)
ERYTHROCYTE [DISTWIDTH] IN BLOOD BY AUTOMATED COUNT: 15.9 % (ref 11.5–14.5)
EST. AVERAGE GLUCOSE BLD GHB EST-MCNC: 143 MG/DL
GLOBULIN SER CALC-MCNC: 4 G/DL (ref 2–4)
GLUCOSE SERPL-MCNC: 124 MG/DL (ref 65–100)
HBA1C MFR BLD: 6.6 % (ref 4–5.6)
HCT VFR BLD AUTO: 30.6 % (ref 36.6–50.3)
HGB BLD-MCNC: 9.2 G/DL (ref 12.1–17)
HISTORY CHECK: NORMAL
IMM GRANULOCYTES # BLD AUTO: 0 K/UL (ref 0–0.04)
IMM GRANULOCYTES NFR BLD AUTO: 0 % (ref 0–0.5)
INR PPP: 1 (ref 0.9–1.1)
LYMPHOCYTES # BLD: 1.2 K/UL (ref 0.8–3.5)
LYMPHOCYTES NFR BLD: 22 % (ref 12–49)
MAGNESIUM SERPL-MCNC: 1.6 MG/DL (ref 1.6–2.4)
MCH RBC QN AUTO: 22.3 PG (ref 26–34)
MCHC RBC AUTO-ENTMCNC: 30.1 G/DL (ref 30–36.5)
MCV RBC AUTO: 74.1 FL (ref 80–99)
MONOCYTES # BLD: 0.6 K/UL (ref 0–1)
MONOCYTES NFR BLD: 11 % (ref 5–13)
NEUTS SEG # BLD: 3.3 K/UL (ref 1.8–8)
NEUTS SEG NFR BLD: 64 % (ref 32–75)
NRBC # BLD: 0 K/UL (ref 0–0.01)
NRBC BLD-RTO: 0 PER 100 WBC
NT PRO BNP: 1864 PG/ML
PLATELET # BLD AUTO: 340 K/UL (ref 150–400)
PMV BLD AUTO: 10.6 FL (ref 8.9–12.9)
POTASSIUM SERPL-SCNC: 4 MMOL/L (ref 3.5–5.1)
PROT SERPL-MCNC: 6.9 G/DL (ref 6.4–8.2)
PROTHROMBIN TIME: 10.6 SEC (ref 9–11.1)
RBC # BLD AUTO: 4.13 M/UL (ref 4.1–5.7)
SARS-COV-2 RNA RESP QL NAA+PROBE: NOT DETECTED
SODIUM SERPL-SCNC: 138 MMOL/L (ref 136–145)
SOURCE: NORMAL
SPECIMEN EXP DATE BLD: NORMAL
TSH SERPL DL<=0.05 MIU/L-ACNC: 0.81 UIU/ML (ref 0.36–3.74)
WBC # BLD AUTO: 5.2 K/UL (ref 4.1–11.1)

## 2025-01-02 PROCEDURE — 84443 ASSAY THYROID STIM HORMONE: CPT

## 2025-01-02 PROCEDURE — 83735 ASSAY OF MAGNESIUM: CPT

## 2025-01-02 PROCEDURE — 86850 RBC ANTIBODY SCREEN: CPT

## 2025-01-02 PROCEDURE — 36415 COLL VENOUS BLD VENIPUNCTURE: CPT

## 2025-01-02 PROCEDURE — 86901 BLOOD TYPING SEROLOGIC RH(D): CPT

## 2025-01-02 PROCEDURE — 83880 ASSAY OF NATRIURETIC PEPTIDE: CPT

## 2025-01-02 PROCEDURE — 86923 COMPATIBILITY TEST ELECTRIC: CPT

## 2025-01-02 PROCEDURE — 83036 HEMOGLOBIN GLYCOSYLATED A1C: CPT

## 2025-01-02 PROCEDURE — 87635 SARS-COV-2 COVID-19 AMP PRB: CPT

## 2025-01-02 PROCEDURE — 85025 COMPLETE CBC W/AUTO DIFF WBC: CPT

## 2025-01-02 PROCEDURE — 80053 COMPREHEN METABOLIC PANEL: CPT

## 2025-01-02 PROCEDURE — 85610 PROTHROMBIN TIME: CPT

## 2025-01-02 PROCEDURE — 86900 BLOOD TYPING SEROLOGIC ABO: CPT

## 2025-01-02 RX ORDER — SODIUM CHLORIDE, SODIUM LACTATE, POTASSIUM CHLORIDE, CALCIUM CHLORIDE 600; 310; 30; 20 MG/100ML; MG/100ML; MG/100ML; MG/100ML
INJECTION, SOLUTION INTRAVENOUS CONTINUOUS
OUTPATIENT
Start: 2025-01-08

## 2025-01-02 RX ORDER — METFORMIN HYDROCHLORIDE 750 MG/1
750 TABLET, EXTENDED RELEASE ORAL 2 TIMES DAILY
COMMUNITY

## 2025-01-02 ASSESSMENT — PAIN SCALES - GENERAL: PAINLEVEL_OUTOF10: 0

## 2025-01-02 NOTE — PROGRESS NOTES
Dc instructions and medications discussed with patient at bedside. All questions answered at this time. VSS. No IV in place at this time. Pt to lobby without incident. self to drive patient home.      [facility]  Preoperative Instructions        Surgery Date 1/8/2025       Contact # [phone]    Time of arrival 6 am     On the day of your surgery, please report to the main entrance of the hospital ([facility] side). Go up to the gold elevators and report to the 2nd floor registration. This is where you go for the cath lab.     2. Do not have anything to eat or drink (including water, gum, mints, coffee, juice) after midnight ??      .?This may not apply to medications prescribed by your physician. ?(Please note below the special instructions with medications to take the morning of your procedure.)    4. We recommend you do not drink any alcoholic beverages 1 week prior to your surgery. Also, no smoking 1 week prior to surgery.    5. Your surgeon would like vitamins/supplements stopped 1 week prior to procedure.    6. Wear comfortable clothes.  Wear glasses instead of contacts.  Do not bring any money or jewelry. Please bring picture ID, insurance card, and any prearranged co-payment or hospital payment.  Do not wear make-up, particularly mascara the morning of your surgery.  Do not wear nail polish, particularly if you are having foot /hand surgery.  Wear your hair loose or down, no ponytails, buns, calli pins or clips.  All body piercings must be removed.  Please shower with antibacterial soap for three consecutive days before and on the morning of surgery, but do not apply any lotions, powders or deodorants after the shower on the day of surgery. Please use a fresh towels after each shower. Please sleep in clean clothes and change bed linens the night before surgery.  Please do not shave for 48 hours prior to surgery. Shaving of the face is acceptable    7. You should understand that if you do not follow these instructions your surgery may be cancelled.  If your physical condition changes (I.e. fever, cold or flu) please contact your surgeon as soon as possible.    8. It is important

## 2025-01-02 NOTE — PROGRESS NOTES

## 2025-01-02 NOTE — PROGRESS NOTES
Please obtain the following for PAT:  CBC with diff  CMP  Mag  PT/INR  BNP  A1C  TSH  Type and cross: Prepare 1 and keep 1 units ahead  UA with reflex to culture *only if symptomatic*  12 lead EKG    Thank you!    Medication instructions:     Stop Last dose   Metoprolol 25mg BID PO 1/6/2025             Only take ASA morning of procedure.    Tha Joseph, APRN - CNP

## 2025-01-06 LAB
EKG ATRIAL RATE: 67 BPM
EKG DIAGNOSIS: NORMAL
EKG P AXIS: 62 DEGREES
EKG P-R INTERVAL: 210 MS
EKG Q-T INTERVAL: 414 MS
EKG QRS DURATION: 96 MS
EKG QTC CALCULATION (BAZETT): 437 MS
EKG R AXIS: -29 DEGREES
EKG T AXIS: 20 DEGREES
EKG VENTRICULAR RATE: 67 BPM

## 2025-01-07 ENCOUNTER — ANESTHESIA EVENT (OUTPATIENT)
Facility: HOSPITAL | Age: 70
DRG: 267 | End: 2025-01-07
Payer: MEDICARE

## 2025-01-07 NOTE — PERIOP NOTE
Spoke to BRAYAN Almazan regarding BNP and EKG.  He reviewed and okay to proceed with planned procedure.

## 2025-01-08 ENCOUNTER — ANESTHESIA (OUTPATIENT)
Facility: HOSPITAL | Age: 70
DRG: 267 | End: 2025-01-08
Payer: MEDICARE

## 2025-01-15 ENCOUNTER — TELEPHONE (OUTPATIENT)
Age: 70
End: 2025-01-15

## 2025-01-15 NOTE — TELEPHONE ENCOUNTER
This RN called patient to remind him of rescheduled surgery date 1/22/2025 at 07:30 for arrival. Instructed patient not to eat after midnight and only sips of water. Patient states he understands and will call with any questions

## 2025-01-21 ENCOUNTER — TELEPHONE (OUTPATIENT)
Age: 70
End: 2025-01-21

## 2025-01-21 NOTE — TELEPHONE ENCOUNTER
I called patient to remind him of where to go for tomorrow's TAVR and arrival time.      Patient understands.

## 2025-01-22 ENCOUNTER — APPOINTMENT (OUTPATIENT)
Facility: HOSPITAL | Age: 70
DRG: 267 | End: 2025-01-22
Attending: INTERNAL MEDICINE
Payer: MEDICARE

## 2025-01-22 ENCOUNTER — HOSPITAL ENCOUNTER (INPATIENT)
Facility: HOSPITAL | Age: 70
LOS: 1 days | Discharge: HOME | DRG: 267 | End: 2025-01-23
Attending: INTERNAL MEDICINE | Admitting: INTERNAL MEDICINE
Payer: MEDICARE

## 2025-01-22 DIAGNOSIS — I35.0 NONRHEUMATIC AORTIC VALVE STENOSIS: Primary | ICD-10-CM

## 2025-01-22 DIAGNOSIS — I35.0 AORTIC STENOSIS: ICD-10-CM

## 2025-01-22 DIAGNOSIS — R01.1 HEART MURMUR: ICD-10-CM

## 2025-01-22 DIAGNOSIS — Z95.2 S/P TAVR (TRANSCATHETER AORTIC VALVE REPLACEMENT): Primary | ICD-10-CM

## 2025-01-22 LAB
ABO + RH BLD: NORMAL
ACT BLD: 118 SECS (ref 79–138)
ACT BLD: 222 SECS (ref 79–138)
BLD PROD TYP BPU: NORMAL
BLOOD BANK DISPENSE STATUS: NORMAL
BLOOD GROUP ANTIBODIES SERPL: NORMAL
BPU ID: NORMAL
CROSSMATCH RESULT: NORMAL
ECHO AV AREA PEAK VELOCITY: 2.5 CM2
ECHO AV AREA VTI: 2.3 CM2
ECHO AV AREA/BSA PEAK VELOCITY: 1.3 CM2/M2
ECHO AV AREA/BSA VTI: 1.2 CM2/M2
ECHO AV MEAN GRADIENT: 6 MMHG
ECHO AV MEAN VELOCITY: 1.1 M/S
ECHO AV PEAK GRADIENT: 10 MMHG
ECHO AV PEAK VELOCITY: 1.6 M/S
ECHO AV VELOCITY RATIO: 0.75
ECHO AV VTI: 38.6 CM
ECHO BSA: 1.96 M2
ECHO EST RA PRESSURE: 8 MMHG
ECHO LA DIAMETER INDEX: 2.95 CM/M2
ECHO LA DIAMETER: 5.7 CM
ECHO LA VOL A-L A2C: 83 ML (ref 18–58)
ECHO LA VOL A-L A4C: 81 ML (ref 18–58)
ECHO LA VOL MOD A2C: 79 ML (ref 18–58)
ECHO LA VOL MOD A4C: 76 ML (ref 18–58)
ECHO LA VOLUME AREA LENGTH: 83 ML
ECHO LA VOLUME INDEX A-L A2C: 43 ML/M2 (ref 16–34)
ECHO LA VOLUME INDEX A-L A4C: 42 ML/M2 (ref 16–34)
ECHO LA VOLUME INDEX AREA LENGTH: 43 ML/M2 (ref 16–34)
ECHO LA VOLUME INDEX MOD A2C: 41 ML/M2 (ref 16–34)
ECHO LA VOLUME INDEX MOD A4C: 39 ML/M2 (ref 16–34)
ECHO LV E' LATERAL VELOCITY: 6.75 CM/S
ECHO LV E' SEPTAL VELOCITY: 4.62 CM/S
ECHO LV EDV A4C: 95 ML
ECHO LV EDV INDEX A4C: 49 ML/M2
ECHO LV EF PHYSICIAN: 55 %
ECHO LV EF PHYSICIAN: 55 %
ECHO LV EJECTION FRACTION A4C: 51 %
ECHO LV ESV A4C: 46 ML
ECHO LV ESV INDEX A4C: 24 ML/M2
ECHO LV FRACTIONAL SHORTENING: 28 % (ref 28–44)
ECHO LV INTERNAL DIMENSION DIASTOLE INDEX: 2.8 CM/M2
ECHO LV INTERNAL DIMENSION DIASTOLIC MMODE: 5.2 CM (ref 4.2–5.9)
ECHO LV INTERNAL DIMENSION DIASTOLIC: 5.4 CM (ref 4.2–5.9)
ECHO LV INTERNAL DIMENSION SYSTOLIC INDEX: 2.02 CM/M2
ECHO LV INTERNAL DIMENSION SYSTOLIC MMODE: 4.1 CM
ECHO LV INTERNAL DIMENSION SYSTOLIC: 3.9 CM
ECHO LV IVSD MMODE: 1.2 CM (ref 0.6–1)
ECHO LV IVSD: 1 CM (ref 0.6–1)
ECHO LV IVSS MMODE: 1.1 CM
ECHO LV MASS 2D: 220.6 G (ref 88–224)
ECHO LV MASS INDEX 2D: 114.3 G/M2 (ref 49–115)
ECHO LV POSTERIOR WALL DIASTOLIC MMODE: 1.4 CM (ref 0.6–1)
ECHO LV POSTERIOR WALL DIASTOLIC: 1.1 CM (ref 0.6–1)
ECHO LV POSTERIOR WALL SYSTOLIC MMODE: 1.8 CM
ECHO LV RELATIVE WALL THICKNESS RATIO: 0.41
ECHO LVOT AREA: 3.1 CM2
ECHO LVOT AV VTI INDEX: 0.74
ECHO LVOT DIAM: 2 CM
ECHO LVOT MEAN GRADIENT: 3 MMHG
ECHO LVOT PEAK GRADIENT: 6 MMHG
ECHO LVOT PEAK VELOCITY: 1.2 M/S
ECHO LVOT STROKE VOLUME INDEX: 46.2 ML/M2
ECHO LVOT SV: 89.2 ML
ECHO LVOT VTI: 28.4 CM
ECHO MV A VELOCITY: 0.61 M/S
ECHO MV AREA VTI: 2.3 CM2
ECHO MV E DECELERATION TIME (DT): 209.9 MS
ECHO MV E VELOCITY: 0.88 M/S
ECHO MV E/A RATIO: 1.44
ECHO MV E/E' LATERAL: 13.04
ECHO MV E/E' RATIO (AVERAGED): 16.04
ECHO MV E/E' SEPTAL: 19.05
ECHO MV LVOT VTI INDEX: 1.34
ECHO MV MAX VELOCITY: 1.2 M/S
ECHO MV MEAN GRADIENT: 2 MMHG
ECHO MV MEAN VELOCITY: 0.7 M/S
ECHO MV PEAK GRADIENT: 6 MMHG
ECHO MV REGURGITANT PEAK VELOCITY: 4.9 M/S
ECHO MV REGURGITANT PEAK VELOCITY: 5.1 M/S
ECHO MV REGURGITANT VTIA: 189.9 CM
ECHO MV VTI: 38 CM
ECHO PULMONARY ARTERY END DIASTOLIC PRESSURE: 19 MMHG
ECHO RA VOLUME: 56 ML
ECHO RA VOLUME: 61 ML
ECHO RIGHT VENTRICULAR SYSTOLIC PRESSURE (RVSP): 47 MMHG
ECHO RV FREE WALL PEAK S': 18.3 CM/S
ECHO RV INTERNAL DIMENSION: 3.6 CM
ECHO RV TAPSE: 1.9 CM (ref 1.7–?)
ECHO TV REGURGITANT MAX VELOCITY: 3.12 M/S
ECHO TV REGURGITANT PEAK GRADIENT: 40 MMHG
HCT VFR BLD AUTO: 23.1 % (ref 36.6–50.3)
HGB BLD-MCNC: 7 G/DL (ref 12.1–17)
HISTORY CHECK: NORMAL
SPECIMEN EXP DATE BLD: NORMAL
UNIT DIVISION: 0

## 2025-01-22 PROCEDURE — 2580000003 HC RX 258: Performed by: INTERNAL MEDICINE

## 2025-01-22 PROCEDURE — 93306 TTE W/DOPPLER COMPLETE: CPT

## 2025-01-22 PROCEDURE — 2580000003 HC RX 258: Performed by: NURSE ANESTHETIST, CERTIFIED REGISTERED

## 2025-01-22 PROCEDURE — 02RF38N REPLACEMENT OF AORTIC VALVE WITH ZOOPLASTIC TISSUE, USING RAPID DEPLOYMENT TECHNIQUE, PERCUTANEOUS APPROACH: ICD-10-PCS | Performed by: INTERNAL MEDICINE

## 2025-01-22 PROCEDURE — 33361 REPLACE AORTIC VALVE PERQ: CPT | Performed by: THORACIC SURGERY (CARDIOTHORACIC VASCULAR SURGERY)

## 2025-01-22 PROCEDURE — C1769 GUIDE WIRE: HCPCS | Performed by: INTERNAL MEDICINE

## 2025-01-22 PROCEDURE — 33210 INSERT ELECTRD/PM CATH SNGL: CPT | Performed by: INTERNAL MEDICINE

## 2025-01-22 PROCEDURE — 93321 DOPPLER ECHO F-UP/LMTD STD: CPT

## 2025-01-22 PROCEDURE — 86900 BLOOD TYPING SEROLOGIC ABO: CPT

## 2025-01-22 PROCEDURE — 2060000000 HC ICU INTERMEDIATE R&B

## 2025-01-22 PROCEDURE — 85018 HEMOGLOBIN: CPT

## 2025-01-22 PROCEDURE — 86901 BLOOD TYPING SEROLOGIC RH(D): CPT

## 2025-01-22 PROCEDURE — B31P1ZZ FLUOROSCOPY OF THORACO-ABDOMINAL AORTA USING LOW OSMOLAR CONTRAST: ICD-10-PCS | Performed by: INTERNAL MEDICINE

## 2025-01-22 PROCEDURE — 3700000000 HC ANESTHESIA ATTENDED CARE: Performed by: INTERNAL MEDICINE

## 2025-01-22 PROCEDURE — 36415 COLL VENOUS BLD VENIPUNCTURE: CPT

## 2025-01-22 PROCEDURE — 6370000000 HC RX 637 (ALT 250 FOR IP): Performed by: INTERNAL MEDICINE

## 2025-01-22 PROCEDURE — 33362 REPLACE AORTIC VALVE OPEN: CPT | Performed by: INTERNAL MEDICINE

## 2025-01-22 PROCEDURE — 2500000003 HC RX 250 WO HCPCS: Performed by: NURSE PRACTITIONER

## 2025-01-22 PROCEDURE — 2709999900 HC NON-CHARGEABLE SUPPLY: Performed by: INTERNAL MEDICINE

## 2025-01-22 PROCEDURE — 86850 RBC ANTIBODY SCREEN: CPT

## 2025-01-22 PROCEDURE — 6360000002 HC RX W HCPCS: Performed by: NURSE ANESTHETIST, CERTIFIED REGISTERED

## 2025-01-22 PROCEDURE — 6360000004 HC RX CONTRAST MEDICATION: Performed by: INTERNAL MEDICINE

## 2025-01-22 PROCEDURE — 2580000003 HC RX 258: Performed by: NURSE PRACTITIONER

## 2025-01-22 PROCEDURE — 4A023N7 MEASUREMENT OF CARDIAC SAMPLING AND PRESSURE, LEFT HEART, PERCUTANEOUS APPROACH: ICD-10-PCS | Performed by: INTERNAL MEDICINE

## 2025-01-22 PROCEDURE — 6360000002 HC RX W HCPCS: Performed by: INTERNAL MEDICINE

## 2025-01-22 PROCEDURE — C1760 CLOSURE DEV, VASC: HCPCS | Performed by: INTERNAL MEDICINE

## 2025-01-22 PROCEDURE — 85014 HEMATOCRIT: CPT

## 2025-01-22 PROCEDURE — 3700000001 HC ADD 15 MINUTES (ANESTHESIA): Performed by: INTERNAL MEDICINE

## 2025-01-22 PROCEDURE — C1894 INTRO/SHEATH, NON-LASER: HCPCS | Performed by: INTERNAL MEDICINE

## 2025-01-22 PROCEDURE — 86923 COMPATIBILITY TEST ELECTRIC: CPT

## 2025-01-22 PROCEDURE — 85347 COAGULATION TIME ACTIVATED: CPT

## 2025-01-22 RX ORDER — IOPAMIDOL 755 MG/ML
INJECTION, SOLUTION INTRAVASCULAR PRN
Status: DISCONTINUED | OUTPATIENT
Start: 2025-01-22 | End: 2025-01-22 | Stop reason: HOSPADM

## 2025-01-22 RX ORDER — ASPIRIN 81 MG/1
81 TABLET, CHEWABLE ORAL DAILY
Status: DISCONTINUED | OUTPATIENT
Start: 2025-01-22 | End: 2025-01-23 | Stop reason: HOSPADM

## 2025-01-22 RX ORDER — SODIUM CHLORIDE 0.9 % (FLUSH) 0.9 %
5-40 SYRINGE (ML) INJECTION EVERY 12 HOURS SCHEDULED
Status: DISCONTINUED | OUTPATIENT
Start: 2025-01-22 | End: 2025-01-23 | Stop reason: HOSPADM

## 2025-01-22 RX ORDER — AMLODIPINE BESYLATE 5 MG/1
10 TABLET ORAL ONCE
Status: COMPLETED | OUTPATIENT
Start: 2025-01-22 | End: 2025-01-22

## 2025-01-22 RX ORDER — SODIUM CHLORIDE 9 MG/ML
INJECTION, SOLUTION INTRAVENOUS
Status: DISCONTINUED | OUTPATIENT
Start: 2025-01-22 | End: 2025-01-22

## 2025-01-22 RX ORDER — FENTANYL CITRATE 50 UG/ML
INJECTION, SOLUTION INTRAMUSCULAR; INTRAVENOUS
Status: DISCONTINUED | OUTPATIENT
Start: 2025-01-22 | End: 2025-01-22 | Stop reason: SDUPTHER

## 2025-01-22 RX ORDER — LIDOCAINE HYDROCHLORIDE 10 MG/ML
INJECTION, SOLUTION EPIDURAL; INFILTRATION; INTRACAUDAL; PERINEURAL PRN
Status: DISCONTINUED | OUTPATIENT
Start: 2025-01-22 | End: 2025-01-22 | Stop reason: HOSPADM

## 2025-01-22 RX ORDER — SODIUM CHLORIDE 9 MG/ML
INJECTION, SOLUTION INTRAVENOUS PRN
Status: DISCONTINUED | OUTPATIENT
Start: 2025-01-22 | End: 2025-01-23 | Stop reason: HOSPADM

## 2025-01-22 RX ORDER — LIDOCAINE HYDROCHLORIDE AND EPINEPHRINE 10; 10 MG/ML; UG/ML
INJECTION, SOLUTION INFILTRATION; PERINEURAL PRN
Status: DISCONTINUED | OUTPATIENT
Start: 2025-01-22 | End: 2025-01-22 | Stop reason: HOSPADM

## 2025-01-22 RX ORDER — PROTAMINE SULFATE 10 MG/ML
INJECTION, SOLUTION INTRAVENOUS
Status: DISCONTINUED | OUTPATIENT
Start: 2025-01-22 | End: 2025-01-22 | Stop reason: SDUPTHER

## 2025-01-22 RX ORDER — SODIUM CHLORIDE 0.9 % (FLUSH) 0.9 %
5-40 SYRINGE (ML) INJECTION PRN
Status: DISCONTINUED | OUTPATIENT
Start: 2025-01-22 | End: 2025-01-23 | Stop reason: HOSPADM

## 2025-01-22 RX ORDER — MIDAZOLAM HYDROCHLORIDE 1 MG/ML
INJECTION, SOLUTION INTRAMUSCULAR; INTRAVENOUS
Status: DISCONTINUED | OUTPATIENT
Start: 2025-01-22 | End: 2025-01-22 | Stop reason: SDUPTHER

## 2025-01-22 RX ORDER — SODIUM CHLORIDE, SODIUM LACTATE, POTASSIUM CHLORIDE, CALCIUM CHLORIDE 600; 310; 30; 20 MG/100ML; MG/100ML; MG/100ML; MG/100ML
INJECTION, SOLUTION INTRAVENOUS CONTINUOUS
Status: DISCONTINUED | OUTPATIENT
Start: 2025-01-22 | End: 2025-01-22

## 2025-01-22 RX ORDER — LIDOCAINE HYDROCHLORIDE 20 MG/ML
INJECTION, SOLUTION EPIDURAL; INFILTRATION; INTRACAUDAL; PERINEURAL
Status: DISCONTINUED | OUTPATIENT
Start: 2025-01-22 | End: 2025-01-22 | Stop reason: SDUPTHER

## 2025-01-22 RX ORDER — PHENYLEPHRINE HCL IN 0.9% NACL 0.4MG/10ML
SYRINGE (ML) INTRAVENOUS
Status: DISCONTINUED | OUTPATIENT
Start: 2025-01-22 | End: 2025-01-22 | Stop reason: SDUPTHER

## 2025-01-22 RX ORDER — ASPIRIN 81 MG/1
81 TABLET ORAL DAILY
Status: DISCONTINUED | OUTPATIENT
Start: 2025-01-23 | End: 2025-01-23

## 2025-01-22 RX ORDER — ATORVASTATIN CALCIUM 20 MG/1
20 TABLET, FILM COATED ORAL NIGHTLY
Status: DISCONTINUED | OUTPATIENT
Start: 2025-01-22 | End: 2025-01-23 | Stop reason: HOSPADM

## 2025-01-22 RX ORDER — HYDRALAZINE HYDROCHLORIDE 20 MG/ML
5 INJECTION INTRAMUSCULAR; INTRAVENOUS EVERY 4 HOURS PRN
Status: DISCONTINUED | OUTPATIENT
Start: 2025-01-22 | End: 2025-01-23 | Stop reason: HOSPADM

## 2025-01-22 RX ORDER — METOPROLOL TARTRATE 25 MG/1
12.5 TABLET, FILM COATED ORAL 2 TIMES DAILY
Status: DISCONTINUED | OUTPATIENT
Start: 2025-01-22 | End: 2025-01-23

## 2025-01-22 RX ORDER — ACETAMINOPHEN 325 MG/1
650 TABLET ORAL EVERY 4 HOURS PRN
Status: DISCONTINUED | OUTPATIENT
Start: 2025-01-22 | End: 2025-01-23 | Stop reason: HOSPADM

## 2025-01-22 RX ORDER — SODIUM CHLORIDE 9 MG/ML
INJECTION, SOLUTION INTRAVENOUS PRN
Status: COMPLETED | OUTPATIENT
Start: 2025-01-22 | End: 2025-01-22

## 2025-01-22 RX ORDER — SODIUM CHLORIDE 9 MG/ML
INJECTION, SOLUTION INTRAVENOUS
Status: DISCONTINUED | OUTPATIENT
Start: 2025-01-22 | End: 2025-01-22 | Stop reason: SDUPTHER

## 2025-01-22 RX ORDER — HEPARIN SODIUM 1000 [USP'U]/ML
INJECTION, SOLUTION INTRAVENOUS; SUBCUTANEOUS
Status: DISCONTINUED | OUTPATIENT
Start: 2025-01-22 | End: 2025-01-22 | Stop reason: SDUPTHER

## 2025-01-22 RX ORDER — CEFAZOLIN SODIUM/WATER 2 G/20 ML
SYRINGE (ML) INTRAVENOUS
Status: DISCONTINUED | OUTPATIENT
Start: 2025-01-22 | End: 2025-01-22 | Stop reason: SDUPTHER

## 2025-01-22 RX ORDER — ONDANSETRON 2 MG/ML
INJECTION INTRAMUSCULAR; INTRAVENOUS
Status: DISCONTINUED | OUTPATIENT
Start: 2025-01-22 | End: 2025-01-22 | Stop reason: SDUPTHER

## 2025-01-22 RX ORDER — ONDANSETRON 2 MG/ML
4 INJECTION INTRAMUSCULAR; INTRAVENOUS EVERY 6 HOURS PRN
Status: DISCONTINUED | OUTPATIENT
Start: 2025-01-22 | End: 2025-01-23 | Stop reason: HOSPADM

## 2025-01-22 RX ORDER — ASPIRIN 81 MG/1
TABLET, CHEWABLE ORAL PRN
Status: DISCONTINUED | OUTPATIENT
Start: 2025-01-22 | End: 2025-01-22 | Stop reason: HOSPADM

## 2025-01-22 RX ORDER — ONDANSETRON 4 MG/1
4 TABLET, ORALLY DISINTEGRATING ORAL EVERY 8 HOURS PRN
Status: DISCONTINUED | OUTPATIENT
Start: 2025-01-22 | End: 2025-01-23 | Stop reason: HOSPADM

## 2025-01-22 RX ADMIN — PROTAMINE SULFATE 80 MG: 10 INJECTION, SOLUTION INTRAVENOUS at 11:43

## 2025-01-22 RX ADMIN — HEPARIN SODIUM 3000 UNITS: 1000 INJECTION, SOLUTION INTRAVENOUS; SUBCUTANEOUS at 11:31

## 2025-01-22 RX ADMIN — METOPROLOL TARTRATE 12.5 MG: 25 TABLET, FILM COATED ORAL at 20:55

## 2025-01-22 RX ADMIN — Medication 2 G: at 11:01

## 2025-01-22 RX ADMIN — Medication 80 MCG: at 11:45

## 2025-01-22 RX ADMIN — HEPARIN SODIUM 11000 UNITS: 1000 INJECTION, SOLUTION INTRAVENOUS; SUBCUTANEOUS at 11:20

## 2025-01-22 RX ADMIN — ONDANSETRON HYDROCHLORIDE 4 MG: 2 INJECTION, SOLUTION INTRAMUSCULAR; INTRAVENOUS at 11:50

## 2025-01-22 RX ADMIN — ATORVASTATIN CALCIUM 20 MG: 20 TABLET, FILM COATED ORAL at 20:55

## 2025-01-22 RX ADMIN — SODIUM CHLORIDE, PRESERVATIVE FREE 10 ML: 5 INJECTION INTRAVENOUS at 20:57

## 2025-01-22 RX ADMIN — SODIUM CHLORIDE: 9 INJECTION, SOLUTION INTRAVENOUS at 10:32

## 2025-01-22 RX ADMIN — PROPOFOL 40 MG: 10 INJECTION, EMULSION INTRAVENOUS at 10:36

## 2025-01-22 RX ADMIN — Medication 80 MCG: at 11:34

## 2025-01-22 RX ADMIN — SODIUM CHLORIDE: 900 INJECTION, SOLUTION INTRAVENOUS at 10:51

## 2025-01-22 RX ADMIN — Medication 3 AMPULE: at 14:18

## 2025-01-22 RX ADMIN — FENTANYL CITRATE 50 MCG: 50 INJECTION, SOLUTION INTRAMUSCULAR; INTRAVENOUS at 10:38

## 2025-01-22 RX ADMIN — AMLODIPINE BESYLATE 10 MG: 5 TABLET ORAL at 22:34

## 2025-01-22 RX ADMIN — SODIUM CHLORIDE, POTASSIUM CHLORIDE, SODIUM LACTATE AND CALCIUM CHLORIDE: 600; 310; 30; 20 INJECTION, SOLUTION INTRAVENOUS at 13:52

## 2025-01-22 RX ADMIN — PHENYLEPHRINE HYDROCHLORIDE 15 MCG/MIN: 10 INJECTION INTRAVENOUS at 10:52

## 2025-01-22 RX ADMIN — MIDAZOLAM HYDROCHLORIDE 1 MG: 1 INJECTION, SOLUTION INTRAMUSCULAR; INTRAVENOUS at 10:36

## 2025-01-22 RX ADMIN — MIDAZOLAM HYDROCHLORIDE 1 MG: 1 INJECTION, SOLUTION INTRAMUSCULAR; INTRAVENOUS at 10:38

## 2025-01-22 RX ADMIN — LIDOCAINE HYDROCHLORIDE 60 MG: 20 INJECTION, SOLUTION EPIDURAL; INFILTRATION; INTRACAUDAL; PERINEURAL at 10:36

## 2025-01-22 RX ADMIN — PROPOFOL 80 MCG/KG/MIN: 10 INJECTION, EMULSION INTRAVENOUS at 10:37

## 2025-01-22 RX ADMIN — FENTANYL CITRATE 50 MCG: 50 INJECTION, SOLUTION INTRAMUSCULAR; INTRAVENOUS at 11:56

## 2025-01-22 ASSESSMENT — EJECTION FRACTION
EF_VALUE: .33
EF_VALUE: .33

## 2025-01-22 NOTE — CARDIO/PULMONARY
Chart reviewed: Patient is 69 y.o. male admitted with Aortic stenosis [I35.0]  Aortic stenosis, severe [I35.0]    Education: Trans-catheter education folder at the bedside.  Met with Yefri Vigil.     Educated using teach back method. Reviewed daily weights and temperatures, signs and symptoms of infection at surgery sites, and valve type and card. Smoking history assessed. Patient is a non smoker. Encouraged Heart Healthy, Low Sodium (2000 mg) diet.      Discussed Cardiac Rehab Program benefits, format, and encouraged participation. Initial Cardiac Rehab Program intake appointment scheduled for 1/30/25 at 0800 and appointment information is on the AVS. General questions answered. Yefri Vigil verbalized understanding.     Will continue to follow for educational needs and enrollment in the Cardiac Rehab Program.     ZENON SHAH RN

## 2025-01-22 NOTE — PROGRESS NOTES
SHEATH PULL NOTE:    Patient informed of procedure with questions answered with review. Sheath site prepped with Chloraprep swab. 6 fr sheath in right groin pulled by CHARIS alvarez. Hand hold and manual pressure, with manual compression to site. No bleeding, no hematoma, no pain at site. Hemostasis obtained with hand hold/manual compression at site. Patient tolerated well. No change in status. Handhold for 10 minutes. No change at site. Quick clot and tegadrm dressing applied to site. No bleeding, no hematoma, no pain/discomfort at site. Groin instructions provided with review. Continue to monitor procedure site and patient status.    *Advised patient to keep head flat and extremity flat to decrease risk of bleeding.    *Recommended that patient not drink for ONE HOUR post sheath pull completion.    *Recommended that patient not eat for TWO HOURS post sheath pull completion.    *Instructed patient on rationale for delay of PO products to decrease risk for aspiration and if additional treatment to procedure site is required. Patient verbalized understanding of instructions with review.

## 2025-01-22 NOTE — ANESTHESIA PRE PROCEDURE
Department of Anesthesiology  Preprocedure Note       Name:  Yefri Vigil   Age:  69 y.o.  :  1955                                          MRN:  [id number]         Date:  2025      Surgeon: Surgeon(s):  Kenny Strong MD Wehman, Paul B, MD    Procedure: Procedure(s):  Transcatheter aortic valve replacement    Medications prior to admission:   Prior to Admission medications    Medication Sig Start Date End Date Taking? Authorizing Provider   metFORMIN (GLUCOPHAGE-XR) 750 MG extended release tablet Take 1 tablet by mouth in the morning and at bedtime   Yes Provider, Dane, MD   lisinopril (PRINIVIL;ZESTRIL) 20 MG tablet Take 1 tablet by mouth daily   Yes Provider, Dane, MD   aspirin 81 MG chewable tablet Take 1 tablet by mouth daily 10/20/23  Yes Eladio Silverman Jr., MD   atorvastatin (LIPITOR) 20 MG tablet Take 2 tablets by mouth nightly 10/19/23  Yes Eladio Silverman Jr., MD   amLODIPine (NORVASC) 10 MG tablet Take 1 tablet by mouth daily 10/19/23  Yes Eladio Silverman Jr., MD   metoprolol tartrate (LOPRESSOR) 100 MG tablet Take 1 tablet by mouth 2 times daily  Patient taking differently: Take 25 mg by mouth 2 times daily 10/19/23  Yes Eladio Silverman Jr., MD   pantoprazole (PROTONIX) 40 MG tablet Take 1 tablet by mouth every morning (before breakfast)  Patient not taking: Reported on 2025   Kenny Strong MD   ferrous sulfate (IRON 325) 325 (65 Fe) MG tablet Take 1 tablet by mouth daily (with breakfast)  Patient not taking: Reported on 2025 12/4/24 3/4/25  Kenny Strong MD   docusate sodium (COLACE) 100 MG capsule Take 1 capsule by mouth daily  Patient not taking: Reported on 2025 12/4/24 3/4/25  Kenny Strong MD   hydrALAZINE (APRESOLINE) 25 MG tablet Take 1 tablet by mouth every 8 hours  Patient not taking: Reported on 2025 10/19/23   Eladio Silverman Jr., MD   clopidogrel (PLAVIX) 75 MG tablet Take 1 tablet by mouth daily  Patient not taking:

## 2025-01-22 NOTE — PROGRESS NOTES
Patient arrived to IVCU from Recovery post TAVR Bilateral groin sites CDI, no bleeding/hematomaVSS, NSR with BBB 2L O2 NC, Afebrile. Patient on bedrest.     1630: Patient off bedrest, ambulated in the hallway without difficulty. VSS Dr. Strong aware of Hand H results, no new orders.

## 2025-01-22 NOTE — ANESTHESIA POSTPROCEDURE EVALUATION
Department of Anesthesiology  Postprocedure Note    Patient: Yefri Vigil  MRN: [id number]  YOB: 1955  Date of evaluation: 1/22/2025    Procedure Summary       Date: 01/22/25 Room / Location: [facility] CATH LAB 1 / [facility] CARDIAC CATH LAB    Anesthesia Start: 1032 Anesthesia Stop: 1215    Procedures:       Transcatheter aortic valve replacement      Ultrasound guided vascular access      Insert temporary pacemaker      Aortic root aortogram      Aortagram abdominal w runoff Diagnosis: Nonrheumatic aortic valve stenosis    Providers: Kenny Strong MD; Be Ferguson MD Responsible Provider: George Khanna MD    Anesthesia Type: MAC ASA Status: 4            Anesthesia Type: MAC    Mary Phase I: Mary Score: 9    Mary Phase II:      Anesthesia Post Evaluation  Post-Anesthesia Evaluation and Assessment    Patient: Yefri Vigil MRN: [id number]  SSN: [id number]    YOB: 1955  Age: 69 y.o.  Sex: male      I have evaluated the patient and they are stable and ready for discharge from the PACU.     Cardiovascular Function/Vital Signs  Visit Vitals  BP (!) 147/79   Pulse 67   Temp 98 °F (36.7 °C) (Oral)   Resp 15   Ht 1.702 m (5' 7\")   Wt 81.6 kg (180 lb)   SpO2 91%   BMI 28.19 kg/m²       Patient is status post General anesthesia for Procedure(s):  Transcatheter aortic valve replacement  Ultrasound guided vascular access  Insert temporary pacemaker  Aortic root aortogram  Aortagram abdominal w runoff.    Nausea/Vomiting: None    Postoperative hydration reviewed and adequate.    Pain:      Managed    Neurological Status:       At baseline    Mental Status, Level of Consciousness: Alert and  oriented to person, place, and time    Pulmonary Status:       Adequate oxygenation and airway patent    Complications related to anesthesia: None    Post-anesthesia assessment completed. No concerns    Signed By: George Khanna MD     January 22, 2025                There were no known notable events for

## 2025-01-22 NOTE — ANESTHESIA PROCEDURE NOTES
Arterial Line:    An arterial line was placed using ultrasound guidance, in the pre-op for the following indication(s): continuous blood pressure monitoring and blood sampling needed.    A 20 gauge (size) (length), Arrow (type) catheter was placed, Seldinger technique not used, into the left radial artery, secured by Tegaderm.  Anesthesia type: Local  Local infiltration: Injection    Events:  patient tolerated procedure well with no complications.1/22/2025 9:55 AM1/22/2025 10:05 AM  Anesthesiologist: George Khanna MD  Other anesthesia staff: Bernadette Erickson RN  Performed: Other anesthesia staff and Anesthesiologist   Preanesthetic Checklist  Completed: patient identified, IV checked, site marked, risks and benefits discussed, surgical/procedural consents, equipment checked, pre-op evaluation, timeout performed, anesthesia consent given, oxygen available, monitors applied/VS acknowledged, fire risk safety assessment completed and verbalized and blood product R/B/A discussed and consented

## 2025-01-22 NOTE — PROGRESS NOTES
Update History & Physical    The patient's History and Physical of December 11, 2024 was reviewed with the patient and I examined the patient. There was no change. The surgical site was confirmed by the patient and me.       Plan: The risks, benefits, expected outcome, and alternative to the recommended procedure have been discussed with the patient. Patient understands and wants to proceed with the procedure.     Electronically signed by MARY KATE Tracy CNP on 1/22/2025 at 8:14 AM

## 2025-01-22 NOTE — PROGRESS NOTES
Cardiac Cath Lab Recovery Arrival Note:      Yefri Vigil arrived to Cardiac Cath Lab, Recovery Area. Staff introduced to patient. Patient identifiers verified with NAME and DATE OF BIRTH. Procedure verified with patient. Consent forms reviewed and signed by patient or authorized representative and verified. Allergies verified.     Patient and family oriented to department. Patient and family informed of procedure and plan of care.     Questions answered with review. Patient prepped for procedure, per orders from physician, prior to arrival.    Patient on cardiac monitor, non-invasive blood pressure, SPO2 monitor. On RA. Patient is A&Ox 4. Patient reports no pain.     Patient in stretcher, in low position, with side rails up, call bell within reach, patient instructed to call if assistance as needed.    Patient prep in: [facility] Recovery Area, [address].     Family in: Jules.   Prep by: Lyssa

## 2025-01-22 NOTE — OP NOTE
Operative Note    Transcatheter Aortic Valve Replacement Percutaneously through the Femoral Artery     Indication  The valve was placed for severe aortic stenosis. Intermediate risk.     Interventional cardiologist: Kenny Strong MD  Cardiac surgeon: Ryan Ferguson MD     Access  The valve was placed using a transfemoral approach.   Initially a 6Fr sheath was placed in the RFA, 6Fr long sheath in LFV, 6Fr sheath in the LFA using ultrasound and micropuncture technique.  The LFV was used to place temporary venous wire.  The LFA was used to place a pig tail for angiography.  The RFA was preclosed with 2 perclose sutures.   Then delivery sheath was placed and patient heparinized.     Procedure     Prior to placement of the aortic valve, an aortogram were performed.  Left heart catheterization was peformed.  A balloon aortic valvuloplasty was not performed prior to valve placement.  A 29 mm Sandoval Kaiden S3 Resilia aortic valve was then deployed during rapid ventricular pacing. Post-deployment balloon inflation was not performed.  Cardiopulmonary bypass support was not used for hemodynamic support during the procedure.     At the completion of the case, catheters were removed. Protamine was given. The valve delivery sheath was removed and hemostasis obtained by 2 perclose suture.  6Fr LFA hemostasis via external pressure. Other venous sheaths were removed and hemostasis obtained by manual compression for 15 minutes.     Hemodynamics     Pre- deplyment hemodynamics showed severe aortic stenosis with mean gradient of 29 mm Hg, with LVEDP of 18 mm Hg.  Post-deployment hemodynamics showed transvalvular mean gradient of 1 mm Hg, with LVEDP of 18 mm Hg.     Angiography  1. Aortic angiography pre intervention demonstrates severe AS and mild AI.  2. Aortic angiography post TAVR demonstrates the valve in good position with no AI with patent coronary arteries.  3. Abdominal aortography showed patent vessels with patent RFA with

## 2025-01-22 NOTE — CONSENT
Informed Consent for Blood Component Transfusion Note    I have discussed with the patient the rationale for blood component transfusion; its benefits in treating or preventing fatigue, organ damage, or death; and its risk which includes mild transfusion reactions, rare risk of blood borne infection, or more serious but rare reactions. I have discussed the alternatives to transfusion, including the risk and consequences of not receiving transfusion. The patient had an opportunity to ask questions and had agreed to proceed with transfusion of blood components.    Electronically signed by MARY KATE Tracy CNP on 1/22/25 at 8:14 AM EST

## 2025-01-22 NOTE — PROGRESS NOTES
Clinical Update:    Yefri Vigil seen in [facility] s/p TAVR with Dr. Strong under MAC anesthesia on 1/22/2025.      Patient is groggy but wakes to speech. Access sites soft with gauze dressing-clean, dry, intact. PPP.     Anticoagulation plan for ASA. Plan for postoperative TTE and EKG.    Vitals:    01/22/25 1230   BP: (!) 116/55   Pulse: 59   Resp: 15   Temp:    SpO2: 93%       Signed:  MARY KATE Tracy - CNP  1/22/2025  12:35 PM

## 2025-01-23 ENCOUNTER — APPOINTMENT (OUTPATIENT)
Facility: HOSPITAL | Age: 70
DRG: 267 | End: 2025-01-23
Attending: INTERNAL MEDICINE
Payer: MEDICARE

## 2025-01-23 VITALS
HEIGHT: 67 IN | DIASTOLIC BLOOD PRESSURE: 68 MMHG | HEART RATE: 67 BPM | BODY MASS INDEX: 27.02 KG/M2 | WEIGHT: 172.18 LBS | TEMPERATURE: 97.9 F | RESPIRATION RATE: 16 BRPM | SYSTOLIC BLOOD PRESSURE: 138 MMHG | OXYGEN SATURATION: 97 %

## 2025-01-23 LAB
ANION GAP SERPL CALC-SCNC: 6 MMOL/L (ref 2–12)
BUN SERPL-MCNC: 20 MG/DL (ref 6–20)
BUN/CREAT SERPL: 14 (ref 12–20)
CALCIUM SERPL-MCNC: 8.3 MG/DL (ref 8.5–10.1)
CHLORIDE SERPL-SCNC: 108 MMOL/L (ref 97–108)
CO2 SERPL-SCNC: 24 MMOL/L (ref 21–32)
CREAT SERPL-MCNC: 1.47 MG/DL (ref 0.7–1.3)
EKG ATRIAL RATE: 60 BPM
EKG ATRIAL RATE: 78 BPM
EKG DIAGNOSIS: NORMAL
EKG DIAGNOSIS: NORMAL
EKG P AXIS: 58 DEGREES
EKG P AXIS: 60 DEGREES
EKG P-R INTERVAL: 206 MS
EKG P-R INTERVAL: 218 MS
EKG Q-T INTERVAL: 390 MS
EKG Q-T INTERVAL: 506 MS
EKG QRS DURATION: 108 MS
EKG QRS DURATION: 114 MS
EKG QTC CALCULATION (BAZETT): 444 MS
EKG QTC CALCULATION (BAZETT): 506 MS
EKG R AXIS: -2 DEGREES
EKG R AXIS: 84 DEGREES
EKG T AXIS: 250 DEGREES
EKG T AXIS: 6 DEGREES
EKG VENTRICULAR RATE: 60 BPM
EKG VENTRICULAR RATE: 78 BPM
ERYTHROCYTE [DISTWIDTH] IN BLOOD BY AUTOMATED COUNT: 16.2 % (ref 11.5–14.5)
GLUCOSE SERPL-MCNC: 153 MG/DL (ref 65–100)
HCT VFR BLD AUTO: 24.7 % (ref 36.6–50.3)
HGB BLD-MCNC: 7.5 G/DL (ref 12.1–17)
IRON SATN MFR SERPL: 6 % (ref 20–50)
IRON SERPL-MCNC: 17 UG/DL (ref 35–150)
MAGNESIUM SERPL-MCNC: 1.5 MG/DL (ref 1.6–2.4)
MCH RBC QN AUTO: 22.3 PG (ref 26–34)
MCHC RBC AUTO-ENTMCNC: 30.4 G/DL (ref 30–36.5)
MCV RBC AUTO: 73.3 FL (ref 80–99)
NRBC # BLD: 0 K/UL (ref 0–0.01)
NRBC BLD-RTO: 0 PER 100 WBC
PLATELET # BLD AUTO: 248 K/UL (ref 150–400)
PMV BLD AUTO: 10 FL (ref 8.9–12.9)
POTASSIUM SERPL-SCNC: 3.9 MMOL/L (ref 3.5–5.1)
RBC # BLD AUTO: 3.37 M/UL (ref 4.1–5.7)
SODIUM SERPL-SCNC: 138 MMOL/L (ref 136–145)
TIBC SERPL-MCNC: 284 UG/DL (ref 250–450)
WBC # BLD AUTO: 6.7 K/UL (ref 4.1–11.1)

## 2025-01-23 PROCEDURE — 85027 COMPLETE CBC AUTOMATED: CPT

## 2025-01-23 PROCEDURE — 6370000000 HC RX 637 (ALT 250 FOR IP): Performed by: INTERNAL MEDICINE

## 2025-01-23 PROCEDURE — 6360000002 HC RX W HCPCS: Performed by: INTERNAL MEDICINE

## 2025-01-23 PROCEDURE — 83540 ASSAY OF IRON: CPT

## 2025-01-23 PROCEDURE — 83550 IRON BINDING TEST: CPT

## 2025-01-23 PROCEDURE — 71045 X-RAY EXAM CHEST 1 VIEW: CPT

## 2025-01-23 PROCEDURE — 36415 COLL VENOUS BLD VENIPUNCTURE: CPT

## 2025-01-23 PROCEDURE — 6370000000 HC RX 637 (ALT 250 FOR IP): Performed by: NURSE PRACTITIONER

## 2025-01-23 PROCEDURE — 80048 BASIC METABOLIC PNL TOTAL CA: CPT

## 2025-01-23 PROCEDURE — 2500000003 HC RX 250 WO HCPCS: Performed by: NURSE PRACTITIONER

## 2025-01-23 PROCEDURE — 83735 ASSAY OF MAGNESIUM: CPT

## 2025-01-23 RX ORDER — FERROUS SULFATE 325(65) MG
325 TABLET ORAL
Qty: 30 TABLET | Refills: 2 | Status: SHIPPED | OUTPATIENT
Start: 2025-01-23 | End: 2025-04-23

## 2025-01-23 RX ORDER — PANTOPRAZOLE SODIUM 40 MG/1
40 TABLET, DELAYED RELEASE ORAL
Qty: 30 TABLET | Refills: 5 | Status: SHIPPED | OUTPATIENT
Start: 2025-01-23

## 2025-01-23 RX ORDER — PANTOPRAZOLE SODIUM 40 MG/1
40 TABLET, DELAYED RELEASE ORAL
Status: DISCONTINUED | OUTPATIENT
Start: 2025-01-23 | End: 2025-01-23 | Stop reason: HOSPADM

## 2025-01-23 RX ORDER — AMOXICILLIN 500 MG/1
CAPSULE ORAL
Qty: 12 CAPSULE | Refills: 1 | Status: SHIPPED | OUTPATIENT
Start: 2025-01-23

## 2025-01-23 RX ORDER — LANOLIN ALCOHOL/MO/W.PET/CERES
800 CREAM (GRAM) TOPICAL EVERY 4 HOURS
Status: COMPLETED | OUTPATIENT
Start: 2025-01-23 | End: 2025-01-23

## 2025-01-23 RX ORDER — DOCUSATE SODIUM 100 MG/1
100 CAPSULE, LIQUID FILLED ORAL DAILY
Qty: 30 CAPSULE | Refills: 2 | Status: SHIPPED | OUTPATIENT
Start: 2025-01-23 | End: 2025-04-23

## 2025-01-23 RX ORDER — METOPROLOL TARTRATE 25 MG/1
25 TABLET, FILM COATED ORAL 2 TIMES DAILY
Qty: 180 TABLET | Refills: 3 | Status: SHIPPED
Start: 2025-01-23

## 2025-01-23 RX ORDER — AMLODIPINE BESYLATE 5 MG/1
10 TABLET ORAL DAILY
Status: DISCONTINUED | OUTPATIENT
Start: 2025-01-23 | End: 2025-01-23 | Stop reason: HOSPADM

## 2025-01-23 RX ORDER — METOPROLOL TARTRATE 25 MG/1
25 TABLET, FILM COATED ORAL 2 TIMES DAILY
Status: DISCONTINUED | OUTPATIENT
Start: 2025-01-23 | End: 2025-01-23 | Stop reason: HOSPADM

## 2025-01-23 RX ADMIN — ASPIRIN 81 MG: 81 TABLET, CHEWABLE ORAL at 09:34

## 2025-01-23 RX ADMIN — PANTOPRAZOLE SODIUM 40 MG: 40 TABLET, DELAYED RELEASE ORAL at 09:41

## 2025-01-23 RX ADMIN — SODIUM CHLORIDE, PRESERVATIVE FREE 10 ML: 5 INJECTION INTRAVENOUS at 09:36

## 2025-01-23 RX ADMIN — Medication 800 MG: at 09:35

## 2025-01-23 RX ADMIN — Medication 800 MG: at 12:25

## 2025-01-23 RX ADMIN — METOPROLOL TARTRATE 25 MG: 25 TABLET, FILM COATED ORAL at 09:35

## 2025-01-23 RX ADMIN — IRON SUCROSE 100 MG: 20 INJECTION, SOLUTION INTRAVENOUS at 09:36

## 2025-01-23 RX ADMIN — SODIUM CHLORIDE, PRESERVATIVE FREE 10 ML: 5 INJECTION INTRAVENOUS at 09:37

## 2025-01-23 RX ADMIN — AMLODIPINE BESYLATE 10 MG: 5 TABLET ORAL at 09:34

## 2025-01-23 NOTE — CARE COORDINATION
Care Management Initial Assessment       RUR: 10% \"low risk\"  Readmission? No  1st IM letter given? Yes, given by Patient Access Team on 1/22/25  1st  letter given: N/A    Initial note - 0858 AM: Chart reviewed. CM met with pt at the bedside to introduce self and role. Verified contact information and demographics. Pt resides alone in a one level home with 3 BONY. Pt PCP is with [facility] with the last visit being within the last three months. Preferred pharmacy is [email or web address] on [address] [employer]. Pt has no hx of HH services. Pt has a hx of an IPR stay at [facility]. Pt is independent with ADL's and has no DME needs. Pt has a walker at home. Pt is an active . Pt has no ACP on file; pt is a FULL code. Pt son to transport pt home upon time of d/c. Full assessment below:     01/23/25 0858   Service Assessment   Patient Orientation Alert and Oriented;Person;Place;Situation;Self   Cognition Alert   History Provided By Patient   Primary Caregiver Self   Support Systems Children   Patient's Healthcare Decision Maker is: Legal Next of Kin   PCP Verified by CM Yes  (Pt stated he goes to [facility])   Last Visit to PCP Within last 3 months   Prior Functional Level Independent in ADLs/IADLs   Current Functional Level Independent in ADLs/IADLs   Can patient return to prior living arrangement Yes   Ability to make needs known: Good   Family able to assist with home care needs: Yes   Would you like for me to discuss the discharge plan with any other family members/significant others, and if so, who? Yes  (Jules Vigil (child))   Financial Resources Medicare  ([employer] Medicare)   Community Resources None   Social/Functional History   Lives With Alone   Type of Home House   Home Layout One level   Home Access Stairs to enter with rails   Entrance Stairs - Number of Steps 3 BONY   Home Equipment Walker - Rolling   Receives Help From Family   Prior Level of Assist for ADLs Independent   Prior Level of Assist

## 2025-01-23 NOTE — DISCHARGE INSTRUCTIONS
Cardiac Surgery Specialist - Valve Clinic    [address]      [place] [address]  Office- [phone]  Fax- [phone]  _____________________________________________________________  Dr. Al Zhu, [facility]   Dr. Kenny Strong, [facility]  Dr. Joanne Garirdo, [facility]     Haley Yao, MSN, ACNPC-AG  Chanda Lara, MSN, AGPCNP-BC  ___________________________________________________________     Surgery & Date: Procedure(s):  Transcatheter aortic valve replacement  Ultrasound guided vascular access  Insert temporary pacemaker  Aortic root aortogram  Aortagram abdominal w runoff    Patient ID:  Yefri Vigil  [id number]  69 y.o.  1955    Admit Date: 1/22/2025    Discharge Date: 1/23/2025     Admitting Physician: [unfilled]     Discharge Physician: [unfilled]    Admission Diagnoses:   Aortic stenosis [I35.0]  Aortic stenosis, severe [I35.0]    Discharge Diagnoses:   [unfilled]    Discharge Condition: Good    Cardiology Procedures this Admission:   TAVR    Disposition: home    Reference discharge instructions provided by nursing for diet and activity.    Signed:  MARY KATE Tracy CNP  1/23/2025  8:26 AM        MEDICATIONS:  Please refer to your After Visit Summary for your medication list.           Guidelines to Follow After Your TAVR  The purpose of these instructions is to provide you with information on how to care for yourself after your Transcatheter Aortic Valve Replacement (TAVR). If you have any questions after your discharge, please call us at [phone].  Prior to discharge, you will receive a temporary implant card. A permanent card will be sent to you in approximately 6 to 8 weeks. Share this card with your doctors, including your dentist. It is important to share information regarding your

## 2025-01-23 NOTE — PROGRESS NOTES
[facility] FLOOR Progress Note    Admit Date: 2025  POD: 1 Day Post-Op      Procedure:  Procedure(s):  Transcatheter aortic valve replacement  Ultrasound guided vascular access  Insert temporary pacemaker  Aortic root aortogram  Aortagram abdominal w runoff    Subjective/overnight events:   Pt seen with Dr. Ferguson, and with Dr. Strong, , up in the chair.. In NSR, On room air, Afebrile.   Vital signs stable. No events overnight. Bilateral groin sites stable. No complaints at present.  Objective:     BP (!) 143/79   Pulse 83   Temp 98.2 °F (36.8 °C) (Oral)   Resp 18   Ht 1.702 m (5' 7\")   Wt 78.1 kg (172 lb 2.9 oz)   SpO2 99%   BMI 26.97 kg/m²   Temp (24hrs), Av.1 °F (36.7 °C), Min:98 °F (36.7 °C), Max:98.3 °F (36.8 °C)      Last 24hr Input/Output:    Intake/Output Summary (Last 24 hours) at 2025 1004  Last data filed at 2025 0520  Gross per 24 hour   Intake 1557.01 ml   Output 1250 ml   Net 307.01 ml        EKG/Rhythm:   Encounter Date: 25   EKG 12 Lead   Result Value    Ventricular Rate 78    Atrial Rate 78    P-R Interval 206    QRS Duration 108    Q-T Interval 390    QTc Calculation (Bazett) 444    P Axis 58    R Axis -2    T Axis 6    Diagnosis      Sinus rhythm with premature atrial complexes in a pattern of bigeminy  Minimal voltage criteria for LVH, may be normal variant ( Ilya product )  Borderline ECG  When compared with ECG of 2025 12:16,  MANUAL COMPARISON REQUIRED, DATA IS UNCONFIRMED         Oxygen: As above    CXR: Xray Result (most recent):  XR CHEST (2 VW) 10/02/2024    Narrative  EXAM: XR CHEST (2 VW)  ACC#: [id number]    INDICATION: Nonrheumatic aortic (valve) stenosis    COMPARISON: 10/8/2023    TECHNIQUE: PA and lateral views of the chest.    FINDINGS:    Lungs are clear.  The cardiomediastinal configuration is within normal limits.  No acute bony abnormalities. There are degenerative changes of the spine.    Impression  No acute cardiopulmonary

## 2025-01-23 NOTE — PLAN OF CARE
Problem: Chronic Conditions and Co-morbidities  Goal: Patient's chronic conditions and co-morbidity symptoms are monitored and maintained or improved  1/23/2025 1248 by Laura Snell RN  Outcome: Adequate for Discharge  1/22/2025 2317 by Albertina Khanna RN  Outcome: Progressing  Flowsheets (Taken 1/22/2025 1319 by Laura Snell, RN)  Care Plan - Patient's Chronic Conditions and Co-Morbidity Symptoms are Monitored and Maintained or Improved: Collaborate with multidisciplinary team to address chronic and comorbid conditions and prevent exacerbation or deterioration     Problem: Discharge Planning  Goal: Discharge to home or other facility with appropriate resources  1/23/2025 1248 by Laura Snell RN  Outcome: Adequate for Discharge  1/22/2025 2317 by Albertina Khanna RN  Outcome: Progressing  Flowsheets (Taken 1/22/2025 1319 by Laura Snell, RN)  Discharge to home or other facility with appropriate resources: Identify discharge learning needs (meds, wound care, etc)     Problem: ABCDS Injury Assessment  Goal: Absence of physical injury  1/23/2025 1248 by Laura Snell RN  Outcome: Adequate for Discharge  1/22/2025 2317 by Albertina Khanna RN  Outcome: Progressing     Problem: Safety - Adult  Goal: Free from fall injury  Outcome: Adequate for Discharge

## 2025-01-23 NOTE — PROGRESS NOTES
Post-Structural Heart End of Shift Note/Checklist    Yefri Vigil  POD: Day of Surgery    Procedure:  Procedure(s):  Transcatheter aortic valve replacement  Ultrasound guided vascular access  Insert temporary pacemaker  Aortic root aortogram  Aortagram abdominal w runoff    Shift: 5795-4905  Significant events or complaints during shift: Below.  Cardiac rhythm: NSR  Vital Signs:   Elevated BP: administered PM metoprolol.      2219: BP rechecked at 184/96  2221: Giovanna Moore MD  2224: Page returned: order for one time dose of patients typical home med of amlodipine 10mg and order for 5mg Hydralazine IV Q4hrs PRN for SBP >160      O2 status: On room air  Febrile/afebrile: afebrile  Current infusions: none.    Number of times and distance patient has ambulated: 1.   How is patient tolerating ambulation? Well.     Patient diet: Cardiac, diabetic diet.  Intake of diet:  well.  Is the patient tolerating eating?  Yes.  Is the patient voiding without difficulty? Yes.  Is the patient passing gas? Yes  Date of last BM: 1/21.      Checklist:   Are I&O documented and accurate? Yes.  For nightshift, is a daily weight documented? Yes.  Was this weight standing? Yes.  Are labs drawn and resulted? Yes.  Has the patient's post-procedure echo been done yet? Yes.  Post-TAVR patients without an existing PPM- is the EKG done and has it transmitted to Epic? Yes.  Have dressings been removed? Yes.

## 2025-01-23 NOTE — PROGRESS NOTES
Post-Structural Heart End of Shift Note/Checklist    Yefri Vigil  POD: Day of Surgery    Procedure:  Procedure(s):  Transcatheter aortic valve replacement  Ultrasound guided vascular access  Insert temporary pacemaker  Aortic root aortogram  Aortagram abdominal w runoff    Shift: [id number]  Significant events or complaints during shift:     Post op Hgb 7 no new orders, will recheck CBC in am   Cardiac rhythm: NSR  Vital Signs: Vital signs stable  O2 status: On room air  Febrile/afebrile: Afebrile  Current infusions: none.    Number of times and distance patient has ambulated: 1  .   How is patient tolerating ambulation? Tolerating well.     Patient diet: Cardiac, diabetic diet.  Intake of diet: tolerating.  Is the patient tolerating eating?  Yes.  Is the patient voiding without difficulty? Yes.  Is the patient passing gas? Yes  Date of last BM: 1/21/25  .      Checklist:   Are I&O documented and accurate? Yes    For nightshift, is a daily weight documented? N/A.  Was this weight standing? N/A.  Are labs drawn and resulted? N/A.  Has the patient's post-procedure echo been done yet? Yes.  Post-TAVR patients without an existing PPM- is the EKG done and has it transmitted to Epic? Yes.  Have dressings been removed? N/A.

## 2025-01-23 NOTE — PLAN OF CARE
Problem: Chronic Conditions and Co-morbidities  Goal: Patient's chronic conditions and co-morbidity symptoms are monitored and maintained or improved  Outcome: Progressing     Problem: Discharge Planning  Goal: Discharge to home or other facility with appropriate resources  Outcome: Progressing     Problem: ABCDS Injury Assessment  Goal: Absence of physical injury  Outcome: Progressing     Predictive Model Details          30 (Normal)  Factor Value    Calculated 1/22/2025 23:17 37% Age 69 years old    Deterioration Index Model 31% Respiratory rate 23     13% Pulse oximetry 89 %     10% Hematocrit abnormal (23.1 %)     9% Systolic 153     0% Pulse 79     0% Temperature 98.2 °F (36.8 °C)

## 2025-01-23 NOTE — PROGRESS NOTES
Pt ready for discharge. RN reviewed all new medications and where to  the prescriptions. RN reviewed how to follow up with the cardiologist. RN reviewed right and left groin  site care instructions. Patient advised GI will call to schedule an EGD/Colonoscopy, discussed the need for pre-antibiiotics and went over the prescription.   Pt verbalized understanding. RN provided time to answer any and all questions.

## 2025-01-23 NOTE — PROGRESS NOTES
Spiritual Care Partner Volunteer visited patient at [facility] in [facility] on 1/23/2025   Documented by:    ANOOP Matos, [facility]   Paging Service [phone] ([phone])

## 2025-01-23 NOTE — CONSULTS
Procedure Laterality Date    CARDIAC PROCEDURE N/A 10/18/2023    Left heart cath / coronary angiography performed by Rosmery Arnett MD at [facility] CARDIAC CATH LAB    CARDIAC PROCEDURE N/A 10/18/2023    Percutaneous coronary intervention performed by Rosmery Arnett MD at [facility] CARDIAC CATH LAB    CARDIAC PROCEDURE N/A 10/18/2023    Intravascular ultrasound performed by Rosmery Arnett MD at [facility] CARDIAC CATH LAB    CARDIAC PROCEDURE N/A 10/7/2024    Coronary angiography performed by Kenny Strong MD at [facility] CARDIAC CATH LAB    CARDIAC PROCEDURE N/A 10/7/2024    Aortic root aortogram performed by Kenny Strong MD at [facility] CARDIAC CATH LAB    CARDIAC PROCEDURE N/A 2025    Transcatheter aortic valve replacement performed by Kenny Strong MD at [facility] CARDIAC CATH LAB    CARDIAC PROCEDURE N/A 2025    Insert temporary pacemaker performed by Kenny Strong MD at [facility] CARDIAC CATH LAB    CARDIAC PROCEDURE N/A 2025    Aortic root aortogram performed by Kenny Strong MD at [facility] CARDIAC CATH LAB    COLONOSCOPY      Had polyps told to return 5 yrs, not done    INVASIVE VASCULAR N/A 10/7/2024    Ultrasound guided vascular access performed by Kenny Strong MD at [facility] CARDIAC CATH LAB    INVASIVE VASCULAR N/A 2025    Ultrasound guided vascular access performed by Kenny Strong MD at [facility] CARDIAC CATH LAB    INVASIVE VASCULAR N/A 2025    Aortagram abdominal w runoff performed by Kenny Strong MD at [facility] CARDIAC CATH LAB       Social History     Tobacco Use    Smoking status: Former     Current packs/day: 0.00     Average packs/day: 1 pack/day for 8.2 years (8.2 ttl pk-yrs)     Types: Cigarettes     Start date:      Quit date: 3/25/1985     Years since quittin.8    Smokeless tobacco: Never    Tobacco comments:     Quit smoking: pt quit smoking 35 years ago   Substance Use Topics    Alcohol use: Not Currently        Family History   Problem Relation Age of Onset

## 2025-01-23 NOTE — DISCHARGE SUMMARY
Cardiology Discharge Summary     [facility]    Patient ID:  Yefri Vigil  [id number]  69 y.o.  1955    Admit Date: 1/22/2025     Discharge Date: 1/23/2025   Admitting Physician: Kenny Strong MD   Discharge Physician: Kenny Strong MD    Admission and Discharge Diagnoses include:  Non-rheumatic Aortic stenosis    Cardiology Procedures this Admission:  TAVR    Consults:  None    > 30 minutes coordinating discharge.    Hospital Course and Discharge Exam:    Severe AS:  The patient was evaluated by Evelin Simon and Ligia who discussed conventional aortic valve replacement and TAVR, as well as the risks and benefits of both versus continuing medical therapy with the patient. All questions were answered. Through shared decision making, Evelin Simon and Ligia felt that TAVR is a better option for this patient, given age, frailty, and co-morbidities. S/P TAVR 1/22/2025. MG 6 and EF 55%. Groin sites intact. EKG SR.   CAD, NSTEMI s/p stent to LAD in 10/23: On ASA, Plavix, BB, statin PTA; continue.   HTN, well-controlled: Baseline BP unknown but 122/74 in office. On Norvasc, hydralazine, Lasix, metoprolol PTA; would hold BB 24-36 hours pre-procedure.   HLD: on statin PTA; continue.   DMII: On metformin PTA. Will check A1C at PAT and would hold metformin 48 hours prior to valvular intervention.   CKD stage IIIa: patient of Dr. Moreland . Baseline creatinine ~1.7-1.9; currently running 1.81 as of 5/06/24. Avoid nephrotoxins. Strict I&O, daily weights.   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 Neuro is following and neuropsych testing scheduled for spring 2025.    Acute anemia: Iron studies. Iron infusion. GI consult. Hgb 7.5. No hemodynamic instability. Can D/C with GI op f/u appointment.            On the day of discharge, ambulatory and taking oral.  All questions answered.  Aware of signs and symptoms warranting urgent

## 2025-01-30 ENCOUNTER — HOSPITAL ENCOUNTER (OUTPATIENT)
Facility: HOSPITAL | Age: 70
Setting detail: RECURRING SERIES
End: 2025-01-30
Payer: MEDICARE

## 2025-01-30 ENCOUNTER — OFFICE VISIT (OUTPATIENT)
Age: 70
End: 2025-01-30
Payer: MEDICARE

## 2025-01-30 VITALS
HEART RATE: 59 BPM | SYSTOLIC BLOOD PRESSURE: 132 MMHG | DIASTOLIC BLOOD PRESSURE: 75 MMHG | WEIGHT: 175.2 LBS | BODY MASS INDEX: 27.44 KG/M2 | OXYGEN SATURATION: 100 % | TEMPERATURE: 97.8 F

## 2025-01-30 VITALS — WEIGHT: 175.8 LBS | BODY MASS INDEX: 27.53 KG/M2

## 2025-01-30 DIAGNOSIS — I35.0 AORTIC STENOSIS, SEVERE: Primary | ICD-10-CM

## 2025-01-30 LAB
GLUCOSE BLD STRIP.AUTO-MCNC: 258 MG/DL (ref 65–117)
SERVICE CMNT-IMP: ABNORMAL

## 2025-01-30 PROCEDURE — 93797 PHYS/QHP OP CAR RHAB WO ECG: CPT

## 2025-01-30 PROCEDURE — 1111F DSCHRG MED/CURRENT MED MERGE: CPT | Performed by: THORACIC SURGERY (CARDIOTHORACIC VASCULAR SURGERY)

## 2025-01-30 PROCEDURE — 3017F COLORECTAL CA SCREEN DOC REV: CPT | Performed by: THORACIC SURGERY (CARDIOTHORACIC VASCULAR SURGERY)

## 2025-01-30 PROCEDURE — 82962 GLUCOSE BLOOD TEST: CPT

## 2025-01-30 PROCEDURE — G8419 CALC BMI OUT NRM PARAM NOF/U: HCPCS | Performed by: THORACIC SURGERY (CARDIOTHORACIC VASCULAR SURGERY)

## 2025-01-30 PROCEDURE — 1159F MED LIST DOCD IN RCRD: CPT | Performed by: THORACIC SURGERY (CARDIOTHORACIC VASCULAR SURGERY)

## 2025-01-30 PROCEDURE — 1123F ACP DISCUSS/DSCN MKR DOCD: CPT | Performed by: THORACIC SURGERY (CARDIOTHORACIC VASCULAR SURGERY)

## 2025-01-30 PROCEDURE — 3075F SYST BP GE 130 - 139MM HG: CPT | Performed by: THORACIC SURGERY (CARDIOTHORACIC VASCULAR SURGERY)

## 2025-01-30 PROCEDURE — G8428 CUR MEDS NOT DOCUMENT: HCPCS | Performed by: THORACIC SURGERY (CARDIOTHORACIC VASCULAR SURGERY)

## 2025-01-30 PROCEDURE — 3078F DIAST BP <80 MM HG: CPT | Performed by: THORACIC SURGERY (CARDIOTHORACIC VASCULAR SURGERY)

## 2025-01-30 PROCEDURE — 1036F TOBACCO NON-USER: CPT | Performed by: THORACIC SURGERY (CARDIOTHORACIC VASCULAR SURGERY)

## 2025-01-30 PROCEDURE — 99212 OFFICE O/P EST SF 10 MIN: CPT | Performed by: THORACIC SURGERY (CARDIOTHORACIC VASCULAR SURGERY)

## 2025-01-30 PROCEDURE — 93798 PHYS/QHP OP CAR RHAB W/ECG: CPT

## 2025-01-30 RX ORDER — ATORVASTATIN CALCIUM 40 MG/1
40 TABLET, FILM COATED ORAL DAILY
COMMUNITY

## 2025-01-30 RX ORDER — HYDRALAZINE HYDROCHLORIDE 25 MG/1
25 TABLET, FILM COATED ORAL 3 TIMES DAILY
COMMUNITY

## 2025-01-30 ASSESSMENT — EXERCISE STRESS TEST
PEAK_HR: 76
PEAK_METS: 2
PEAK_BP: 156/60
PEAK_RPE: 11

## 2025-01-30 ASSESSMENT — PATIENT HEALTH QUESTIONNAIRE - PHQ9
2. FEELING DOWN, DEPRESSED OR HOPELESS: NOT AT ALL
SUM OF ALL RESPONSES TO PHQ QUESTIONS 1-9: 9
6. FEELING BAD ABOUT YOURSELF - OR THAT YOU ARE A FAILURE OR HAVE LET YOURSELF OR YOUR FAMILY DOWN: NOT AT ALL
SUM OF ALL RESPONSES TO PHQ QUESTIONS 1-9: 9
SUM OF ALL RESPONSES TO PHQ QUESTIONS 1-9: 9
1. LITTLE INTEREST OR PLEASURE IN DOING THINGS: MORE THAN HALF THE DAYS
5. POOR APPETITE OR OVEREATING: MORE THAN HALF THE DAYS
4. FEELING TIRED OR HAVING LITTLE ENERGY: NEARLY EVERY DAY
10. IF YOU CHECKED OFF ANY PROBLEMS, HOW DIFFICULT HAVE THESE PROBLEMS MADE IT FOR YOU TO DO YOUR WORK, TAKE CARE OF THINGS AT HOME, OR GET ALONG WITH OTHER PEOPLE: NOT DIFFICULT AT ALL
SUM OF ALL RESPONSES TO PHQ9 QUESTIONS 1 & 2: 2
8. MOVING OR SPEAKING SO SLOWLY THAT OTHER PEOPLE COULD HAVE NOTICED. OR THE OPPOSITE, BEING SO FIGETY OR RESTLESS THAT YOU HAVE BEEN MOVING AROUND A LOT MORE THAN USUAL: NOT AT ALL
SUM OF ALL RESPONSES TO PHQ QUESTIONS 1-9: 9
7. TROUBLE CONCENTRATING ON THINGS, SUCH AS READING THE NEWSPAPER OR WATCHING TELEVISION: NOT AT ALL
9. THOUGHTS THAT YOU WOULD BE BETTER OFF DEAD, OR OF HURTING YOURSELF: NOT AT ALL
3. TROUBLE FALLING OR STAYING ASLEEP: MORE THAN HALF THE DAYS

## 2025-01-30 NOTE — PROGRESS NOTES
Name: Yefri Vigil   : 1955   Home Phone: [phone]     Reviewed record in preparation for visit and have obtained necessary documentation. Identified pt with two pt identifiers (name and ).     1. Have you been to the ER, urgent care clinic since your last visit?  Hospitalized since your last visit?No    2. Have you seen or consulted any other health care providers outside of the [facility] since your last visit?  Include any pap smears or colon screening. No      Yefri Vigil is being seen for TAVR follow up approximately “One week post-operatively.     Patient counseled on Wound care, Signs of infection, and Office contact information and instructed to follow up  in approximately one month. Patient given opportunity to ask questions and receive clarification.     Current Medications-Reviewed with Patient    Allergies-Reviewed with Patient        Vitals  There were no vitals taken for this visit.

## 2025-01-30 NOTE — PROGRESS NOTES
Patient: Yefri Vigil   Age: 69 y.o.     Patient Care Team:  Edy Brandt APRN - NP as PCP - General (Nurse Practitioner)  Edy Brandt APRN - NP as PCP - Empaneled Provider  Kenny Strong MD as Consulting Physician (Cardiothoracic Surgery)  John Simon MD as Consulting Physician (Thoracic Surgery)    PCP: Edy Brandt APRN - NP    Cardiologist: Ligia    Diagnosis/Reason for Consultation: The encounter diagnosis was Aortic stenosis, severe.     HPI: 69 y.o. y.o. male  S/P TAVR with a Kaiden 3 via R Femoral Artery approach on 1/22/2025 for severe and symptomatic Aortic Stenosis.  There were no intra-op or post-op complications . Yefri Vigil was discharged to home on 1/23/2025 and is here today for his  1 week f/u.     Pre-op sx included SOB and fatigue.     Overall doing well. Pain is well-controlled on current regimen. No complaints. Starts cardiac rehab today. No questions or concerns.    NYHA Classification: II-III   Class I (Mild): No limitation of physical activity. Ordinary physical activity does not cause undue fatigue, palpitation, or dyspnea.   Class II (Mild): Slight limitation of physical activity. Comfortable at rest, but ordinary physical activity results in fatigue, palpitation, or dyspnea.   Class III (Moderate): Marked limitation of physical activity. Comfortable at rest, but less than ordinary activity causes fatigue, palpitation, or dyspnea   Class IV (Severe): Unable to carry out any physical activity without discomfort. Symptoms  of cardiac insufficiency at rest.  If any physical activity is undertaken, discomfort is increased.    Angina Classification: 0   Class 0: No symptoms   Class 1: Angina with strenuous exercise   Class 2: Angina with moderate exercise   Class 3: Angina with mild exertion   Walking 1-2 level blocks at normal pace; Climbing 1 flight of stairs at normal pace  Class 4: Angina at any level of physical exertion       Past

## 2025-01-30 NOTE — CARDIO/PULMONARY
INTAKE APPOINTMENT NOTE  2025    NAME: Yefri Vigil : 1955 AGE: 69 y.o.  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    Aspiration pneumonitis ([facility]) 10/07/2023    At risk for sleep apnea 2024    STOP-BANG 5    Diabetes mellitus, type 2 ([facility])     Diarrhea 10/07/2023    High blood cholesterol     Hypertension     Hypokalemia 2015    Non-traumatic rhabdomyolysis 10/07/2023    NSTEMI (non-ST elevated myocardial infarction) ([facility])     Pneumonia 03/15/2015    S/P drug eluting coronary stent placement     x1    Severe sepsis ([facility]) 10/04/2023       LABS:     No results found for: \"HBA1C\", \"NMR7MAND\"  No results found for: \"CHOL\", \"CHOLPOCT\", \"CHLST\", \"CHOLV\", \"HDL\", \"HDLPOC\", \"HDLC\", \"LDL\", \"LDLC\", \"VLDLC\", \"VLDL\"      ANTHROPOMETRICS:      Ht Readings from Last 1 Encounters:   25 1.702 m (5' 7\")      Wt Readings from Last 1 Encounters:   25 78.1 kg (172 lb 2.9 oz)        WAIST: 38\"       VISIT SUMMARY:    Yefri Vigil 69 y.o. presented to Cardiac Rehab for program orientation and 6 minute walk test today with a primary diagnosis of S/P TAVR (transcatheter aortic valve replacement) [Z95.2]. EF is 55 % Cardiac risk factors include family history, dyslipidemia, diabetes mellitus, obesity, hypertension, prior MI, valvular heart disease.    Yefri Vigil is not  and lives alone. Patient was evaluated for depression using the PHQ-9 assessment  tool with a result of 9 which is considered mild. The result was discussed with patient.   Patient denied chest pain or SOB during 6 minute walk test and was in SB/SR/PACs . Patient walked 280  meters at a speed of 2.0 and grade of 0  for a final MET level of 2.6 .   Exercise prescription developed using exercise tolerance results and patient stated goals, to be supplemented with home exercise recommendations. Education manual given to patient and reviewed. Patient will attend cardiac rehab 2-3 times/week which will include both exercise and education sessions.    Patient states that he/she would like to accomplish the following by completion of the program:  Decrease PHQ9 to <6  Decrease weight to 170lbs  Intake Start Time: 0749  Intake End Time:0948    At this time, patient only scheduled 1 additional exercise visit. Provided pt with financial assistance paperwork, as he has concerns with copayments for cardiac rehab visits.    Brissa Pierce RN

## 2025-02-04 ENCOUNTER — HOSPITAL ENCOUNTER (OUTPATIENT)
Facility: HOSPITAL | Age: 70
Setting detail: RECURRING SERIES
Discharge: HOME | End: 2025-02-07
Payer: MEDICARE

## 2025-02-04 VITALS — BODY MASS INDEX: 26.47 KG/M2 | WEIGHT: 169 LBS

## 2025-02-04 PROCEDURE — 93798 PHYS/QHP OP CAR RHAB W/ECG: CPT

## 2025-02-04 ASSESSMENT — EXERCISE STRESS TEST
PEAK_RPE: 10
PEAK_METS: 2.6
PEAK_HR: 82

## 2025-02-06 ENCOUNTER — HOSPITAL ENCOUNTER (OUTPATIENT)
Facility: HOSPITAL | Age: 70
Setting detail: RECURRING SERIES
Discharge: HOME | End: 2025-02-09
Payer: MEDICARE

## 2025-02-06 VITALS — BODY MASS INDEX: 26.63 KG/M2 | WEIGHT: 170 LBS

## 2025-02-06 PROCEDURE — 93798 PHYS/QHP OP CAR RHAB W/ECG: CPT

## 2025-02-06 ASSESSMENT — EXERCISE STRESS TEST
PEAK_HR: 74
PEAK_RPE: 10
PEAK_METS: 2.6

## 2025-02-11 ENCOUNTER — APPOINTMENT (OUTPATIENT)
Facility: HOSPITAL | Age: 70
End: 2025-02-11
Payer: MEDICARE

## 2025-02-13 ENCOUNTER — HOSPITAL ENCOUNTER (OUTPATIENT)
Facility: HOSPITAL | Age: 70
Setting detail: RECURRING SERIES
Discharge: HOME | End: 2025-02-16
Payer: MEDICARE

## 2025-02-13 VITALS — BODY MASS INDEX: 26.94 KG/M2 | WEIGHT: 172 LBS

## 2025-02-13 PROCEDURE — 93798 PHYS/QHP OP CAR RHAB W/ECG: CPT

## 2025-02-13 ASSESSMENT — EXERCISE STRESS TEST
PEAK_RPE: 10
PEAK_METS: 2.6
PEAK_HR: 73

## 2025-02-17 ENCOUNTER — ANESTHESIA (OUTPATIENT)
Facility: HOSPITAL | Age: 70
End: 2025-02-17
Payer: MEDICARE

## 2025-02-17 ENCOUNTER — ANESTHESIA EVENT (OUTPATIENT)
Facility: HOSPITAL | Age: 70
End: 2025-02-17
Payer: MEDICARE

## 2025-02-17 ENCOUNTER — HOSPITAL ENCOUNTER (OUTPATIENT)
Facility: HOSPITAL | Age: 70
Setting detail: OUTPATIENT SURGERY
Discharge: HOME | End: 2025-02-17
Attending: INTERNAL MEDICINE | Admitting: INTERNAL MEDICINE
Payer: MEDICARE

## 2025-02-17 VITALS
DIASTOLIC BLOOD PRESSURE: 73 MMHG | HEART RATE: 64 BPM | BODY MASS INDEX: 26.37 KG/M2 | WEIGHT: 168 LBS | SYSTOLIC BLOOD PRESSURE: 156 MMHG | OXYGEN SATURATION: 99 % | RESPIRATION RATE: 22 BRPM | TEMPERATURE: 97.8 F | HEIGHT: 67 IN

## 2025-02-17 PROCEDURE — 3600007513: Performed by: INTERNAL MEDICINE

## 2025-02-17 PROCEDURE — 7100000010 HC PHASE II RECOVERY - FIRST 15 MIN: Performed by: INTERNAL MEDICINE

## 2025-02-17 PROCEDURE — 3700000001 HC ADD 15 MINUTES (ANESTHESIA): Performed by: INTERNAL MEDICINE

## 2025-02-17 PROCEDURE — 3600007502: Performed by: INTERNAL MEDICINE

## 2025-02-17 PROCEDURE — 3700000000 HC ANESTHESIA ATTENDED CARE: Performed by: INTERNAL MEDICINE

## 2025-02-17 PROCEDURE — 3600007503: Performed by: INTERNAL MEDICINE

## 2025-02-17 PROCEDURE — 2580000003 HC RX 258: Performed by: INTERNAL MEDICINE

## 2025-02-17 PROCEDURE — 2720000010 HC SURG SUPPLY STERILE: Performed by: INTERNAL MEDICINE

## 2025-02-17 PROCEDURE — 88305 TISSUE EXAM BY PATHOLOGIST: CPT

## 2025-02-17 PROCEDURE — 2709999900 HC NON-CHARGEABLE SUPPLY: Performed by: INTERNAL MEDICINE

## 2025-02-17 PROCEDURE — 7100000011 HC PHASE II RECOVERY - ADDTL 15 MIN: Performed by: INTERNAL MEDICINE

## 2025-02-17 PROCEDURE — 3600007512: Performed by: INTERNAL MEDICINE

## 2025-02-17 PROCEDURE — 6360000002 HC RX W HCPCS: Performed by: ANESTHESIOLOGY

## 2025-02-17 RX ORDER — GLYCOPYRROLATE 0.2 MG/ML
INJECTION INTRAMUSCULAR; INTRAVENOUS
Status: DISCONTINUED | OUTPATIENT
Start: 2025-02-17 | End: 2025-02-17 | Stop reason: SDUPTHER

## 2025-02-17 RX ORDER — SODIUM CHLORIDE 0.9 % (FLUSH) 0.9 %
5-40 SYRINGE (ML) INJECTION PRN
Status: DISCONTINUED | OUTPATIENT
Start: 2025-02-17 | End: 2025-02-17 | Stop reason: HOSPADM

## 2025-02-17 RX ORDER — LIDOCAINE HYDROCHLORIDE 20 MG/ML
INJECTION, SOLUTION EPIDURAL; INFILTRATION; INTRACAUDAL; PERINEURAL
Status: DISCONTINUED | OUTPATIENT
Start: 2025-02-17 | End: 2025-02-17 | Stop reason: SDUPTHER

## 2025-02-17 RX ORDER — SODIUM CHLORIDE 9 MG/ML
INJECTION, SOLUTION INTRAVENOUS CONTINUOUS
Status: DISCONTINUED | OUTPATIENT
Start: 2025-02-17 | End: 2025-02-17 | Stop reason: HOSPADM

## 2025-02-17 RX ORDER — SIMETHICONE 40MG/0.6ML
40 SUSPENSION, DROPS(FINAL DOSAGE FORM)(ML) ORAL AS NEEDED
Status: DISCONTINUED | OUTPATIENT
Start: 2025-02-17 | End: 2025-02-17 | Stop reason: HOSPADM

## 2025-02-17 RX ADMIN — PROPOFOL 320 MG: 10 INJECTION, EMULSION INTRAVENOUS at 15:02

## 2025-02-17 RX ADMIN — LIDOCAINE HYDROCHLORIDE 100 MG: 20 INJECTION, SOLUTION EPIDURAL; INFILTRATION; INTRACAUDAL; PERINEURAL at 14:25

## 2025-02-17 RX ADMIN — GLYCOPYRROLATE 0.2 MG: 0.2 INJECTION, SOLUTION INTRAMUSCULAR; INTRAVENOUS at 14:25

## 2025-02-17 RX ADMIN — SODIUM CHLORIDE: 9 INJECTION, SOLUTION INTRAVENOUS at 14:35

## 2025-02-17 ASSESSMENT — PAIN - FUNCTIONAL ASSESSMENT
PAIN_FUNCTIONAL_ASSESSMENT: NONE - DENIES PAIN
PAIN_FUNCTIONAL_ASSESSMENT: NONE - DENIES PAIN

## 2025-02-17 NOTE — PROGRESS NOTES
1434- Endoscope was pre-cleaned at bedside immediately following procedure by RUSSELL Mock    1506- Endoscope was pre-cleaned at bedside immediately following procedure by TERESA Mock.

## 2025-02-17 NOTE — OP NOTE
[facility]  [facility]  Nilam Jodran MD, [facility]  [phone]         2025    Esophagogastroduodenoscopy & Colonoscopy Procedure Note  Yefri Vigil  : 1955  [facility] Medical Record Number: [id number]      Indications:  Severe GI blood loss anemia, hx of colon polyps  Referring Physician:  Edy Brandt, MARY KATE - NP  Anesthesia/Sedation: Conscious Sedation/Moderate Sedation/MAC  Endoscopist:  Dr. Nilam Jordan  Complications:  None  Estimated Blood Loss:  None    Permit:  The indications, risks, benefits and alternatives were reviewed with the patient or their decision maker who was provided an opportunity to ask questions and all questions were answered.  The specific risks of esophagogastroduodenoscopy with conscious sedation were reviewed, including but not limited to anesthetic complication, bleeding, adverse drug reaction, missed lesion, infection, IV site reactions, and intestinal perforation which would lead to the need for surgical repair.  Alternatives to EGD and colonoscopy including radiographic imaging, observation without testing, or laboratory testing were reviewed as well as the limitations of those alternatives discussed.  After considering the options and having all their questions answered, the patient or their decision maker provided both verbal and written consent to proceed.      -----------EGD------------   Procedure in Detail:  After obtaining informed consent, positioning of the patient in the left lateral decubitus position, and conduction of a pre-procedure pause or \"time out\" the endoscope was introduced into the mouth and advanced to the duodenum.  A careful inspection was made, and findings or interventions are described below.    Findings:   Esophagus: Diaphragmatic impression at 39 cm from the incisors.  Z-line/EGJ at 38 cm.  1 to 2 cm sliding hiatal hernia.  There is

## 2025-02-17 NOTE — ANESTHESIA PRE PROCEDURE
Department of Anesthesiology  Preprocedure Note       Name:  Yefri Vigil   Age:  69 y.o.  :  1955                                          MRN:  [id number]         Date:  2025      Surgeon: Surgeon(s):  Reece Jordan MD    Procedure: Procedure(s):  COLONOSCOPY AND UPPER ESOPHAGOGASTRODUODENOSCOPY  ESOPHAGOGASTRODUODENOSCOPY    Medications prior to admission:   Prior to Admission medications    Medication Sig Start Date End Date Taking? Authorizing Provider   ferrous sulfate (IRON 325) 325 (65 Fe) MG tablet Take 1 tablet by mouth daily (with breakfast) 25 Yes Kenny Strong MD   atorvastatin (LIPITOR) 40 MG tablet Take 1 tablet by mouth daily    Dane Thacker MD   hydrALAZINE (APRESOLINE) 25 MG tablet Take 1 tablet by mouth 3 times daily    Dane Thacker MD   metoprolol (LOPRESSOR) 25 MG tablet Take 1 tablet by mouth 2 times daily 25   Kenny Strong MD   pantoprazole (PROTONIX) 40 MG tablet Take 1 tablet by mouth every morning (before breakfast) 25   Kenny Strong MD   amoxicillin (AMOXIL) 500 MG capsule Take 4 tablets by oral route one hour prior to procedures. 25   Kenny Strong MD   metFORMIN (GLUCOPHAGE-XR) 750 MG extended release tablet Take 1 tablet by mouth in the morning and at bedtime    Dane Thacker MD   lisinopril (PRINIVIL;ZESTRIL) 20 MG tablet Take 1 tablet by mouth daily    Dane Thacker MD   aspirin 81 MG chewable tablet Take 1 tablet by mouth daily 10/20/23   Eladio Silverman Jr., MD   amLODIPine (NORVASC) 10 MG tablet Take 1 tablet by mouth daily 10/19/23   Eladio Silverman Jr., MD       Current medications:    No current facility-administered medications for this encounter.       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

## 2025-02-17 NOTE — ANESTHESIA POSTPROCEDURE EVALUATION
Department of Anesthesiology  Postprocedure Note    Patient: Yefri Vigil  MRN: [id number]  YOB: 1955  Date of evaluation: 2/17/2025    Procedure Summary       Date: 02/17/25 Room / Location: hospitals ENDO 04 / [facility] END[facility]    Anesthesia Start: 1415 Anesthesia Stop: 1510    Procedures:       COLONOSCOPY AND UPPER ESOPHAGOGASTRODUODENOSCOPY (Lower GI Region)      ESOPHAGOGASTRODUODENOSCOPY (Upper GI Region) Diagnosis:       Anemia, unspecified type      Chronic anticoagulation      Personal history of colon polyps, unspecified      (Anemia, unspecified type [D64.9])      (Chronic anticoagulation [Z79.01])      (Personal history of colon polyps, unspecified [Z86.0100])    Surgeons: Reece Jordan MD Responsible Provider: Simran Kamara DO    Anesthesia Type: MAC ASA Status: 3            Anesthesia Type: MAC    Mary Phase I: Mary Score: 10    Mary Phase II: Mary Score: 10    Anesthesia Post Evaluation    Patient location during evaluation: PACU  Patient participation: complete - patient participated  Level of consciousness: awake  Airway patency: patent  Nausea & Vomiting: no vomiting and no nausea  Cardiovascular status: hemodynamically stable  Respiratory status: acceptable  Hydration status: euvolemic    No notable events documented.

## 2025-02-17 NOTE — H&P
[facility]  Outpatient History and Physical    Patient: Yefri MEZA Musa    Physician: Reece Jordan MD    Vital Signs: There were no vitals taken for this visit.    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  Was followed by ID in the hospital as well as other specialties    Alcohol abuse 10/07/2023    Arthritis     back    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    Aspiration pneumonitis (HCC) 10/07/2023    At risk for sleep apnea 12/04/2024    STOP-BANG 5    Diabetes mellitus, type 2 ([facility]) 2010    Diarrhea 10/07/2023    High blood cholesterol     Hypertension     Hypokalemia 03/17/2015    Non-traumatic rhabdomyolysis 10/07/2023    NSTEMI (non-ST elevated myocardial infarction) ([facility])     Pneumonia 03/15/2015    S/P drug eluting coronary stent placement     x1    Severe sepsis ([facility]) 10/04/2023      Past Surgical History:   Procedure Laterality Date    CARDIAC PROCEDURE N/A 10/18/2023    Left heart cath / coronary angiography performed by Rosmery Arnett MD at [facility] CARDIAC CATH LAB    CARDIAC PROCEDURE N/A 10/18/2023    Percutaneous coronary intervention performed by Rosmery Arnett MD at [facility] CARDIAC CATH LAB    CARDIAC PROCEDURE N/A 10/18/2023    Intravascular ultrasound performed by Rosmery Arnett MD at [facility] CARDIAC CATH LAB    CARDIAC PROCEDURE N/A 10/7/2024    Coronary angiography performed by Kenny Strong MD at [facility] CARDIAC CATH LAB    CARDIAC PROCEDURE N/A 10/7/2024    Aortic root aortogram

## 2025-02-17 NOTE — PROGRESS NOTES
ARRIVAL INFORMATION:  Verified patient name and date of birth, scheduled procedure, and informed consent.     : barry Vicente, contact number: [phone]  Physician and staff can share information with the .     Receive texts: yes    Belongings with patient include:  Clothing,None    GI FOCUSED ASSESSMENT:  Neuro: Awake, alert, oriented x4  Respiratory: even and unlabored   GI: soft and non-distended  EKG Rhythm: normal sinus rhythm/sinus lydia    Education:Reviewed general discharge instructions and  information.     *Patient is alert and oriented with seemingly full understanding of why he is here and procedures etc.  Patient expressed some confusion with his medications.  It seems he just didn't remember the names of the medications that he takes.  Spoke with son, Jules, who reports that he fills his dads pill box for the week which is why he doesn't know the names of the medications.  Jules informed nurse that patient has not had 81 mg ASA since Friday and is on NO other blood thinners.  Patient agrees.      The risks and benefits of the bite block have been explained to patient.  Patient verbalizes understanding.    (Patient reports leaving upper and lower partial denture at home.)

## 2025-02-17 NOTE — DISCHARGE INSTRUCTIONS
Yefri Vigil  [id number]  1955    COLON/EGD DISCHARGE INSTRUCTIONS  Discomfort:  Redness at IV site- apply warm compress to area; if redness or soreness persist- contact your physician  There may be a slight amount of blood passed from the rectum  Gaseous discomfort- walking, belching will help relieve any discomfort  Sore throat- throat lozenges or warm salt water gargle  You may not operate a vehicle for 12 hours  You may not engage in an occupation involving machinery or appliances for rest of today  You may not drink alcoholic beverages for at least 12 hours  Avoid making any critical decisions for at least 24 hour  DIET:   Regular diet.   - however -  remember your colon is empty and a heavy meal will produce gas.   Avoid these foods:  vegetables, fried / greasy foods, carbonated drinks for today.    MEDICATIONS:      Regarding Aspirin or Nonsteroidal medications, please see below.    ACTIVITY:  You may resume your normal daily activities it is recommended that you spend the remainder of the day resting -  avoid any strenuous activity.    CALL M.D.  ANY SIGN OF:  Increasing pain, nausea, vomiting  Abdominal distension (swelling)  New increased bleeding (oral or rectal)  Fever (chills)  Pain in chest area  Bloody discharge from nose or mouth  Shortness of breath    The use of some over-the-counter pain medication may lead to bleeding after biopsies or other procedures you may have had done.  Tylenol (also called acetaminophen) is safe to take and will not lead to bleeding.      Impressions:  EGD: 1 to 2 cm sliding hiatal hernia.  Schatzki's ring, wide open so dilation not performed.  Gastric biopsies taken.  Colonoscopy: 6 to 8 cm infiltrative/laterally spreading lesion in the transverse colon, concerning for neoplasia.  Biopsies taken.  Tattoo placed.  3 diminutive colon polyps in ascending and sigmoid colon, status post cold snare polypectomy.      Recommendations:   -Await pathology

## 2025-02-18 ENCOUNTER — HOSPITAL ENCOUNTER (OUTPATIENT)
Facility: HOSPITAL | Age: 70
Setting detail: RECURRING SERIES
Discharge: HOME | End: 2025-02-21
Payer: MEDICARE

## 2025-02-18 ENCOUNTER — HOSPITAL ENCOUNTER (OUTPATIENT)
Facility: HOSPITAL | Age: 70
Setting detail: OBSERVATION
Discharge: HOME | End: 2025-02-20
Attending: STUDENT IN AN ORGANIZED HEALTH CARE EDUCATION/TRAINING PROGRAM | Admitting: STUDENT IN AN ORGANIZED HEALTH CARE EDUCATION/TRAINING PROGRAM
Payer: MEDICARE

## 2025-02-18 ENCOUNTER — APPOINTMENT (OUTPATIENT)
Facility: HOSPITAL | Age: 70
End: 2025-02-18
Payer: MEDICARE

## 2025-02-18 VITALS — BODY MASS INDEX: 26.63 KG/M2 | WEIGHT: 170 LBS

## 2025-02-18 DIAGNOSIS — H53.8 BLURRED VISION, BILATERAL: Primary | ICD-10-CM

## 2025-02-18 DIAGNOSIS — D50.9 MICROCYTIC ANEMIA: ICD-10-CM

## 2025-02-18 DIAGNOSIS — R79.89 ELEVATED TROPONIN: ICD-10-CM

## 2025-02-18 DIAGNOSIS — E83.42 HYPOMAGNESEMIA: ICD-10-CM

## 2025-02-18 LAB
ALBUMIN SERPL-MCNC: 3 G/DL (ref 3.5–5)
ALBUMIN/GLOB SERPL: 0.7 (ref 1.1–2.2)
ALP SERPL-CCNC: 98 U/L (ref 45–117)
ALT SERPL-CCNC: 20 U/L (ref 12–78)
ANION GAP SERPL CALC-SCNC: 6 MMOL/L (ref 2–12)
AST SERPL-CCNC: 22 U/L (ref 15–37)
BASOPHILS # BLD: 0.02 K/UL (ref 0–0.1)
BASOPHILS NFR BLD: 0.4 % (ref 0–1)
BILIRUB SERPL-MCNC: 0.4 MG/DL (ref 0.2–1)
BUN SERPL-MCNC: 17 MG/DL (ref 6–20)
BUN/CREAT SERPL: 9 (ref 12–20)
CALCIUM SERPL-MCNC: 8.5 MG/DL (ref 8.5–10.1)
CHLORIDE SERPL-SCNC: 104 MMOL/L (ref 97–108)
CO2 SERPL-SCNC: 26 MMOL/L (ref 21–32)
CREAT SERPL-MCNC: 1.84 MG/DL (ref 0.7–1.3)
DIFFERENTIAL METHOD BLD: ABNORMAL
EOSINOPHIL # BLD: 0.1 K/UL (ref 0–0.4)
EOSINOPHIL NFR BLD: 1.9 % (ref 0–7)
ERYTHROCYTE [DISTWIDTH] IN BLOOD BY AUTOMATED COUNT: 15.8 % (ref 11.5–14.5)
GLOBULIN SER CALC-MCNC: 4.5 G/DL (ref 2–4)
GLUCOSE SERPL-MCNC: 188 MG/DL (ref 65–100)
HCT VFR BLD AUTO: 32.6 % (ref 36.6–50.3)
HGB BLD-MCNC: 9.5 G/DL (ref 12.1–17)
IMM GRANULOCYTES # BLD AUTO: 0.02 K/UL (ref 0–0.04)
IMM GRANULOCYTES NFR BLD AUTO: 0.4 % (ref 0–0.5)
INR PPP: 1.1 (ref 0.9–1.1)
LYMPHOCYTES # BLD: 1.13 K/UL (ref 0.8–3.5)
LYMPHOCYTES NFR BLD: 21.4 % (ref 12–49)
MAGNESIUM SERPL-MCNC: 1.4 MG/DL (ref 1.6–2.4)
MCH RBC QN AUTO: 21.1 PG (ref 26–34)
MCHC RBC AUTO-ENTMCNC: 29.1 G/DL (ref 30–36.5)
MCV RBC AUTO: 72.4 FL (ref 80–99)
MONOCYTES # BLD: 0.54 K/UL (ref 0–1)
MONOCYTES NFR BLD: 10.2 % (ref 5–13)
NEUTS SEG # BLD: 3.46 K/UL (ref 1.8–8)
NEUTS SEG NFR BLD: 65.7 % (ref 32–75)
NRBC # BLD: 0 K/UL (ref 0–0.01)
NRBC BLD-RTO: 0 PER 100 WBC
PLATELET # BLD AUTO: 232 K/UL (ref 150–400)
PMV BLD AUTO: 10.2 FL (ref 8.9–12.9)
POTASSIUM SERPL-SCNC: 3.8 MMOL/L (ref 3.5–5.1)
PROT SERPL-MCNC: 7.5 G/DL (ref 6.4–8.2)
PROTHROMBIN TIME: 11.4 SEC (ref 9.2–11.2)
RBC # BLD AUTO: 4.5 M/UL (ref 4.1–5.7)
SODIUM SERPL-SCNC: 136 MMOL/L (ref 136–145)
TROPONIN I SERPL HS-MCNC: 334 NG/L (ref 0–76)
TROPONIN I SERPL HS-MCNC: 351 NG/L (ref 0–76)
WBC # BLD AUTO: 5.3 K/UL (ref 4.1–11.1)

## 2025-02-18 PROCEDURE — 85610 PROTHROMBIN TIME: CPT

## 2025-02-18 PROCEDURE — 70496 CT ANGIOGRAPHY HEAD: CPT

## 2025-02-18 PROCEDURE — 96374 THER/PROPH/DIAG INJ IV PUSH: CPT

## 2025-02-18 PROCEDURE — 70551 MRI BRAIN STEM W/O DYE: CPT

## 2025-02-18 PROCEDURE — 6370000000 HC RX 637 (ALT 250 FOR IP): Performed by: STUDENT IN AN ORGANIZED HEALTH CARE EDUCATION/TRAINING PROGRAM

## 2025-02-18 PROCEDURE — 80053 COMPREHEN METABOLIC PANEL: CPT

## 2025-02-18 PROCEDURE — 85025 COMPLETE CBC W/AUTO DIFF WBC: CPT

## 2025-02-18 PROCEDURE — 36415 COLL VENOUS BLD VENIPUNCTURE: CPT

## 2025-02-18 PROCEDURE — 6360000002 HC RX W HCPCS: Performed by: STUDENT IN AN ORGANIZED HEALTH CARE EDUCATION/TRAINING PROGRAM

## 2025-02-18 PROCEDURE — 2580000003 HC RX 258: Performed by: STUDENT IN AN ORGANIZED HEALTH CARE EDUCATION/TRAINING PROGRAM

## 2025-02-18 PROCEDURE — 93798 PHYS/QHP OP CAR RHAB W/ECG: CPT

## 2025-02-18 PROCEDURE — 83735 ASSAY OF MAGNESIUM: CPT

## 2025-02-18 PROCEDURE — 6360000004 HC RX CONTRAST MEDICATION: Performed by: STUDENT IN AN ORGANIZED HEALTH CARE EDUCATION/TRAINING PROGRAM

## 2025-02-18 PROCEDURE — 99285 EMERGENCY DEPT VISIT HI MDM: CPT

## 2025-02-18 PROCEDURE — 84484 ASSAY OF TROPONIN QUANT: CPT

## 2025-02-18 RX ORDER — 0.9 % SODIUM CHLORIDE 0.9 %
1000 INTRAVENOUS SOLUTION INTRAVENOUS ONCE
Status: COMPLETED | OUTPATIENT
Start: 2025-02-18 | End: 2025-02-18

## 2025-02-18 RX ORDER — ACETAMINOPHEN 325 MG/1
650 TABLET ORAL EVERY 4 HOURS PRN
Status: DISCONTINUED | OUTPATIENT
Start: 2025-02-18 | End: 2025-02-19

## 2025-02-18 RX ORDER — ONDANSETRON 2 MG/ML
4 INJECTION INTRAMUSCULAR; INTRAVENOUS ONCE
Status: COMPLETED | OUTPATIENT
Start: 2025-02-18 | End: 2025-02-18

## 2025-02-18 RX ORDER — BUTALBITAL, ACETAMINOPHEN AND CAFFEINE 50; 325; 40 MG/1; MG/1; MG/1
2 TABLET ORAL
Status: COMPLETED | OUTPATIENT
Start: 2025-02-18 | End: 2025-02-18

## 2025-02-18 RX ORDER — ONDANSETRON 2 MG/ML
4 INJECTION INTRAMUSCULAR; INTRAVENOUS EVERY 4 HOURS PRN
Status: DISCONTINUED | OUTPATIENT
Start: 2025-02-18 | End: 2025-02-20 | Stop reason: HOSPADM

## 2025-02-18 RX ORDER — IOPAMIDOL 755 MG/ML
100 INJECTION, SOLUTION INTRAVASCULAR
Status: COMPLETED | OUTPATIENT
Start: 2025-02-18 | End: 2025-02-18

## 2025-02-18 RX ADMIN — ONDANSETRON 4 MG: 2 INJECTION INTRAMUSCULAR; INTRAVENOUS at 15:51

## 2025-02-18 RX ADMIN — IOPAMIDOL 100 ML: 755 INJECTION, SOLUTION INTRAVENOUS at 17:01

## 2025-02-18 RX ADMIN — SODIUM CHLORIDE 1000 ML: 0.9 INJECTION, SOLUTION INTRAVENOUS at 15:51

## 2025-02-18 RX ADMIN — BUTALBITAL, ACETAMINOPHEN, AND CAFFEINE 2 TABLET: 325; 50; 40 TABLET ORAL at 15:51

## 2025-02-18 ASSESSMENT — VISUAL ACUITY
OS: 20/160
OD: 20/125

## 2025-02-18 ASSESSMENT — EXERCISE STRESS TEST
PEAK_HR: 75
PEAK_RPE: 10
PEAK_METS: 2.6

## 2025-02-18 NOTE — ED PROVIDER NOTES
[facility] EMERGENCY DEPARTMENT  EMERGENCY DEPARTMENT ENCOUNTER       Pt Name: Yefri Vigil  MRN: [id number]  Birthdate 1955  Date of evaluation: 2/18/2025  Provider: Justin Lerma MD   PCP: Edy Brandt APRN - NP  Note Started: 3:05 PM EST 2/18/25     CHIEF COMPLAINT       Chief Complaint   Patient presents with    Eye Problem     Pt ambulatory to triage with the c/o floaters and colors in his vision x2 days, but worsened today. Pt reports h/o migraines, but no new HA over the last 2 days. Pt not driving d/t this change.         HISTORY OF PRESENT ILLNESS: 1 or more elements      History From: Patient  HPI Limitations: None     Yefri Vigil is a 69 y.o. male who presents with vision changes. He reports noticing vision abnormalities starting two days ago, with mild symptoms initially that did not prompt immediate medical attention. The vision disturbance has worsened today, causing enough concern for the patient to seek evaluation today. He describes his current vision as \"not normal\" but notes it is not as severe as it was earlier. The patient mentions difficulty reading, stating he could not see past the second line during visual acuity testing. He reports being able to see people in the room but struggles with reading tasks.    The patient also reports experiencing a headache or migraine about an hour prior to the encounter. He mentions having migraines in the last day or two, noting that coughing exacerbates the head pain. The patient denies eye pain.  Denies falls or injuries.  Denies syncope.  Denies dizziness, numbness, weakness, chest pain, shortness of breath, fever, chills, neck pain.    Of note, the patient had bilateral cataract surgery approximately 5-6 months ago and changed his glasses 2-3 times since then. He obtained new glasses about 2 months ago.    Medical History  -Hypertension, CAD, high cholesterol, diabetes history of alcohol abuse now in remission     Nursing

## 2025-02-18 NOTE — ED NOTES
Patient completed visual acuity test, unable to read past the second line with either eye or bilaterally. Patient states that this is not normal for him, he can usually read more lines than that. As well, patient reporting he cannot read his phone right now.

## 2025-02-19 PROBLEM — H53.8 BLURRY VISION: Status: ACTIVE | Noted: 2025-02-19

## 2025-02-19 LAB
ANION GAP SERPL CALC-SCNC: 6 MMOL/L (ref 2–12)
BUN SERPL-MCNC: 14 MG/DL (ref 6–20)
BUN/CREAT SERPL: 10 (ref 12–20)
CALCIUM SERPL-MCNC: 8 MG/DL (ref 8.5–10.1)
CHLORIDE SERPL-SCNC: 107 MMOL/L (ref 97–108)
CHOLEST SERPL-MCNC: 128 MG/DL
CO2 SERPL-SCNC: 26 MMOL/L (ref 21–32)
CREAT SERPL-MCNC: 1.47 MG/DL (ref 0.7–1.3)
CRP SERPL-MCNC: <0.29 MG/DL (ref 0–0.3)
ERYTHROCYTE [DISTWIDTH] IN BLOOD BY AUTOMATED COUNT: 15.8 % (ref 11.5–14.5)
ERYTHROCYTE [SEDIMENTATION RATE] IN BLOOD: 52 MM/HR (ref 0–20)
EST. AVERAGE GLUCOSE BLD GHB EST-MCNC: 157 MG/DL
GLUCOSE SERPL-MCNC: 148 MG/DL (ref 65–100)
HBA1C MFR BLD: 7.1 % (ref 4–5.6)
HCT VFR BLD AUTO: 27.9 % (ref 36.6–50.3)
HDLC SERPL-MCNC: 53 MG/DL
HDLC SERPL: 2.4 (ref 0–5)
HGB BLD-MCNC: 8.3 G/DL (ref 12.1–17)
LDLC SERPL CALC-MCNC: 60.2 MG/DL (ref 0–100)
MCH RBC QN AUTO: 21.6 PG (ref 26–34)
MCHC RBC AUTO-ENTMCNC: 29.7 G/DL (ref 30–36.5)
MCV RBC AUTO: 72.5 FL (ref 80–99)
NRBC # BLD: 0 K/UL (ref 0–0.01)
NRBC BLD-RTO: 0 PER 100 WBC
PLATELET # BLD AUTO: 193 K/UL (ref 150–400)
PMV BLD AUTO: 9.9 FL (ref 8.9–12.9)
POTASSIUM SERPL-SCNC: 4 MMOL/L (ref 3.5–5.1)
RBC # BLD AUTO: 3.85 M/UL (ref 4.1–5.7)
SODIUM SERPL-SCNC: 139 MMOL/L (ref 136–145)
TRIGL SERPL-MCNC: 74 MG/DL
VLDLC SERPL CALC-MCNC: 14.8 MG/DL
WBC # BLD AUTO: 3.6 K/UL (ref 4.1–11.1)

## 2025-02-19 PROCEDURE — 92610 EVALUATE SWALLOWING FUNCTION: CPT

## 2025-02-19 PROCEDURE — 80061 LIPID PANEL: CPT

## 2025-02-19 PROCEDURE — 2500000003 HC RX 250 WO HCPCS: Performed by: STUDENT IN AN ORGANIZED HEALTH CARE EDUCATION/TRAINING PROGRAM

## 2025-02-19 PROCEDURE — 36415 COLL VENOUS BLD VENIPUNCTURE: CPT

## 2025-02-19 PROCEDURE — 97116 GAIT TRAINING THERAPY: CPT

## 2025-02-19 PROCEDURE — 96372 THER/PROPH/DIAG INJ SC/IM: CPT

## 2025-02-19 PROCEDURE — G0378 HOSPITAL OBSERVATION PER HR: HCPCS

## 2025-02-19 PROCEDURE — 6370000000 HC RX 637 (ALT 250 FOR IP): Performed by: STUDENT IN AN ORGANIZED HEALTH CARE EDUCATION/TRAINING PROGRAM

## 2025-02-19 PROCEDURE — 99221 1ST HOSP IP/OBS SF/LOW 40: CPT | Performed by: PSYCHIATRY & NEUROLOGY

## 2025-02-19 PROCEDURE — 85027 COMPLETE CBC AUTOMATED: CPT

## 2025-02-19 PROCEDURE — 97530 THERAPEUTIC ACTIVITIES: CPT | Performed by: OCCUPATIONAL THERAPIST

## 2025-02-19 PROCEDURE — 83036 HEMOGLOBIN GLYCOSYLATED A1C: CPT

## 2025-02-19 PROCEDURE — 85652 RBC SED RATE AUTOMATED: CPT

## 2025-02-19 PROCEDURE — 80048 BASIC METABOLIC PNL TOTAL CA: CPT

## 2025-02-19 PROCEDURE — 97161 PT EVAL LOW COMPLEX 20 MIN: CPT

## 2025-02-19 PROCEDURE — 86140 C-REACTIVE PROTEIN: CPT

## 2025-02-19 PROCEDURE — 6360000002 HC RX W HCPCS: Performed by: STUDENT IN AN ORGANIZED HEALTH CARE EDUCATION/TRAINING PROGRAM

## 2025-02-19 PROCEDURE — 97165 OT EVAL LOW COMPLEX 30 MIN: CPT | Performed by: OCCUPATIONAL THERAPIST

## 2025-02-19 RX ORDER — ONDANSETRON 4 MG/1
4 TABLET, ORALLY DISINTEGRATING ORAL EVERY 8 HOURS PRN
Status: DISCONTINUED | OUTPATIENT
Start: 2025-02-19 | End: 2025-02-19

## 2025-02-19 RX ORDER — SODIUM CHLORIDE 9 MG/ML
INJECTION, SOLUTION INTRAVENOUS PRN
Status: DISCONTINUED | OUTPATIENT
Start: 2025-02-19 | End: 2025-02-20 | Stop reason: HOSPADM

## 2025-02-19 RX ORDER — PANTOPRAZOLE SODIUM 40 MG/1
40 TABLET, DELAYED RELEASE ORAL
Status: DISCONTINUED | OUTPATIENT
Start: 2025-02-19 | End: 2025-02-20 | Stop reason: HOSPADM

## 2025-02-19 RX ORDER — DOCUSATE SODIUM 100 MG/1
100 CAPSULE, LIQUID FILLED ORAL DAILY
Status: ON HOLD | COMMUNITY
End: 2025-02-20

## 2025-02-19 RX ORDER — ATORVASTATIN CALCIUM 40 MG/1
40 TABLET, FILM COATED ORAL DAILY
Status: DISCONTINUED | OUTPATIENT
Start: 2025-02-19 | End: 2025-02-20 | Stop reason: HOSPADM

## 2025-02-19 RX ORDER — ASPIRIN 81 MG/1
81 TABLET, CHEWABLE ORAL DAILY
Status: DISCONTINUED | OUTPATIENT
Start: 2025-02-19 | End: 2025-02-20 | Stop reason: HOSPADM

## 2025-02-19 RX ORDER — FERROUS SULFATE 325(65) MG
325 TABLET ORAL
Status: DISCONTINUED | OUTPATIENT
Start: 2025-02-19 | End: 2025-02-20 | Stop reason: HOSPADM

## 2025-02-19 RX ORDER — ENOXAPARIN SODIUM 100 MG/ML
40 INJECTION SUBCUTANEOUS DAILY
Status: DISCONTINUED | OUTPATIENT
Start: 2025-02-19 | End: 2025-02-20 | Stop reason: HOSPADM

## 2025-02-19 RX ORDER — POLYETHYLENE GLYCOL 3350 17 G/17G
17 POWDER, FOR SOLUTION ORAL DAILY PRN
Status: DISCONTINUED | OUTPATIENT
Start: 2025-02-19 | End: 2025-02-20 | Stop reason: HOSPADM

## 2025-02-19 RX ORDER — SODIUM CHLORIDE 0.9 % (FLUSH) 0.9 %
5-40 SYRINGE (ML) INJECTION PRN
Status: DISCONTINUED | OUTPATIENT
Start: 2025-02-19 | End: 2025-02-20 | Stop reason: HOSPADM

## 2025-02-19 RX ORDER — SODIUM CHLORIDE 0.9 % (FLUSH) 0.9 %
5-40 SYRINGE (ML) INJECTION EVERY 12 HOURS SCHEDULED
Status: DISCONTINUED | OUTPATIENT
Start: 2025-02-19 | End: 2025-02-20 | Stop reason: HOSPADM

## 2025-02-19 RX ORDER — ONDANSETRON 2 MG/ML
4 INJECTION INTRAMUSCULAR; INTRAVENOUS EVERY 6 HOURS PRN
Status: DISCONTINUED | OUTPATIENT
Start: 2025-02-19 | End: 2025-02-19

## 2025-02-19 RX ORDER — METOPROLOL TARTRATE 25 MG/1
25 TABLET, FILM COATED ORAL 2 TIMES DAILY
Status: DISCONTINUED | OUTPATIENT
Start: 2025-02-19 | End: 2025-02-20 | Stop reason: HOSPADM

## 2025-02-19 RX ADMIN — PANTOPRAZOLE SODIUM 40 MG: 40 TABLET, DELAYED RELEASE ORAL at 05:49

## 2025-02-19 RX ADMIN — SODIUM CHLORIDE, PRESERVATIVE FREE 10 ML: 5 INJECTION INTRAVENOUS at 20:41

## 2025-02-19 RX ADMIN — ASPIRIN 81 MG: 81 TABLET, CHEWABLE ORAL at 10:10

## 2025-02-19 RX ADMIN — ENOXAPARIN SODIUM 40 MG: 100 INJECTION SUBCUTANEOUS at 10:10

## 2025-02-19 RX ADMIN — FERROUS SULFATE TAB 325 MG (65 MG ELEMENTAL FE) 325 MG: 325 (65 FE) TAB at 10:10

## 2025-02-19 RX ADMIN — METOPROLOL TARTRATE 25 MG: 25 TABLET, FILM COATED ORAL at 20:41

## 2025-02-19 RX ADMIN — SODIUM CHLORIDE, PRESERVATIVE FREE 10 ML: 5 INJECTION INTRAVENOUS at 10:11

## 2025-02-19 RX ADMIN — ATORVASTATIN CALCIUM 40 MG: 40 TABLET, FILM COATED ORAL at 10:10

## 2025-02-19 RX ADMIN — METOPROLOL TARTRATE 25 MG: 25 TABLET, FILM COATED ORAL at 10:10

## 2025-02-19 NOTE — CONSULTS
joint position sense and pinprick sensibility in all four limbs.       Laboratory Studies:   Admission on 02/18/2025   Component Date Value Ref Range Status    WBC 02/18/2025 5.3  4.1 - 11.1 K/uL Final    RBC 02/18/2025 4.50  4.10 - 5.70 M/uL Final    Hemoglobin 02/18/2025 9.5 (L)  12.1 - 17.0 g/dL Final    Hematocrit 02/18/2025 32.6 (L)  36.6 - 50.3 % Final    MCV 02/18/2025 72.4 (L)  80.0 - 99.0 FL Final    MCH 02/18/2025 21.1 (L)  26.0 - 34.0 PG Final    MCHC 02/18/2025 29.1 (L)  30.0 - 36.5 g/dL Final    RDW 02/18/2025 15.8 (H)  11.5 - 14.5 % Final    Platelets 02/18/2025 232  150 - 400 K/uL Final    MPV 02/18/2025 10.2  8.9 - 12.9 FL Final    Nucleated RBCs 02/18/2025 0.0  0  WBC Final    nRBC 02/18/2025 0.00  0.00 - 0.01 K/uL Final    Neutrophils % 02/18/2025 65.7  32.0 - 75.0 % Final    Lymphocytes % 02/18/2025 21.4  12.0 - 49.0 % Final    Monocytes % 02/18/2025 10.2  5.0 - 13.0 % Final    Eosinophils % 02/18/2025 1.9  0.0 - 7.0 % Final    Basophils % 02/18/2025 0.4  0.0 - 1.0 % Final    Immature Granulocytes % 02/18/2025 0.4  0.0 - 0.5 % Final    Neutrophils Absolute 02/18/2025 3.46  1.80 - 8.00 K/UL Final    Lymphocytes Absolute 02/18/2025 1.13  0.80 - 3.50 K/UL Final    Monocytes Absolute 02/18/2025 0.54  0.00 - 1.00 K/UL Final    Eosinophils Absolute 02/18/2025 0.10  0.00 - 0.40 K/UL Final    Basophils Absolute 02/18/2025 0.02  0.00 - 0.10 K/UL Final    Immature Granulocytes Absolute 02/18/2025 0.02  0.00 - 0.04 K/UL Final    Differential Type 02/18/2025 AUTOMATED    Final    Sodium 02/18/2025 136  136 - 145 mmol/L Final    Potassium 02/18/2025 3.8  3.5 - 5.1 mmol/L Final    Chloride 02/18/2025 104  97 - 108 mmol/L Final    CO2 02/18/2025 26  21 - 32 mmol/L Final    Anion Gap 02/18/2025 6  2 - 12 mmol/L Final    Glucose 02/18/2025 188 (H)  65 - 100 mg/dL Final    BUN 02/18/2025 17  6 - 20 MG/DL Final    Creatinine 02/18/2025 1.84 (H)  0.70 - 1.30 MG/DL Final    BUN/Creatinine Ratio 02/18/2025 9 (L)

## 2025-02-19 NOTE — CARE COORDINATION
Care Management Initial Assessment       RUR: N/A  - GILLESPIE signed 2/19/25  Readmission? Yes - 1/22-1/23/25 at [facility] for Aortic stenosis   1st IM letter given? No  1st  letter given: No      Initial note: Chart review completed prior to assessment. CM completed assessment with pt at bedside. CM introduced self, role of CM, verified demographics to ensure accuracy, and discussed transition of care planning. Pt resides alone in 1 level home with 3 BONY. He reports independence with ADLs and uses no DME at baseline. Pt is an active . Pt's son anticipated to transport home at d/c. Pt has been cleared by PT/OT/SLP with no needs. Pt voiced no concerns with transition of care plan at this time. No barriers identified by CM. Pharmacy preference is [facility] on [address]    Care management will continue to be available to assist as transition of care planning needs arise.  Full readmission assessment:       02/19/25 1208   Service Assessment   Patient Orientation Alert and Oriented   Cognition Alert   History Provided By Patient   Primary Caregiver Self   Accompanied By/Relationship N/A   Support Systems Children   Patient's Healthcare Decision Maker is: Legal Next of Kin  (Pt's son is legal NOK)   PCP Verified by CM Yes  (Dr. Edy Brandt)   Last Visit to PCP Within last 3 months   Prior Functional Level Independent in ADLs/IADLs   Current Functional Level Independent in ADLs/IADLs   Can patient return to prior living arrangement Yes   Ability to make needs known: Good   Family able to assist with home care needs: Yes   Would you like for me to discuss the discharge plan with any other family members/significant others, and if so, who? Yes  (Son, Jules Vigil [phone])   Financial Resources Medicare  ([employer] Medicare)   Community Resources None   Social/Functional History   Lives With Alone   Type of Home House   Home Layout One level   Home Access Stairs to enter with rails   Entrance Stairs - Number

## 2025-02-19 NOTE — H&P
N/A 10/7/2024    Aortic root aortogram performed by Kenny Strong MD at [facility] CARDIAC CATH LAB    CARDIAC PROCEDURE N/A 2025    Transcatheter aortic valve replacement performed by Kenny Strong MD at [facility] CARDIAC CATH LAB    CARDIAC PROCEDURE N/A 2025    Insert temporary pacemaker performed by Kenny Strong MD at [facility] CARDIAC CATH LAB    CARDIAC PROCEDURE N/A 2025    Aortic root aortogram performed by Kenny Strong MD at [facility] CARDIAC CATH LAB    COLONOSCOPY  2009    Had polyps told to return 5 yrs, not done    COLONOSCOPY N/A 2025    COLONOSCOPY AND UPPER ESOPHAGOGASTRODUODENOSCOPY performed by Reece Jordan MD at [facility] ENDOSCOPY    INVASIVE VASCULAR N/A 10/7/2024    Ultrasound guided vascular access performed by Kenny Strong MD at [facility] CARDIAC CATH LAB    INVASIVE VASCULAR N/A 2025    Ultrasound guided vascular access performed by Kenny Strong MD at [facility] CARDIAC CATH LAB    INVASIVE VASCULAR N/A 2025    Aortagram abdominal w runoff performed by Kenny Strong MD at [facility] CARDIAC CATH LAB    UPPER GASTROINTESTINAL ENDOSCOPY N/A 2025    ESOPHAGOGASTRODUODENOSCOPY performed by Reece Jordan MD at [facility] ENDOSCOPY       Social History     Tobacco Use    Smoking status: Former     Current packs/day: 0.00     Average packs/day: 1 pack/day for 8.2 years (8.2 ttl pk-yrs)     Types: Cigarettes     Start date:      Quit date: 3/25/1985     Years since quittin.9    Smokeless tobacco: Never    Tobacco comments:     Quit smoking: pt quit smoking 35 years ago   Substance Use Topics    Alcohol use: Not Currently        Family History   Problem Relation Age of Onset    Hypertension Mother     Diabetes Mother     Hypertension Maternal Grandmother        No Known Allergies     Prior to Admission medications    Medication Sig Start Date End Date Taking? Authorizing Provider   atorvastatin (LIPITOR) 40 MG tablet Take 1 tablet by mouth daily    Provider, MD Dane

## 2025-02-20 ENCOUNTER — APPOINTMENT (OUTPATIENT)
Facility: HOSPITAL | Age: 70
End: 2025-02-20
Payer: MEDICARE

## 2025-02-20 VITALS
HEIGHT: 67 IN | HEART RATE: 53 BPM | BODY MASS INDEX: 27.29 KG/M2 | RESPIRATION RATE: 17 BRPM | DIASTOLIC BLOOD PRESSURE: 75 MMHG | WEIGHT: 173.9 LBS | OXYGEN SATURATION: 95 % | TEMPERATURE: 98.4 F | SYSTOLIC BLOOD PRESSURE: 157 MMHG

## 2025-02-20 LAB
ANION GAP SERPL CALC-SCNC: 4 MMOL/L (ref 2–12)
BASOPHILS # BLD: 0.02 K/UL (ref 0–0.1)
BASOPHILS NFR BLD: 0.4 % (ref 0–1)
BUN SERPL-MCNC: 12 MG/DL (ref 6–20)
BUN/CREAT SERPL: 9 (ref 12–20)
CALCIUM SERPL-MCNC: 8.1 MG/DL (ref 8.5–10.1)
CHLORIDE SERPL-SCNC: 106 MMOL/L (ref 97–108)
CO2 SERPL-SCNC: 28 MMOL/L (ref 21–32)
CREAT SERPL-MCNC: 1.38 MG/DL (ref 0.7–1.3)
DIFFERENTIAL METHOD BLD: ABNORMAL
EOSINOPHIL # BLD: 0.17 K/UL (ref 0–0.4)
EOSINOPHIL NFR BLD: 3.7 % (ref 0–7)
ERYTHROCYTE [DISTWIDTH] IN BLOOD BY AUTOMATED COUNT: 15.5 % (ref 11.5–14.5)
GLUCOSE SERPL-MCNC: 101 MG/DL (ref 65–100)
HCT VFR BLD AUTO: 29 % (ref 36.6–50.3)
HGB BLD-MCNC: 8.5 G/DL (ref 12.1–17)
IMM GRANULOCYTES # BLD AUTO: 0.01 K/UL (ref 0–0.04)
IMM GRANULOCYTES NFR BLD AUTO: 0.2 % (ref 0–0.5)
LYMPHOCYTES # BLD: 1.41 K/UL (ref 0.8–3.5)
LYMPHOCYTES NFR BLD: 30.8 % (ref 12–49)
MCH RBC QN AUTO: 21.2 PG (ref 26–34)
MCHC RBC AUTO-ENTMCNC: 29.3 G/DL (ref 30–36.5)
MCV RBC AUTO: 72.3 FL (ref 80–99)
MONOCYTES # BLD: 0.44 K/UL (ref 0–1)
MONOCYTES NFR BLD: 9.6 % (ref 5–13)
NEUTS SEG # BLD: 2.53 K/UL (ref 1.8–8)
NEUTS SEG NFR BLD: 55.3 % (ref 32–75)
NRBC # BLD: 0 K/UL (ref 0–0.01)
NRBC BLD-RTO: 0 PER 100 WBC
PLATELET # BLD AUTO: 195 K/UL (ref 150–400)
PMV BLD AUTO: 10.6 FL (ref 8.9–12.9)
POTASSIUM SERPL-SCNC: 3.8 MMOL/L (ref 3.5–5.1)
RBC # BLD AUTO: 4.01 M/UL (ref 4.1–5.7)
SODIUM SERPL-SCNC: 138 MMOL/L (ref 136–145)
WBC # BLD AUTO: 4.6 K/UL (ref 4.1–11.1)

## 2025-02-20 PROCEDURE — G0378 HOSPITAL OBSERVATION PER HR: HCPCS

## 2025-02-20 PROCEDURE — 85025 COMPLETE CBC W/AUTO DIFF WBC: CPT

## 2025-02-20 PROCEDURE — 96372 THER/PROPH/DIAG INJ SC/IM: CPT

## 2025-02-20 PROCEDURE — 2500000003 HC RX 250 WO HCPCS: Performed by: STUDENT IN AN ORGANIZED HEALTH CARE EDUCATION/TRAINING PROGRAM

## 2025-02-20 PROCEDURE — 6370000000 HC RX 637 (ALT 250 FOR IP): Performed by: STUDENT IN AN ORGANIZED HEALTH CARE EDUCATION/TRAINING PROGRAM

## 2025-02-20 PROCEDURE — 6360000002 HC RX W HCPCS: Performed by: STUDENT IN AN ORGANIZED HEALTH CARE EDUCATION/TRAINING PROGRAM

## 2025-02-20 PROCEDURE — 36415 COLL VENOUS BLD VENIPUNCTURE: CPT

## 2025-02-20 PROCEDURE — 80048 BASIC METABOLIC PNL TOTAL CA: CPT

## 2025-02-20 RX ORDER — POLYETHYLENE GLYCOL 3350 17 G/17G
17 POWDER, FOR SOLUTION ORAL DAILY PRN
Qty: 116 G | Refills: 0 | Status: SHIPPED | OUTPATIENT
Start: 2025-02-20 | End: 2025-03-22

## 2025-02-20 RX ORDER — DOCUSATE SODIUM 100 MG/1
100 CAPSULE, LIQUID FILLED ORAL 2 TIMES DAILY PRN
Qty: 30 CAPSULE | Refills: 0 | Status: SHIPPED | OUTPATIENT
Start: 2025-02-20

## 2025-02-20 RX ADMIN — FERROUS SULFATE TAB 325 MG (65 MG ELEMENTAL FE) 325 MG: 325 (65 FE) TAB at 08:39

## 2025-02-20 RX ADMIN — SODIUM CHLORIDE, PRESERVATIVE FREE 10 ML: 5 INJECTION INTRAVENOUS at 08:39

## 2025-02-20 RX ADMIN — PANTOPRAZOLE SODIUM 40 MG: 40 TABLET, DELAYED RELEASE ORAL at 05:43

## 2025-02-20 RX ADMIN — ENOXAPARIN SODIUM 40 MG: 100 INJECTION SUBCUTANEOUS at 08:39

## 2025-02-20 RX ADMIN — ATORVASTATIN CALCIUM 40 MG: 40 TABLET, FILM COATED ORAL at 08:39

## 2025-02-20 RX ADMIN — METOPROLOL TARTRATE 25 MG: 25 TABLET, FILM COATED ORAL at 08:39

## 2025-02-20 RX ADMIN — ASPIRIN 81 MG: 81 TABLET, CHEWABLE ORAL at 08:39

## 2025-02-20 NOTE — PROGRESS NOTES
Hospitalist Progress Note    NAME:   Yefri Vigil   : 1955   MRN: [id number]     Date/Time: 2025 3:50 PM  Patient PCP: Edy Brandt APRN - NP    Estimated discharge date:   Barriers: Clinical improvement      Assessment / Plan:    Binocular blurry vision  Onset several days of binocular blurry vision and floaters.  Requested for admission due to concern for occipital CVA or CVA affecting vision  CTA head and neck with no acute findings, indeterminate stenosis of the right and left vertebral arteries  MRI brain without acute stroke-chronic microvascular changes seen    Continue home aspirin and Plavix  Resume home antihypertensives  ESR/CRP ordered, if excessively high will involve rheumatology and possibly surgery?  For biopsy for temporal arteritis  Neurology on board, expertise appreciated  If workup negative for CVA, would recommend expedited follow-up with his ophthalmologist     CAD s/p stent to LAD 10/23  Continue DAPT, BB, statin      T2DM  Hold home metformin  Glucoses ACHS      Elevated troponin  Trops flat, denies any chest pain  Trop leak in setting of CVA?   Monitor      CKD 3  Monitor BMP     Hx of West Nile encephalitis with processing issues  Per chart, patient with neurology follow-up with neuropsych testing for spring 2025    Medical Decision Making:   I personally reviewed labs: CBC, BMP  I personally reviewed imaging:  I personally reviewed EKG:  Toxic drug monitoring:   Discussed case with:         Code Status: Full  DVT Prophylaxis: Lovenox  GI Prophylaxis:    Subjective:     Chief Complaint / Reason for Physician Visit  Alert and oriented x 2-believed it was  but then quickly corrected to  and verbalized present same as drop.  Endorsed a brief episode of blurry vision during lunch.  Discussed with RN events overnight.       Objective:     VITALS:   Last 24hrs VS reviewed since prior progress note. Most recent are:  Patient Vitals for the past 24 
End of Shift Note    Bedside shift change report given to CHARIS MARX (oncoming nurse) by Linus Fox RN .        Shift worked:  3P-7P   Shift summary and any significant changes:     -NEW ADMIT   -CVA/TIA NEGATIVE.. POSSIBLE ARTERITIS    -MEDS SENT HOME WITH SON     Concerns for physician to address:  NONE   Zone phone for oncoming shift:   [phone]     Patient Information  Yefri Vigil  69 y.o.  2/18/2025  2:49 PM by David Olivo MD. 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    High cholesterol 09/24/2014    HTN (hypertension) 09/24/2014    Controlled diabetes mellitus type 2 with complications (HCC) 09/24/2014     Past Medical History:   Diagnosis Date    Acute kidney injury 10/04/2023    Acute metabolic encephalopathy 10/07/2023    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     
End of Shift Note    Bedside shift change report given to Gloria (oncoming nurse) by Kim Geronimo RN (offgoing nurse).  Report included the following information SBAR, Kardex, and MAR    Shift worked:  7p-7a     Shift summary and any significant changes:     Scheduled medications were given, see MAR.  IV has been flushed and is patent.  Patient is up with the assistance of one.  Patient had a MRI done during my shift.  Patient teaching and routine rounding has been done.     Concerns for physician to address:       Zone phone for oncoming shift:          Activity:  Level of Assistance: Standby assist, set-up cues, supervision of patient - no hands on  Number times ambulated in hallways past shift: 0  Number of times OOB to chair past shift: 0    Cardiac:   Cardiac Monitoring: Yes           Access:  Current line(s): PIV     Genitourinary:        Respiratory:   O2 Device: None (Room air)  Chronic home O2 use?: NO  Incentive spirometer at bedside: NO    GI:     Current diet:  Diet NPO  Passing flatus: YES    Pain Management:   Patient states pain is manageable on current regimen: YES    Skin:  Sly Scale Score: 21  Interventions: Wound Offloading (Prevention Methods): Blankets, Pillows, Repositioning, Turning    Patient Safety:  Fall Risk:    Fall Risk Interventions  Toilet Every 2 Hours-In Advance of Need: Yes  Hourly Visual Checks: In bed, Awake  Fall Visual Posted: Armband, Socks  Room Door Open: Deferred to promote rest  Patient Moved Closer to Nursing Station: No    Active Consults:   IP CONSULT TO NEUROLOGY  IP CONSULT TO CASE MANAGEMENT    Length of Stay:  Expected LOS: 2  Actual LOS: 0    Kim Geronimo RN                            
End of Shift Note    Bedside shift change report given to LesiaRN (oncoming nurse) by Loida Tolbert RN .        Shift worked:  Nights   Shift summary and any significant changes:     -had episode of high BP MD made aware    -pending ESR-CRP      Concerns for physician to address:  NONE   [facility] phone for oncoming shift:   [phone]     Patient Information  Yefri Vigil  69 y.o.  2/18/2025  2:49 PM by David Olivo MD. 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    NSTEMI (non-ST elevated myocardial infarction) (HCC) 10/07/2023    History of diarrhea 10/07/2023    Neoplasm of uncertain behavior of trachea, bronchus, 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    High cholesterol 09/24/2014    HTN (hypertension) 09/24/2014    Controlled diabetes mellitus type 2 with complications (HCC) 09/24/2014     Past Medical History:   Diagnosis Date    Acute kidney injury 10/04/2023    Acute metabolic encephalopathy 10/07/2023    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 
Linnetteal complete and note to follow.  Pt was attending OP cardiac rehab prior to admit.  Pt reports blurry vision that doesn't change with activity.         02/19/25 0930 02/19/25 0934 02/19/25 0937   Vital Signs   Pulse 57 60 62   BP (!) 160/78 (!) 150/88 (!) 154/77   MAP (Calculated) 105 109 103   BP Location Left upper arm Left upper arm Left upper arm   BP Method Automatic Automatic Automatic   Patient Position Semi fowlers Sitting Standing       
PHYSICAL THERAPY EVALUATION/DISCHARGE    Patient: Yefri Vigil (69 y.o. male)  Date: 2/19/2025  Primary Diagnosis: Blurry vision [H53.8]       Precautions:                        ASSESSMENT AND RECOMMENDATIONS:  Based on the objective data below, the patient presents post adm with blurry vision which started after his cardiac rehab appt yesterday. He states he feels his vision is not yet back to normal; he can read larger print but not smaller. He reports no other sxs. Strength is good without focal weakness. He presented independent with no device for bed mobility, transfers and amb. He amb up and down one step without rail with CGA. He did show minor path deviation x2 during amb but self corrected and did not affect balance. BP stable. No change in vision during session. Reviewed [facility] education and home safety with vision changes. No current PT needs at this time. Expect return to cardiac rehab at d/c.    Functional Outcome Measure:  The patient scored 52/56 on the LAWSON outcome measure which is indicative of low fall risk.          Further skilled acute physical therapy is not indicated at this time.       PLAN :  Recommendation for discharge: (in order for the patient to meet his/her long term goals):   No skilled physical therapy    Other factors to consider for discharge:  was in cardiac rehab    IF patient discharges home will need the following DME: none       SUBJECTIVE:   Patient stated “I feel ok. 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    
Patient discharged home with all belongings. Discharge education and follow up information reviewed with patient. Patient verbalized understanding. LDAs removed. Patient's son provided transportation.   
Report called to [facility] on Neuro at this time.  Pending transport to floor.  
Speech LAnguage Pathology EVALUATION/DISCHARGE    Patient: Yefri Vigil (69 y.o. male)  Date: 2/19/2025  Primary Diagnosis: Blurry vision [H53.8]       Precautions:                     ASSESSMENT :  The patient presents with baseline swallow function. Mastication assessed to be timely and complete. No overt difficulty or overt clinical signs of aspiration observed with any consistency trialed. Patient independently self-fed across trials.    Patient denied changes in speech, language, or cognitive function. Note patient with intermittent delayed responses. Note chart review revealed slowed processing 2/2 West Nile infection history in 2023.    Patient will be discharged from skilled speech-language pathology services at this time.     PLAN :  Recommendations and Planned Interventions:  Diet: Regular and thin liquids  -- Medication as tolerated  -- Upright for all PO    Acute SLP Services: No, patient will be discharged from acute skilled speech-language pathology at this time.  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  Was followed by ID in the hospital as well as other specialties    Alcohol abuse 10/07/2023    Arthritis     back    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    Aspiration pneumonitis (HCC) 10/07/2023    At risk for sleep apnea 12/04/2024    STOP-BANG 5    Diabetes mellitus, type 2 (HCC) 2010    Diarrhea 10/07/2023    High blood cholesterol     Hypertension     Hypokalemia 03/17/2015    Non-traumatic rhabdomyolysis 10/07/2023    NSTEMI (non-ST elevated myocardial infarction) ([facility])     Pneumonia 03/15/2015    S/P drug eluting coronary stent 
    Elpidio Jacobo., Barthel DSandraW. (1965). Functional evaluation: the Barthel Index. Md State Med J (54)2.  BRO TayF, JERARDO Solis., Inderjit, ELGINA., LAMBERTO Beck. (1999). Measuring the change indisability after inpatient rehabilitation; comparison of the responsiveness of the Barthel Index and Functional [place] Measure. Journal of Neurology, Neurosurgery, and Psychiatry, 66(4), 480-484.  DOC Cárdenas, CRISTINA Sanchez, & JACKIE Ramos (2004.) Assessment of post-stroke quality of life in cost-effectiveness studies: The usefulness of the Barthel Index and the EuroQoL-5D. Quality of Life Research, 13, 427-43       Pain Ratin/10     Activity Tolerance:   Good    After treatment:   Assisted pt back to stretcher in stable condition and bilateral rails up, call bell in reach and all lines and leads as prior to therapist arrival, son at bedside    COMMUNICATION/EDUCATION:   The patient's plan of care was discussed with: physical therapist, registered nurse, and pt and his son    Patient Education  Education Given To: Patient;Family  Education Provided: Role of Therapy;Plan of Care;Mobility Training;Fall Prevention Strategies;ADL Adaptive Strategies  Education Method: Verbal  Barriers to Learning: None;Cognition  Education Outcome: Verbalized understanding;Continued education needed    Thank you for this referral.  Gloria Son OTR/L  Minutes: 19    Occupational Therapy Evaluation Charge Determination   History Examination Decision-Making   LOW Complexity : Brief history review  LOW Complexity: 1-3 Performance deficits relating to physical, cognitive, or psychosocial skills that result in activity limitations and/or participation restrictions LOW Complexity: No comorbidities that affect functional and  no verbal  or physical assist needed to complete eval tasks   Based on the above components, the patient evaluation is determined to be of the following complexity level: Low

## 2025-02-20 NOTE — DISCHARGE SUMMARY
Discharge Summary    Name: Yefri Vigil  [id number]  YOB: 1955 (Age: 69 y.o.)   Date of Admission: 2/18/2025  Date of Discharge: 2/20/2025  Attending Physician: Taylor Rincon MD    Discharge Diagnosis:     Binocular blurry vision  CAD s/p stent to LAD 10/23  T2DM  Elevated troponin  CKD 3  Hx of West Nile encephalitis with processing issues    Consultations:  IP CONSULT TO NEUROLOGY  IP CONSULT TO CASE MANAGEMENT      Brief Admission History/Reason for Admission Per David Olivo MD:     Yefri Vigil is a 69 y.o.  male with PMHx significant for  has a past medical history of Acute kidney injury, Acute metabolic encephalopathy, Alcohol abuse, Arthritis, Aseptic meningitis, Aspiration pneumonitis (HCC), At risk for sleep apnea, Diabetes mellitus, type 2 (HCC), Diarrhea, High blood cholesterol, Hypertension, Hypokalemia, Non-traumatic rhabdomyolysis, NSTEMI (non-ST elevated myocardial infarction) (HCC), Pneumonia, S/P drug eluting coronary stent placement, and Severe sepsis (HCC). who presents with the above chief complaint.      We were asked to admit for work up and further evaluation.      Patient here with chief complaint of vision deficit.  He describes bilateral floaters and colors in his vision for the last 2 to 3 days.  Does note that he has had a headache over the past 2 days approximately.     Patient with a history of West Nile encephalitis with listed history of processing issues.  History is significantly difficult to extract and he is a poor historian.     Denies any new numbness or tingling.  States his symptoms are present in both eyes.  Has a very hard time describing his symptoms.  States he has some deficits to the center of his vision however also states that he primarily has floaters and colors to his vision.  He currently denies any vision deficit.    Brief Hospital Course by Main Problems:     Binocular blurry vision  Onset several days

## 2025-02-20 NOTE — CARE COORDINATION
Case Management     Pt cleared for d/c from CM standpoint.    CM discussed d/c plan for home with pt and Son at bedside.  Both agreed and voiced no concerns.    No CM needs identified at this time.         02/20/25 1152   Services At/After Discharge   Transition of Care Consult (CM Consult) N/A   Services At/After Discharge None   Mode of Transport at Discharge Other (see comment)  (Son)   Confirm Follow Up Transport Family   Condition of Participation: Discharge Planning   The Plan for Transition of Care is related to the following treatment goals: Return to baseline   The Patient and/or Patient Representative was provided with a Choice of Provider? Patient   The Patient and/Or Patient Representative agree with the Discharge Plan? Yes   Freedom of Choice list was provided with basic dialogue that supports the patient's individualized plan of care/goals, treatment preferences, and shares the quality data associated with the providers?  Yes       KIMBERLEE Beckman,RN  Care   [facility]  Phone: [phone]

## 2025-02-20 NOTE — DISCHARGE INSTRUCTIONS
All of your medications from before your hospitalization are the same EXCEPT:  Your new prescription for you to start is a laxative and stool softener if you need it for a bowel movement.  Please take all of your medications as prescribed. If prescribed any medications, please read all pharmacy instructions and inserts. Inform your doctor and pharmacist about all other medications and alternative therapies.  Please follow up with your PCP in 1-2 weeks to be reassessed after your hospital stay. We ruled out stroke and giant cell arteritis. You should follow up with your eye doctor.  Please also follow up with ophthalmology.  If you start feeling any symptoms similar to what brought you into the hospital, please come back to the ED to be re-evaluated.

## 2025-02-25 ENCOUNTER — HOSPITAL ENCOUNTER (OUTPATIENT)
Facility: HOSPITAL | Age: 70
Setting detail: RECURRING SERIES
Discharge: HOME | End: 2025-02-28
Payer: MEDICARE

## 2025-02-25 ENCOUNTER — TRANSCRIBE ORDERS (OUTPATIENT)
Facility: HOSPITAL | Age: 70
End: 2025-02-25

## 2025-02-25 VITALS — WEIGHT: 173 LBS | BODY MASS INDEX: 27.1 KG/M2

## 2025-02-25 DIAGNOSIS — K63.89 MASS OF COLON: Primary | ICD-10-CM

## 2025-02-25 PROCEDURE — 93798 PHYS/QHP OP CAR RHAB W/ECG: CPT

## 2025-02-25 ASSESSMENT — EXERCISE STRESS TEST
PEAK_HR: 70
PEAK_RPE: 10
PEAK_METS: 2.6

## 2025-02-27 ENCOUNTER — HOSPITAL ENCOUNTER (OUTPATIENT)
Facility: HOSPITAL | Age: 70
Setting detail: RECURRING SERIES
End: 2025-02-27
Payer: MEDICARE

## 2025-02-27 VITALS — BODY MASS INDEX: 27.41 KG/M2 | WEIGHT: 175 LBS

## 2025-02-27 PROCEDURE — 93798 PHYS/QHP OP CAR RHAB W/ECG: CPT

## 2025-02-27 ASSESSMENT — EXERCISE STRESS TEST
PEAK_METS: 3.3
PEAK_RPE: 10
PEAK_HR: 82

## 2025-03-04 ENCOUNTER — HOSPITAL ENCOUNTER (OUTPATIENT)
Facility: HOSPITAL | Age: 70
Setting detail: RECURRING SERIES
Discharge: HOME | End: 2025-03-07
Payer: MEDICARE

## 2025-03-04 VITALS — BODY MASS INDEX: 27.1 KG/M2 | WEIGHT: 173 LBS

## 2025-03-04 PROCEDURE — 93798 PHYS/QHP OP CAR RHAB W/ECG: CPT

## 2025-03-04 ASSESSMENT — EXERCISE STRESS TEST
PEAK_RPE: 10
PEAK_METS: 3.3
PEAK_HR: 73

## 2025-03-05 ENCOUNTER — OFFICE VISIT (OUTPATIENT)
Age: 70
End: 2025-03-05
Payer: MEDICARE

## 2025-03-05 VITALS
WEIGHT: 170.6 LBS | DIASTOLIC BLOOD PRESSURE: 74 MMHG | TEMPERATURE: 97.7 F | BODY MASS INDEX: 26.72 KG/M2 | HEART RATE: 58 BPM | SYSTOLIC BLOOD PRESSURE: 132 MMHG | OXYGEN SATURATION: 100 %

## 2025-03-05 DIAGNOSIS — Z95.2 S/P TAVR (TRANSCATHETER AORTIC VALVE REPLACEMENT): Primary | ICD-10-CM

## 2025-03-05 PROCEDURE — 1123F ACP DISCUSS/DSCN MKR DOCD: CPT

## 2025-03-05 PROCEDURE — 3017F COLORECTAL CA SCREEN DOC REV: CPT | Performed by: THORACIC SURGERY (CARDIOTHORACIC VASCULAR SURGERY)

## 2025-03-05 PROCEDURE — 1159F MED LIST DOCD IN RCRD: CPT

## 2025-03-05 PROCEDURE — 3078F DIAST BP <80 MM HG: CPT

## 2025-03-05 PROCEDURE — 3075F SYST BP GE 130 - 139MM HG: CPT

## 2025-03-05 PROCEDURE — G8427 DOCREV CUR MEDS BY ELIG CLIN: HCPCS

## 2025-03-05 PROCEDURE — 1036F TOBACCO NON-USER: CPT | Performed by: THORACIC SURGERY (CARDIOTHORACIC VASCULAR SURGERY)

## 2025-03-05 PROCEDURE — G8419 CALC BMI OUT NRM PARAM NOF/U: HCPCS

## 2025-03-05 PROCEDURE — 99213 OFFICE O/P EST LOW 20 MIN: CPT

## 2025-03-05 NOTE — PROGRESS NOTES
Name: Yefri Vigil   : 1955   Home Phone: [phone]     Reviewed record in preparation for visit and have obtained necessary documentation. Identified pt with two pt identifiers (name and ).     1. Have you been to the ER, urgent care clinic since your last visit?  Hospitalized since your last visit?No    2. Have you seen or consulted any other health care providers outside of the [facility] since your last visit?  Include any pap smears or colon screening. No      Yefri Vigil is being seen for TAVR follow up approximately One month post-operatively”.     Patient counseled on Office contact information and instructed to follow up  with primary cardiologist. Patient given opportunity to ask questions and receive clarification.     Current Medications-Reviewed with Patient    Allergies-Reviewed with Patient        Vitals  Wt 77.4 kg (170 lb 9.6 oz)   BMI 26.72 kg/m²           
spreading, infiltrative appearing lesion in the transverse colon, 6 to 8 cm size, concerning for neoplasia and nonobstructive, wide open Schatzki's ring at EGJ. Scheduled for colectomy 3/28/25.    I spent a total of 25 minutes chart reviewing, interviewing patient, assessing patient, as well as devising and documenting a plan of care.

## 2025-03-06 ENCOUNTER — HOSPITAL ENCOUNTER (OUTPATIENT)
Facility: HOSPITAL | Age: 70
Setting detail: RECURRING SERIES
Discharge: HOME | End: 2025-03-09
Payer: MEDICARE

## 2025-03-06 VITALS — WEIGHT: 172 LBS | BODY MASS INDEX: 26.94 KG/M2

## 2025-03-06 PROCEDURE — 93798 PHYS/QHP OP CAR RHAB W/ECG: CPT

## 2025-03-06 ASSESSMENT — EXERCISE STRESS TEST
PEAK_RPE: 10
PEAK_METS: 3.2
PEAK_HR: 75

## 2025-03-11 ENCOUNTER — HOSPITAL ENCOUNTER (OUTPATIENT)
Facility: HOSPITAL | Age: 70
Discharge: HOME | End: 2025-03-14
Payer: MEDICARE

## 2025-03-11 VITALS — WEIGHT: 170 LBS | BODY MASS INDEX: 26.63 KG/M2

## 2025-03-11 PROCEDURE — 93798 PHYS/QHP OP CAR RHAB W/ECG: CPT

## 2025-03-11 ASSESSMENT — EXERCISE STRESS TEST
PEAK_METS: 3.3
PEAK_HR: 71
PEAK_RPE: 11

## 2025-03-11 NOTE — PROGRESS NOTES
Intake Note      Patient Name: Yefri Vigil  YOB: 1955    Age: 69 y.o.  Date of Intake: 3/12/2025   Education: 7 Ethnicity Black   Gender: Male Referring Provider: SOWMYA Rosas     REASON FOR REFERRAL AND EVALUATION PROCEDURES:  Yefri Vigil  was referred for evaluation by his Neurology Provider to assist in differential diagnosis and individualized treatment planning. he understood the rationale and procedures for evaluation, as well as the limits to confidentiality, and agreed to participate. he consented to have this report made available to his  treating providers through his  electronic medical records.   History Sources: Patient and Medical Record    HISTORY OF PRESENT ILLNESS:  The patient is a 69-year-old male with pertinent medical history noted for high cholesterol, hypertension, controlled type 2 diabetes mellitus, NSTEMI, encephalopathy, meningitis, memory loss, alcohol abuse, stroke, and severe aortic stenosis.  He presented independently for clinical interview with questionable insight and ability as a historian.  At the time of this note, his report could not be corroborated by collateral sources beyond what is available in his medical record.  The patient appeared somewhat confused about today's appointment.  When asked what brings him in for the evaluation, he responded \"the appointment.\"  He did not immediately report significant problems in his cognitive functioning and he described his memory to be \"fair.\"  He acknowledged he has a tendency to lose his train of thought while completing tasks or engaging in conversations and stated this problem started in the past year or so.  With additional prompting, he associated the attentional issue to his October 2023 hospitalization, which he stated took place \"2003.\"  According to the patient's discharge summary, he was hospitalized 10/4/2023 through 10/19/2023 for pertinent diagnoses including acute toxic metabolic

## 2025-03-12 ENCOUNTER — OFFICE VISIT (OUTPATIENT)
Age: 70
End: 2025-03-12
Payer: MEDICARE

## 2025-03-12 DIAGNOSIS — F10.11 ALCOHOL ABUSE, IN REMISSION: ICD-10-CM

## 2025-03-12 DIAGNOSIS — Z86.61 PERSONAL HISTORY OF MENINGITIS: ICD-10-CM

## 2025-03-12 DIAGNOSIS — R41.3 MEMORY LOSS: ICD-10-CM

## 2025-03-12 DIAGNOSIS — Z86.73 HISTORY OF STROKE: ICD-10-CM

## 2025-03-12 DIAGNOSIS — R41.840 ATTENTION DEFICIT: ICD-10-CM

## 2025-03-12 DIAGNOSIS — G93.40 ENCEPHALOPATHY, UNSPECIFIED TYPE: Primary | ICD-10-CM

## 2025-03-12 PROCEDURE — 1036F TOBACCO NON-USER: CPT | Performed by: CLINICAL NEUROPSYCHOLOGIST

## 2025-03-12 PROCEDURE — 90791 PSYCH DIAGNOSTIC EVALUATION: CPT | Performed by: CLINICAL NEUROPSYCHOLOGIST

## 2025-03-12 PROCEDURE — 1123F ACP DISCUSS/DSCN MKR DOCD: CPT | Performed by: CLINICAL NEUROPSYCHOLOGIST

## 2025-03-13 ENCOUNTER — HOSPITAL ENCOUNTER (OUTPATIENT)
Facility: HOSPITAL | Age: 70
Setting detail: RECURRING SERIES
Discharge: HOME | End: 2025-03-16
Payer: MEDICARE

## 2025-03-13 VITALS — WEIGHT: 171 LBS | BODY MASS INDEX: 26.78 KG/M2

## 2025-03-13 PROCEDURE — 93798 PHYS/QHP OP CAR RHAB W/ECG: CPT

## 2025-03-13 ASSESSMENT — EXERCISE STRESS TEST
PEAK_METS: 3.3
PEAK_RPE: 11
PEAK_HR: 74

## 2025-03-18 ENCOUNTER — HOSPITAL ENCOUNTER (OUTPATIENT)
Facility: HOSPITAL | Age: 70
Setting detail: RECURRING SERIES
Discharge: HOME | End: 2025-03-21
Payer: MEDICARE

## 2025-03-18 VITALS — BODY MASS INDEX: 26.47 KG/M2 | WEIGHT: 169 LBS

## 2025-03-18 PROCEDURE — 93798 PHYS/QHP OP CAR RHAB W/ECG: CPT

## 2025-03-18 ASSESSMENT — EXERCISE STRESS TEST
PEAK_RPE: 11
PEAK_METS: 3.3
PEAK_HR: 73

## 2025-03-19 ENCOUNTER — HOSPITAL ENCOUNTER (OUTPATIENT)
Facility: HOSPITAL | Age: 70
Discharge: HOME | End: 2025-03-22
Payer: MEDICARE

## 2025-03-19 VITALS
SYSTOLIC BLOOD PRESSURE: 151 MMHG | HEIGHT: 66 IN | WEIGHT: 166.45 LBS | BODY MASS INDEX: 26.75 KG/M2 | DIASTOLIC BLOOD PRESSURE: 71 MMHG | OXYGEN SATURATION: 100 % | TEMPERATURE: 98.1 F | HEART RATE: 56 BPM | RESPIRATION RATE: 14 BRPM

## 2025-03-19 LAB
ABO + RH BLD: NORMAL
ALBUMIN SERPL-MCNC: 3.1 G/DL (ref 3.5–5)
ALBUMIN/GLOB SERPL: 0.7 (ref 1.1–2.2)
ALP SERPL-CCNC: 82 U/L (ref 45–117)
ALT SERPL-CCNC: 20 U/L (ref 12–78)
ANION GAP SERPL CALC-SCNC: 3 MMOL/L (ref 2–12)
APPEARANCE UR: CLEAR
APTT PPP: 27.4 SEC (ref 22.1–31)
AST SERPL-CCNC: 21 U/L (ref 15–37)
BACTERIA URNS QL MICRO: NEGATIVE /HPF
BILIRUB SERPL-MCNC: 0.3 MG/DL (ref 0.2–1)
BILIRUB UR QL: NEGATIVE
BLOOD GROUP ANTIBODIES SERPL: NORMAL
BUN SERPL-MCNC: 22 MG/DL (ref 6–20)
BUN/CREAT SERPL: 13 (ref 12–20)
CALCIUM SERPL-MCNC: 9.3 MG/DL (ref 8.5–10.1)
CHLORIDE SERPL-SCNC: 108 MMOL/L (ref 97–108)
CO2 SERPL-SCNC: 25 MMOL/L (ref 21–32)
COLOR UR: ABNORMAL
CREAT SERPL-MCNC: 1.75 MG/DL (ref 0.7–1.3)
EPITH CASTS URNS QL MICRO: ABNORMAL /LPF
ERYTHROCYTE [DISTWIDTH] IN BLOOD BY AUTOMATED COUNT: 18.3 % (ref 11.5–14.5)
EST. AVERAGE GLUCOSE BLD GHB EST-MCNC: 160 MG/DL
GLOBULIN SER CALC-MCNC: 4.5 G/DL (ref 2–4)
GLUCOSE SERPL-MCNC: 80 MG/DL (ref 65–100)
GLUCOSE UR STRIP.AUTO-MCNC: NEGATIVE MG/DL
HBA1C MFR BLD: 7.2 % (ref 4–5.6)
HCT VFR BLD AUTO: 33.6 % (ref 36.6–50.3)
HGB BLD-MCNC: 9.9 G/DL (ref 12.1–17)
HGB UR QL STRIP: NEGATIVE
HYALINE CASTS URNS QL MICRO: ABNORMAL /LPF (ref 0–2)
INR PPP: 1 (ref 0.9–1.1)
KETONES UR QL STRIP.AUTO: NEGATIVE MG/DL
LEUKOCYTE ESTERASE UR QL STRIP.AUTO: NEGATIVE
MAGNESIUM SERPL-MCNC: 1.7 MG/DL (ref 1.6–2.4)
MCH RBC QN AUTO: 21.7 PG (ref 26–34)
MCHC RBC AUTO-ENTMCNC: 29.5 G/DL (ref 30–36.5)
MCV RBC AUTO: 73.7 FL (ref 80–99)
NITRITE UR QL STRIP.AUTO: NEGATIVE
NRBC # BLD: 0 K/UL (ref 0–0.01)
NRBC BLD-RTO: 0 PER 100 WBC
PH UR STRIP: 5.5 (ref 5–8)
PHOSPHATE SERPL-MCNC: 3.9 MG/DL (ref 2.6–4.7)
PLATELET # BLD AUTO: 219 K/UL (ref 150–400)
PMV BLD AUTO: 10.2 FL (ref 8.9–12.9)
POTASSIUM SERPL-SCNC: 4.2 MMOL/L (ref 3.5–5.1)
PROT SERPL-MCNC: 7.6 G/DL (ref 6.4–8.2)
PROT UR STRIP-MCNC: 300 MG/DL
PROTHROMBIN TIME: 10.6 SEC (ref 9.2–11.2)
RBC # BLD AUTO: 4.56 M/UL (ref 4.1–5.7)
RBC #/AREA URNS HPF: ABNORMAL /HPF (ref 0–5)
SODIUM SERPL-SCNC: 136 MMOL/L (ref 136–145)
SP GR UR REFRACTOMETRY: 1.02
SPECIMEN EXP DATE BLD: NORMAL
THERAPEUTIC RANGE: NORMAL SECS (ref 58–77)
URINE CULTURE IF INDICATED: ABNORMAL
UROBILINOGEN UR QL STRIP.AUTO: 0.2 EU/DL (ref 0.2–1)
WBC # BLD AUTO: 4.8 K/UL (ref 4.1–11.1)
WBC URNS QL MICRO: ABNORMAL /HPF (ref 0–4)

## 2025-03-19 PROCEDURE — 84100 ASSAY OF PHOSPHORUS: CPT

## 2025-03-19 PROCEDURE — 86900 BLOOD TYPING SEROLOGIC ABO: CPT

## 2025-03-19 PROCEDURE — 86850 RBC ANTIBODY SCREEN: CPT

## 2025-03-19 PROCEDURE — 36415 COLL VENOUS BLD VENIPUNCTURE: CPT

## 2025-03-19 PROCEDURE — 80053 COMPREHEN METABOLIC PANEL: CPT

## 2025-03-19 PROCEDURE — 83036 HEMOGLOBIN GLYCOSYLATED A1C: CPT

## 2025-03-19 PROCEDURE — 83735 ASSAY OF MAGNESIUM: CPT

## 2025-03-19 PROCEDURE — 81001 URINALYSIS AUTO W/SCOPE: CPT

## 2025-03-19 PROCEDURE — 86901 BLOOD TYPING SEROLOGIC RH(D): CPT

## 2025-03-19 PROCEDURE — 85610 PROTHROMBIN TIME: CPT

## 2025-03-19 PROCEDURE — 85027 COMPLETE CBC AUTOMATED: CPT

## 2025-03-19 PROCEDURE — 85730 THROMBOPLASTIN TIME PARTIAL: CPT

## 2025-03-19 RX ORDER — ACETAMINOPHEN 500 MG
1000 TABLET ORAL ONCE
OUTPATIENT
Start: 2025-03-28

## 2025-03-19 RX ORDER — ALVIMOPAN 12 MG/1
12 CAPSULE ORAL ONCE
OUTPATIENT
Start: 2025-03-28 | End: 2025-04-03

## 2025-03-19 RX ORDER — CEFAZOLIN SODIUM/WATER 2 G/20 ML
2000 SYRINGE (ML) INTRAVENOUS ONCE
OUTPATIENT
Start: 2025-03-28

## 2025-03-19 RX ORDER — DOCUSATE SODIUM 100 MG/1
100 CAPSULE, LIQUID FILLED ORAL 2 TIMES DAILY
COMMUNITY

## 2025-03-19 RX ORDER — SODIUM CHLORIDE, SODIUM LACTATE, POTASSIUM CHLORIDE, CALCIUM CHLORIDE 600; 310; 30; 20 MG/100ML; MG/100ML; MG/100ML; MG/100ML
INJECTION, SOLUTION INTRAVENOUS CONTINUOUS
OUTPATIENT
Start: 2025-03-28

## 2025-03-19 RX ORDER — CELECOXIB 200 MG/1
200 CAPSULE ORAL ONCE
OUTPATIENT
Start: 2025-03-28

## 2025-03-19 RX ORDER — METOCLOPRAMIDE 10 MG/1
10 TABLET ORAL SEE ADMIN INSTRUCTIONS
COMMUNITY

## 2025-03-19 RX ORDER — NEOMYCIN SULFATE 500 MG/1
500 TABLET ORAL SEE ADMIN INSTRUCTIONS
COMMUNITY

## 2025-03-19 RX ORDER — METRONIDAZOLE 500 MG/1
500 TABLET ORAL SEE ADMIN INSTRUCTIONS
COMMUNITY

## 2025-03-19 RX ORDER — METRONIDAZOLE 500 MG/100ML
500 INJECTION, SOLUTION INTRAVENOUS ONCE
OUTPATIENT
Start: 2025-03-28

## 2025-03-19 NOTE — PROGRESS NOTES
PAT Nurse Practitioner   Pre-Operative Chart Review/Assessment            Patient Name:  Yefri Vigil          Age:   69 y.o.    :  1955    Surgeon:   Nitza     Primary Care Provider:    Edy Brandt APRN - NP    Date of PAT:  3/19/2025    Date of Surgery:    3/28/2025     Procedure(s):  ROBOTIC TRANSVERSE COLECTOMY (E R A S)     Anesthesia:  General     Pertinent Medical History:  severe AVS s/p TAVR (2025), CAD (PCI LAD ), HTN, HLD, CKD 3A, Anemia, DM2    Allergies:  No Known Allergies            PLAN:      1)  Cardiac Clearance:  Obtained Kenny Strong MD  EKG in chart from cards; NSR , QRS 98.  Able to proceed with GI surgery as low risk - If surgically necessary, hold aspirin shortest duration surgically possible given recent TAVR intervention.      Echo 2025: normal EF, AVR mean 8       2)  Sleep apnea screening:  STOP BAN       3)  Program for Diabetes Health Consult:  A1c 7.2 (3/19/25), known diagnosis on Metformin. Glucose 80 on BMP      4) Treatment for MRSA/MSSA:  MRSA not present       5) Discharge plan: Per acute care floor protocol      6) Medication recommendations prior to surgery:    Do not take Metformin the morning of surgery.   Given cardiology recommendations, called patient and requested to continue ASA 81mg in perioperative peroid, given recent TAVR procedure 2025.   Please hold iron for 5 days prior to surgery.       7) Additional Concerns:  None identified            Vital Signs:       BP (!) 151/71   Pulse 56   Temp 98.1 °F (36.7 °C) (Oral)   Resp 14   Ht 1.67 m (5' 5.75\")   Wt 75.5 kg (166 lb 7.2 oz)   SpO2 100%   BMI 27.07 kg/m²          ERAS/amarjit-op instructions:     Week Before Surgery:   Start Ensure surgery immuno-nutrition shakes on  through . Drink 1/2 bottle of the shake, 4 times a day. This means you will have a total of 2 shakes per day.      Start incentive spirometry on

## 2025-03-20 ENCOUNTER — HOSPITAL ENCOUNTER (OUTPATIENT)
Facility: HOSPITAL | Age: 70
Setting detail: RECURRING SERIES
Discharge: HOME | End: 2025-03-23
Payer: MEDICARE

## 2025-03-20 VITALS — WEIGHT: 170 LBS | BODY MASS INDEX: 27.65 KG/M2

## 2025-03-20 LAB
BACTERIA SPEC CULT: NORMAL
BACTERIA SPEC CULT: NORMAL
SERVICE CMNT-IMP: NORMAL

## 2025-03-20 PROCEDURE — 93798 PHYS/QHP OP CAR RHAB W/ECG: CPT

## 2025-03-20 ASSESSMENT — EXERCISE STRESS TEST
PEAK_METS: 3.3
PEAK_HR: 77
PEAK_RPE: 11

## 2025-03-21 NOTE — PERIOP NOTE
[facility]  Preoperative Instructions        Surgery Date: 3/28/25          Time of Arrival To Be Called (day prior between 2-5pm)  Contact: [phone]     1. On the day of your surgery, please report to the Surgical Services Registration Desk and sign in at your designated time. The Surgery Center is located to the right of the Emergency Room.     2. You must have someone with you to drive you home. You should not drive a car for 24 hours following surgery. Please make arrangements for a friend or family member to stay with you for the first 24 hours after your surgery.    3. Refer to your handbook for instructions on drinking liquids prior to surgery.    4. We recommend you do not drink any alcoholic beverages for 24 hours before and after your surgery.    5. Contact your surgeon’s office for instructions on the following medications: non-steroidal anti-inflammatory drugs (i.e. Advil, Aleve), vitamins, and supplements. (Some surgeon’s will want you to stop these medications prior to surgery and others may allow you to take them)  **If you are currently taking Plavix, Coumadin, Aspirin and/or other blood-thinning agents, contact your surgeon for instructions.** Your surgeon will partner with the physician prescribing these medications to determine if it is safe to stop or if you need to continue taking.  Please do not stop taking these medications without instructions from your surgeon    6. Wear comfortable clothes.  Wear glasses instead of contacts.  Do not bring any money or jewelry. Please bring picture ID, insurance card, and any prearranged co-payment or hospital payment.  Do not wear make-up, particularly mascara the morning of your surgery.  Do not wear nail polish, particularly if you are having foot /hand surgery.  Wear your hair loose or down, no ponytails, buns, calli pins or clips.  All body piercings must be removed. Please do not shave for 48 hours prior to surgery. 
Called pt's son Jules Vigil, he confirms his father has picked up his ERAS pre-op medications and bowel prep.   
Follow-up phone call with patient.  Per cardiology recommendations, patient should continue Aspirin 81 mg prior to surgery.  Patient verbalized understanding without further questions at this time.  
Incentive Spirometer        Using the incentive spirometer helps expand the small air sacs of your lungs, helps you breathe deeply, and helps improve your lung function.  Use your incentive spirometer twice a day (10 breaths each time) prior to surgery.      How to Use Your Incentive Spirometer:  Hold the incentive spirometer in an upright position.   Breathe out as usual.   Place the mouthpiece in your mouth and seal your lips tightly around it.   Take a deep breath.  Breathe in slowly and as deeply as possible. Keep the blue flow rate guide between the arrows.   Hold your breath as long as possible. Then exhale slowly and allow the piston to fall to the bottom of the column.   Rest for a few seconds and repeat steps one through five at least 10 times.     PAT Tidal Volume: 2000  X: 3  Date: 3/19/25        BRING THE INCENTIVE SPIROMETER WITH YOU TO THE HOSPITAL ON THE DAY OF YOUR SURGERY.  Opportunity given to ask and answer questions as well as to observe return demonstration.    Patient signature_____________________________    Witness____________________________    
OSORIOM with Tiffanie at Dr. Goddard's office stating pt has not yet received ERAS  prescriptions for surgery.   
Orthopedic and Spine Patients:  Instructions on When You Can   Eat or Drink Before Surgery      You have been provided a pre-surgery drink received at your pre-admission testing appointment.    Night before surgery:  You should drink 1/2 bottle of the  pre-surgery drink at bedtime.   No food after midnight!        Day of Surgery:  Complete 2nd half of the bottle of the pre-surgery drink 1 hour prior to arrival at hospital.      For questions call Pre-Admission Testing at [phone].  They are available from 8:00am-5:00pm, Monday through Friday.    
Pt states his ERAS binder is at home. Reviewed all pre-op instructions with pt and gave him ERAS pre-op calendar sheet with dates filed out. Pt confirms understanding of instructions.      
Week Before Surgery:   Start Ensure surgery immuno-nutrition shakes on Saturday March 22nd through Wednesday March 26th. Drink 1/2 bottle of the shake, 4 times a day. This means you will have a total of 2 shakes per day.      Start incentive spirometry on Wednesday March 19th and practice twice daily- 10 times each and bring incentive spirometer with you the day of surgery     Bathe with Dial Soap for three days before your surgery, starting Tuesday March 25th    Day Before Surgery:   On Thursday March 27th eat a regular breakfast.     On Thursday March 27th start clear liquid diet at 12 noon.     Begin bowel prep Thursday March 27th at 12 noon and follow instructions per surgeon.    Take metoclopramide at 6 am, 12 noon, and 5 pm the day before surgery.   Take metronidazole at 8 am, 9 am, and 5 pm the day before surgery.  Take neomycin at 8 am, 9 am, and 5 pm the day before surgery.      Shower with Dial Soap and Hibiclens.    Drink 1/2 bottle of Ensure Pre-Surgery drink.    Day of Surgery:   Shower with Dial and Hibiclens.    Drink 1/2 bottle of Ensure Pre-Surgery drink. Please finish drink 1 hr prior to arrival to hospital.     
healing and prevent you from healing completely.    If you lose your Hibiclens/chlorhexidine, please call the Surgery Center, or it is available for purchase at your pharmacy.               ___________________      ___________________      ________________  (Signature of Patient)          (Witness)                   (Date and Time)

## 2025-03-25 ENCOUNTER — HOSPITAL ENCOUNTER (OUTPATIENT)
Facility: HOSPITAL | Age: 70
Setting detail: RECURRING SERIES
Discharge: HOME | End: 2025-03-28
Payer: MEDICARE

## 2025-03-25 VITALS — BODY MASS INDEX: 27.81 KG/M2 | WEIGHT: 171 LBS

## 2025-03-25 PROCEDURE — 93798 PHYS/QHP OP CAR RHAB W/ECG: CPT

## 2025-03-25 ASSESSMENT — EXERCISE STRESS TEST
PEAK_RPE: 11
PEAK_METS: 3.6
PEAK_HR: 91

## 2025-03-26 ENCOUNTER — PROCEDURE VISIT (OUTPATIENT)
Age: 70
End: 2025-03-26
Payer: MEDICARE

## 2025-03-26 DIAGNOSIS — F02.80 MAJOR NEUROCOGNITIVE DISORDER DUE TO MULTIPLE ETIOLOGIES (HCC): Primary | ICD-10-CM

## 2025-03-26 DIAGNOSIS — F43.23 ADJUSTMENT DISORDER WITH MIXED ANXIETY AND DEPRESSED MOOD: ICD-10-CM

## 2025-03-26 PROCEDURE — 96139 PSYCL/NRPSYC TST TECH EA: CPT | Performed by: CLINICAL NEUROPSYCHOLOGIST

## 2025-03-26 PROCEDURE — 96138 PSYCL/NRPSYC TECH 1ST: CPT | Performed by: CLINICAL NEUROPSYCHOLOGIST

## 2025-03-26 PROCEDURE — 96133 NRPSYC TST EVAL PHYS/QHP EA: CPT | Performed by: CLINICAL NEUROPSYCHOLOGIST

## 2025-03-26 PROCEDURE — 96132 NRPSYC TST EVAL PHYS/QHP 1ST: CPT | Performed by: CLINICAL NEUROPSYCHOLOGIST

## 2025-03-27 ENCOUNTER — APPOINTMENT (OUTPATIENT)
Facility: HOSPITAL | Age: 70
End: 2025-03-27
Payer: MEDICARE

## 2025-04-02 RX ORDER — CELECOXIB 200 MG/1
200 CAPSULE ORAL ONCE
OUTPATIENT
Start: 2025-04-11

## 2025-04-02 RX ORDER — SODIUM CHLORIDE, SODIUM LACTATE, POTASSIUM CHLORIDE, CALCIUM CHLORIDE 600; 310; 30; 20 MG/100ML; MG/100ML; MG/100ML; MG/100ML
INJECTION, SOLUTION INTRAVENOUS CONTINUOUS
OUTPATIENT
Start: 2025-04-11

## 2025-04-02 RX ORDER — ALVIMOPAN 12 MG/1
12 CAPSULE ORAL ONCE
OUTPATIENT
Start: 2025-04-11 | End: 2025-04-17

## 2025-04-02 RX ORDER — ACETAMINOPHEN 500 MG
1000 TABLET ORAL ONCE
OUTPATIENT
Start: 2025-04-11

## 2025-04-02 RX ORDER — CEFAZOLIN SODIUM/WATER 2 G/20 ML
2000 SYRINGE (ML) INTRAVENOUS ONCE
OUTPATIENT
Start: 2025-04-11

## 2025-04-02 NOTE — PERIOP NOTE
[facility]  Preoperative Instructions        Surgery Date Friday, April 11th          Time of Arrival TBD/TBC @ [phone]    1. On the day of your surgery, please report to the Surgical Services Registration Desk and sign in at your designated time. The Surgery Center is located to the right of the Emergency Room.     2. You must have someone with you to drive you home. You should not drive a car for 24 hours following surgery. Please make arrangements for a friend or family member to stay with you for the first 24 hours after your surgery.    3. Refer to your handbook for instructions on drinking liquids prior to surgery.    4. We recommend you do not drink any alcoholic beverages for 24 hours before and after your surgery.    5. Contact your surgeon’s office for instructions on the following medications: non-steroidal anti-inflammatory drugs (i.e. Advil, Aleve), vitamins, and supplements. (Some surgeon’s will want you to stop these medications prior to surgery and others may allow you to take them)  **If you are currently taking Plavix, Coumadin, Aspirin and/or other blood-thinning agents, contact your surgeon for instructions.** Your surgeon will partner with the physician prescribing these medications to determine if it is safe to stop or if you need to continue taking.  Please do not stop taking these medications without instructions from your surgeon    6. Wear comfortable clothes.  Wear glasses instead of contacts.  Do not bring any money or jewelry. Please bring picture ID, insurance card, and any prearranged co-payment or hospital payment.  Do not wear make-up, particularly mascara the morning of your surgery.  Do not wear nail polish, particularly if you are having foot /hand surgery.  Wear your hair loose or down, no ponytails, buns, calli pins or clips.  All body piercings must be removed.  Please shower with antibacterial soap for three consecutive days before and on  the morning of surgery, but do not apply any lotions, powders or deodorants after the shower on the day of surgery. Please use a fresh towels after each shower. Please sleep in clean clothes and change bed linens the night before surgery.  Please do not shave for 48 hours prior to surgery. Shaving of the face is acceptable.    7. You should understand that if you do not follow these instructions your surgery may be cancelled.  If your physical condition changes (I.e. fever, cold or flu) please contact your surgeon as soon as possible.    8. It is important that you be on time.  If a situation occurs where you may be late, please call [phone] (OR Holding Area).    9. If you have any questions and or problems, please call [phone] (Pre-admission Testing).    10. Your surgery time may be subject to change.  You will receive a phone call the evening prior with your arrival time.    11.  If having outpatient surgery, you must have someone to drive you here, stay with you during the duration of your stay, and to drive you home at time of discharge.      Special Instructions:   Start using the incentive spirometer twice daily, ten breaths each time today  Bring the incentive spirometer with you to the hospital    On Saturday, April 5th through Wednesday, April 10th start drinking the Ensure Surgery immuno-nutrition shakes, splitting the shakes in half and drinking half a shake, four times daily  Starting FOUR (4) NIGHTS BEFORE surgery, shower using the antibacterial soap daily  The DAY BEFORE surgery:  Eat and drink normally until 12 noon; start a clear liquid diet thereafter  Begin bowel prep at 12 noon per surgeon instructions  Take metoclopramide/Reglan, metronidazole/Flagyl and neomycin per surgeon instructions  Drink one-half bottle of the Ensure Pre-Surgery clear carbohydrate drink before bed  After showering with the antibacterial bar of soap, apply Hibiclens and follow the \"Preventing Infections Before

## 2025-04-03 ENCOUNTER — HOSPITAL ENCOUNTER (OUTPATIENT)
Facility: HOSPITAL | Age: 70
Discharge: HOME | End: 2025-04-03
Payer: MEDICARE

## 2025-04-03 LAB
ABO + RH BLD: NORMAL
BLOOD GROUP ANTIBODIES SERPL: NORMAL
SPECIMEN EXP DATE BLD: NORMAL

## 2025-04-03 PROCEDURE — 86901 BLOOD TYPING SEROLOGIC RH(D): CPT

## 2025-04-03 PROCEDURE — 86850 RBC ANTIBODY SCREEN: CPT

## 2025-04-03 PROCEDURE — 86900 BLOOD TYPING SEROLOGIC ABO: CPT

## 2025-04-03 PROCEDURE — 36415 COLL VENOUS BLD VENIPUNCTURE: CPT

## 2025-04-03 PROCEDURE — 86923 COMPATIBILITY TEST ELECTRIC: CPT

## 2025-04-03 NOTE — PERIOP NOTE
Patient here for type/screen, ERAS supplies & instructions. Denies any changes/updates since 3/19/25 appointment. Patient verbalized understanding of instructions, states has bowel prep and day prior to surgery medications already picked up from pharmacy.

## 2025-04-10 ENCOUNTER — ANESTHESIA EVENT (OUTPATIENT)
Facility: HOSPITAL | Age: 70
End: 2025-04-10
Payer: MEDICARE

## 2025-04-11 ENCOUNTER — HOSPITAL ENCOUNTER (INPATIENT)
Facility: HOSPITAL | Age: 70
LOS: 8 days | Discharge: NURSING, REHAB OR LONG-TERM CARE | DRG: 329 | End: 2025-04-19
Attending: STUDENT IN AN ORGANIZED HEALTH CARE EDUCATION/TRAINING PROGRAM | Admitting: STUDENT IN AN ORGANIZED HEALTH CARE EDUCATION/TRAINING PROGRAM
Payer: MEDICARE

## 2025-04-11 ENCOUNTER — ANESTHESIA (OUTPATIENT)
Facility: HOSPITAL | Age: 70
End: 2025-04-11
Payer: MEDICARE

## 2025-04-11 DIAGNOSIS — K63.89 COLONIC MASS: Primary | ICD-10-CM

## 2025-04-11 LAB
GLUCOSE BLD STRIP.AUTO-MCNC: 115 MG/DL (ref 65–117)
GLUCOSE BLD STRIP.AUTO-MCNC: 124 MG/DL (ref 65–117)
GLUCOSE BLD STRIP.AUTO-MCNC: 73 MG/DL (ref 65–117)
GLUCOSE BLD STRIP.AUTO-MCNC: 97 MG/DL (ref 65–117)
SERVICE CMNT-IMP: ABNORMAL
SERVICE CMNT-IMP: NORMAL

## 2025-04-11 PROCEDURE — 82962 GLUCOSE BLOOD TEST: CPT

## 2025-04-11 PROCEDURE — 88309 TISSUE EXAM BY PATHOLOGIST: CPT

## 2025-04-11 PROCEDURE — 2580000003 HC RX 258: Performed by: STUDENT IN AN ORGANIZED HEALTH CARE EDUCATION/TRAINING PROGRAM

## 2025-04-11 PROCEDURE — 6360000002 HC RX W HCPCS

## 2025-04-11 PROCEDURE — 2720000010 HC SURG SUPPLY STERILE: Performed by: STUDENT IN AN ORGANIZED HEALTH CARE EDUCATION/TRAINING PROGRAM

## 2025-04-11 PROCEDURE — 8E0W4CZ ROBOTIC ASSISTED PROCEDURE OF TRUNK REGION, PERCUTANEOUS ENDOSCOPIC APPROACH: ICD-10-PCS | Performed by: STUDENT IN AN ORGANIZED HEALTH CARE EDUCATION/TRAINING PROGRAM

## 2025-04-11 PROCEDURE — 2500000003 HC RX 250 WO HCPCS: Performed by: STUDENT IN AN ORGANIZED HEALTH CARE EDUCATION/TRAINING PROGRAM

## 2025-04-11 PROCEDURE — 88360 TUMOR IMMUNOHISTOCHEM/MANUAL: CPT

## 2025-04-11 PROCEDURE — C1762 CONN TISS, HUMAN(INC FASCIA): HCPCS | Performed by: STUDENT IN AN ORGANIZED HEALTH CARE EDUCATION/TRAINING PROGRAM

## 2025-04-11 PROCEDURE — 3700000001 HC ADD 15 MINUTES (ANESTHESIA): Performed by: STUDENT IN AN ORGANIZED HEALTH CARE EDUCATION/TRAINING PROGRAM

## 2025-04-11 PROCEDURE — 2709999900 HC NON-CHARGEABLE SUPPLY: Performed by: STUDENT IN AN ORGANIZED HEALTH CARE EDUCATION/TRAINING PROGRAM

## 2025-04-11 PROCEDURE — P9045 ALBUMIN (HUMAN), 5%, 250 ML: HCPCS

## 2025-04-11 PROCEDURE — 6360000002 HC RX W HCPCS: Performed by: STUDENT IN AN ORGANIZED HEALTH CARE EDUCATION/TRAINING PROGRAM

## 2025-04-11 PROCEDURE — 2500000003 HC RX 250 WO HCPCS

## 2025-04-11 PROCEDURE — S2900 ROBOTIC SURGICAL SYSTEM: HCPCS | Performed by: STUDENT IN AN ORGANIZED HEALTH CARE EDUCATION/TRAINING PROGRAM

## 2025-04-11 PROCEDURE — 0DTF4ZG RESECTION OF RIGHT LARGE INTESTINE, PERCUTANEOUS ENDOSCOPIC APPROACH, HAND-ASSISTED: ICD-10-PCS | Performed by: STUDENT IN AN ORGANIZED HEALTH CARE EDUCATION/TRAINING PROGRAM

## 2025-04-11 PROCEDURE — 6370000000 HC RX 637 (ALT 250 FOR IP): Performed by: STUDENT IN AN ORGANIZED HEALTH CARE EDUCATION/TRAINING PROGRAM

## 2025-04-11 PROCEDURE — 2500000003 HC RX 250 WO HCPCS: Performed by: NURSE ANESTHETIST, CERTIFIED REGISTERED

## 2025-04-11 PROCEDURE — 7100000001 HC PACU RECOVERY - ADDTL 15 MIN: Performed by: STUDENT IN AN ORGANIZED HEALTH CARE EDUCATION/TRAINING PROGRAM

## 2025-04-11 PROCEDURE — 2580000003 HC RX 258: Performed by: ANESTHESIOLOGY

## 2025-04-11 PROCEDURE — 88342 IMHCHEM/IMCYTCHM 1ST ANTB: CPT

## 2025-04-11 PROCEDURE — 1100000000 HC RM PRIVATE

## 2025-04-11 PROCEDURE — 3600000009 HC SURGERY ROBOT BASE: Performed by: STUDENT IN AN ORGANIZED HEALTH CARE EDUCATION/TRAINING PROGRAM

## 2025-04-11 PROCEDURE — 88341 IMHCHEM/IMCYTCHM EA ADD ANTB: CPT

## 2025-04-11 PROCEDURE — 7100000000 HC PACU RECOVERY - FIRST 15 MIN: Performed by: STUDENT IN AN ORGANIZED HEALTH CARE EDUCATION/TRAINING PROGRAM

## 2025-04-11 PROCEDURE — 6360000002 HC RX W HCPCS: Performed by: NURSE ANESTHETIST, CERTIFIED REGISTERED

## 2025-04-11 PROCEDURE — 3600000019 HC SURGERY ROBOT ADDTL 15MIN: Performed by: STUDENT IN AN ORGANIZED HEALTH CARE EDUCATION/TRAINING PROGRAM

## 2025-04-11 PROCEDURE — 3700000000 HC ANESTHESIA ATTENDED CARE: Performed by: STUDENT IN AN ORGANIZED HEALTH CARE EDUCATION/TRAINING PROGRAM

## 2025-04-11 DEVICE — IMPLANTABLE DEVICE: Type: IMPLANTABLE DEVICE | Site: ABDOMEN | Status: FUNCTIONAL

## 2025-04-11 RX ORDER — HYDRALAZINE HYDROCHLORIDE 20 MG/ML
INJECTION INTRAMUSCULAR; INTRAVENOUS
Status: DISCONTINUED | OUTPATIENT
Start: 2025-04-11 | End: 2025-04-11 | Stop reason: SDUPTHER

## 2025-04-11 RX ORDER — SODIUM CHLORIDE, SODIUM LACTATE, POTASSIUM CHLORIDE, CALCIUM CHLORIDE 600; 310; 30; 20 MG/100ML; MG/100ML; MG/100ML; MG/100ML
INJECTION, SOLUTION INTRAVENOUS CONTINUOUS
Status: DISCONTINUED | OUTPATIENT
Start: 2025-04-11 | End: 2025-04-11 | Stop reason: HOSPADM

## 2025-04-11 RX ORDER — INDOCYANINE GREEN AND WATER 25 MG
KIT INJECTION
Status: DISCONTINUED | OUTPATIENT
Start: 2025-04-11 | End: 2025-04-11 | Stop reason: SDUPTHER

## 2025-04-11 RX ORDER — ATORVASTATIN CALCIUM 40 MG/1
40 TABLET, FILM COATED ORAL DAILY
Status: DISCONTINUED | OUTPATIENT
Start: 2025-04-12 | End: 2025-04-19 | Stop reason: HOSPADM

## 2025-04-11 RX ORDER — ONDANSETRON 4 MG/1
4 TABLET, ORALLY DISINTEGRATING ORAL EVERY 8 HOURS PRN
Status: DISCONTINUED | OUTPATIENT
Start: 2025-04-11 | End: 2025-04-14

## 2025-04-11 RX ORDER — INSULIN LISPRO 100 [IU]/ML
0-4 INJECTION, SOLUTION INTRAVENOUS; SUBCUTANEOUS
Status: DISCONTINUED | OUTPATIENT
Start: 2025-04-11 | End: 2025-04-12

## 2025-04-11 RX ORDER — PROCHLORPERAZINE EDISYLATE 5 MG/ML
5 INJECTION INTRAMUSCULAR; INTRAVENOUS
Status: DISCONTINUED | OUTPATIENT
Start: 2025-04-11 | End: 2025-04-11 | Stop reason: HOSPADM

## 2025-04-11 RX ORDER — PHENYLEPHRINE HCL IN 0.9% NACL 0.4MG/10ML
SYRINGE (ML) INTRAVENOUS
Status: DISCONTINUED | OUTPATIENT
Start: 2025-04-11 | End: 2025-04-11 | Stop reason: SDUPTHER

## 2025-04-11 RX ORDER — OXYCODONE HYDROCHLORIDE 5 MG/1
5 TABLET ORAL EVERY 4 HOURS PRN
Status: DISCONTINUED | OUTPATIENT
Start: 2025-04-11 | End: 2025-04-14

## 2025-04-11 RX ORDER — MAGNESIUM SULFATE HEPTAHYDRATE 40 MG/ML
INJECTION, SOLUTION INTRAVENOUS
Status: DISCONTINUED | OUTPATIENT
Start: 2025-04-11 | End: 2025-04-11 | Stop reason: SDUPTHER

## 2025-04-11 RX ORDER — SODIUM CHLORIDE 0.9 % (FLUSH) 0.9 %
5-40 SYRINGE (ML) INJECTION EVERY 12 HOURS SCHEDULED
Status: DISCONTINUED | OUTPATIENT
Start: 2025-04-11 | End: 2025-04-14

## 2025-04-11 RX ORDER — MIDAZOLAM HYDROCHLORIDE 1 MG/ML
INJECTION, SOLUTION INTRAMUSCULAR; INTRAVENOUS
Status: DISCONTINUED | OUTPATIENT
Start: 2025-04-11 | End: 2025-04-11 | Stop reason: SDUPTHER

## 2025-04-11 RX ORDER — ALBUMIN HUMAN 50 G/1000ML
SOLUTION INTRAVENOUS
Status: DISCONTINUED | OUTPATIENT
Start: 2025-04-11 | End: 2025-04-11 | Stop reason: SDUPTHER

## 2025-04-11 RX ORDER — LIDOCAINE HYDROCHLORIDE ANHYDROUS AND DEXTROSE MONOHYDRATE 5; 400 G/100ML; MG/100ML
INJECTION, SOLUTION INTRAVENOUS
Status: DISCONTINUED | OUTPATIENT
Start: 2025-04-11 | End: 2025-04-11 | Stop reason: SDUPTHER

## 2025-04-11 RX ORDER — SODIUM CHLORIDE 9 MG/ML
INJECTION, SOLUTION INTRAVENOUS PRN
Status: DISCONTINUED | OUTPATIENT
Start: 2025-04-11 | End: 2025-04-14

## 2025-04-11 RX ORDER — SODIUM CHLORIDE 0.9 % (FLUSH) 0.9 %
5-40 SYRINGE (ML) INJECTION EVERY 12 HOURS SCHEDULED
Status: DISCONTINUED | OUTPATIENT
Start: 2025-04-11 | End: 2025-04-11 | Stop reason: HOSPADM

## 2025-04-11 RX ORDER — SODIUM CHLORIDE 0.9 % (FLUSH) 0.9 %
5-40 SYRINGE (ML) INJECTION PRN
Status: DISCONTINUED | OUTPATIENT
Start: 2025-04-11 | End: 2025-04-19 | Stop reason: HOSPADM

## 2025-04-11 RX ORDER — LIDOCAINE HYDROCHLORIDE 20 MG/ML
INJECTION, SOLUTION EPIDURAL; INFILTRATION; INTRACAUDAL; PERINEURAL
Status: DISCONTINUED | OUTPATIENT
Start: 2025-04-11 | End: 2025-04-11 | Stop reason: SDUPTHER

## 2025-04-11 RX ORDER — ALVIMOPAN 12 MG/1
12 CAPSULE ORAL 2 TIMES DAILY
Status: DISPENSED | OUTPATIENT
Start: 2025-04-11 | End: 2025-04-15

## 2025-04-11 RX ORDER — ACETAMINOPHEN 325 MG/1
650 TABLET ORAL EVERY 6 HOURS
Status: DISCONTINUED | OUTPATIENT
Start: 2025-04-11 | End: 2025-04-19 | Stop reason: HOSPADM

## 2025-04-11 RX ORDER — BUPIVACAINE HYDROCHLORIDE 2.5 MG/ML
INJECTION, SOLUTION EPIDURAL; INFILTRATION; INTRACAUDAL; PERINEURAL PRN
Status: DISCONTINUED | OUTPATIENT
Start: 2025-04-11 | End: 2025-04-11 | Stop reason: ALTCHOICE

## 2025-04-11 RX ORDER — ACETAMINOPHEN 500 MG
1000 TABLET ORAL ONCE
Status: COMPLETED | OUTPATIENT
Start: 2025-04-11 | End: 2025-04-11

## 2025-04-11 RX ORDER — ROCURONIUM BROMIDE 10 MG/ML
INJECTION, SOLUTION INTRAVENOUS
Status: DISCONTINUED | OUTPATIENT
Start: 2025-04-11 | End: 2025-04-11 | Stop reason: SDUPTHER

## 2025-04-11 RX ORDER — SODIUM CHLORIDE 9 MG/ML
INJECTION, SOLUTION INTRAVENOUS PRN
Status: DISCONTINUED | OUTPATIENT
Start: 2025-04-11 | End: 2025-04-11 | Stop reason: HOSPADM

## 2025-04-11 RX ORDER — DEXAMETHASONE SODIUM PHOSPHATE 4 MG/ML
INJECTION, SOLUTION INTRA-ARTICULAR; INTRALESIONAL; INTRAMUSCULAR; INTRAVENOUS; SOFT TISSUE
Status: DISCONTINUED | OUTPATIENT
Start: 2025-04-11 | End: 2025-04-11 | Stop reason: SDUPTHER

## 2025-04-11 RX ORDER — SODIUM CHLORIDE 0.9 % (FLUSH) 0.9 %
5-40 SYRINGE (ML) INJECTION PRN
Status: DISCONTINUED | OUTPATIENT
Start: 2025-04-11 | End: 2025-04-11 | Stop reason: HOSPADM

## 2025-04-11 RX ORDER — METOPROLOL TARTRATE 25 MG/1
25 TABLET, FILM COATED ORAL 2 TIMES DAILY
Status: DISCONTINUED | OUTPATIENT
Start: 2025-04-11 | End: 2025-04-19 | Stop reason: HOSPADM

## 2025-04-11 RX ORDER — DEXMEDETOMIDINE HYDROCHLORIDE 100 UG/ML
INJECTION, SOLUTION INTRAVENOUS
Status: DISCONTINUED | OUTPATIENT
Start: 2025-04-11 | End: 2025-04-11 | Stop reason: SDUPTHER

## 2025-04-11 RX ORDER — HYDRALAZINE HYDROCHLORIDE 25 MG/1
25 TABLET, FILM COATED ORAL 3 TIMES DAILY
Status: DISCONTINUED | OUTPATIENT
Start: 2025-04-11 | End: 2025-04-16

## 2025-04-11 RX ORDER — EPHEDRINE SULFATE/0.9% NACL/PF 25 MG/5 ML
SYRINGE (ML) INTRAVENOUS
Status: DISCONTINUED | OUTPATIENT
Start: 2025-04-11 | End: 2025-04-11 | Stop reason: SDUPTHER

## 2025-04-11 RX ORDER — HYDROMORPHONE HYDROCHLORIDE 1 MG/ML
0.5 INJECTION, SOLUTION INTRAMUSCULAR; INTRAVENOUS; SUBCUTANEOUS EVERY 5 MIN PRN
Status: DISCONTINUED | OUTPATIENT
Start: 2025-04-11 | End: 2025-04-11 | Stop reason: HOSPADM

## 2025-04-11 RX ORDER — SODIUM CHLORIDE, SODIUM LACTATE, POTASSIUM CHLORIDE, CALCIUM CHLORIDE 600; 310; 30; 20 MG/100ML; MG/100ML; MG/100ML; MG/100ML
INJECTION, SOLUTION INTRAVENOUS CONTINUOUS
Status: DISCONTINUED | OUTPATIENT
Start: 2025-04-11 | End: 2025-04-12

## 2025-04-11 RX ORDER — PANTOPRAZOLE SODIUM 40 MG/1
40 TABLET, DELAYED RELEASE ORAL
Status: DISCONTINUED | OUTPATIENT
Start: 2025-04-12 | End: 2025-04-12

## 2025-04-11 RX ORDER — OXYCODONE HYDROCHLORIDE 5 MG/1
5 TABLET ORAL
Status: DISCONTINUED | OUTPATIENT
Start: 2025-04-11 | End: 2025-04-11 | Stop reason: HOSPADM

## 2025-04-11 RX ORDER — PROPOFOL 10 MG/ML
INJECTION, EMULSION INTRAVENOUS
Status: DISCONTINUED | OUTPATIENT
Start: 2025-04-11 | End: 2025-04-11 | Stop reason: SDUPTHER

## 2025-04-11 RX ORDER — ONDANSETRON 2 MG/ML
4 INJECTION INTRAMUSCULAR; INTRAVENOUS
Status: DISCONTINUED | OUTPATIENT
Start: 2025-04-11 | End: 2025-04-11 | Stop reason: HOSPADM

## 2025-04-11 RX ORDER — OXYCODONE HYDROCHLORIDE 5 MG/1
10 TABLET ORAL EVERY 4 HOURS PRN
Status: DISCONTINUED | OUTPATIENT
Start: 2025-04-11 | End: 2025-04-14

## 2025-04-11 RX ORDER — ALVIMOPAN 12 MG/1
12 CAPSULE ORAL ONCE
Status: COMPLETED | OUTPATIENT
Start: 2025-04-11 | End: 2025-04-11

## 2025-04-11 RX ORDER — FENTANYL CITRATE 50 UG/ML
25 INJECTION, SOLUTION INTRAMUSCULAR; INTRAVENOUS EVERY 5 MIN PRN
Status: DISCONTINUED | OUTPATIENT
Start: 2025-04-11 | End: 2025-04-11 | Stop reason: HOSPADM

## 2025-04-11 RX ORDER — ONDANSETRON 2 MG/ML
INJECTION INTRAMUSCULAR; INTRAVENOUS
Status: DISCONTINUED | OUTPATIENT
Start: 2025-04-11 | End: 2025-04-11 | Stop reason: SDUPTHER

## 2025-04-11 RX ORDER — FENTANYL CITRATE 50 UG/ML
INJECTION, SOLUTION INTRAMUSCULAR; INTRAVENOUS
Status: DISCONTINUED | OUTPATIENT
Start: 2025-04-11 | End: 2025-04-11 | Stop reason: SDUPTHER

## 2025-04-11 RX ORDER — GLYCOPYRROLATE 0.2 MG/ML
INJECTION INTRAMUSCULAR; INTRAVENOUS
Status: DISCONTINUED | OUTPATIENT
Start: 2025-04-11 | End: 2025-04-11 | Stop reason: SDUPTHER

## 2025-04-11 RX ORDER — AMLODIPINE BESYLATE 5 MG/1
10 TABLET ORAL DAILY
Status: DISCONTINUED | OUTPATIENT
Start: 2025-04-12 | End: 2025-04-19 | Stop reason: HOSPADM

## 2025-04-11 RX ORDER — GLUCAGON 1 MG/ML
1 KIT INJECTION PRN
Status: DISCONTINUED | OUTPATIENT
Start: 2025-04-11 | End: 2025-04-19 | Stop reason: HOSPADM

## 2025-04-11 RX ORDER — ENOXAPARIN SODIUM 100 MG/ML
40 INJECTION SUBCUTANEOUS DAILY
Status: DISCONTINUED | OUTPATIENT
Start: 2025-04-11 | End: 2025-04-12

## 2025-04-11 RX ORDER — ONDANSETRON 2 MG/ML
4 INJECTION INTRAMUSCULAR; INTRAVENOUS EVERY 6 HOURS PRN
Status: DISCONTINUED | OUTPATIENT
Start: 2025-04-11 | End: 2025-04-14

## 2025-04-11 RX ORDER — DEXTROSE MONOHYDRATE 100 MG/ML
INJECTION, SOLUTION INTRAVENOUS CONTINUOUS PRN
Status: DISCONTINUED | OUTPATIENT
Start: 2025-04-11 | End: 2025-04-19 | Stop reason: HOSPADM

## 2025-04-11 RX ORDER — CELECOXIB 200 MG/1
200 CAPSULE ORAL ONCE
Status: COMPLETED | OUTPATIENT
Start: 2025-04-11 | End: 2025-04-11

## 2025-04-11 RX ADMIN — ALVIMOPAN 12 MG: 12 CAPSULE ORAL at 20:42

## 2025-04-11 RX ADMIN — SODIUM CHLORIDE, POTASSIUM CHLORIDE, SODIUM LACTATE AND CALCIUM CHLORIDE: 600; 310; 30; 20 INJECTION, SOLUTION INTRAVENOUS at 14:59

## 2025-04-11 RX ADMIN — FENTANYL CITRATE 50 MCG: 50 INJECTION, SOLUTION INTRAMUSCULAR; INTRAVENOUS at 14:39

## 2025-04-11 RX ADMIN — PROPOFOL 50 MG: 10 INJECTION, EMULSION INTRAVENOUS at 13:51

## 2025-04-11 RX ADMIN — ALVIMOPAN 12 MG: 12 CAPSULE ORAL at 10:41

## 2025-04-11 RX ADMIN — SODIUM CHLORIDE, PRESERVATIVE FREE 10 ML: 5 INJECTION INTRAVENOUS at 22:23

## 2025-04-11 RX ADMIN — SUGAMMADEX 200 MG: 100 INJECTION, SOLUTION INTRAVENOUS at 16:15

## 2025-04-11 RX ADMIN — CELECOXIB 200 MG: 200 CAPSULE ORAL at 10:41

## 2025-04-11 RX ADMIN — OXYCODONE 5 MG: 5 TABLET ORAL at 22:22

## 2025-04-11 RX ADMIN — PROPOFOL 200 MG: 10 INJECTION, EMULSION INTRAVENOUS at 13:48

## 2025-04-11 RX ADMIN — FENTANYL CITRATE 25 MCG: 50 INJECTION, SOLUTION INTRAMUSCULAR; INTRAVENOUS at 14:29

## 2025-04-11 RX ADMIN — DEXMEDETOMIDINE 5 MCG: 100 INJECTION, SOLUTION INTRAVENOUS at 15:04

## 2025-04-11 RX ADMIN — FENTANYL CITRATE 25 MCG: 50 INJECTION, SOLUTION INTRAMUSCULAR; INTRAVENOUS at 14:19

## 2025-04-11 RX ADMIN — LIDOCAINE HYDROCHLORIDE 100 MG: 20 INJECTION, SOLUTION EPIDURAL; INFILTRATION; INTRACAUDAL; PERINEURAL at 13:48

## 2025-04-11 RX ADMIN — Medication 20 MG: at 14:27

## 2025-04-11 RX ADMIN — METOPROLOL TARTRATE 25 MG: 25 TABLET, FILM COATED ORAL at 20:42

## 2025-04-11 RX ADMIN — HYDRALAZINE HYDROCHLORIDE 25 MG: 25 TABLET ORAL at 20:42

## 2025-04-11 RX ADMIN — ROCURONIUM BROMIDE 30 MG: 10 INJECTION INTRAVENOUS at 14:26

## 2025-04-11 RX ADMIN — Medication 120 MCG: at 14:24

## 2025-04-11 RX ADMIN — DEXAMETHASONE SODIUM PHOSPHATE 8 MG: 4 INJECTION INTRA-ARTICULAR; INTRALESIONAL; INTRAMUSCULAR; INTRAVENOUS; SOFT TISSUE at 14:02

## 2025-04-11 RX ADMIN — SODIUM CHLORIDE, POTASSIUM CHLORIDE, SODIUM LACTATE AND CALCIUM CHLORIDE: 600; 310; 30; 20 INJECTION, SOLUTION INTRAVENOUS at 10:40

## 2025-04-11 RX ADMIN — MAGNESIUM SULFATE IN WATER 2000 MG: 40 INJECTION, SOLUTION INTRAVENOUS at 14:45

## 2025-04-11 RX ADMIN — INDOCYANINE GREEN 7.5 MG: KIT INTRAVENOUS at 15:15

## 2025-04-11 RX ADMIN — PROPOFOL 50 MG: 10 INJECTION, EMULSION INTRAVENOUS at 14:28

## 2025-04-11 RX ADMIN — Medication 10 MG: at 14:31

## 2025-04-11 RX ADMIN — METRONIDAZOLE 500 MG: 500 INJECTION, SOLUTION INTRAVENOUS at 14:24

## 2025-04-11 RX ADMIN — MIDAZOLAM HYDROCHLORIDE 2 MG: 1 INJECTION, SOLUTION INTRAMUSCULAR; INTRAVENOUS at 13:40

## 2025-04-11 RX ADMIN — PROPOFOL 50 MCG/KG/MIN: 10 INJECTION, EMULSION INTRAVENOUS at 13:56

## 2025-04-11 RX ADMIN — ALBUMIN (HUMAN) 12.5 G: 12.5 INJECTION, SOLUTION INTRAVENOUS at 14:29

## 2025-04-11 RX ADMIN — SODIUM CHLORIDE: 9 INJECTION, SOLUTION INTRAVENOUS at 13:56

## 2025-04-11 RX ADMIN — FENTANYL CITRATE 50 MCG: 50 INJECTION, SOLUTION INTRAMUSCULAR; INTRAVENOUS at 14:32

## 2025-04-11 RX ADMIN — ROCURONIUM BROMIDE 50 MG: 10 INJECTION INTRAVENOUS at 13:50

## 2025-04-11 RX ADMIN — HYDROMORPHONE HYDROCHLORIDE 0.2 MG: 1 INJECTION, SOLUTION INTRAMUSCULAR; INTRAVENOUS; SUBCUTANEOUS at 15:49

## 2025-04-11 RX ADMIN — WATER 2000 MG: 1 INJECTION INTRAMUSCULAR; INTRAVENOUS; SUBCUTANEOUS at 14:04

## 2025-04-11 RX ADMIN — Medication 10 MG: at 14:12

## 2025-04-11 RX ADMIN — FENTANYL CITRATE 50 MCG: 50 INJECTION, SOLUTION INTRAMUSCULAR; INTRAVENOUS at 13:48

## 2025-04-11 RX ADMIN — HYDRALAZINE HYDROCHLORIDE 5 MG: 20 INJECTION INTRAMUSCULAR; INTRAVENOUS at 15:57

## 2025-04-11 RX ADMIN — SODIUM CHLORIDE, SODIUM LACTATE, POTASSIUM CHLORIDE, AND CALCIUM CHLORIDE: .6; .31; .03; .02 INJECTION, SOLUTION INTRAVENOUS at 20:51

## 2025-04-11 RX ADMIN — ONDANSETRON 4 MG: 2 INJECTION, SOLUTION INTRAMUSCULAR; INTRAVENOUS at 16:02

## 2025-04-11 RX ADMIN — GLYCOPYRROLATE 0.2 MG: 0.2 INJECTION INTRAMUSCULAR; INTRAVENOUS at 14:21

## 2025-04-11 RX ADMIN — LIDOCAINE HYDROCHLORIDE ANHYDROUS AND DEXTROSE MONOHYDRATE 2 MG/KG/HR: 5; 400 INJECTION, SOLUTION INTRAVENOUS at 13:56

## 2025-04-11 RX ADMIN — EPHEDRINE SULFATE 10 MG: 5 INJECTION INTRAVENOUS at 14:23

## 2025-04-11 RX ADMIN — ACETAMINOPHEN 1000 MG: 500 TABLET, FILM COATED ORAL at 10:42

## 2025-04-11 RX ADMIN — ACETAMINOPHEN 650 MG: 325 TABLET ORAL at 19:06

## 2025-04-11 RX ADMIN — ENOXAPARIN SODIUM 40 MG: 100 INJECTION SUBCUTANEOUS at 19:00

## 2025-04-11 ASSESSMENT — PAIN SCALES - GENERAL
PAINLEVEL_OUTOF10: 6
PAINLEVEL_OUTOF10: 0
PAINLEVEL_OUTOF10: 2
PAINLEVEL_OUTOF10: 4

## 2025-04-11 ASSESSMENT — PAIN DESCRIPTION - LOCATION
LOCATION: ABDOMEN

## 2025-04-11 NOTE — PROGRESS NOTES
End of Shift Note    Bedside shift change report given to krzysztof VIZCAINO  (oncoming nurse) by ROSALINDA ROGERS RN .        Shift worked:  days   Shift summary and any significant changes:    New PACU patient to the unit  ERAS, Robotic extended right colectomy r/t colonic mass       Concerns for physician to address:     Zone phone for oncoming shift:        Patient Information  Yefri Vigil  69 y.o.  4/11/2025  8:58 AM by Katrina Goddard MD. 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    NSTEMI (non-ST elevated myocardial infarction) (HCC) 10/07/2023    History of diarrhea 10/07/2023    Neoplasm of uncertain behavior of trachea, bronchus, 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    High cholesterol 09/24/2014    HTN (hypertension) 09/24/2014    Controlled diabetes mellitus type 2 with complications (HCC) 09/24/2014     Past Medical History:   Diagnosis Date    Acute kidney injury 10/04/2023    Acute metabolic encephalopathy 10/07/2023    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  Was followed by ID in the hospital as well as other specialties    Alcohol abuse 10/07/2023    Arthritis     back    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 mellitus, type 2 (HCC) 2010    Diarrhea 10/07/2023    High blood cholesterol     Hypertension     Hypokalemia 03/17/2015    Non-traumatic rhabdomyolysis  10/07/2023    NSTEMI (non-ST elevated myocardial infarction) (HCC)     Pneumonia 03/15/2015    S/P drug eluting coronary stent placement     x1    Severe sepsis 10/04/2023       Core Measures:  CVA: no  CHF: no  PNA: no    Activity:   Number times ambulated in hallways past shift: 0  Number of times OOB to chair past shift: 0    Cardiac:   Cardiac Monitoring: no    Access:   Current line(s): PIV    Respiratory:   O2 Device: Nasal cannula    GI:     Current diet:  ADULT DIET; Clear Liquid; Low Fiber  Tolerating current diet: Yes    Pain Management:   Patient states pain is manageable on current regimen: yes    Skin:  Sly Scale Score: 23  Interventions: N/A  Pressure injury: no    Patient Safety:  Fall Score: Al Total Score: 20  Interventions: stay with me program  Self-release roll belt: No  Dexterity to release roll belt: N/A   (must document dexterity  here by stating Yes or No here, otherwise this is a restraint and must follow restraint documentation policy.)    DVT prophylaxis:  DVT prophylaxis: meds    Active Consults:  IP CONSULT TO DIETITIAN    Length of Stay:  Expected LOS: 3  Actual LOS: 0    ROSALINDA ROGERS RN

## 2025-04-11 NOTE — OP NOTE
Operative Note      Patient: Yefri Vigil  YOB: 1955  MRN: [id number]    Date of Procedure: 4/11/2025    Pre-Op Diagnosis Codes:      * Colonic mass [K63.89]    Post-Op Diagnosis: Same       Procedure(s):  ROBOTIC EXTENDED RIGHT COLECTOMY (E R A S)    Surgeon(s):  Katrina Goddard MD    Assistant:   First Assistant: Liddle, Gerald, RN    Anesthesia: General    Estimated Blood Loss (mL): 50cc    Complications: None    Specimens:   ID Type Source Tests Collected by Time Destination   1 : RIGHT COLON Tissue Colon SURGICAL PATHOLOGY Katrina Goddard MD 4/11/2025 1557        Implants:  Implant Name Type Inv. Item Serial No.  Lot No. LRB No. Used Action   ALLOGRAFT HUM TISS SHT 12X2 CM PLCNTA MEMBRN AMNIOEFFECT - [id number]  ALLOGRAFT HUM TISS SHT 12X2 CM PLCNTA MEMBRN AMNIOEFFECT [id number] [employer]WD NA N/A 1 Implanted         Drains:   Urinary Catheter 04/11/25 2 Way;[employer]itz[employer] (Active)       Findings:  Infection Present At Time Of Surgery (PATOS) (choose all levels that have infection present):  No infection present  Other Findings: endoscopic tattoo in the mid transverse colon; bright IcG perfusion of bowel to be anastomosed  This procedure was not performed to treat colon cancer through resection    Detailed Description of Procedure:   The patient was taken to the operating room and positioned on the table supine. After induction of general anesthesia, the abdomen was prepped and draped in the usual sterile fashion. A timeout was performed verifying patient and procedure. The abdomen was then entered using the Optiview technique in the left upper quadrant, and the abdomen insufflated. An injury-free entry was confirmed. A robotic 8mm trocar was placed in the supraumbilical midline and this was used to manipulate the colon to identify the endoscopic tattoo which was in the mid transverse colon. Given this, I elected to proceed with extending right colectomy. Another

## 2025-04-11 NOTE — ANESTHESIA PRE PROCEDURE
Department of Anesthesiology  Preprocedure Note       Name:  Yefri Vigil   Age:  69 y.o.  :  1955                                          MRN:  [id number]         Date:  2025      Surgeon: Surgeon(s):  Katrina Goddard MD    Procedure: Procedure(s):  ROBOTIC TRANSVERSE COLECTOMY (E R A S)    Medications prior to admission:   Prior to Admission medications    Medication Sig Start Date End Date Taking? Authorizing Provider   docusate sodium (COLACE) 100 MG capsule Take 1 capsule by mouth 2 times daily    Dane Thacker MD   neomycin (MYCIFRADIN) 500 MG tablet Take 1 tablet by mouth See Admin Instructions Take as instructed by Dr. Goddard on 3/27/25    Dane Thacker MD   metroNIDAZOLE (FLAGYL) 500 MG tablet Take 1 tablet by mouth See Admin Instructions Take as instructed by Dr. Goddard on 3/27/25    Dane Thacker MD   metoclopramide (REGLAN) 10 MG tablet Take 1 tablet by mouth See Admin Instructions Take as instructed by Dr. Goddard on 3/27/25    Dane Thacker MD   polyethylene glycol (COLYTE) 240 g solution Take by mouth See Admin Instructions Take as instructed by Dr. Goddard on 3/27/25    Dane Tahcker MD   atorvastatin (LIPITOR) 40 MG tablet Take 1 tablet by mouth daily    Dane Thacker MD   hydrALAZINE (APRESOLINE) 25 MG tablet Take 1 tablet by mouth 3 times daily    Dane Thacker MD   metoprolol (LOPRESSOR) 25 MG tablet Take 1 tablet by mouth 2 times daily 25   Kenny Strong MD   ferrous sulfate (IRON 325) 325 (65 Fe) MG tablet Take 1 tablet by mouth daily (with breakfast) 25  Kenny Strong MD   pantoprazole (PROTONIX) 40 MG tablet Take 1 tablet by mouth every morning (before breakfast) 25   Kenny Strong MD   metFORMIN (GLUCOPHAGE-XR) 750 MG extended release tablet Take 1 tablet by mouth in the morning and at bedtime    Dane Thacker MD   lisinopril (PRINIVIL;ZESTRIL) 20 MG tablet Take 1 tablet by

## 2025-04-11 NOTE — ANESTHESIA POSTPROCEDURE EVALUATION
Department of Anesthesiology  Postprocedure Note    Patient: Yefri Vigil  MRN: [id number]  YOB: 1955  Date of evaluation: 4/11/2025    Procedure Summary       Date: 04/11/25 Room / Location: [facility] M1 / [facility]    Anesthesia Start: 1340 Anesthesia Stop: 1636    Procedure: ROBOTIC EXTENDED RIGHT COLECTOMY (E R A S) (Abdomen) Diagnosis:       Colonic mass      (Colonic mass [K63.89])    Providers: Katrina Goddard MD Responsible Provider: George Leigh MD    Anesthesia Type: General ASA Status: 3            Anesthesia Type: General    Mary Phase I: Mary Score: 8    Mary Phase II:      Anesthesia Post Evaluation    Patient location during evaluation: PACU  Patient participation: complete - patient participated  Level of consciousness: sleepy but conscious and responsive to verbal stimuli  Airway patency: patent  Nausea & Vomiting: no vomiting and no nausea  Cardiovascular status: blood pressure returned to baseline and hemodynamically stable  Respiratory status: acceptable  Hydration status: stable    No notable events documented.

## 2025-04-11 NOTE — FLOWSHEET NOTE
04/11/25 1635   Handoff   Communication Given Transfer Handoff   Handoff Given To Jose J VIZCAINO   Handoff Received From Shorty VIZCAINO/Elliott MAN   Handoff Communication Face to Face;At bedside   Time Handoff Given 1638     1724- Son called and updated on pt status.

## 2025-04-11 NOTE — FLOWSHEET NOTE
04/11/25 1815   Handoff   Communication Given Transfer Handoff   Handoff Given To Brissa VIZCAINO   Handoff Received From Jose J VIZCAINO   Handoff Communication Telephone   Time Handoff Given 1815     Pt transferred pt with all personal belongings. Pt son updated on status and new room number.

## 2025-04-11 NOTE — BRIEF OP NOTE
Brief Postoperative Note      Patient: Yefri Vigil  YOB: 1955  MRN: [id number]    Date of Procedure: 4/11/2025    Pre-Op Diagnosis Codes:      * Colonic mass [K63.89]    Post-Op Diagnosis: Same       Procedure(s):  ROBOTIC EXTENDED RIGHT COLECTOMY (E R A S)    Surgeon(s):  Katrina Goddard MD    Assistant:  First Assistant: Liddle, Gerald, RN    Anesthesia: General    Estimated Blood Loss (mL): 50cc    Complications: None    Specimens:   ID Type Source Tests Collected by Time Destination   1 : RIGHT COLON Tissue Colon SURGICAL PATHOLOGY Katrina Goddard MD 4/11/2025 1557        Implants:  Implant Name Type Inv. Item Serial No.  Lot No. LRB No. Used Action   ALLOGRAFT HUM TISS SHT 12X2 CM PLCNTA MEMBRN AMNIOEFFECT - [id number]  ALLOGRAFT HUM TISS SHT 12X2 CM PLCNTA MEMBRN AMNIOEFFECT [id number] [employer]- NA N/A 1 Implanted         Drains:   Urinary Catheter 04/11/25 2 Way;Fitzgerald (Active)       Findings:  Infection Present At Time Of Surgery (PATOS) (choose all levels that have infection present):  No infection present  Other Findings:  endoscopic tattoo in the mid transverse colon; bright IcG perfusion of bowel to be anastomosed   This procedure was not performed to treat colon cancer through resection    Electronically signed by Katrina Goddard MD on 4/11/2025 at 4:11 PM

## 2025-04-12 ENCOUNTER — APPOINTMENT (OUTPATIENT)
Facility: HOSPITAL | Age: 70
DRG: 329 | End: 2025-04-12
Attending: STUDENT IN AN ORGANIZED HEALTH CARE EDUCATION/TRAINING PROGRAM
Payer: MEDICARE

## 2025-04-12 ENCOUNTER — ANESTHESIA (OUTPATIENT)
Facility: HOSPITAL | Age: 70
End: 2025-04-12
Payer: MEDICARE

## 2025-04-12 ENCOUNTER — ANESTHESIA EVENT (OUTPATIENT)
Facility: HOSPITAL | Age: 70
End: 2025-04-12
Payer: MEDICARE

## 2025-04-12 LAB
ALBUMIN SERPL-MCNC: 2.4 G/DL (ref 3.5–5)
ALBUMIN/GLOB SERPL: 0.7 (ref 1.1–2.2)
ALP SERPL-CCNC: 75 U/L (ref 45–117)
ALT SERPL-CCNC: 237 U/L (ref 12–78)
ANION GAP SERPL CALC-SCNC: 17 MMOL/L (ref 2–12)
ANION GAP SERPL CALC-SCNC: 20 MMOL/L (ref 2–12)
ANION GAP SERPL CALC-SCNC: 23 MMOL/L (ref 2–12)
APTT PPP: 31.1 SEC (ref 22.1–31)
APTT PPP: 35.6 SEC (ref 22.1–31)
ARTERIAL PATENCY WRIST A: ABNORMAL
ARTERIAL PATENCY WRIST A: ABNORMAL
ARTERIAL PATENCY WRIST A: YES
AST SERPL-CCNC: 298 U/L (ref 15–37)
BASE DEFICIT BLDA-SCNC: 16.1 MMOL/L
BASE DEFICIT BLDA-SCNC: 24.9 MMOL/L
BASE DEFICIT BLDA-SCNC: 4.1 MMOL/L
BASOPHILS # BLD: 0 K/UL (ref 0–0.1)
BASOPHILS NFR BLD: 0 % (ref 0–1)
BDY SITE: ABNORMAL
BILIRUB DIRECT SERPL-MCNC: 0.1 MG/DL (ref 0–0.2)
BILIRUB SERPL-MCNC: 0.5 MG/DL (ref 0.2–1)
BUN SERPL-MCNC: 36 MG/DL (ref 6–20)
BUN SERPL-MCNC: 38 MG/DL (ref 6–20)
BUN SERPL-MCNC: 41 MG/DL (ref 6–20)
BUN/CREAT SERPL: 10 (ref 12–20)
BUN/CREAT SERPL: 11 (ref 12–20)
BUN/CREAT SERPL: 11 (ref 12–20)
CALCIUM SERPL-MCNC: 6.6 MG/DL (ref 8.5–10.1)
CALCIUM SERPL-MCNC: 8.1 MG/DL (ref 8.5–10.1)
CALCIUM SERPL-MCNC: 8.2 MG/DL (ref 8.5–10.1)
CHLORIDE SERPL-SCNC: 105 MMOL/L (ref 97–108)
CHLORIDE SERPL-SCNC: 105 MMOL/L (ref 97–108)
CHLORIDE SERPL-SCNC: 108 MMOL/L (ref 97–108)
CO2 SERPL-SCNC: 12 MMOL/L (ref 21–32)
CO2 SERPL-SCNC: 17 MMOL/L (ref 21–32)
CO2 SERPL-SCNC: 8 MMOL/L (ref 21–32)
CREAT SERPL-MCNC: 3.33 MG/DL (ref 0.7–1.3)
CREAT SERPL-MCNC: 3.76 MG/DL (ref 0.7–1.3)
CREAT SERPL-MCNC: 3.8 MG/DL (ref 0.7–1.3)
DIFFERENTIAL METHOD BLD: ABNORMAL
EOSINOPHIL # BLD: 0 K/UL (ref 0–0.4)
EOSINOPHIL NFR BLD: 0 % (ref 0–7)
ERYTHROCYTE [DISTWIDTH] IN BLOOD BY AUTOMATED COUNT: 18.3 % (ref 11.5–14.5)
ERYTHROCYTE [DISTWIDTH] IN BLOOD BY AUTOMATED COUNT: 18.6 % (ref 11.5–14.5)
ERYTHROCYTE [DISTWIDTH] IN BLOOD BY AUTOMATED COUNT: 19.8 % (ref 11.5–14.5)
ERYTHROCYTE [DISTWIDTH] IN BLOOD BY AUTOMATED COUNT: 20.2 % (ref 11.5–14.5)
EST. AVERAGE GLUCOSE BLD GHB EST-MCNC: 143 MG/DL
FIBRINOGEN PPP-MCNC: 215 MG/DL (ref 200–475)
FIO2 ON VENT: 40 %
FIO2 ON VENT: 80 %
GAS FLOW.O2 SETTING OXYMISER: 14
GAS FLOW.O2 SETTING OXYMISER: 14
GLOBULIN SER CALC-MCNC: 3.5 G/DL (ref 2–4)
GLUCOSE BLD STRIP.AUTO-MCNC: 168 MG/DL (ref 65–117)
GLUCOSE BLD STRIP.AUTO-MCNC: 176 MG/DL (ref 65–117)
GLUCOSE BLD STRIP.AUTO-MCNC: 195 MG/DL (ref 65–117)
GLUCOSE BLD STRIP.AUTO-MCNC: 272 MG/DL (ref 65–117)
GLUCOSE BLD STRIP.AUTO-MCNC: 323 MG/DL (ref 65–117)
GLUCOSE SERPL-MCNC: 167 MG/DL (ref 65–100)
GLUCOSE SERPL-MCNC: 171 MG/DL (ref 65–100)
GLUCOSE SERPL-MCNC: 283 MG/DL (ref 65–100)
HBA1C MFR BLD: 6.6 % (ref 4–5.6)
HCO3 BLDA-SCNC: 11 MMOL/L (ref 22–26)
HCO3 BLDA-SCNC: 20 MMOL/L (ref 22–26)
HCO3 BLDA-SCNC: 5 MMOL/L (ref 22–26)
HCT VFR BLD AUTO: 18.4 % (ref 36.6–50.3)
HCT VFR BLD AUTO: 25.4 % (ref 36.6–50.3)
HCT VFR BLD AUTO: 25.5 % (ref 36.6–50.3)
HCT VFR BLD AUTO: 26.9 % (ref 36.6–50.3)
HGB BLD-MCNC: 6.2 G/DL (ref 12.1–17)
HGB BLD-MCNC: 6.9 G/DL (ref 12.1–17)
HGB BLD-MCNC: 7.6 G/DL (ref 12.1–17)
HGB BLD-MCNC: 8.1 G/DL (ref 12.1–17)
HISTORY CHECK: NORMAL
IMM GRANULOCYTES # BLD AUTO: 0 K/UL (ref 0–0.04)
IMM GRANULOCYTES NFR BLD AUTO: 0 % (ref 0–0.5)
INR PPP: 1.5 (ref 0.9–1.1)
INR PPP: 1.7 (ref 0.9–1.1)
LACTATE SERPL-SCNC: 12.4 MMOL/L (ref 0.4–2)
LACTATE SERPL-SCNC: 15.6 MMOL/L (ref 0.4–2)
LACTATE SERPL-SCNC: 17.9 MMOL/L (ref 0.4–2)
LACTATE SERPL-SCNC: 9.1 MMOL/L (ref 0.4–2)
LYMPHOCYTES # BLD: 1.38 K/UL (ref 0.8–3.5)
LYMPHOCYTES # BLD: 1.72 K/UL (ref 0.8–3.5)
LYMPHOCYTES # BLD: 2.12 K/UL (ref 0.8–3.5)
LYMPHOCYTES NFR BLD: 12 % (ref 12–49)
LYMPHOCYTES NFR BLD: 35 % (ref 12–49)
LYMPHOCYTES NFR BLD: 7 % (ref 12–49)
MAGNESIUM SERPL-MCNC: 1.7 MG/DL (ref 1.6–2.4)
MAGNESIUM SERPL-MCNC: 2.2 MG/DL (ref 1.6–2.4)
MCH RBC QN AUTO: 21.8 PG (ref 26–34)
MCH RBC QN AUTO: 21.9 PG (ref 26–34)
MCH RBC QN AUTO: 26.2 PG (ref 26–34)
MCH RBC QN AUTO: 26.5 PG (ref 26–34)
MCHC RBC AUTO-ENTMCNC: 27.1 G/DL (ref 30–36.5)
MCHC RBC AUTO-ENTMCNC: 28.3 G/DL (ref 30–36.5)
MCHC RBC AUTO-ENTMCNC: 31.9 G/DL (ref 30–36.5)
MCHC RBC AUTO-ENTMCNC: 33.7 G/DL (ref 30–36.5)
MCV RBC AUTO: 77.5 FL (ref 80–99)
MCV RBC AUTO: 78.6 FL (ref 80–99)
MCV RBC AUTO: 80.7 FL (ref 80–99)
MCV RBC AUTO: 82.2 FL (ref 80–99)
METAMYELOCYTES NFR BLD MANUAL: 2 %
MONOCYTES # BLD: 0.25 K/UL (ref 0–1)
MONOCYTES # BLD: 0.79 K/UL (ref 0–1)
MONOCYTES # BLD: 1.24 K/UL (ref 0–1)
MONOCYTES NFR BLD: 4 % (ref 5–13)
MONOCYTES NFR BLD: 5 % (ref 5–13)
MONOCYTES NFR BLD: 7 % (ref 5–13)
NEUTS BAND NFR BLD MANUAL: 11 %
NEUTS BAND NFR BLD MANUAL: 18 %
NEUTS BAND NFR BLD MANUAL: 9 %
NEUTS SEG # BLD: 14.34 K/UL (ref 1.8–8)
NEUTS SEG # BLD: 17.53 K/UL (ref 1.8–8)
NEUTS SEG # BLD: 2.84 K/UL (ref 1.8–8)
NEUTS SEG NFR BLD: 40 % (ref 32–75)
NEUTS SEG NFR BLD: 70 % (ref 32–75)
NEUTS SEG NFR BLD: 80 % (ref 32–75)
NRBC # BLD: 0 K/UL (ref 0–0.01)
NRBC BLD-RTO: 0 PER 100 WBC
PCO2 BLDA: 23 MMHG (ref 35–45)
PCO2 BLDA: 30 MMHG (ref 35–45)
PCO2 BLDA: 32 MMHG (ref 35–45)
PEEP RESPIRATORY: 5
PEEP RESPIRATORY: 5
PH BLDA: 6.99 (ref 7.35–7.45)
PH BLDA: 7.18 (ref 7.35–7.45)
PH BLDA: 7.4 (ref 7.35–7.45)
PHOSPHATE SERPL-MCNC: 7.8 MG/DL (ref 2.6–4.7)
PHOSPHATE SERPL-MCNC: 9 MG/DL (ref 2.6–4.7)
PLATELET # BLD AUTO: 228 K/UL (ref 150–400)
PLATELET # BLD AUTO: 246 K/UL (ref 150–400)
PLATELET # BLD AUTO: 71 K/UL (ref 150–400)
PLATELET # BLD AUTO: 85 K/UL (ref 150–400)
PMV BLD AUTO: 10.6 FL (ref 8.9–12.9)
PMV BLD AUTO: 10.8 FL (ref 8.9–12.9)
PMV BLD AUTO: 11.1 FL (ref 8.9–12.9)
PMV BLD AUTO: 11.5 FL (ref 8.9–12.9)
PO2 BLDA: 122 MMHG (ref 80–100)
PO2 BLDA: 153 MMHG (ref 80–100)
PO2 BLDA: 370 MMHG (ref 80–100)
POTASSIUM SERPL-SCNC: 5.6 MMOL/L (ref 3.5–5.1)
POTASSIUM SERPL-SCNC: 5.9 MMOL/L (ref 3.5–5.1)
POTASSIUM SERPL-SCNC: 6.4 MMOL/L (ref 3.5–5.1)
PROCALCITONIN SERPL-MCNC: 13.96 NG/ML
PROT SERPL-MCNC: 5.9 G/DL (ref 6.4–8.2)
PROTHROMBIN TIME: 15.6 SEC (ref 9.2–11.2)
PROTHROMBIN TIME: 17.7 SEC (ref 9.2–11.2)
RBC # BLD AUTO: 2.34 M/UL (ref 4.1–5.7)
RBC # BLD AUTO: 3.09 M/UL (ref 4.1–5.7)
RBC # BLD AUTO: 3.16 M/UL (ref 4.1–5.7)
RBC # BLD AUTO: 3.47 M/UL (ref 4.1–5.7)
RBC MORPH BLD: ABNORMAL
SAO2 % BLD: 100 % (ref 92–97)
SAO2 % BLD: 96 % (ref 92–97)
SAO2 % BLD: 99 % (ref 92–97)
SAO2% DEVICE SAO2% SENSOR NAME: ABNORMAL
SERVICE CMNT-IMP: ABNORMAL
SODIUM SERPL-SCNC: 136 MMOL/L (ref 136–145)
SODIUM SERPL-SCNC: 137 MMOL/L (ref 136–145)
SODIUM SERPL-SCNC: 142 MMOL/L (ref 136–145)
SPECIMEN SITE: ABNORMAL
THERAPEUTIC RANGE: ABNORMAL SECS (ref 58–77)
THERAPEUTIC RANGE: ABNORMAL SECS (ref 58–77)
TROPONIN I SERPL HS-MCNC: 118 NG/L (ref 0–76)
TROPONIN I SERPL HS-MCNC: 328 NG/L (ref 0–76)
VENTILATION MODE VENT: ABNORMAL
VENTILATION MODE VENT: ABNORMAL
VT SETTING VENT: 500
VT SETTING VENT: 550
WBC # BLD AUTO: 17.7 K/UL (ref 4.1–11.1)
WBC # BLD AUTO: 19.7 K/UL (ref 4.1–11.1)
WBC # BLD AUTO: 4.9 K/UL (ref 4.1–11.1)
WBC # BLD AUTO: 9.6 K/UL (ref 4.1–11.1)

## 2025-04-12 PROCEDURE — P9059 PLASMA, FRZ BETWEEN 8-24HOUR: HCPCS

## 2025-04-12 PROCEDURE — 3600000004 HC SURGERY LEVEL 4 BASE: Performed by: SURGERY

## 2025-04-12 PROCEDURE — 0W3P0ZZ CONTROL BLEEDING IN GASTROINTESTINAL TRACT, OPEN APPROACH: ICD-10-PCS | Performed by: SURGERY

## 2025-04-12 PROCEDURE — 85730 THROMBOPLASTIN TIME PARTIAL: CPT

## 2025-04-12 PROCEDURE — 6370000000 HC RX 637 (ALT 250 FOR IP): Performed by: NURSE PRACTITIONER

## 2025-04-12 PROCEDURE — 3700000000 HC ANESTHESIA ATTENDED CARE: Performed by: SURGERY

## 2025-04-12 PROCEDURE — 2500000003 HC RX 250 WO HCPCS: Performed by: STUDENT IN AN ORGANIZED HEALTH CARE EDUCATION/TRAINING PROGRAM

## 2025-04-12 PROCEDURE — 6370000000 HC RX 637 (ALT 250 FOR IP): Performed by: HOSPITALIST

## 2025-04-12 PROCEDURE — P9016 RBC LEUKOCYTES REDUCED: HCPCS

## 2025-04-12 PROCEDURE — 94002 VENT MGMT INPAT INIT DAY: CPT

## 2025-04-12 PROCEDURE — 84145 PROCALCITONIN (PCT): CPT

## 2025-04-12 PROCEDURE — 84100 ASSAY OF PHOSPHORUS: CPT

## 2025-04-12 PROCEDURE — 3700000001 HC ADD 15 MINUTES (ANESTHESIA): Performed by: SURGERY

## 2025-04-12 PROCEDURE — 2580000003 HC RX 258: Performed by: HOSPITALIST

## 2025-04-12 PROCEDURE — 85025 COMPLETE CBC W/AUTO DIFF WBC: CPT

## 2025-04-12 PROCEDURE — 3E1M39Z IRRIGATION OF PERITONEAL CAVITY USING DIALYSATE, PERCUTANEOUS APPROACH: ICD-10-PCS | Performed by: COLON & RECTAL SURGERY

## 2025-04-12 PROCEDURE — 2709999900 HC NON-CHARGEABLE SUPPLY: Performed by: SURGERY

## 2025-04-12 PROCEDURE — P9073 PLATELETS PHERESIS PATH REDU: HCPCS

## 2025-04-12 PROCEDURE — 37799 UNLISTED PX VASCULAR SURGERY: CPT

## 2025-04-12 PROCEDURE — 2580000003 HC RX 258: Performed by: STUDENT IN AN ORGANIZED HEALTH CARE EDUCATION/TRAINING PROGRAM

## 2025-04-12 PROCEDURE — 86706 HEP B SURFACE ANTIBODY: CPT

## 2025-04-12 PROCEDURE — 2580000003 HC RX 258: Performed by: NURSE ANESTHETIST, CERTIFIED REGISTERED

## 2025-04-12 PROCEDURE — 35840 EXPLORE ABDOMINAL VESSELS: CPT | Performed by: SURGERY

## 2025-04-12 PROCEDURE — 80048 BASIC METABOLIC PNL TOTAL CA: CPT

## 2025-04-12 PROCEDURE — 30233L1 TRANSFUSION OF NONAUTOLOGOUS FRESH PLASMA INTO PERIPHERAL VEIN, PERCUTANEOUS APPROACH: ICD-10-PCS | Performed by: COLON & RECTAL SURGERY

## 2025-04-12 PROCEDURE — 83036 HEMOGLOBIN GLYCOSYLATED A1C: CPT

## 2025-04-12 PROCEDURE — 4A133B1 MONITORING OF ARTERIAL PRESSURE, PERIPHERAL, PERCUTANEOUS APPROACH: ICD-10-PCS | Performed by: COLON & RECTAL SURGERY

## 2025-04-12 PROCEDURE — 02HV33Z INSERTION OF INFUSION DEVICE INTO SUPERIOR VENA CAVA, PERCUTANEOUS APPROACH: ICD-10-PCS | Performed by: INTERNAL MEDICINE

## 2025-04-12 PROCEDURE — 85384 FIBRINOGEN ACTIVITY: CPT

## 2025-04-12 PROCEDURE — 6360000002 HC RX W HCPCS: Performed by: NURSE PRACTITIONER

## 2025-04-12 PROCEDURE — 99221 1ST HOSP IP/OBS SF/LOW 40: CPT | Performed by: SURGERY

## 2025-04-12 PROCEDURE — 71045 X-RAY EXAM CHEST 1 VIEW: CPT

## 2025-04-12 PROCEDURE — 85027 COMPLETE CBC AUTOMATED: CPT

## 2025-04-12 PROCEDURE — 2700000000 HC OXYGEN THERAPY PER DAY

## 2025-04-12 PROCEDURE — 93005 ELECTROCARDIOGRAM TRACING: CPT

## 2025-04-12 PROCEDURE — 2500000003 HC RX 250 WO HCPCS: Performed by: INTERNAL MEDICINE

## 2025-04-12 PROCEDURE — 6370000000 HC RX 637 (ALT 250 FOR IP): Performed by: STUDENT IN AN ORGANIZED HEALTH CARE EDUCATION/TRAINING PROGRAM

## 2025-04-12 PROCEDURE — 90945 DIALYSIS ONE EVALUATION: CPT

## 2025-04-12 PROCEDURE — 2500000003 HC RX 250 WO HCPCS: Performed by: HOSPITALIST

## 2025-04-12 PROCEDURE — 87340 HEPATITIS B SURFACE AG IA: CPT

## 2025-04-12 PROCEDURE — 6360000004 HC RX CONTRAST MEDICATION: Performed by: STUDENT IN AN ORGANIZED HEALTH CARE EDUCATION/TRAINING PROGRAM

## 2025-04-12 PROCEDURE — 6360000002 HC RX W HCPCS: Performed by: STUDENT IN AN ORGANIZED HEALTH CARE EDUCATION/TRAINING PROGRAM

## 2025-04-12 PROCEDURE — 36430 TRANSFUSION BLD/BLD COMPNT: CPT

## 2025-04-12 PROCEDURE — 36415 COLL VENOUS BLD VENIPUNCTURE: CPT

## 2025-04-12 PROCEDURE — 87040 BLOOD CULTURE FOR BACTERIA: CPT

## 2025-04-12 PROCEDURE — 84484 ASSAY OF TROPONIN QUANT: CPT

## 2025-04-12 PROCEDURE — 82803 BLOOD GASES ANY COMBINATION: CPT

## 2025-04-12 PROCEDURE — 85018 HEMOGLOBIN: CPT

## 2025-04-12 PROCEDURE — P9045 ALBUMIN (HUMAN), 5%, 250 ML: HCPCS | Performed by: NURSE ANESTHETIST, CERTIFIED REGISTERED

## 2025-04-12 PROCEDURE — 36620 INSERTION CATHETER ARTERY: CPT

## 2025-04-12 PROCEDURE — 74174 CTA ABD&PLVS W/CONTRAST: CPT

## 2025-04-12 PROCEDURE — 0BH17EZ INSERTION OF ENDOTRACHEAL AIRWAY INTO TRACHEA, VIA NATURAL OR ARTIFICIAL OPENING: ICD-10-PCS | Performed by: COLON & RECTAL SURGERY

## 2025-04-12 PROCEDURE — 2580000003 HC RX 258: Performed by: INTERNAL MEDICINE

## 2025-04-12 PROCEDURE — 2000000000 HC ICU R&B

## 2025-04-12 PROCEDURE — 36556 INSERT NON-TUNNEL CV CATH: CPT

## 2025-04-12 PROCEDURE — 30233N1 TRANSFUSION OF NONAUTOLOGOUS RED BLOOD CELLS INTO PERIPHERAL VEIN, PERCUTANEOUS APPROACH: ICD-10-PCS | Performed by: COLON & RECTAL SURGERY

## 2025-04-12 PROCEDURE — 3600000014 HC SURGERY LEVEL 4 ADDTL 15MIN: Performed by: SURGERY

## 2025-04-12 PROCEDURE — 2720000010 HC SURG SUPPLY STERILE: Performed by: SURGERY

## 2025-04-12 PROCEDURE — 83735 ASSAY OF MAGNESIUM: CPT

## 2025-04-12 PROCEDURE — 31500 INSERT EMERGENCY AIRWAY: CPT

## 2025-04-12 PROCEDURE — 85610 PROTHROMBIN TIME: CPT

## 2025-04-12 PROCEDURE — C1762 CONN TISS, HUMAN(INC FASCIA): HCPCS | Performed by: SURGERY

## 2025-04-12 PROCEDURE — 2500000003 HC RX 250 WO HCPCS: Performed by: NURSE ANESTHETIST, CERTIFIED REGISTERED

## 2025-04-12 PROCEDURE — 74018 RADEX ABDOMEN 1 VIEW: CPT

## 2025-04-12 PROCEDURE — 4A133J1 MONITORING OF ARTERIAL PULSE, PERIPHERAL, PERCUTANEOUS APPROACH: ICD-10-PCS | Performed by: COLON & RECTAL SURGERY

## 2025-04-12 PROCEDURE — 6360000002 HC RX W HCPCS: Performed by: INTERNAL MEDICINE

## 2025-04-12 PROCEDURE — 5A1945Z RESPIRATORY VENTILATION, 24-96 CONSECUTIVE HOURS: ICD-10-PCS | Performed by: COLON & RECTAL SURGERY

## 2025-04-12 PROCEDURE — 6360000002 HC RX W HCPCS: Performed by: NURSE ANESTHETIST, CERTIFIED REGISTERED

## 2025-04-12 PROCEDURE — 82962 GLUCOSE BLOOD TEST: CPT

## 2025-04-12 PROCEDURE — 36600 WITHDRAWAL OF ARTERIAL BLOOD: CPT

## 2025-04-12 PROCEDURE — 6360000002 HC RX W HCPCS: Performed by: HOSPITALIST

## 2025-04-12 PROCEDURE — 80076 HEPATIC FUNCTION PANEL: CPT

## 2025-04-12 PROCEDURE — 30233R1 TRANSFUSION OF NONAUTOLOGOUS PLATELETS INTO PERIPHERAL VEIN, PERCUTANEOUS APPROACH: ICD-10-PCS | Performed by: COLON & RECTAL SURGERY

## 2025-04-12 PROCEDURE — 83605 ASSAY OF LACTIC ACID: CPT

## 2025-04-12 DEVICE — ALLOGRAFT HUM TISS 10X4 CM SHT AMNION/CHORION AMNIOFIX: Type: IMPLANTABLE DEVICE | Site: ABDOMEN | Status: FUNCTIONAL

## 2025-04-12 RX ORDER — SODIUM CHLORIDE 0.9 % (FLUSH) 0.9 %
1.1-1.9 SYRINGE (ML) INJECTION PRN
Status: DISCONTINUED | OUTPATIENT
Start: 2025-04-12 | End: 2025-04-14

## 2025-04-12 RX ORDER — VANCOMYCIN 2 GRAM/500 ML IN 0.9 % SODIUM CHLORIDE INTRAVENOUS
2000 ONCE
Status: COMPLETED | OUTPATIENT
Start: 2025-04-12 | End: 2025-04-12

## 2025-04-12 RX ORDER — 0.9 % SODIUM CHLORIDE 0.9 %
1000 INTRAVENOUS SOLUTION INTRAVENOUS ONCE
Status: COMPLETED | OUTPATIENT
Start: 2025-04-12 | End: 2025-04-12

## 2025-04-12 RX ORDER — DEXTROSE MONOHYDRATE 25 G/50ML
25 INJECTION, SOLUTION INTRAVENOUS ONCE
Status: COMPLETED | OUTPATIENT
Start: 2025-04-12 | End: 2025-04-12

## 2025-04-12 RX ORDER — CALCIUM GLUCONATE 20 MG/ML
1000 INJECTION, SOLUTION INTRAVENOUS ONCE
Status: COMPLETED | OUTPATIENT
Start: 2025-04-12 | End: 2025-04-12

## 2025-04-12 RX ORDER — SODIUM CHLORIDE, SODIUM LACTATE, POTASSIUM CHLORIDE, AND CALCIUM CHLORIDE .6; .31; .03; .02 G/100ML; G/100ML; G/100ML; G/100ML
500 INJECTION, SOLUTION INTRAVENOUS ONCE
Status: COMPLETED | OUTPATIENT
Start: 2025-04-12 | End: 2025-04-12

## 2025-04-12 RX ORDER — SODIUM CHLORIDE 9 MG/ML
INJECTION, SOLUTION INTRAVENOUS PRN
Status: DISCONTINUED | OUTPATIENT
Start: 2025-04-12 | End: 2025-04-13

## 2025-04-12 RX ORDER — ALBUMIN HUMAN 50 G/1000ML
SOLUTION INTRAVENOUS
Status: DISCONTINUED | OUTPATIENT
Start: 2025-04-12 | End: 2025-04-12 | Stop reason: SDUPTHER

## 2025-04-12 RX ORDER — ROCURONIUM BROMIDE 10 MG/ML
INJECTION, SOLUTION INTRAVENOUS
Status: DISCONTINUED | OUTPATIENT
Start: 2025-04-12 | End: 2025-04-12 | Stop reason: SDUPTHER

## 2025-04-12 RX ORDER — 0.9 % SODIUM CHLORIDE 0.9 %
INTRAVENOUS SOLUTION INTRAVENOUS
Status: DISCONTINUED | OUTPATIENT
Start: 2025-04-12 | End: 2025-04-12 | Stop reason: SDUPTHER

## 2025-04-12 RX ORDER — DEXAMETHASONE SODIUM PHOSPHATE 4 MG/ML
INJECTION, SOLUTION INTRA-ARTICULAR; INTRALESIONAL; INTRAMUSCULAR; INTRAVENOUS; SOFT TISSUE
Status: DISCONTINUED | OUTPATIENT
Start: 2025-04-12 | End: 2025-04-12 | Stop reason: SDUPTHER

## 2025-04-12 RX ORDER — INSULIN LISPRO 100 [IU]/ML
0-4 INJECTION, SOLUTION INTRAVENOUS; SUBCUTANEOUS EVERY 6 HOURS
Status: DISCONTINUED | OUTPATIENT
Start: 2025-04-12 | End: 2025-04-19

## 2025-04-12 RX ORDER — IOPAMIDOL 612 MG/ML
100 INJECTION, SOLUTION INTRATHECAL
Status: DISCONTINUED | OUTPATIENT
Start: 2025-04-12 | End: 2025-04-12

## 2025-04-12 RX ORDER — LIDOCAINE HYDROCHLORIDE 20 MG/ML
INJECTION, SOLUTION EPIDURAL; INFILTRATION; INTRACAUDAL; PERINEURAL
Status: DISCONTINUED | OUTPATIENT
Start: 2025-04-12 | End: 2025-04-12 | Stop reason: SDUPTHER

## 2025-04-12 RX ORDER — CALCIUM GLUCONATE 98 MG/ML
1000 INJECTION, SOLUTION INTRAVENOUS ONCE
Status: DISCONTINUED | OUTPATIENT
Start: 2025-04-12 | End: 2025-04-12

## 2025-04-12 RX ORDER — ETOMIDATE 2 MG/ML
INJECTION INTRAVENOUS
Status: DISCONTINUED | OUTPATIENT
Start: 2025-04-12 | End: 2025-04-12 | Stop reason: SDUPTHER

## 2025-04-12 RX ORDER — CHLORHEXIDINE GLUCONATE ORAL RINSE 1.2 MG/ML
15 SOLUTION DENTAL 2 TIMES DAILY
Status: DISCONTINUED | OUTPATIENT
Start: 2025-04-12 | End: 2025-04-15

## 2025-04-12 RX ORDER — INDOMETHACIN 25 MG/1
200 CAPSULE ORAL ONCE
Status: COMPLETED | OUTPATIENT
Start: 2025-04-12 | End: 2025-04-12

## 2025-04-12 RX ORDER — CALCIUM CHLORIDE, MAGNESIUM CHLORIDE, DEXTROSE MONOHYDRATE, LACTIC ACID, SODIUM CHLORIDE, SODIUM BICARBONATE AND POTASSIUM CHLORIDE 5.15; 2.03; 22; 5.4; 6.46; 3.09; .157 G/L; G/L; G/L; G/L; G/L; G/L; G/L
INJECTION INTRAVENOUS CONTINUOUS
Status: DISCONTINUED | OUTPATIENT
Start: 2025-04-12 | End: 2025-04-14

## 2025-04-12 RX ORDER — CALCIUM GLUCONATE 20 MG/ML
2000 INJECTION, SOLUTION INTRAVENOUS ONCE
Status: COMPLETED | OUTPATIENT
Start: 2025-04-12 | End: 2025-04-13

## 2025-04-12 RX ORDER — INDOMETHACIN 25 MG/1
150 CAPSULE ORAL ONCE
Status: DISCONTINUED | OUTPATIENT
Start: 2025-04-12 | End: 2025-04-12

## 2025-04-12 RX ORDER — SODIUM CHLORIDE 9 MG/ML
INJECTION, SOLUTION INTRAVENOUS PRN
Status: DISCONTINUED | OUTPATIENT
Start: 2025-04-12 | End: 2025-04-14

## 2025-04-12 RX ORDER — INDOMETHACIN 25 MG/1
CAPSULE ORAL
Status: DISCONTINUED | OUTPATIENT
Start: 2025-04-12 | End: 2025-04-12 | Stop reason: SDUPTHER

## 2025-04-12 RX ORDER — MIDAZOLAM HYDROCHLORIDE 1 MG/ML
INJECTION, SOLUTION INTRAMUSCULAR; INTRAVENOUS
Status: DISCONTINUED | OUTPATIENT
Start: 2025-04-12 | End: 2025-04-12 | Stop reason: SDUPTHER

## 2025-04-12 RX ORDER — INDOMETHACIN 25 MG/1
100 CAPSULE ORAL ONCE
Status: COMPLETED | OUTPATIENT
Start: 2025-04-12 | End: 2025-04-12

## 2025-04-12 RX ORDER — FENTANYL CITRATE-0.9 % NACL/PF 10 MCG/ML
25-200 PLASTIC BAG, INJECTION (ML) INTRAVENOUS CONTINUOUS
Refills: 0 | Status: DISCONTINUED | OUTPATIENT
Start: 2025-04-12 | End: 2025-04-15

## 2025-04-12 RX ORDER — SODIUM CHLORIDE, SODIUM LACTATE, POTASSIUM CHLORIDE, AND CALCIUM CHLORIDE .6; .31; .03; .02 G/100ML; G/100ML; G/100ML; G/100ML
500 INJECTION, SOLUTION INTRAVENOUS ONCE
Status: DISCONTINUED | OUTPATIENT
Start: 2025-04-12 | End: 2025-04-14

## 2025-04-12 RX ORDER — IOPAMIDOL 755 MG/ML
100 INJECTION, SOLUTION INTRAVASCULAR
Status: COMPLETED | OUTPATIENT
Start: 2025-04-12 | End: 2025-04-12

## 2025-04-12 RX ORDER — ENOXAPARIN SODIUM 100 MG/ML
30 INJECTION SUBCUTANEOUS DAILY
Status: DISCONTINUED | OUTPATIENT
Start: 2025-04-13 | End: 2025-04-12

## 2025-04-12 RX ORDER — CEFOXITIN 2 G/1
INJECTION, POWDER, FOR SOLUTION INTRAVENOUS
Status: DISCONTINUED | OUTPATIENT
Start: 2025-04-12 | End: 2025-04-12 | Stop reason: SDUPTHER

## 2025-04-12 RX ORDER — FENTANYL CITRATE 50 UG/ML
INJECTION, SOLUTION INTRAMUSCULAR; INTRAVENOUS
Status: DISCONTINUED | OUTPATIENT
Start: 2025-04-12 | End: 2025-04-12 | Stop reason: SDUPTHER

## 2025-04-12 RX ORDER — PROPOFOL 10 MG/ML
5-50 INJECTION, EMULSION INTRAVENOUS CONTINUOUS
Status: DISCONTINUED | OUTPATIENT
Start: 2025-04-12 | End: 2025-04-15

## 2025-04-12 RX ORDER — INDOMETHACIN 25 MG/1
CAPSULE ORAL
Status: COMPLETED
Start: 2025-04-12 | End: 2025-04-12

## 2025-04-12 RX ORDER — SODIUM CHLORIDE 9 MG/ML
INJECTION, SOLUTION INTRAVENOUS CONTINUOUS
Status: DISCONTINUED | OUTPATIENT
Start: 2025-04-12 | End: 2025-04-12

## 2025-04-12 RX ADMIN — IOPAMIDOL 100 ML: 755 INJECTION, SOLUTION INTRAVENOUS at 14:14

## 2025-04-12 RX ADMIN — ACETAMINOPHEN 650 MG: 325 TABLET ORAL at 12:44

## 2025-04-12 RX ADMIN — FENTANYL CITRATE 25 MCG: 50 INJECTION, SOLUTION INTRAMUSCULAR; INTRAVENOUS at 16:12

## 2025-04-12 RX ADMIN — ATORVASTATIN CALCIUM 40 MG: 40 TABLET, FILM COATED ORAL at 08:03

## 2025-04-12 RX ADMIN — FENTANYL CITRATE 50 MCG: 50 INJECTION, SOLUTION INTRAMUSCULAR; INTRAVENOUS at 15:51

## 2025-04-12 RX ADMIN — PIPERACILLIN AND TAZOBACTAM 3375 MG: 3; .375 INJECTION, POWDER, LYOPHILIZED, FOR SOLUTION INTRAVENOUS at 20:35

## 2025-04-12 RX ADMIN — LIDOCAINE HYDROCHLORIDE 100 MG: 20 INJECTION, SOLUTION EPIDURAL; INFILTRATION; INTRACAUDAL; PERINEURAL at 15:00

## 2025-04-12 RX ADMIN — PROPOFOL 35 MCG/KG/MIN: 10 INJECTION, EMULSION INTRAVENOUS at 19:04

## 2025-04-12 RX ADMIN — PHENYLEPHRINE HYDROCHLORIDE 40 MCG/MIN: 10 INJECTION INTRAVENOUS at 15:07

## 2025-04-12 RX ADMIN — ALVIMOPAN 12 MG: 12 CAPSULE ORAL at 08:03

## 2025-04-12 RX ADMIN — FENTANYL CITRATE 50 MCG/HR: at 17:48

## 2025-04-12 RX ADMIN — SODIUM CHLORIDE, SODIUM LACTATE, POTASSIUM CHLORIDE, AND CALCIUM CHLORIDE 500 ML: .6; .31; .03; .02 INJECTION, SOLUTION INTRAVENOUS at 08:55

## 2025-04-12 RX ADMIN — SODIUM CHLORIDE 1000 ML: 0.9 INJECTION, SOLUTION INTRAVENOUS at 13:46

## 2025-04-12 RX ADMIN — PROPOFOL 50 MCG/KG/MIN: 10 INJECTION, EMULSION INTRAVENOUS at 16:42

## 2025-04-12 RX ADMIN — SODIUM BICARBONATE 50 MEQ: 84 INJECTION, SOLUTION INTRAVENOUS at 16:32

## 2025-04-12 RX ADMIN — INSULIN LISPRO 1 UNITS: 100 INJECTION, SOLUTION INTRAVENOUS; SUBCUTANEOUS at 11:56

## 2025-04-12 RX ADMIN — DEXAMETHASONE SODIUM PHOSPHATE 4 MG: 4 INJECTION, SOLUTION INTRAMUSCULAR; INTRAVENOUS at 15:15

## 2025-04-12 RX ADMIN — FENTANYL CITRATE 25 MCG: 50 INJECTION, SOLUTION INTRAMUSCULAR; INTRAVENOUS at 15:00

## 2025-04-12 RX ADMIN — INSULIN HUMAN 5 UNITS: 100 INJECTION, SOLUTION PARENTERAL at 11:52

## 2025-04-12 RX ADMIN — PIPERACILLIN AND TAZOBACTAM 4500 MG: 4; .5 INJECTION, POWDER, LYOPHILIZED, FOR SOLUTION INTRAVENOUS; PARENTERAL at 12:40

## 2025-04-12 RX ADMIN — SODIUM CHLORIDE 1000 ML: 9 INJECTION, SOLUTION INTRAVENOUS at 15:44

## 2025-04-12 RX ADMIN — INSULIN LISPRO 3 UNITS: 100 INJECTION, SOLUTION INTRAVENOUS; SUBCUTANEOUS at 18:38

## 2025-04-12 RX ADMIN — CALCIUM GLUCONATE 1000 MG: 20 INJECTION, SOLUTION INTRAVENOUS at 18:12

## 2025-04-12 RX ADMIN — SODIUM BICARBONATE 100 MEQ: 84 INJECTION, SOLUTION INTRAVENOUS at 14:27

## 2025-04-12 RX ADMIN — CALCIUM GLUCONATE 1000 MG: 20 INJECTION, SOLUTION INTRAVENOUS at 11:55

## 2025-04-12 RX ADMIN — CALCIUM GLUCONATE 2000 MG: 20 INJECTION, SOLUTION INTRAVENOUS at 22:22

## 2025-04-12 RX ADMIN — MIDAZOLAM HYDROCHLORIDE 1 MG: 1 INJECTION, SOLUTION INTRAMUSCULAR; INTRAVENOUS at 15:00

## 2025-04-12 RX ADMIN — SODIUM BICARBONATE: 84 INJECTION, SOLUTION INTRAVENOUS at 20:00

## 2025-04-12 RX ADMIN — SODIUM BICARBONATE 50 MEQ: 84 INJECTION, SOLUTION INTRAVENOUS at 15:39

## 2025-04-12 RX ADMIN — HYDROMORPHONE HYDROCHLORIDE 0.5 MG: 1 INJECTION, SOLUTION INTRAMUSCULAR; INTRAVENOUS; SUBCUTANEOUS at 02:33

## 2025-04-12 RX ADMIN — SODIUM CHLORIDE 1000 ML: 0.9 INJECTION, SOLUTION INTRAVENOUS at 14:26

## 2025-04-12 RX ADMIN — SODIUM CHLORIDE 1000 ML: 9 INJECTION, SOLUTION INTRAVENOUS at 15:40

## 2025-04-12 RX ADMIN — PROPOFOL 40 MCG/KG/MIN: 10 INJECTION, EMULSION INTRAVENOUS at 18:50

## 2025-04-12 RX ADMIN — INSULIN LISPRO 2 UNITS: 100 INJECTION, SOLUTION INTRAVENOUS; SUBCUTANEOUS at 23:02

## 2025-04-12 RX ADMIN — ROCURONIUM BROMIDE 80 MG: 10 INJECTION INTRAVENOUS at 15:00

## 2025-04-12 RX ADMIN — SODIUM CHLORIDE 1000 ML: 0.9 INJECTION, SOLUTION INTRAVENOUS at 12:43

## 2025-04-12 RX ADMIN — CALCIUM GLUCONATE 1000 MG: 20 INJECTION, SOLUTION INTRAVENOUS at 14:44

## 2025-04-12 RX ADMIN — SODIUM CHLORIDE, PRESERVATIVE FREE 10 ML: 5 INJECTION INTRAVENOUS at 20:15

## 2025-04-12 RX ADMIN — ALBUMIN (HUMAN) 12.5 G: 12.5 INJECTION, SOLUTION INTRAVENOUS at 15:49

## 2025-04-12 RX ADMIN — Medication 2000 MG: at 18:10

## 2025-04-12 RX ADMIN — ENOXAPARIN SODIUM 40 MG: 100 INJECTION SUBCUTANEOUS at 08:04

## 2025-04-12 RX ADMIN — OXYCODONE 5 MG: 5 TABLET ORAL at 10:57

## 2025-04-12 RX ADMIN — SODIUM BICARBONATE 200 MEQ: 84 INJECTION, SOLUTION INTRAVENOUS at 13:41

## 2025-04-12 RX ADMIN — PANTOPRAZOLE SODIUM 40 MG: 40 INJECTION, POWDER, LYOPHILIZED, FOR SOLUTION INTRAVENOUS at 18:38

## 2025-04-12 RX ADMIN — ETOMIDATE 10 MG: 2 INJECTION, SOLUTION INTRAVENOUS at 15:00

## 2025-04-12 RX ADMIN — DEXTROSE MONOHYDRATE 25 G: 25 INJECTION, SOLUTION INTRAVENOUS at 11:52

## 2025-04-12 RX ADMIN — ACETAMINOPHEN 650 MG: 325 TABLET ORAL at 08:03

## 2025-04-12 RX ADMIN — MIDAZOLAM HYDROCHLORIDE 1 MG: 1 INJECTION, SOLUTION INTRAMUSCULAR; INTRAVENOUS at 15:08

## 2025-04-12 RX ADMIN — CHLORHEXIDINE GLUCONATE 15 ML: 1.2 RINSE ORAL at 20:15

## 2025-04-12 RX ADMIN — SODIUM CHLORIDE 1000 ML: 9 INJECTION, SOLUTION INTRAVENOUS at 16:52

## 2025-04-12 RX ADMIN — SODIUM CHLORIDE: 0.9 INJECTION, SOLUTION INTRAVENOUS at 10:57

## 2025-04-12 RX ADMIN — Medication 1 AMPULE: at 20:13

## 2025-04-12 RX ADMIN — CEFOXITIN 2000 MG: 2 INJECTION, POWDER, FOR SOLUTION INTRAVENOUS at 15:17

## 2025-04-12 ASSESSMENT — PAIN DESCRIPTION - LOCATION
LOCATION: ABDOMEN

## 2025-04-12 ASSESSMENT — PAIN SCALES - GENERAL
PAINLEVEL_OUTOF10: 3
PAINLEVEL_OUTOF10: 6
PAINLEVEL_OUTOF10: 0
PAINLEVEL_OUTOF10: 8
PAINLEVEL_OUTOF10: 8
PAINLEVEL_OUTOF10: 3
PAINLEVEL_OUTOF10: 7
PAINLEVEL_OUTOF10: 3

## 2025-04-12 ASSESSMENT — PULMONARY FUNCTION TESTS
PIF_VALUE: 18
PIF_VALUE: 19

## 2025-04-12 ASSESSMENT — PAIN - FUNCTIONAL ASSESSMENT
PAIN_FUNCTIONAL_ASSESSMENT: ACTIVITIES ARE NOT PREVENTED
PAIN_FUNCTIONAL_ASSESSMENT: ACTIVITIES ARE NOT PREVENTED

## 2025-04-12 ASSESSMENT — PAIN DESCRIPTION - DESCRIPTORS
DESCRIPTORS: ACHING
DESCRIPTORS: ACHING

## 2025-04-12 ASSESSMENT — PAIN DESCRIPTION - ORIENTATION
ORIENTATION: LEFT;LOWER

## 2025-04-12 NOTE — PROGRESS NOTES
Called out to [facility] on-call for pt being hypotensive, and pt is lethargic. Order for LR bolus received and labs, and hospitalist consulted.

## 2025-04-12 NOTE — ANESTHESIA PROCEDURE NOTES
Arterial Line:    An arterial line was placed using ultrasound guidance, in the OR for the following indication(s): continuous blood pressure monitoring and blood sampling needed.    A 20 gauge (size) (length), Angiocath (type) catheter was placed, Seldinger technique used, into the right radial artery, secured by Tegaderm.4/12/2025 3:49 PM4/12/2025 3:49 PM  Resident/CRNA: Angela Fernandez APRN - CRNA  Performed: Resident/CRNA   Preanesthetic Checklist  Completed: patient identified, IV checked, site marked, risks and benefits discussed, surgical/procedural consents, equipment checked, pre-op evaluation, timeout performed, anesthesia consent given, oxygen available, monitors applied/VS acknowledged, fire risk safety assessment completed and verbalized and blood product R/B/A discussed and consented

## 2025-04-12 NOTE — SIGNIFICANT EVENT
[facility]  RAPID RESPONSE TEAM   RN NOTE       During round on ST, checked on patient bed alarm, found patient trying to get out of bed, confused, room # 3101/01  @ around 0843 for patient Yefri Vigil , MRN# [id number]      S- Reason for rapid response: patient cold and clammy   Per primary RN:   Background: patient admitted yesterday for colon mass s/p right robotic colectomy with Dr. Goddard     O/A- Focused Assessment:   Cardiac- sinus tachycardia on the monitor, hr 120s, sbp 89, lethargic.   Respiratory- room air, adequate air movement with rr of 16, spo2 90s.   Neuro- lethargic, oriented to person and place only.   GI/: clear liquid diet per note.   Gtts/Lines/drain- PIV x2  Pertinent Labs/chart reviewed-  Yes.  Morning labs pending, hemoglobin 7.6. wbc elevated 17     Sepsis Screen: Positive   - lactic acid  - blood cultures     Interventions:      Spoke to Dr. Kessler on the phone, orders received for the following Interventions:   - 500 ml LR x2 bolus =1L LR bolus,  - held am bp medications  - repeat bmp and h&h at 12   - add on mag and phos  - sepsis bundle (lactic acid, IVF bolus, and blood cultures)  - Medicine consult.   - possible OR/CT? Dr. Kessler aware and waiting response to fluid and pain meds before CT.   -    - 1103- RRT for chest pain, patient was about to be transferred to new room 2138.   On arrival patient lethargic but able to answer questions, complains of generalized pain in the chest and abdomin area. EKG was done 5 minutes prior and read by MD with no ischemic changes. MD at bedside, patient will be transferred to new room 2138. Repeat labs, EKG, cxr, kub and bc to be obtained once patient is in the new room. Vital signs stable, patient is in the new room and labs are pending. Primary RN will administer hyperkalemia management orders.     1250: patient repeat labs came back, lactic acid elevated to 17.9 and CO2-8. Patient not making much urine post bolus. ICU  consult. And ICU kindly accepted patient. Abg showed more acidosis, and patient transferred to ICU.     P- Outcome/Response:   Patient is stable, vital signs stable, labs pending. Fluid infusing.     Patient Disposition:   Patient transferred to higher level of care following RRT?: Yes.  RRT End Time: 1200  Patient moved to room 2514 @1325  Patient/family education provided as applicable.       Primary RN aware to call with any new or concerning problems.   Care Collaborated with primary RN, CRN, RT, and responding MD.   See MD notes for details and plan of care.     Code Status: Full Code   Attending MD: Katrina Goddard MD   Vitals:    04/12/25 0918   BP: (!) 104/51   Pulse: 72   Resp:    Temp:    SpO2: 96%          Yoly BSN, RN, CCRN, TCRN  Rapid Response Team- RN  Ext [phone]

## 2025-04-12 NOTE — PROGRESS NOTES
Nutrition Note    Consult received as part of ERAS protocol for post op supplements. Pt has been deteriorating throughout the day transferring from surgery floor to step down and now in ICU. Pt is not appropriate for supplements or diet education at this time. Will monitor status.     Electronically signed by Julissa Thacker RD on 4/12/25 at 1:41 PM EDT    Contact: [phone]

## 2025-04-12 NOTE — CONSENT
Informed Consent for Blood Component Transfusion Note    I have discussed with the patient the rationale for blood component transfusion; its benefits in treating or preventing fatigue, organ damage, or death; and its risk which includes mild transfusion reactions, rare risk of blood borne infection, or more serious but rare reactions. I have discussed the alternatives to transfusion, including the risk and consequences of not receiving transfusion. The patient had an opportunity to ask questions and had agreed to proceed with transfusion of blood components.    Electronically signed by Brenden Mayorga MD on 4/12/25 at 1:35 PM EDT

## 2025-04-12 NOTE — BRIEF OP NOTE
Brief Postoperative Note      Patient: Yefri Vigil  YOB: 1955  MRN: [id number]    Date of Procedure: 4/12/2025    Pre-Op Diagnosis Codes:      * Acute abdomen [R10.0]    Post-Op Diagnosis: acute blood loss anemia        Procedure(s):  EXPLORATORY LAPAROTOMY    Surgeon(s):  Billy Reyes MD    Assistant:  Surgical Assistant: Trever Matt    Anesthesia: General    Estimated Blood Loss (mL): 1500cc    Complications: None    Specimens:   * No specimens in log *    Implants:  Implant Name Type Inv. Item Serial No.  Lot No. LRB No. Used Action   [id number] - [id number]   [id number]  NA N/A 1 Implanted         Drains:   Closed/Suction Drain Left Abdomen;LLQ Bulb (Active)       Urinary Catheter 04/11/25 2 Way;Fitzgerald (Active)   Catheter Indications Perioperative use for selected surgical procedures 04/11/25 2005   Site Assessment No urethral drainage 04/11/25 2005   Urine Color Yellow 04/12/25 0642   Urine Appearance Clear 04/12/25 0642   Output (mL) 450 mL 04/12/25 0642       Findings:  Infection Present At Time Of Surgery (PATOS) (choose all levels that have infection present):  No infection present  Other Findings: 1) 1 liter of liquid blood and 500cc clotted blood removed from the abdomen.  2) Active arterial bleeding from the right gastroepiploic artery just to the right and above the anastomosis.  It appeared to have been sealed with energy and was bleeding through the amy.  3) diffuse ooze from the retroperitoneum.  4) anastomosis intact  5) all bowel viable      Electronically signed by Billy Reyes MD on 4/12/2025 at 4:49 PM

## 2025-04-12 NOTE — CONSULTS
Consult received. Patient followed by our nephrology service. Chart and labs reviewed.     Came by to see patient but patient was in the OR.     Please notify me when patient is back in the ICU    Thank you.    Efren Arnett MD  [facility]

## 2025-04-12 NOTE — CONSULTS
Hospitalist Consultation Note    NAME: Yefri Vigil   : 1955  MRN: [id number]     Date/Time: 2025 10:14 AM    Patient PCP: Edy Brandt APRN - NP      Recommendations/Plan:       Principal Problem:    Colonic mass  Resolved Problems:    * No resolved hospital problems. *     S/p Robotic extended right colectomy for colonic mass on .    On  - Patient was hypotensive SBP in 80s. S/p 1 l NS bolus  with slight improvement to 100s.   Last dose of Hydralazine  and Metoprolol around   9 pm on .  S/p Dilaudid around 6 pm on .  Prior echo :  Normal LV systolic function, EF-55-60%. Bioprosthetic valve, RVSP_ 47        Severe sepsis  - Hypotension, Leucocytosis , tachycardic , hypothermic during admission   High anion gap metabolic acidosis   TERESA on CKD stage 3 , baseline creatinine - 1.75   Hyperkalemia , hyperphosphatemia   Elevated lactic acidosis, BG- 170s   Prior Iron deficiency anemia   RRT for chest pain - elevated troponin -? Demand ischemia     Plan   - Transfer to medical step down unit with tele   -Continue with IV hydration - looks dry on examination   - EKG with tall T waves in anterolateral leads, possible changes related to hyperkalemia   -Insulin 5 untis +D50+ calcium gluconate ordered   -  Repeat CBC, BMP, LFT,  lactate - post hydration   - Possible TERESA - due to hypotension related to anesthesia, BP medications. Etiology to determine   - Drop in Hb by 2 units post surgery , expected . Repeat H&H , if needed - possible transfusion .   - Discussed with Surgery Dr Nichols : Abdomen is soft, less likely to have perforation. Plan to repeat labs again and then decide about CT imaging.   - Hold BP medications   - Patient temp low to 94 ,  Jessenia hugger. . Has leucocytosis, cultures - ordered. Empirically started on antibiotics.   -Bladder scan , brooks insertion if retention . Intake and output monitoring   - Had diffuse Chest pain with SOB. Non radiating.  XR abdomen

## 2025-04-12 NOTE — PROGRESS NOTES
1910: Bedside and Verbal shift change report given to CHARIS Encarnacion  (oncoming nurse) by CHARIS Escoto (offgoing nurse). Report included the following information Nurse Handoff Report, ED SBAR, Adult Overview, Surgery Report, Intake/Output, MAR, Recent Results, Cardiac Rhythm Sinus Rhythm, and Alarm Parameters. Patient is intubated and sedated, on Propofol 35mcg/kg/min, Fentanyl 50mcg/hour and ongoing Plasma transfusion.    1920: Above ongoing Plasma transfusion done, no blood transfusion reaction noted.    1921: 2nd unit of Plasma, verified and counterchecked with CONCETTA Wilson. Started transfusion according to protocol.    1944: 2nd unit of Plasma transfusion done, no blood transfusion reaction noted.    1954: 3rd unit of Plasma, verified and counterchecked with CONCETTA Wilson. Started transfusion according to protocol.    2000: Shift assessment done, see flowsheet for details.    2012: 3rd unit of Plasma transfusion done, no blood transfusion reaction noted.     2021: ABG done, result noted by Cherelle, ICU NP.    2041: Blood drawn for LA, CBC, Protime/INR and aPTT.    2050: Bloody saturated dressing on mid abdominal with jose drain, notified Cherelle, ICU NP, came to bedside and assessed, she said she'll notify the surgeon on call.    2058: CBC 6.2, notified Cherelle ICU NP with an order to transfuse 3units of PRBC but to give 2 units first then check H&H, 1u Cryoprecipitate, 1u Platelets, and 1u Plasma.    2100: Cherelle ICU NP ordered to do CT abdomen stat but then cancelled; as per surgeon no need to do CT abdomen and will give blood products.    2116: Blood drawn for Fibrinogen.    2121: 1st unit of PRBC verified and counterchecked with CONCETTA Wilson. Started transfusion according to protocol.    2204: Update given to patient's son Jules.    2311: PRBC 1st unit done, no blood transfusion reaction noted.    2314: Plasma 1unit verified and counterchecked with CONCETTA Wilson. Started transfusion according to protocol.    2339:  Plasma transfusion done, no blood transfusion reaction noted.    2351: Platelet 1unit verified and counterchecked with CONCETTA Wilson. Started transfusion according to protocol.    0000: Reassessment done, no change to previous assessment.    0024: Platelet transfusion done, no blood transfusion reaction noted.    0029: Blood drawn for LA.    0038: Cryoprecipitate 1unit verified and counterchecked with CHARIS Rios. Started transfusion according to protocol.    0040: MAP 62-64mmHg, notified Cherelle, ICU NP with an order to start Levophed gtt and to titrate accordingly. See MAR.    0048: Active bleeding on mid abdominal site, notified Cherelle, ICU NP, came to bedside with an order to do CT abdomen with contrast stat.    0116: Blood drawn for CBC, BMP, Mg, Phos, PTT/INR, aPTT and Fibrinogen.    0121: Cryoprecipitate transfusion done, no blood transfusion reaction noted.    8114-2080: Transported to CT department for stat CTA abdomen together with CONCETTA Wilson and SHANW Caruso as well as [place], RT. Mid abdominal dressing was leaking, reinforced dressing with abdominal pads.    0225: Hgb 6.6, as per Cherelle to give the remaining 2 units of PRBC.    0235: 2nd unit of PRBC verified and counterchecked with CONCETTA Wilson. Started transfusion according to protocol.    0318: As per Cherelle, no need for CRRT as of the moment, notified Day Asif RN.    0357: Blood drawn for Vancomycin, ABG done, results noted by Cherelle ICU Np with an order to stop Bicarb gtt.    0400: Reassessment done, no change to previous assessment.    0430: PRBC 2nd unit done, no blood transfusion reaction noted.    0444: 3rd unit of PRBC verified and counterchecked with CHARIS Rajput. Started transfusion according to protocol.    0450: Spoke to patient's son Jules, he wanted to talk first to the surgeon before giving consent.    0540: Anesth Dr. Arnett, Colorectal Sx Dr. Kessler and OR staff at the bedside. JUAN CARLOS to OR.

## 2025-04-12 NOTE — PROGRESS NOTES
Pt transferred to 2138 for higher level of care. Patient continues to deteriorate, with BP, temp, and critical labwork being called. Pt transferred to ICU for higher level of care. Verbal report given to Julissa VIZCAINO on telephone.

## 2025-04-12 NOTE — PROGRESS NOTES
1341: Pt. Now arrived to his CCU room. Dr. Mayorga called to the bedside. MD made aware of ABG results. 4 AMPS of Sodium Bicarb ordered and given per Dr. Mayorga.     1346: 1 L of NS bolus now ordered and given per Dr. Mayorga.     1400: STAT CT ordered. Transporting Patient to CT now.     1413: CT now completed.     1426: 1 L of NS bolus now ordered and given per Dr. Mayorga.     1427: 2 AMP of Sodium Bicarb now given per Dr. Myaorga.     1430: Transported back from CT. Fitzgerald inserted per Dr. Mayorga for strict I/O's. Bladder temp found to be 93.2 F. Jessenia robertson applied. MD aware.    Dual skin obtained with CHARIS Nichols. Skin intact.  Lap sites to ABD.     1444: Calcium given per Dr. Mayorga. OR team at the bedside. Pt. Transported to OR now.     1450: Report given to CHARIS Escoto. Primary care will be provided by this Nurse after the OR.     Addendum:     1724: Pt. Arrived back to his CCU room. Dialysis catheter being placed emergently by Dr. Ruelas. Received order for 3 units of FFP and 1 unit of platelets. Also received order for repeat blood work and 1 G of calcium IVPB now. Restraints ordered and in use. Fentanyl, propofol ordered for sedation while intubated.

## 2025-04-12 NOTE — PROGRESS NOTES
Pharmacy Antimicrobial Kinetic Dosing    Indication for Antimicrobials: IntraAb.Infection     Current Regimen of Each Antimicrobial:  Vancomycin 2 gm IV x1; then dosing per level. Start Date ; Day 1  Zosyn. Start date ; day 1 of 7.  Micafungin 100 mg IV daily.  Start date ; Day 1    Previous Antimicrobial Therapy:   cefazoline 2 gm x1; metronidazole 500 mg x1.     Goal Level: Vancomycin -600    Date Dose & Interval Measured (mcg/mL) Predicted AUC 24-48 Predicted AUC 24,ss                          Significant Cultures:    blood: pending    Labs:  Recent Labs     Units 25  1130 25  0704   CREATININE MG/DL 3.76* 3.33*   BUN MG/DL 38* 36*   PROCAL ng/mL 13.96  --    WBC K/uL 19.7* 17.7*   BANDS % 9 11     Temp (24hrs), Av °F (36.1 °C), Min:94.5 °F (34.7 °C), Max:97.6 °F (36.4 °C)      Conditions for Dosing Consideration: None    Creatinine Clearance (mL/min): Estimated Creatinine Clearance: 19 mL/min (A) (based on SCr of 3.76 mg/dL (H)).       Impression/Plan:   TERESA.  Vancomycin 2 gm IV x1, then doing per levels.  Decrease Zosyn to 3.375 gm IV q12h.  Continue Micafungin.  BMP daily. Vanco random level in AM  Antimicrobial stop date: Vancomycin, micafungin TBD, Zosyn x7 days     Pharmacy will follow daily and adjust medications as appropriate for renal function and/or serum levels.    Thank you,  Elias Lu, [facility]

## 2025-04-12 NOTE — PROGRESS NOTES
Anticoagulation Dosing    Indication:  DVT ppx    Estimated Creatinine Clearance: 21 mL/min (A) (based on SCr of 3.33 mg/dL (H)).    Recent Labs     04/12/25  0704 04/12/25  1102   HGB 7.6*  --    HCT 26.9*  --      --    ALBUMIN  --  2.4*       Wt Readings from Last 1 Encounters:   04/11/25 77 kg (169 lb 12.1 oz)     Ht Readings from Last 1 Encounters:   04/11/25 1.676 m (5' 6\")     Body mass index is 27.4 kg/m².    Plan:  Lovenox adjusted to 30 mg sq q24h due to patient renal function and weight.       Thank you,  REX CARLSON [facility]

## 2025-04-12 NOTE — PROCEDURES
PROCEDURE NOTE  Date: 4/12/2025   Name: Yefri Vigil  YOB: 1955    CENTRAL LINE    Date/Time: 4/12/2025 5:56 PM    Performed by: Akshat Ruelas MD  Authorized by: Akshat Ruelas MD  Consent: Written consent obtained.  Risks and benefits: risks, benefits and alternatives were discussed  Consent given by: patient  Patient identity confirmed: arm band  Time out: Immediately prior to procedure a \"time out\" was called to verify the correct patient, procedure, equipment, support staff and site/side marked as required.  Indications: vascular access  Preparation: skin prepped with 2% chlorhexidine  Location details: right internal jugular  Patient position: flat  Catheter type: triple lumen  Pre-procedure: landmarks identified  Ultrasound guidance: yes  Sterile ultrasound techniques: sterile gel and sterile probe covers were used  Number of attempts: 1  Successful placement: yes  Post-procedure: line sutured and dressing applied  Assessment: blood return through all ports, free fluid flow, placement verified by x-ray and no pneumothorax on x-ray  Patient tolerance: patient tolerated the procedure well with no immediate complications

## 2025-04-12 NOTE — ANESTHESIA PRE PROCEDURE
Department of Anesthesiology  Preprocedure Note       Name:  Yefri Vigil   Age:  69 y.o.  :  1955                                          MRN:  [id number]         Date:  2025      Surgeon: Surgeon(s):  Billy Reyes MD    Procedure: Procedure(s):  LAPAROTOMY EXPLORATORY    Medications prior to admission:   Prior to Admission medications    Medication Sig Start Date End Date Taking? Authorizing Provider   neomycin (MYCIFRADIN) 500 MG tablet Take 1 tablet by mouth See Admin Instructions Take as instructed by Dr. Goddard on 3/27/25   Yes Provider, MD Dane   metroNIDAZOLE (FLAGYL) 500 MG tablet Take 1 tablet by mouth See Admin Instructions Take as instructed by Dr. Goddard on 3/27/25   Yes ProviderDane MD   metoclopramide (REGLAN) 10 MG tablet Take 1 tablet by mouth See Admin Instructions Take as instructed by Dr. Goddard on 3/27/25   Yes ProviderDane MD   polyethylene glycol (COLYTE) 240 g solution Take by mouth See Admin Instructions Take as instructed by Dr. Goddard on 3/27/25   Yes ProviderDane MD   atorvastatin (LIPITOR) 40 MG tablet Take 1 tablet by mouth daily   Yes ProviderDane MD   hydrALAZINE (APRESOLINE) 25 MG tablet Take 1 tablet by mouth 3 times daily   Yes ProviderDane MD   metoprolol (LOPRESSOR) 25 MG tablet Take 1 tablet by mouth 2 times daily 25  Yes Kenny Strong MD   ferrous sulfate (IRON 325) 325 (65 Fe) MG tablet Take 1 tablet by mouth daily (with breakfast) 25 Yes Kenny Strong MD   pantoprazole (PROTONIX) 40 MG tablet Take 1 tablet by mouth every morning (before breakfast) 25  Yes Kenny Strong MD   metFORMIN (GLUCOPHAGE-XR) 750 MG extended release tablet Take 1 tablet by mouth in the morning and at bedtime   Yes Dane Thacker MD   lisinopril (PRINIVIL;ZESTRIL) 20 MG tablet Take 1 tablet by mouth daily   Yes Dane Thacker MD   aspirin 81 MG chewable tablet Take 1 tablet by mouth

## 2025-04-12 NOTE — CONSULTS
Nephrology Consult Note      Assessment:  TERESA on CKD-3b: serum Cr bump to 3.7mg/dl. Pre-renal vs ATN (hypotension). Celebrex x 1 dose (4/11) and IV contrast exposure today. Non-oliguric. Underlying DM kidney disease. Baseline Cr 1.4-1.8mg/dl.    Hemorrhagic shock: secondary to gastroepiploic artery bleed. S/p multiple units of PRBCs periop.    Hyperkalemia: persistent    Metabolic acidosis: severe lactic acidosis    Robotic transverse colectomy: 4/11/25    DM2: stable control      Plan/Recommendations:  Start CVVH-> 2K/3.5Ca bath. Factor rate 0  Start IV 1/2NS + 75meq Sodium Bicarbonate at 100cc/hr  Keep MAPS >65  Trend Hgb-> PRBCS/blood products per ICU team  Strict I/Os  Avoid nephrotoxins  AM labs    Discussed with ICU team and patient's son    Thanks for the consultation. Renal service will follow patient with you. Please contact me with any questions or concerns.    Initial Consult note         Patient name: Yefri Vigil  MR no: [id number]  Date of admission: 4/11/2025  Date of consultation: 4/12/25  Requested by: Dr. Brenden Mayorga  Reason for consult: TERESA on CKD    Patient seen and examined. History obtained from patient and chart review. Relevant labs, data and notes reviewed.      HPI: Yefri Vigil is a 69 y.o. male with PMH significant for CKD stage 3b, DM2, HTN, CAD who underwent elective robotic transverse colectomy yesterday. Post-op course complicated by possible hemorrhagic shock-> warranting ICU transfer. Notable lactic acidosis/TERESA/Hyperkalemia. Nephrology consulted to evaluate and manage TERESA/Hyperkalemia/metabolic acidosis. Patient emergently taken back to OR for ex-lap-> found to have active arterial bleed from gastroepiploic artery. 3units of PRBCS given. Patient seen once he got back to the ICU. Hemodynamics relatively stable. Making urine.     PMH:  Past Medical History:   Diagnosis Date    Acute kidney injury 10/04/2023    Acute metabolic encephalopathy 10/07/2023    Diagnosed  IntraVENous PRN Katrina Goddard MD        HYDROmorphone (DILAUDID) injection 0.5 mg  0.5 mg IntraVENous Q3H PRN Katrina Goddard MD   0.5 mg at 04/12/25 0233    ondansetron (ZOFRAN-ODT) disintegrating tablet 4 mg  4 mg Oral Q8H PRN Katrina Goddard MD        Or    ondansetron (ZOFRAN) injection 4 mg  4 mg IntraVENous Q6H PRN Katrina Goddard MD        acetaminophen (TYLENOL) tablet 650 mg  650 mg Oral Q6H Katrina Goddard MD   650 mg at 04/12/25 1244    oxyCODONE (ROXICODONE) immediate release tablet 5 mg  5 mg Oral Q4H PRN Katrina Goddard MD   5 mg at 04/12/25 1057    Or    oxyCODONE (ROXICODONE) immediate release tablet 10 mg  10 mg Oral Q4H PRN Katrina Goddard MD        alvimopan (ENTEREG) capsule 12 mg  12 mg Oral BID Katrina Goddard MD   12 mg at 04/12/25 0803    glucose chewable tablet 16 g  4 tablet Oral PRN Katrina Goddard MD        dextrose bolus 10% 125 mL  125 mL IntraVENous PRN Katrina Goddard MD        Or    dextrose bolus 10% 250 mL  250 mL IntraVENous PRN Katrina Goddard MD        glucagon injection 1 mg  1 mg SubCUTAneous PRN Katrina Goddard MD        dextrose 10 % infusion   IntraVENous Continuous PRN Katrina Goddard MD        insulin lispro (HUMALOG,ADMELOG) injection vial 0-4 Units  0-4 Units SubCUTAneous 4x Daily AC & HS Katrina Goddard MD   1 Units at 04/12/25 1156        ROS (besides HPI):  UTO    Objective   /63   Pulse 93   Temp (!) 94.5 °F (34.7 °C)   Resp 20   Ht 1.676 m (5' 6\")   Wt 77 kg (169 lb 12.1 oz)   SpO2 100%   BMI 27.40 kg/m²     Physical Exam:    Gen: Intubated/sedated    HEENT: AT/NC, ETT    Neck: RIJ sherita    Lungs/Chest wall: Breath sounds normal. Symmetrical chest wall expansion. No accessory muscle use. Clear to auscultation    Cardiovascular: Normal S1/S2, normal rate, regular rhythm.     Abdomen: ex-lap dressing    Ext: no clubbing or cyanosis. No edema    Skin: warm and dry. No rashes    : Fitzgerald    CNS: sedated    Labs/Data:    Lab

## 2025-04-12 NOTE — CONSULTS
Name: Yefri Vigil   : 1955   MRN: [id number]   Date: 2025        No chief complaint on file.        HPI:     Yefri Vigil is 69 y.o. -American male -American male who underwent elective robotic transverse colectomy yesterday.  Patient was admitted to medical floor.  This morning patient had hypotensive episode which responded to volume.  Lab work showed bicarb of 8, lactic acid of 15 and creatinine of 3.3.  Hospitalist team was consulted for comanagement.  I was then notified by hospitalist team for lactic acidosis as the repeat lactate was 17.9.  I quickly transferred the patient to ICU for further care.  Patient is toxic appearing otherwise no distress, alert and oriented. Has a past medical history of Acute kidney injury, Acute metabolic encephalopathy, Alcohol abuse, Arthritis, Aseptic meningitis, Aspiration pneumonitis ([facility]), At risk for sleep apnea, Diabetes mellitus, type 2 ([facility]), Diarrhea, High blood cholesterol, Hypertension, Hypokalemia, Non-traumatic rhabdomyolysis, NSTEMI (non-ST elevated myocardial infarction) ([facility]), Pneumonia, S/P drug eluting coronary stent placement.    Active Problems Being Managed:     --Septic shock.  -- Acute renal failure on CKD stage III.  - Severe lactic acidosis.  - Hyperkalemia.  - Mildly elevated troponin.    Assessment/Plan:     Septic shock.  - Likely secondary to intra-abdominal source possibly from bowel ischemia/perforation.  - Continue volume resuscitation.  - I tried contacting colorectal surgery multiple times, I then found out Dr. Kessler is busy and another surgery.  I called general surgery stat and patient is being taken to the OR emergently.  Stat CT abdomen was done which shows free intraperitoneal air.  - Started empiric antibiotics including vancomycin, Zosyn and even micafungin.  Will quickly de-escalate antibiotics tomorrow.    Acute renal failure with hyperkalemia.  - Likely secondary to ATN.  - Patient needs

## 2025-04-12 NOTE — PROGRESS NOTES
TRANSFER - OUT REPORT:    Verbal report given to Julissa on Yefri Vigil  being transferred to [facility] for change in patient condition (hypotension)       Report consisted of patient's Situation, Background, Assessment and   Recommendations(SBAR).     Information from the following report(s) Nurse Handoff Report, Adult Overview, Surgery Report, Intake/Output, MAR, Recent Results, Med Rec Status, and Cardiac Rhythm NSR  was reviewed with the receiving nurse.           Lines:   Peripheral IV 04/11/25 Left Forearm (Active)   Site Assessment Clean, dry & intact 04/12/25 0803   Line Status Flushed 04/12/25 0803   Line Care Connections checked and tightened 04/12/25 0803   Phlebitis Assessment No symptoms 04/12/25 0803   Infiltration Assessment 0 04/12/25 0803   Dressing Status Clean, dry & intact 04/12/25 0803   Dressing Type Transparent 04/12/25 0803   Dressing Intervention New 04/11/25 1037       Peripheral IV 04/11/25 Right Forearm (Active)   Site Assessment Clean, dry & intact 04/12/25 0803   Line Status Flushed 04/12/25 0803   Line Care Connections checked and tightened 04/12/25 0803   Phlebitis Assessment No symptoms 04/12/25 0803   Infiltration Assessment 0 04/12/25 0803   Dressing Status Clean, dry & intact 04/12/25 0803   Dressing Type Transparent 04/12/25 0803   Dressing Intervention New 04/11/25 1040        Opportunity for questions and clarification was provided.      Patient transported with:  Monitor, O2 @ 2lpm, and Registered Nurse

## 2025-04-12 NOTE — PROGRESS NOTES
Subjective:      Yefri Vigil is a 69 y.o. male who is being seen at the request of Dr Mayorga for postoperative shock, acidosis and acute anemia.  Patient is POD#1 robotic extended right hemicolectomy.   Patient developed hypotension overnight that responded to fluid bolus.   He has been progressively acidotic today.           Objective:      BP (!) 121/45   Pulse 67   Temp (!) 94.5 °F (34.7 °C) (Rectal)   Resp 22   Ht 1.676 m (5' 6\")   Wt 77 kg (169 lb 12.1 oz)   SpO2 100%   BMI 27.40 kg/m²     General:   alert, toxic, and able to yes no but not much else   Lungs:   clear to auscultation bilaterally   Heart:   regular rate and rhythm, S1, S2 normal, no murmur, click, rub or gallop   Abdomen:  Distended, tympanic, peritoneal signs      Imaging: report pending.  Expected residual pneumoperitoneum.  Ascites in the upper quadrants.   Gastric distention.  Duodenal wall thickening.  Ground glass material in/anterior to the anastomosis      Lab Review  Lab Results   Component Value Date/Time    WBC 19.7 04/12/2025 11:30 AM    HCT 25.5 04/12/2025 11:30 AM    HGB 6.9 04/12/2025 11:30 AM     04/12/2025 11:30 AM        Lab Results   Component Value Date/Time    BILIDIR 0.1 04/12/2025 11:02 AM     04/12/2025 11:02 AM     04/12/2025 11:02 AM    ALKPHOS 75 04/12/2025 11:02 AM    ALKPHOS 86 05/06/2024 12:00 AM        Lab Results   Component Value Date/Time    COLORCSF COLORLESS 10/05/2023 01:32 PM    PHUR 5.5 03/19/2025 08:40 AM    GLUCOSEU Negative 03/19/2025 08:40 AM    GLUCOSEU 500 10/05/2023 04:36 PM    BLOODU Negative 03/19/2025 08:40 AM    UROBILINOGEN 0.2 03/19/2025 08:40 AM    BILIRUBINUR Negative 03/19/2025 08:40 AM    LEUKOCYTESUR Negative 03/19/2025 08:40 AM     No results found for: \"HCG\"      Assessment:     69 year old with severe acidosis and shock following right hemicolectomy.  Recommend emergent laparotomy     Plan:      1. I recommend proceeding with emergent laparotomy. Treatment

## 2025-04-12 NOTE — PROGRESS NOTES
CRS Note    Pt appears uncomfortable this morning, BP dropped to 80s systolic - gave a 1 L bolus and BP is up to 100 systolic.  Vitals are otherwise normal and labs significant for hgb 7.6 down from 9 - not unexpected after surgery.    PE  Abd soft, ND, NT  Incisions c/d/I    PLAN  - may be hypovolemic after surgery/prep - will continue with fluid boluses  - check hgb in the afternoon - may need transfusion if he continues to trend down and BP is still low   - keep on clear liquids for now

## 2025-04-12 NOTE — ANESTHESIA PROCEDURE NOTES
Central Venous Line:    A central venous line was placed using surface landmarks, in the OR for the following indication(s): central venous access and CVP monitoring.4/12/2025 3:07 PM4/12/2025 3:20 PM    Sterility preparation included the following: provider used sterile gloves, gown, hat and mask and maximum sterile barriers used during central venous catheter insertion.    The patient was placed in Trendelenburg position.The internal jugular vein was prepped.    The site was prepped with Chloraprep.  A 7 Fr (size), 16 (length), introducer triple lumen was placed.    During the procedure, the following specific steps were taken: target vein identified, needle advanced into vein and blood aspirated and guidewire advanced into vein.    Intravenous verification was obtained by venous blood return and x-ray.    Post insertion care included: all ports aspirated, all ports flushed easily, guidewire removed intact, Biopatch applied, line sutured in place and dressing applied.    During the procedure the patient experienced: patient tolerated procedure well with no complications and EBL < 5mL.      Outcomes: uncomplicated and patient tolerated procedure wellNo  Anesthesia type: local..No  Staffing  Performed: Anesthesiologist   Anesthesiologist: Gaby Arnett MD  Performed by: Gaby Arnett MD  Authorized by: Gaby Arnett MD    Preanesthetic Checklist  Completed: patient identified, IV checked, site marked, risks and benefits discussed, surgical/procedural consents, equipment checked, pre-op evaluation, timeout performed, anesthesia consent given, oxygen available, monitors applied/VS acknowledged, fire risk safety assessment completed and verbalized and blood product R/B/A discussed and consented

## 2025-04-13 ENCOUNTER — ANESTHESIA EVENT (OUTPATIENT)
Facility: HOSPITAL | Age: 70
End: 2025-04-13
Payer: MEDICARE

## 2025-04-13 ENCOUNTER — APPOINTMENT (OUTPATIENT)
Facility: HOSPITAL | Age: 70
DRG: 329 | End: 2025-04-13
Attending: STUDENT IN AN ORGANIZED HEALTH CARE EDUCATION/TRAINING PROGRAM
Payer: MEDICARE

## 2025-04-13 ENCOUNTER — ANESTHESIA (OUTPATIENT)
Facility: HOSPITAL | Age: 70
End: 2025-04-13
Payer: MEDICARE

## 2025-04-13 PROBLEM — E87.20 LACTIC ACIDOSIS: Status: ACTIVE | Noted: 2025-04-13

## 2025-04-13 LAB
ABO + RH BLD: NORMAL
ABO + RH BLD: NORMAL
ALBUMIN SERPL-MCNC: 2.4 G/DL (ref 3.5–5)
ALBUMIN/GLOB SERPL: 1 (ref 1.1–2.2)
ALP SERPL-CCNC: 51 U/L (ref 45–117)
ALT SERPL-CCNC: 1061 U/L (ref 12–78)
ANION GAP SERPL CALC-SCNC: 3 MMOL/L (ref 2–12)
ANION GAP SERPL CALC-SCNC: 6 MMOL/L (ref 2–12)
ANION GAP SERPL CALC-SCNC: 9 MMOL/L (ref 2–12)
APTT PPP: 29.2 SEC (ref 22.1–31)
ARTERIAL PATENCY WRIST A: ABNORMAL
ARTERIAL PATENCY WRIST A: ABNORMAL
AST SERPL-CCNC: >2000 U/L (ref 15–37)
BASE DEFICIT BLDA-SCNC: 12 MMOL/L
BASE EXCESS BLDA CALC-SCNC: 2.6 MMOL/L
BASOPHILS # BLD: 0 K/UL (ref 0–0.1)
BASOPHILS # BLD: 0 K/UL (ref 0–0.1)
BASOPHILS NFR BLD: 0 % (ref 0–1)
BASOPHILS NFR BLD: 0 % (ref 0–1)
BDY SITE: ABNORMAL
BDY SITE: ABNORMAL
BILIRUB SERPL-MCNC: 1 MG/DL (ref 0.2–1)
BLD PROD TYP BPU: NORMAL
BLOOD BANK BLOOD PRODUCT EXPIRATION DATE: NORMAL
BLOOD BANK DISPENSE STATUS: NORMAL
BLOOD BANK ISBT PRODUCT BLOOD TYPE: 5100
BLOOD BANK ISBT PRODUCT BLOOD TYPE: 6200
BLOOD BANK ISBT PRODUCT BLOOD TYPE: 7300
BLOOD BANK PRODUCT CODE: NORMAL
BLOOD BANK UNIT TYPE AND RH: NORMAL
BLOOD GROUP ANTIBODIES SERPL: NORMAL
BLOOD GROUP ANTIBODIES SERPL: NORMAL
BPU ID: NORMAL
BUN SERPL-MCNC: 38 MG/DL (ref 6–20)
BUN SERPL-MCNC: 38 MG/DL (ref 6–20)
BUN SERPL-MCNC: 40 MG/DL (ref 6–20)
BUN/CREAT SERPL: 12 (ref 12–20)
BUN/CREAT SERPL: 13 (ref 12–20)
BUN/CREAT SERPL: 13 (ref 12–20)
CALCIUM SERPL-MCNC: 7.6 MG/DL (ref 8.5–10.1)
CALCIUM SERPL-MCNC: 7.7 MG/DL (ref 8.5–10.1)
CALCIUM SERPL-MCNC: 7.8 MG/DL (ref 8.5–10.1)
CHLORIDE SERPL-SCNC: 110 MMOL/L (ref 97–108)
CHLORIDE SERPL-SCNC: 110 MMOL/L (ref 97–108)
CHLORIDE SERPL-SCNC: 112 MMOL/L (ref 97–108)
CO2 SERPL-SCNC: 26 MMOL/L (ref 21–32)
CO2 SERPL-SCNC: 28 MMOL/L (ref 21–32)
CO2 SERPL-SCNC: 29 MMOL/L (ref 21–32)
CREAT SERPL-MCNC: 2.82 MG/DL (ref 0.7–1.3)
CREAT SERPL-MCNC: 3.06 MG/DL (ref 0.7–1.3)
CREAT SERPL-MCNC: 3.06 MG/DL (ref 0.7–1.3)
CROSSMATCH RESULT: NORMAL
DIFFERENTIAL METHOD BLD: ABNORMAL
DIFFERENTIAL METHOD BLD: ABNORMAL
EKG ATRIAL RATE: 68 BPM
EKG ATRIAL RATE: 69 BPM
EKG DIAGNOSIS: NORMAL
EKG DIAGNOSIS: NORMAL
EKG P AXIS: 51 DEGREES
EKG P AXIS: 60 DEGREES
EKG P-R INTERVAL: 196 MS
EKG P-R INTERVAL: 196 MS
EKG Q-T INTERVAL: 438 MS
EKG Q-T INTERVAL: 438 MS
EKG QRS DURATION: 110 MS
EKG QRS DURATION: 112 MS
EKG QTC CALCULATION (BAZETT): 465 MS
EKG QTC CALCULATION (BAZETT): 469 MS
EKG R AXIS: 51 DEGREES
EKG R AXIS: 68 DEGREES
EKG T AXIS: -10 DEGREES
EKG T AXIS: -2 DEGREES
EKG VENTRICULAR RATE: 68 BPM
EKG VENTRICULAR RATE: 69 BPM
EOSINOPHIL # BLD: 0 K/UL (ref 0–0.4)
EOSINOPHIL # BLD: 0 K/UL (ref 0–0.4)
EOSINOPHIL NFR BLD: 0 % (ref 0–7)
EOSINOPHIL NFR BLD: 0 % (ref 0–7)
ERYTHROCYTE [DISTWIDTH] IN BLOOD BY AUTOMATED COUNT: 16.6 % (ref 11.5–14.5)
ERYTHROCYTE [DISTWIDTH] IN BLOOD BY AUTOMATED COUNT: 16.7 % (ref 11.5–14.5)
ERYTHROCYTE [DISTWIDTH] IN BLOOD BY AUTOMATED COUNT: 19.6 % (ref 11.5–14.5)
FIBRINOGEN PPP-MCNC: 272 MG/DL (ref 200–475)
FIBRINOGEN PPP-MCNC: 311 MG/DL (ref 200–475)
FIO2 ON VENT: 40 %
GAS FLOW.O2 SETTING OXYMISER: 14
GAS FLOW.O2 SETTING OXYMISER: 14
GLOBULIN SER CALC-MCNC: 2.3 G/DL (ref 2–4)
GLUCOSE BLD STRIP.AUTO-MCNC: 110 MG/DL (ref 65–117)
GLUCOSE BLD STRIP.AUTO-MCNC: 137 MG/DL (ref 65–117)
GLUCOSE BLD STRIP.AUTO-MCNC: 164 MG/DL (ref 65–117)
GLUCOSE BLD STRIP.AUTO-MCNC: 216 MG/DL (ref 65–117)
GLUCOSE SERPL-MCNC: 119 MG/DL (ref 65–100)
GLUCOSE SERPL-MCNC: 159 MG/DL (ref 65–100)
GLUCOSE SERPL-MCNC: 222 MG/DL (ref 65–100)
HBV SURFACE AB SER QL: REACTIVE
HBV SURFACE AB SER-ACNC: 315.39 MIU/ML
HBV SURFACE AG SER QL: 0.43 INDEX
HBV SURFACE AG SER QL: NEGATIVE
HCO3 BLDA-SCNC: 13 MMOL/L (ref 22–26)
HCO3 BLDA-SCNC: 25 MMOL/L (ref 22–26)
HCT VFR BLD AUTO: 19.3 % (ref 36.6–50.3)
HCT VFR BLD AUTO: 27.5 % (ref 36.6–50.3)
HCT VFR BLD AUTO: 33.5 % (ref 36.6–50.3)
HGB BLD-MCNC: 11.5 G/DL (ref 12.1–17)
HGB BLD-MCNC: 6.6 G/DL (ref 12.1–17)
HGB BLD-MCNC: 9.8 G/DL (ref 12.1–17)
HISTORY CHECK: NORMAL
IMM GRANULOCYTES # BLD AUTO: 0 K/UL (ref 0–0.04)
IMM GRANULOCYTES # BLD AUTO: 0 K/UL (ref 0–0.04)
IMM GRANULOCYTES NFR BLD AUTO: 0 % (ref 0–0.5)
IMM GRANULOCYTES NFR BLD AUTO: 0 % (ref 0–0.5)
INR PPP: 1.4 (ref 0.9–1.1)
LACTATE SERPL-SCNC: 1.7 MMOL/L (ref 0.4–2)
LACTATE SERPL-SCNC: 2.4 MMOL/L (ref 0.4–2)
LACTATE SERPL-SCNC: 3.3 MMOL/L (ref 0.4–2)
LACTATE SERPL-SCNC: 5.2 MMOL/L (ref 0.4–2)
LYMPHOCYTES # BLD: 0.57 K/UL (ref 0.8–3.5)
LYMPHOCYTES # BLD: 1.37 K/UL (ref 0.8–3.5)
LYMPHOCYTES NFR BLD: 19 % (ref 12–49)
LYMPHOCYTES NFR BLD: 8 % (ref 12–49)
MAGNESIUM SERPL-MCNC: 1.8 MG/DL (ref 1.6–2.4)
MCH RBC QN AUTO: 27.6 PG (ref 26–34)
MCH RBC QN AUTO: 28.8 PG (ref 26–34)
MCH RBC QN AUTO: 29.8 PG (ref 26–34)
MCHC RBC AUTO-ENTMCNC: 34.2 G/DL (ref 30–36.5)
MCHC RBC AUTO-ENTMCNC: 34.3 G/DL (ref 30–36.5)
MCHC RBC AUTO-ENTMCNC: 35.6 G/DL (ref 30–36.5)
MCV RBC AUTO: 80.8 FL (ref 80–99)
MCV RBC AUTO: 83.6 FL (ref 80–99)
MCV RBC AUTO: 83.8 FL (ref 80–99)
METAMYELOCYTES NFR BLD MANUAL: 1 %
METAMYELOCYTES NFR BLD MANUAL: 1 %
MONOCYTES # BLD: 0.28 K/UL (ref 0–1)
MONOCYTES # BLD: 0.43 K/UL (ref 0–1)
MONOCYTES NFR BLD: 4 % (ref 5–13)
MONOCYTES NFR BLD: 6 % (ref 5–13)
NEUTS BAND NFR BLD MANUAL: 21 %
NEUTS BAND NFR BLD MANUAL: 35 %
NEUTS SEG # BLD: 5.33 K/UL (ref 1.8–8)
NEUTS SEG # BLD: 6.18 K/UL (ref 1.8–8)
NEUTS SEG NFR BLD: 39 % (ref 32–75)
NEUTS SEG NFR BLD: 66 % (ref 32–75)
NRBC # BLD: 0 K/UL (ref 0–0.01)
NRBC # BLD: 0 K/UL (ref 0–0.01)
NRBC # BLD: 0.03 K/UL (ref 0–0.01)
NRBC BLD-RTO: 0 PER 100 WBC
NRBC BLD-RTO: 0 PER 100 WBC
NRBC BLD-RTO: 0.5 PER 100 WBC
PCO2 BLDA: 28 MMHG (ref 35–45)
PCO2 BLDA: 31 MMHG (ref 35–45)
PEEP RESPIRATORY: 5
PH BLDA: 7.28 (ref 7.35–7.45)
PH BLDA: 7.52 (ref 7.35–7.45)
PHOSPHATE SERPL-MCNC: 3.6 MG/DL (ref 2.6–4.7)
PLATELET # BLD AUTO: 58 K/UL (ref 150–400)
PLATELET # BLD AUTO: 74 K/UL (ref 150–400)
PLATELET # BLD AUTO: 87 K/UL (ref 150–400)
PLATELET COMMENT: ABNORMAL
PMV BLD AUTO: 10.2 FL (ref 8.9–12.9)
PMV BLD AUTO: 10.6 FL (ref 8.9–12.9)
PMV BLD AUTO: 10.9 FL (ref 8.9–12.9)
PO2 BLDA: 151 MMHG (ref 80–100)
PO2 BLDA: 185 MMHG (ref 80–100)
POTASSIUM SERPL-SCNC: 3.9 MMOL/L (ref 3.5–5.1)
POTASSIUM SERPL-SCNC: 4.2 MMOL/L (ref 3.5–5.1)
POTASSIUM SERPL-SCNC: 4.6 MMOL/L (ref 3.5–5.1)
PROT SERPL-MCNC: 4.7 G/DL (ref 6.4–8.2)
PROTHROMBIN TIME: 14.7 SEC (ref 9.2–11.2)
RBC # BLD AUTO: 2.39 M/UL (ref 4.1–5.7)
RBC # BLD AUTO: 3.29 M/UL (ref 4.1–5.7)
RBC # BLD AUTO: 4 M/UL (ref 4.1–5.7)
RBC MORPH BLD: ABNORMAL
RBC MORPH BLD: ABNORMAL
SAO2 % BLD: 99 % (ref 92–97)
SAO2 % BLD: 99 % (ref 92–97)
SAO2% DEVICE SAO2% SENSOR NAME: ABNORMAL
SAO2% DEVICE SAO2% SENSOR NAME: ABNORMAL
SERVICE CMNT-IMP: ABNORMAL
SERVICE CMNT-IMP: NORMAL
SODIUM SERPL-SCNC: 144 MMOL/L (ref 136–145)
SODIUM SERPL-SCNC: 144 MMOL/L (ref 136–145)
SODIUM SERPL-SCNC: 145 MMOL/L (ref 136–145)
SPECIMEN EXP DATE BLD: NORMAL
SPECIMEN EXP DATE BLD: NORMAL
SPECIMEN SITE: ABNORMAL
SPECIMEN SITE: ABNORMAL
THERAPEUTIC RANGE: NORMAL SECS (ref 58–77)
UNIT DIVISION: 0
UNIT ISSUE DATE/TIME: NORMAL
VANCOMYCIN SERPL-MCNC: 18.4 UG/ML
VENTILATION MODE VENT: ABNORMAL
VT SETTING VENT: 500
WBC # BLD AUTO: 6.6 K/UL (ref 4.1–11.1)
WBC # BLD AUTO: 7.1 K/UL (ref 4.1–11.1)
WBC # BLD AUTO: 7.2 K/UL (ref 4.1–11.1)

## 2025-04-13 PROCEDURE — P9073 PLATELETS PHERESIS PATH REDU: HCPCS

## 2025-04-13 PROCEDURE — 3600000014 HC SURGERY LEVEL 4 ADDTL 15MIN: Performed by: COLON & RECTAL SURGERY

## 2025-04-13 PROCEDURE — 6370000000 HC RX 637 (ALT 250 FOR IP): Performed by: HOSPITALIST

## 2025-04-13 PROCEDURE — 86901 BLOOD TYPING SEROLOGIC RH(D): CPT

## 2025-04-13 PROCEDURE — 2500000003 HC RX 250 WO HCPCS: Performed by: NURSE ANESTHETIST, CERTIFIED REGISTERED

## 2025-04-13 PROCEDURE — 94003 VENT MGMT INPAT SUBQ DAY: CPT

## 2025-04-13 PROCEDURE — 74174 CTA ABD&PLVS W/CONTRAST: CPT

## 2025-04-13 PROCEDURE — 2580000003 HC RX 258: Performed by: HOSPITALIST

## 2025-04-13 PROCEDURE — 37799 UNLISTED PX VASCULAR SURGERY: CPT

## 2025-04-13 PROCEDURE — 85025 COMPLETE CBC W/AUTO DIFF WBC: CPT

## 2025-04-13 PROCEDURE — 85610 PROTHROMBIN TIME: CPT

## 2025-04-13 PROCEDURE — P9016 RBC LEUKOCYTES REDUCED: HCPCS

## 2025-04-13 PROCEDURE — 2500000003 HC RX 250 WO HCPCS: Performed by: STUDENT IN AN ORGANIZED HEALTH CARE EDUCATION/TRAINING PROGRAM

## 2025-04-13 PROCEDURE — P9059 PLASMA, FRZ BETWEEN 8-24HOUR: HCPCS

## 2025-04-13 PROCEDURE — 85384 FIBRINOGEN ACTIVITY: CPT

## 2025-04-13 PROCEDURE — 84100 ASSAY OF PHOSPHORUS: CPT

## 2025-04-13 PROCEDURE — 2580000003 HC RX 258: Performed by: NURSE ANESTHETIST, CERTIFIED REGISTERED

## 2025-04-13 PROCEDURE — 30233M1 TRANSFUSION OF NONAUTOLOGOUS PLASMA CRYOPRECIPITATE INTO PERIPHERAL VEIN, PERCUTANEOUS APPROACH: ICD-10-PCS | Performed by: COLON & RECTAL SURGERY

## 2025-04-13 PROCEDURE — 3600000004 HC SURGERY LEVEL 4 BASE: Performed by: COLON & RECTAL SURGERY

## 2025-04-13 PROCEDURE — 83735 ASSAY OF MAGNESIUM: CPT

## 2025-04-13 PROCEDURE — 2000000000 HC ICU R&B

## 2025-04-13 PROCEDURE — 6370000000 HC RX 637 (ALT 250 FOR IP): Performed by: NURSE PRACTITIONER

## 2025-04-13 PROCEDURE — 6360000002 HC RX W HCPCS: Performed by: INTERNAL MEDICINE

## 2025-04-13 PROCEDURE — 36430 TRANSFUSION BLD/BLD COMPNT: CPT

## 2025-04-13 PROCEDURE — 82962 GLUCOSE BLOOD TEST: CPT

## 2025-04-13 PROCEDURE — 6360000002 HC RX W HCPCS: Performed by: NURSE ANESTHETIST, CERTIFIED REGISTERED

## 2025-04-13 PROCEDURE — 6360000002 HC RX W HCPCS: Performed by: HOSPITALIST

## 2025-04-13 PROCEDURE — 2709999900 HC NON-CHARGEABLE SUPPLY: Performed by: COLON & RECTAL SURGERY

## 2025-04-13 PROCEDURE — 86850 RBC ANTIBODY SCREEN: CPT

## 2025-04-13 PROCEDURE — 90945 DIALYSIS ONE EVALUATION: CPT

## 2025-04-13 PROCEDURE — 2500000003 HC RX 250 WO HCPCS: Performed by: NURSE PRACTITIONER

## 2025-04-13 PROCEDURE — 6360000002 HC RX W HCPCS: Performed by: STUDENT IN AN ORGANIZED HEALTH CARE EDUCATION/TRAINING PROGRAM

## 2025-04-13 PROCEDURE — 36415 COLL VENOUS BLD VENIPUNCTURE: CPT

## 2025-04-13 PROCEDURE — 6360000004 HC RX CONTRAST MEDICATION: Performed by: NURSE PRACTITIONER

## 2025-04-13 PROCEDURE — 86900 BLOOD TYPING SEROLOGIC ABO: CPT

## 2025-04-13 PROCEDURE — 3700000000 HC ANESTHESIA ATTENDED CARE: Performed by: COLON & RECTAL SURGERY

## 2025-04-13 PROCEDURE — 83605 ASSAY OF LACTIC ACID: CPT

## 2025-04-13 PROCEDURE — 2580000003 HC RX 258: Performed by: INTERNAL MEDICINE

## 2025-04-13 PROCEDURE — P9012 CRYOPRECIPITATE EACH UNIT: HCPCS

## 2025-04-13 PROCEDURE — 85730 THROMBOPLASTIN TIME PARTIAL: CPT

## 2025-04-13 PROCEDURE — 2580000003 HC RX 258: Performed by: STUDENT IN AN ORGANIZED HEALTH CARE EDUCATION/TRAINING PROGRAM

## 2025-04-13 PROCEDURE — 80048 BASIC METABOLIC PNL TOTAL CA: CPT

## 2025-04-13 PROCEDURE — 80202 ASSAY OF VANCOMYCIN: CPT

## 2025-04-13 PROCEDURE — C1713 ANCHOR/SCREW BN/BN,TIS/BN: HCPCS | Performed by: COLON & RECTAL SURGERY

## 2025-04-13 PROCEDURE — 6370000000 HC RX 637 (ALT 250 FOR IP): Performed by: STUDENT IN AN ORGANIZED HEALTH CARE EDUCATION/TRAINING PROGRAM

## 2025-04-13 PROCEDURE — 80053 COMPREHEN METABOLIC PANEL: CPT

## 2025-04-13 PROCEDURE — 3700000001 HC ADD 15 MINUTES (ANESTHESIA): Performed by: COLON & RECTAL SURGERY

## 2025-04-13 PROCEDURE — 85027 COMPLETE CBC AUTOMATED: CPT

## 2025-04-13 PROCEDURE — 0W3P0ZZ CONTROL BLEEDING IN GASTROINTESTINAL TRACT, OPEN APPROACH: ICD-10-PCS | Performed by: COLON & RECTAL SURGERY

## 2025-04-13 PROCEDURE — 2720000010 HC SURG SUPPLY STERILE: Performed by: COLON & RECTAL SURGERY

## 2025-04-13 RX ORDER — SODIUM CHLORIDE 9 MG/ML
INJECTION, SOLUTION INTRAVENOUS PRN
Status: DISCONTINUED | OUTPATIENT
Start: 2025-04-13 | End: 2025-04-13

## 2025-04-13 RX ORDER — SODIUM CHLORIDE, SODIUM LACTATE, POTASSIUM CHLORIDE, CALCIUM CHLORIDE 600; 310; 30; 20 MG/100ML; MG/100ML; MG/100ML; MG/100ML
INJECTION, SOLUTION INTRAVENOUS
Status: DISCONTINUED | OUTPATIENT
Start: 2025-04-13 | End: 2025-04-13

## 2025-04-13 RX ORDER — SODIUM CHLORIDE 0.9 % (FLUSH) 0.9 %
5-40 SYRINGE (ML) INJECTION EVERY 12 HOURS SCHEDULED
Status: DISCONTINUED | OUTPATIENT
Start: 2025-04-13 | End: 2025-04-13

## 2025-04-13 RX ORDER — SODIUM CHLORIDE 0.9 % (FLUSH) 0.9 %
5-40 SYRINGE (ML) INJECTION PRN
Status: DISCONTINUED | OUTPATIENT
Start: 2025-04-13 | End: 2025-04-13

## 2025-04-13 RX ORDER — LIDOCAINE HYDROCHLORIDE 10 MG/ML
1 INJECTION, SOLUTION EPIDURAL; INFILTRATION; INTRACAUDAL; PERINEURAL
Status: DISCONTINUED | OUTPATIENT
Start: 2025-04-13 | End: 2025-04-13

## 2025-04-13 RX ORDER — FENTANYL CITRATE 50 UG/ML
25 INJECTION, SOLUTION INTRAMUSCULAR; INTRAVENOUS EVERY 5 MIN PRN
Status: DISCONTINUED | OUTPATIENT
Start: 2025-04-13 | End: 2025-04-13

## 2025-04-13 RX ORDER — ONDANSETRON 2 MG/ML
INJECTION INTRAMUSCULAR; INTRAVENOUS
Status: DISCONTINUED | OUTPATIENT
Start: 2025-04-13 | End: 2025-04-13 | Stop reason: SDUPTHER

## 2025-04-13 RX ORDER — SODIUM CHLORIDE, SODIUM LACTATE, POTASSIUM CHLORIDE, CALCIUM CHLORIDE 600; 310; 30; 20 MG/100ML; MG/100ML; MG/100ML; MG/100ML
INJECTION, SOLUTION INTRAVENOUS
Status: DISCONTINUED | OUTPATIENT
Start: 2025-04-13 | End: 2025-04-13 | Stop reason: SDUPTHER

## 2025-04-13 RX ORDER — FENTANYL CITRATE 50 UG/ML
INJECTION, SOLUTION INTRAMUSCULAR; INTRAVENOUS
Status: DISCONTINUED | OUTPATIENT
Start: 2025-04-13 | End: 2025-04-13 | Stop reason: SDUPTHER

## 2025-04-13 RX ORDER — CALCIUM CHLORIDE 100 MG/ML
INJECTION INTRAVENOUS; INTRAVENTRICULAR
Status: DISCONTINUED | OUTPATIENT
Start: 2025-04-13 | End: 2025-04-13 | Stop reason: SDUPTHER

## 2025-04-13 RX ORDER — SODIUM CHLORIDE 9 MG/ML
INJECTION, SOLUTION INTRAVENOUS
Status: DISCONTINUED | OUTPATIENT
Start: 2025-04-13 | End: 2025-04-13 | Stop reason: SDUPTHER

## 2025-04-13 RX ORDER — IOPAMIDOL 755 MG/ML
100 INJECTION, SOLUTION INTRAVASCULAR
Status: COMPLETED | OUTPATIENT
Start: 2025-04-13 | End: 2025-04-13

## 2025-04-13 RX ORDER — SODIUM CHLORIDE 9 MG/ML
INJECTION, SOLUTION INTRAVENOUS PRN
Status: DISCONTINUED | OUTPATIENT
Start: 2025-04-13 | End: 2025-04-14

## 2025-04-13 RX ORDER — HYDROMORPHONE HYDROCHLORIDE 1 MG/ML
0.5 INJECTION, SOLUTION INTRAMUSCULAR; INTRAVENOUS; SUBCUTANEOUS EVERY 5 MIN PRN
Status: DISCONTINUED | OUTPATIENT
Start: 2025-04-13 | End: 2025-04-13

## 2025-04-13 RX ORDER — ROCURONIUM BROMIDE 10 MG/ML
INJECTION, SOLUTION INTRAVENOUS
Status: DISCONTINUED | OUTPATIENT
Start: 2025-04-13 | End: 2025-04-13 | Stop reason: SDUPTHER

## 2025-04-13 RX ORDER — FENTANYL CITRATE-0.9 % NACL/PF 20 MCG/2ML
50 SYRINGE (ML) INTRAVENOUS ONCE
Refills: 0 | Status: COMPLETED | OUTPATIENT
Start: 2025-04-13 | End: 2025-04-13

## 2025-04-13 RX ORDER — SODIUM CHLORIDE, SODIUM LACTATE, POTASSIUM CHLORIDE, CALCIUM CHLORIDE 600; 310; 30; 20 MG/100ML; MG/100ML; MG/100ML; MG/100ML
INJECTION, SOLUTION INTRAVENOUS CONTINUOUS
Status: DISCONTINUED | OUTPATIENT
Start: 2025-04-13 | End: 2025-04-13

## 2025-04-13 RX ORDER — DROPERIDOL 2.5 MG/ML
0.62 INJECTION, SOLUTION INTRAMUSCULAR; INTRAVENOUS
Status: DISCONTINUED | OUTPATIENT
Start: 2025-04-13 | End: 2025-04-13

## 2025-04-13 RX ORDER — ONDANSETRON 2 MG/ML
4 INJECTION INTRAMUSCULAR; INTRAVENOUS
Status: DISCONTINUED | OUTPATIENT
Start: 2025-04-13 | End: 2025-04-13

## 2025-04-13 RX ORDER — NOREPINEPHRINE BITARTRATE 0.06 MG/ML
1-100 INJECTION, SOLUTION INTRAVENOUS CONTINUOUS
Status: DISCONTINUED | OUTPATIENT
Start: 2025-04-13 | End: 2025-04-14

## 2025-04-13 RX ORDER — MIDAZOLAM HYDROCHLORIDE 1 MG/ML
INJECTION, SOLUTION INTRAMUSCULAR; INTRAVENOUS
Status: DISCONTINUED | OUTPATIENT
Start: 2025-04-13 | End: 2025-04-13 | Stop reason: SDUPTHER

## 2025-04-13 RX ADMIN — Medication 1 AMPULE: at 10:20

## 2025-04-13 RX ADMIN — PROPOFOL 20 MG: 10 INJECTION, EMULSION INTRAVENOUS at 07:37

## 2025-04-13 RX ADMIN — Medication 50 MCG: at 01:52

## 2025-04-13 RX ADMIN — VANCOMYCIN HYDROCHLORIDE 750 MG: 750 INJECTION, POWDER, LYOPHILIZED, FOR SOLUTION INTRAVENOUS at 10:09

## 2025-04-13 RX ADMIN — ALVIMOPAN 12 MG: 12 CAPSULE ORAL at 09:58

## 2025-04-13 RX ADMIN — HYDROMORPHONE HYDROCHLORIDE 0.5 MG: 1 INJECTION, SOLUTION INTRAMUSCULAR; INTRAVENOUS; SUBCUTANEOUS at 07:03

## 2025-04-13 RX ADMIN — ONDANSETRON 4 MG: 2 INJECTION, SOLUTION INTRAMUSCULAR; INTRAVENOUS at 07:30

## 2025-04-13 RX ADMIN — SODIUM CHLORIDE, POTASSIUM CHLORIDE, SODIUM LACTATE AND CALCIUM CHLORIDE: 600; 310; 30; 20 INJECTION, SOLUTION INTRAVENOUS at 05:37

## 2025-04-13 RX ADMIN — Medication 1 AMPULE: at 19:46

## 2025-04-13 RX ADMIN — SODIUM CHLORIDE, PRESERVATIVE FREE 10 ML: 5 INJECTION INTRAVENOUS at 19:46

## 2025-04-13 RX ADMIN — ATORVASTATIN CALCIUM 40 MG: 40 TABLET, FILM COATED ORAL at 09:58

## 2025-04-13 RX ADMIN — SODIUM CHLORIDE, PRESERVATIVE FREE 10 ML: 5 INJECTION INTRAVENOUS at 09:58

## 2025-04-13 RX ADMIN — ALVIMOPAN 12 MG: 12 CAPSULE ORAL at 20:31

## 2025-04-13 RX ADMIN — CALCIUM CHLORIDE 0.5 G: 100 INJECTION, SOLUTION INTRAVENOUS at 06:12

## 2025-04-13 RX ADMIN — NOREPINEPHRINE BITARTRATE 5 MCG/MIN: 64 SOLUTION INTRAVENOUS at 00:51

## 2025-04-13 RX ADMIN — FENTANYL CITRATE 50 MCG: 50 INJECTION, SOLUTION INTRAMUSCULAR; INTRAVENOUS at 06:19

## 2025-04-13 RX ADMIN — ACETAMINOPHEN 650 MG: 325 TABLET ORAL at 12:42

## 2025-04-13 RX ADMIN — PROPOFOL 35 MCG/KG/MIN: 10 INJECTION, EMULSION INTRAVENOUS at 01:41

## 2025-04-13 RX ADMIN — FENTANYL CITRATE 50 MCG/HR: at 10:17

## 2025-04-13 RX ADMIN — INSULIN LISPRO 1 UNITS: 100 INJECTION, SOLUTION INTRAVENOUS; SUBCUTANEOUS at 05:02

## 2025-04-13 RX ADMIN — CHLORHEXIDINE GLUCONATE 15 ML: 1.2 RINSE ORAL at 09:58

## 2025-04-13 RX ADMIN — CHLORHEXIDINE GLUCONATE 15 ML: 1.2 RINSE ORAL at 19:45

## 2025-04-13 RX ADMIN — ROCURONIUM BROMIDE 100 MG: 10 INJECTION INTRAVENOUS at 05:33

## 2025-04-13 RX ADMIN — PROPOFOL 40 MCG/KG/MIN: 10 INJECTION, EMULSION INTRAVENOUS at 22:05

## 2025-04-13 RX ADMIN — PROPOFOL 50 MCG/KG/MIN: 10 INJECTION, EMULSION INTRAVENOUS at 07:39

## 2025-04-13 RX ADMIN — PIPERACILLIN AND TAZOBACTAM 3375 MG: 3; .375 INJECTION, POWDER, LYOPHILIZED, FOR SOLUTION INTRAVENOUS at 10:05

## 2025-04-13 RX ADMIN — HYDROMORPHONE HYDROCHLORIDE 0.5 MG: 1 INJECTION, SOLUTION INTRAMUSCULAR; INTRAVENOUS; SUBCUTANEOUS at 07:15

## 2025-04-13 RX ADMIN — ROCURONIUM BROMIDE 20 MG: 10 INJECTION INTRAVENOUS at 06:47

## 2025-04-13 RX ADMIN — MIDAZOLAM HYDROCHLORIDE 2 MG: 1 INJECTION, SOLUTION INTRAMUSCULAR; INTRAVENOUS at 05:33

## 2025-04-13 RX ADMIN — SODIUM CHLORIDE: 9 INJECTION, SOLUTION INTRAVENOUS at 05:41

## 2025-04-13 RX ADMIN — PROPOFOL 50 MG: 10 INJECTION, EMULSION INTRAVENOUS at 06:23

## 2025-04-13 RX ADMIN — CALCIUM CHLORIDE, MAGNESIUM CHLORIDE, DEXTROSE MONOHYDRATE, LACTIC ACID, SODIUM CHLORIDE, SODIUM BICARBONATE AND POTASSIUM CHLORIDE: 5.15; 2.03; 22; 5.4; 6.46; 3.09; .157 INJECTION INTRAVENOUS at 12:01

## 2025-04-13 RX ADMIN — CALCIUM CHLORIDE 0.5 G: 100 INJECTION, SOLUTION INTRAVENOUS at 06:02

## 2025-04-13 RX ADMIN — ROCURONIUM BROMIDE 30 MG: 10 INJECTION INTRAVENOUS at 07:31

## 2025-04-13 RX ADMIN — PIPERACILLIN AND TAZOBACTAM 3375 MG: 3; .375 INJECTION, POWDER, LYOPHILIZED, FOR SOLUTION INTRAVENOUS at 20:33

## 2025-04-13 RX ADMIN — IOPAMIDOL 100 ML: 755 INJECTION, SOLUTION INTRAVENOUS at 02:08

## 2025-04-13 RX ADMIN — FENTANYL CITRATE 50 MCG: 50 INJECTION, SOLUTION INTRAMUSCULAR; INTRAVENOUS at 06:24

## 2025-04-13 RX ADMIN — PANTOPRAZOLE SODIUM 40 MG: 40 INJECTION, POWDER, LYOPHILIZED, FOR SOLUTION INTRAVENOUS at 09:58

## 2025-04-13 RX ADMIN — PROPOFOL 50 MCG/KG/MIN: 10 INJECTION, EMULSION INTRAVENOUS at 11:00

## 2025-04-13 RX ADMIN — PROPOFOL 40 MCG/KG/MIN: 10 INJECTION, EMULSION INTRAVENOUS at 16:11

## 2025-04-13 ASSESSMENT — PULMONARY FUNCTION TESTS
PIF_VALUE: 18
PIF_VALUE: 18
PIF_VALUE: 19
PIF_VALUE: 16
PIF_VALUE: 18
PIF_VALUE: 19

## 2025-04-13 ASSESSMENT — PAIN SCALES - GENERAL
PAINLEVEL_OUTOF10: 0

## 2025-04-13 NOTE — PROGRESS NOTES
CRS Note  Patient was taken back to the OR for persistent intraabdominal bleeding confirmed on CTA.  The bleeding was coming from multiple surfaces including the incision and an area along the retroperitoneum.  The findings were most consistent with coagulopathic bleeding.      Simone Kessler MD

## 2025-04-13 NOTE — PROGRESS NOTES
0700: Bedside and Verbal shift change report given to CHARIS Sethi (oncoming nurse) by CHARIS Encarnacion (offgoing nurse). Report included the following information Nurse Handoff Report, ED SBAR, Recent Results, and Cardiac Rhythm Sinus Rhythm .     0730: Patient currently in the operating room.    0742: Patient returned to the unit. Noted the previous abdominal incision was open; anesthesia nurse applied incision glue as per standard protocol.    0752: Per MD orders, CRRT to be restarted.    0800: Contacted Day to arrange for CRRT to start. Left internal jugular (IJ) dressing was saturated; replaced with a new dressing. Shift assessment completed; refer to the flowsheet for additional details.    0830: Noticed the new dressing on the Left IJ was saturated; redressed and placed a hemostatic agent (QuickClot) under the dressing to assist with hemostasis.    1100: Plasma infusion initiated per MD order, patient currently on the unit (not in the OR).    1155: Contacted the patient's son to provide updates as requested.    1200: Shift reassessment completed. No changes documented. Talked to  about low output from SHIRA Drain left abdomen. No new orders.     1350: Patient's CRRT clotting off. Nephrology was consulted regarding potential discontinuation of CRRT if clotting persists. Notified Day of CRRT status update.    1400: Filter pressure exceeded 50 mmHg; contacted Day for further guidance.    1600: Shift reassessment completed. No changes documented. New dressing applied on the Left IJ.     1700: Talked to  about low output from SHIRA Drain , left abdomen, no new orders.     1900: Bedside and Verbal shift change report given to CHARIS Macias (oncoming nurse) by CHARIS Sethi  (offgoing nurse). Report included the following information Nurse Handoff Report, Recent Results, and Cardiac Rhythm Sinus Rhythm .

## 2025-04-13 NOTE — PROGRESS NOTES
Critical Care Progress Note  Brenden Mayorga MD          Date of Service:  2025  NAME:  Yefri Vigil  :  1955  MRN:  [id number]      Subjective/Hospital course:      : Patient continued to have ongoing bleeding and received multiple blood productes overnight. Eventually taken back to OR this morning and underwent re exploration, found to have multiple areas of bleeding.       Active Problems Being Managed:     --Hemorrhagic shock/?septic shock.  -- Acute renal failure on CKD stage III.  - Severe lactic acidosis.  --Acute hypoxemic respiratory failure.  - Elevated liver enzymes.  - Mildly elevated troponin.     Assessment/Plan:     Hemorrhagic shock/?septic shock.  --Patient had elective transverse colectomy on .  --Patient was emergently taken to OR on  and found to have ongoing intra abdominal bleeding, continued to have ongoing bleeding overnight and taken to OR again today, found to have multiple areas of bleeding, hemostasis achieved.  --Continue vol resuscitation as tolerated and transfuse for Hb <8.  --Monitor SHIRA output.     Acute renal failure.   --Likely ATN etiology, started on CRRT.  --Monitor renal functions.  --Factor as tolerated.    Acute hypoxemic respiratory failure.  --In setting of shock.  --Intubated for emergent surgery.  --lung protective ventilation.  --Dailyl SAT and SBT once stability achieved.    Lactic acidosis.  --Sec to shock  --Optimize perfusion, maintain MAP >65.  --Serial lactate monitoring.       Code status: Full code.  SUP: Protonix.  DVT prophylaxis: SCD. Holding AC.  DANIEL     Jules Vigil (Child)  [phone] (Mobile)       Care Plan discussed with: Patient/Family and Nurse      I personally spent 60 minutes of critical care time.  This is time spent at this critically ill patient's bedside actively involved in patient care as well as the coordination of care and discussions with the patient's family.  This does not include any procedural

## 2025-04-13 NOTE — PROGRESS NOTES
INTERVENTIONAL RADIOLOGY    Called overnight by MARY KATE Menon for this patient who is POD2 right hemicolectomy and POD1 take back for bleeding. CTA overnight shows continued bleeding into the right hemicolectomy bed. Imaging reviewed. Agree that there is active bleeding, but what is not clear is the source. Possibilities are mesenteric, visceral, or abdominal wall. Controlling post-operative bleeds angiographically can be very difficult without a clear target on which to focus during angiography. Therefore, will defer to surgery for possible reexploration, which is much more likely to be successful at bleeding control. If the patient ever becomes unstable or is otherwise unsuitable for surgery, please call IR back and we could reassess utility of angiogram.     Jennifer Huff MD  [facility]  7:00 AM, 4/13/2025

## 2025-04-13 NOTE — PROGRESS NOTES
1450  Received report from Julissa AUSTIN RN  Patient leaving to go to OR for emergent surgery     1724  Patient arrived back from OR   unresponsive not reversed by Anesthesia will be recovering patient here in CCU as he remains intubated.  Will keep intubated for tonight and reevaluate in AM  Consult for Nephrology for CRRT due to CKD stage III acidosis and elevated Potassium levels.  OR team place right radial a-line along with a left triple lumen CVC  Propofol at 50 mcg 2 PIV in place #18 left FA and #20 right FA both flushed and brisk blood return.  Fitzgerald placed prior to going back to OR with 300 cc total clear yellow urine   ETT 24 cm lips tolerating vent at this time  NGT left nare at 40 cm not taped advanced to 65 cm and taped green gastric contents out not enough to measure x-ray will be done after Trialysis line is placed  SHIRA drain in LUQ with 100 cc bright red blood  Midline incision with a RHIANNA drain in place breakthrough drainage noted at bottom of dressing bright red blood    1745  Dr Ruelas at bedside to place Right IJ trialysis line    1800  Assessment complete  unresponsive on Propofol and starting Fentanyl drip  multiple new orders for labs and ABG  platelets and plasma to be given  all consents for blood products have been completed   SCD's in place  will start to wean sedation just prior to surgery patient was fully awake and oriented     Restraints will be added pupils non reactive at this time  A-line and BP cuff are correlating at this time    1841  Critical lab Lactic 12.4  Dr Mayorga was notified at 1843 orders received to repeat lactic every 4 hours    1900  Bedside report given to Talia VIZCAINO

## 2025-04-13 NOTE — DIALYSIS
Received call from Primary RN that CRRT has clotted and per Dr. Arnett's note could possibly stopped. Spoke with Dr. Arnett and confirmed that CRRT could be held at time since pt is hemodynamically stable and not requiring pressors or additional blood products. Pt will be monitored for further CRRT needs.

## 2025-04-13 NOTE — PROGRESS NOTES
SURGERY PROGRESS NOTE      Admit Date: 2025    POD * Day of Surgery *    Procedure: Procedure(s):  EXPLORATORY LAPAROTOMY WASHOUT CONTROL OF BLEEDING      Subjective:     Called by ICU for diffuse bleeding from midline incision as well as blood in the SHIRA      Objective:     BP (!) 153/73   Pulse 69   Temp (!) 96.4 °F (35.8 °C)   Resp 17   Ht 1.676 m (5' 6\")   Wt 77 kg (169 lb 12.1 oz)   SpO2 98%   BMI 27.40 kg/m²      Temp (24hrs), Av °F (35 °C), Min:91.9 °F (33.3 °C), Max:97.5 °F (36.4 °C)      1901 -  0700  In: 1115.6   Out: -   701 - 1900  In: 7861.8 [P.O.:120; I.V.:2955.3]  Out: 2310 [Urine:1160; Drains:100]      CBC:   Lab Results   Component Value Date/Time    WBC 4.9 2025 08:41 PM    RBC 2.34 2025 08:41 PM    HGB 6.2 2025 08:41 PM    HCT 18.4 2025 08:41 PM    MCV 78.6 2025 08:41 PM    MCH 26.5 2025 08:41 PM    MCHC 33.7 2025 08:41 PM    RDW 19.8 2025 08:41 PM    PLT 71 2025 08:41 PM    MPV 10.6 2025 08:41 PM     BMP:    Lab Results   Component Value Date/Time     2025 05:30 PM    K 5.6 2025 05:30 PM     2025 05:30 PM    CO2 17 2025 05:30 PM    BUN 41 2025 05:30 PM    CREATININE 3.80 2025 05:30 PM    CALCIUM 6.6 2025 05:30 PM    LABGLOM 17 2025 05:30 PM    LABGLOM 45 10/19/2023 02:32 AM    GLUCOSE 283 2025 05:30 PM    GLUCOSE 199 2024 12:00 AM     Warfarin PT/INR:    Lab Results   Component Value Date/Time    PROTIME 15.6 2025 08:41 PM    INR 1.5 2025 08:41 PM     PTT:    Lab Results   Component Value Date/Time    APTT 31.1 2025 08:41 PM   [APTT:1}  ABG:    Lab Results   Component Value Date/Time    PHART 7.40 2025 08:21 PM    TIA3VQF 32 2025 08:21 PM    PO2ART 153 2025 08:21 PM    DYG0TZH 20 2025 08:21 PM    BE 0.5 10/04/2023 08:39 AM     Fibrinogen Level:  No results found for:

## 2025-04-13 NOTE — PROGRESS NOTES
CRS Note  Small area of contrast extravasation noted on ct scan in the surgical bed. Will plan on taking him back to the OR for exploration to identify source of persistent bleeding.     Simone Kessler MD

## 2025-04-13 NOTE — PROGRESS NOTES
CRS Note  Was updated by the ICU team on pt status. He continues to have significant bleeding from Duke drain and also through the midline dressing. Last hgb was 6 before 2 units were administered. I recommended a CTA to see if there is an obvious bleeding site but this seems to be more diffuse coagulopathic bleeding given that the pt was already taken back to the OR once for bleeding a few hours ago.  We will consider taking him back to the OR if his bleeding persists or if the CTA shows an obvious target.     Simone Kessler MD

## 2025-04-13 NOTE — PROGRESS NOTES
0049: Colorectal surgery paged to Intensivist phone.    [id number]: Patient JUAN CARLOS to CT. RHIANNA dressing saturated and leaking blood. Poonam notified. Dressing reinforced.     0300: Colorectal surgery paged to Intensivist phone regarding CT results.     0347: Colorectal surgery paged.    0358: Nursing Supervisor aware that the patient will return to OR.

## 2025-04-13 NOTE — FLOWSHEET NOTE
CRRT / [phone]    Primary RN SBAR: Alysha Rudolph, RN  Blood Warmer Tubing Lot #: [id number]  Patient Education provided: procedure to family at BS  Preferred Education method and Primary language: english/verbal  Dialysis Consent: YES  Hospital General Consent Verified: YES  Hospital associated wait time; reason: NA  Hepatitis B Surface Ag   Date/Time Value Ref Range Status   10/06/2023 12:40 PM <0.10 Index Final     Hep B S Ag Interp   Date/Time Value Ref Range Status   10/06/2023 12:40 PM Negative NEG   Final     CRRT INITIATION   04/12/25 2115   Observations & Evaluations   Level of Consciousness 2   Heart Rhythm   (icu cardiac monitor)   Respiratory Quality/Effort Unlabored   Bilateral Breath Sounds Diminished   Skin Color Pale   Skin Condition/Temp Dry;Cool   Abdomen Inspection Rounded;Soft   Bowel Sounds (All Quadrants) Present   Last BM (including prior to admit) 04/11/25   Edema None   Vital Signs   BP (!) 148/71   Temp 97.2 °F (36.2 °C)   Pulse 67   Respirations 17   SpO2 98 %   ICEBOAT   ICEBOAT I;C;E;B;O;A;T   Treatment   $CRRT $Yes   Machine #   (pm05)   Cartridge Lot #   ([id number])   Therapy Type CVVH   System Used   System Used Mee   Pressures (Mee)   Access (mmHg) (!) -37 mmHg   Return/Venous (mmHg) (!) 27 mmHg   TMP (mmHg) 9 mmHg   Pressure Drop (mmHg) 34 mmHg   Filter (mmHg) 101 mmHg   Deaeration Chamber Check Yes   Flow Rates (Mee)   Blood Flow (mL/min) 200 mL/min   Replacement Fluid Pre-Filter (mL/hr) 480 mL/hr   Replacement Filter Post-Filter (mL/hr) 480 mL/hr   Pre-Blood Pump (mL/hr) 960 mL/hr   Mee Calculation   (D) Physician Ordered Hourly Removal (mL) 0 ml   CRRT Activities   Intervention Initiated;Ongoing   Hemodialysis Central Access - Temporary Right Neck   Placement Date/Time: 04/12/25 1700   Orientation: Right  Access Location: Neck   Continued need for line? Yes   Site Assessment Clean, dry & intact   CVC Lumen Status Brisk blood return;Infusing   Blue

## 2025-04-13 NOTE — DIALYSIS
Stopped by to round on patient but he is still in OR. Primary RN to page Day VIZCAINO when pt is back and ready to be restarted on CRRT.

## 2025-04-13 NOTE — FLOWSHEET NOTE
CRRT / [phone]    Primary RN SBAR: Bhakti George, RN  Blood Warmer Tubing Lot #: [id number]  Patient Education provided: provided to Rns; please do not bump or move CRRT machine during set up or treatment to avoid irritating scales and causing alarms  Preferred Education method and Primary language: verbal/ English  Dialysis Consent: yes  Hospital General Consent Verified: yes  Hepatitis B Surface Ag   Date/Time Value Ref Range Status   04/12/2025 09:52 PM 0.43 Index Final     Hep B S Ag Interp   Date/Time Value Ref Range Status   04/12/2025 09:52 PM Negative NEG   Final     Hep B S Ab   Date/Time Value Ref Range Status   04/12/2025 09:52 .39 mIU/mL Final     Hep B S Ab Interp   Date/Time Value Ref Range Status   04/12/2025 09:52 PM REACTIVE (A) NR   Final      Pre treatment   04/13/25 1050   Observations & Evaluations   Level of Consciousness 2   Respiratory Quality/Effort Unlabored   O2 Device Ventilator   Bilateral Breath Sounds Clear   Skin Condition/Temp Warm;Dry   Edema None   Vital Signs   Temp 98.4 °F (36.9 °C)   Pulse 73   Respirations 12   SpO2 100 %   ICEBOAT   ICEBOAT I;C;E;B;O;A;T   Treatment   $CRRT $Yes   Machine #   (PM 05)   Cartridge Lot #   ([id number])   Therapy Type CVVH   Non-CRRT Intake (Must also document in I&O flowsheet)   IV/IVPB (mL) 290 mL   Blood Products (mL) - added to total 0 mL   PO (mL) 0   Tube Feeding (mL) 0 mL   Free Water/Flush (mL) 0 mL   NxStage flushes (mL) 0 mL   Other (mL) 0   Total Non-CRRT Intake (Calculated) 290   Non-CRRT Output (Must also document in I&O flowsheet)   Urine (mL) 0   Stool (mL) 0   NG/OG (mL) 0 mL   Emesis (mL) 0   Wound Drain (mL) 0 mL   Fitzgerald (mL) 0 mL   Total Non-CRRT Output  (Calculated) 0 mL   System Used   System Used Mee   NxStage Calculation   (A) Total Non-NxStage Intake (mL) 290 mL   (C) Total Non-NxStage Output (mL) 0 mL/hr   Mee Calculation   (A) Total Non-Mee Intake (mL) 290 mL   (B) Total Non-Mee Output (mL) 0 mL   (C)

## 2025-04-13 NOTE — PROGRESS NOTES
Pharmacy Antimicrobial Kinetic Dosing    Indication for Antimicrobials: IntraAb.Infection     Current Regimen of Each Antimicrobial:  Vancomycin HD dosing - . Start Date ; Day 2  Zosyn 3.375gm IV q12h HD dosing - . Start date ; day 2 of 7.    Previous Antimicrobial Therapy:  cefazolin + metronidazole   Micafungin 100 mg IV daily.  Start date ; Day 1    Goal Level: Vancomycin -600    Date Dose & Interval Measured (mcg/mL) Predicted AUC 24-48 Predicted AUC 24,ss    03:54  random   18.4                     Significant Cultures:    blood: pending    Labs:  Recent Labs     Units 25  0116 25  2041 25  1730 25  1130 25  0704   CREATININE MG/DL 3.06*  --  3.80* 3.76* 3.33*   BUN MG/DL 38*  --  41* 38* 36*   PROCAL ng/mL  --   --   --  13.96  --    WBC K/uL 7.2 4.9 9.6 19.7* 17.7*   BANDS % 35 18  --  9 11     Temp (24hrs), Av.8 °F (36 °C), Min:91.9 °F (33.3 °C), Max:99.3 °F (37.4 °C)      Conditions for Dosing Consideration: None    Creatinine Clearance (mL/min): Estimated Creatinine Clearance: 23 mL/min (A) (based on SCr of 3.06 mg/dL (H)).     PCT  = 13.96    Impression/Plan:   TERESA with Scr still elevated, baseline Scr 2025 1.4-1.8, WBC wnl, afebrile, Cx ng thusfar  Patient was taken back to the OR for persistent intraabdominal bleeding confirmed on CTA   Redose vanc with 750mg IV once  am  Recheck level  am  Continue Zosyn   BMP daily  Antimicrobial stop date: Vancomycin pending, Zosyn x7 days     Pharmacy will follow daily and adjust medications as appropriate for renal function and/or serum levels.    Thank you,  REX CARLSON, [facility]

## 2025-04-13 NOTE — PROGRESS NOTES
1910: Bedside and Verbal shift change report given to CHARIS Encarnacion (oncoming nurse) by CHARIS Sethi (offgoing nurse). Report included the following information Nurse Handoff Report.     1942: Blood drawn for CBC and BMP.    2000: Shift assessment done, see flowsheet for details.     0000: Reassessment done, no change to previous assessment.    0307: Blood drawn for CBC, BMP, Mg, and Phos.    0400: Reassessment done, no change to previous assessment. SBP >180s on NIBP and manual, notified lorraine, ICU NP with an order to give Hydralazine 10mg IV PRN. See MAR.    0428: No SBT for today as per Lorraine ICU NP.    0700: Bedside and Verbal shift change report given to CHARIS Mak (oncoming nurse) by CHARIS Encarnacion (offgoing nurse). Report included the following information Nurse Handoff Report.

## 2025-04-13 NOTE — OP NOTE
[facility]              [address] [id number]                            OPERATIVE REPORT      PATIENT NAME: ESTELLA DA SILVA              : 1955  MED REC NO: [id number]                       ROOM: 2514  ACCOUNT NO: [id number]                       ADMIT DATE: 2025  PROVIDER: Billy Reyes MD    DATE OF SERVICE:  2025    PREOPERATIVE DIAGNOSES:  Septic shock following right hemicolectomy.    POSTOPERATIVE DIAGNOSES:       1. Acute blood loss anemia.     2. Hypovolemic shock.    PROCEDURES PERFORMED:  Exploratory laparotomy with control of bleeding.    SURGEON:  Billy Reyes MD    ASSISTANT:  Trever Matt.    ANESTHESIA:  General endotracheal.    ESTIMATED BLOOD LOSS:  1500 mL.    SPECIMENS REMOVED:  None.    INTRAOPERATIVE FINDINGS:       1. 1 L of liquid blood and 500 mL of solid clot removed from the abdomen.     2. Active arterial bleeding from the right gastroepiploic artery just to the right and above the anastomosis.  It appears that this had been sealed with an energy device and was actively bleeding through the amy.     3. Diffuse oozing from the retroperitoneal surface on the right-hand side.     4. Anastomosis intact.     5. All bowel viable.     COMPLICATIONS:  None.    IMPLANTS:  AmnioFLEX, serial number [id number].    INDICATIONS:  The patient is a 69-year-old man, who underwent an elective extended right hemicolectomy yesterday.  The patient was hypotensive overnight, but responded to IV fluid boluses.  The patient became more and more acidotic and hypotensive.  He was transferred to the ICU and the lactate estella to 17.  His pH was 6.9.  I was consulted by the intensivist because there was no available colorectal surgeon.  So, the patient is taken to the operating room for urgent exploration.    DESCRIPTION OF PROCEDURE:  The patient was identified in the intensive care unit.  Informed consent obtained from his son.   This was first packed with laparotomy pads.  These were then removed sequentially and the area irrigated and clots removed.  There was diffuse small punctate bleeding, but no significant bleed.  So, the area was covered with Surgicel as well as Surgicel powder, and a laparotomy pad was placed over it.  The bowel was then inspected.  The anastomosis was intact.  There were no signs of any anastomotic leak.  The AmnioFLEX that had been replaced yesterday was partially intact.  The small bowel was run from the anastomosis to the ligament of Treitz.  There were no signs of any small bowel injuries or enterotomies.  The small bowel appeared pink and viable.  The transverse colon was inspected.  It was viable without any signs of injury.  The left side of the abdomen was copiously irrigated with several liters of normal saline until this came back clear.  Once this was completed, attention was focused on the mesenteric defect of the anastomosis.  There was some oozing from the cut edge of the small bowel mesentery.  So, it was decided to close the mesenteric defect with a running 3-0 silk suture.  This completely closed the mesenteric defect and controlled the oozing.  Once this was completed, attention was focused in the right gutter, where the colon had previously been.  The laparotomy pad was removed.  The Surgicel and Surgicel powder were inspected.  There were no signs of any bleeding.  The laparotomy pads were removed.  A laparotomy count was performed and was correct.  The small bowel was returned to the abdomen.  I then changed gloves and removed the wound protector.  A 19-Scottish round drain was inserted through the left lower quadrant previous trocar site and the drain placed over the retroperitoneum on the right-hand side.  The drain was sutured to the skin surface with 2-0 nylon.  I then put on fresh, clean, and dry gloves and placed a new piece of AmnioFLEX over the anastomosis,   completely surrounding the

## 2025-04-13 NOTE — BRIEF OP NOTE
Brief Postoperative Note      Patient: Yefri Vigil  YOB: 1955  MRN: [id number]    Date of Procedure: 4/13/2025    Pre-Op Diagnosis Codes:      * Intra-abdominal bleeding [R58]    Post-Op Diagnosis: Same       Procedure(s):  LAPAROTOMY EXPLORATORY    Surgeon(s):  Simone Kessler MD    Assistant:  Surgical Assistant: Vince Murillo    Anesthesia: General    Estimated Blood Loss (mL): 1500 ml     Complications: None    Specimens:   * No specimens in log *    Implants:  * No implants in log *      Drains:   Closed/Suction Drain Left Abdomen;LLQ Bulb (Active)   Site Description Clean, dry & intact 04/13/25 0400   Dressing Status Clean, dry & intact 04/13/25 0400   Drainage Appearance Bloody 04/13/25 0400   Drain Status Compressed;To bulb suction 04/13/25 0400   Output (ml) 30 ml 04/13/25 0400       NG/OG/NJ/NE Tube Left nostril (Active)   Surrounding Skin Clean, dry & intact 04/13/25 0400   Securement device Adhesive based peterson 04/13/25 0400   Status Suction-low intermittent 04/13/25 0400   Placement Verified X-Ray (Initial);Gastric Contents 04/13/25 0400   NG/OG/NJ/NE External Measurement (cm) 65 cm 04/13/25 0400   Drainage Appearance Green 04/13/25 0400   Output (mL) 100 ml 04/13/25 0400   Action Taken Placement verified (comment) 04/13/25 0400       Urinary Catheter 04/12/25 (Active)   Catheter Indications Need for fluid volume management of the critically ill patient in a critical care setting 04/13/25 0400   Site Assessment No urethral drainage 04/13/25 0400   Urine Color Yellow 04/13/25 0400   Urine Appearance Clear 04/13/25 0400   Urine Odor Malodorous 04/13/25 0400   Collection Container Standard 04/13/25 0400   Securement Method Securing device (Describe) 04/13/25 0400   Catheter Care  Perineal wipes 04/13/25 0400   Catheter Best Practices  Drainage tube clipped to bed;Catheter secured to thigh;Tamper seal intact;Bag below bladder;Bag not on floor;Lack of dependent loop in

## 2025-04-13 NOTE — PROGRESS NOTES
Name: Yefri Vigil   MRN: [id number]  : 1955    Nephrology Progress Note    Assessment:  TERESA on CKD-3b: serum Cr bump to 3.7mg/dl. Pre-renal vs ATN (hypotension). Celebrex x 1 dose () and IV contrast exposure  and .  Non-oliguric. Underlying DM kidney disease. Baseline Cr 1.4-1.8mg/dl.     Hemorrhagic shock: secondary to gastroepiploic artery bleed. Needed to go back to OR for ex lap . multiple units of PRBCs periop. Again taken to OR this morning to explore ongoing bleeding     Hyperkalemia: better with CRRT     Metabolic acidosis: severe lactic acidosis-> improving. S/p IV bicarbonate/CRRT     Robotic transverse colectomy: 25     DM2: stable control    Plan/Recommendations:  Ct CVVH-> 2K/3.5Ca bath. Factor rate 0  If filter clots later today-> can consider holding CRRT  Stop IV Bicarb  Keep MAPS >65  Trend Hgb-> PRBCS/blood products per ICU team  Strict I/Os  Avoid nephrotoxins  AM labs    Subjective:  CTA again earlier this morning for ongoing abd bleeding. Taken back to OR.   Off Levophed. Awaiting getting back on CRRT    ROS:   UTO    Exam:  BP (!) 152/69   Pulse 75   Temp 98.4 °F (36.9 °C) (Oral)   Resp 12   Ht 1.676 m (5' 6\")   Wt 77 kg (169 lb 12.1 oz)   SpO2 100%   BMI 27.40 kg/m²     Gen: Intubated/sedated  HEENT: AT/NC, +ETT  Neck: Curtis  Lungs/Chest wall: Clear. Equal BS.   Cardiovascular: Regular rate, normal rhythm.   Abdomen/: Soft, NT,  BS+  Ext: No peripheral edema       Current Facility-Administered Medications   Medication Dose Route Frequency Provider Last Rate Last Admin    norepinephrine (LEVOPHED) 16 mg in sodium chloride 0.9 % 250 mL infusion (premix)  1-100 mcg/min IntraVENous Continuous Cherelle Menon APRN - CNP   Stopped at 25 0614    0.9 % sodium chloride infusion   IntraVENous PRN Harsh

## 2025-04-13 NOTE — PROGRESS NOTES
I was notified by nursing that the patient's Christoph dressing had become saturated with blood and there was now increased output (blood) from left SHIRA. STAT labs had already been obtained previously and resulted with Hgb 6.2, down from 8.1, INR 1.5, PLT 72. Midline Christoph dressing noted to be saturated with jose blood which is pooling underneath transparent portion now. SHIRA emptied and milked as clots were noted in drain, and at least 100 mL noted by nursing. pRBC, PLT, FFP, cryo, ordered. I discussed with Dr. Reyes who agrees with correction of coagulopathy and RBC transfusion. Patient is hemodynamically stable and on Jessenia hugger to correct hypothermia. Will repeat labs post transfusions.     MARY KATE Medrano CNP       Update 4/13/2025 01:06 AM    I updated CRS Dr. Kessler regarding continued significant bleeding from midline incision, and SHIRA drain which is now intermittently clotting as well as worsening hemodynamics as patient now requiring low dose NE to maintain MAP above 65. Will send for CTA A/P to eval for active bleed. Repeat CBC and coags are pending.     MARY KATE Medrano CNP      Update 4/13/2025 3:19 AM    CTA with active extrav in the RLQ/hemicolectomy bed. CRS Dr. Kessler paged for update. Will also consult IR to eval for possible embo.     MARY KATE Medrano CNP

## 2025-04-14 LAB
ABO + RH BLD: NORMAL
ALBUMIN SERPL-MCNC: 2.2 G/DL (ref 3.5–5)
ALBUMIN/GLOB SERPL: 0.8 (ref 1.1–2.2)
ALP SERPL-CCNC: 60 U/L (ref 45–117)
ALT SERPL-CCNC: 749 U/L (ref 12–78)
ANION GAP SERPL CALC-SCNC: 5 MMOL/L (ref 2–12)
AST SERPL-CCNC: 842 U/L (ref 15–37)
BASOPHILS # BLD: 0.08 K/UL (ref 0–0.1)
BASOPHILS NFR BLD: 1 % (ref 0–1)
BILIRUB DIRECT SERPL-MCNC: 0.3 MG/DL (ref 0–0.2)
BILIRUB SERPL-MCNC: 1.5 MG/DL (ref 0.2–1)
BLD PROD TYP BPU: NORMAL
BLOOD BANK BLOOD PRODUCT EXPIRATION DATE: NORMAL
BLOOD BANK DISPENSE STATUS: NORMAL
BLOOD BANK ISBT PRODUCT BLOOD TYPE: 5100
BLOOD BANK ISBT PRODUCT BLOOD TYPE: 6200
BLOOD BANK PRODUCT CODE: NORMAL
BLOOD BANK UNIT TYPE AND RH: NORMAL
BLOOD GROUP ANTIBODIES SERPL: NORMAL
BPU ID: NORMAL
BUN SERPL-MCNC: 36 MG/DL (ref 6–20)
BUN/CREAT SERPL: 14 (ref 12–20)
CALCIUM SERPL-MCNC: 7.4 MG/DL (ref 8.5–10.1)
CHLORIDE SERPL-SCNC: 112 MMOL/L (ref 97–108)
CO2 SERPL-SCNC: 27 MMOL/L (ref 21–32)
CREAT SERPL-MCNC: 2.6 MG/DL (ref 0.7–1.3)
CROSSMATCH RESULT: NORMAL
DIFFERENTIAL METHOD BLD: ABNORMAL
EOSINOPHIL # BLD: 0 K/UL (ref 0–0.4)
EOSINOPHIL NFR BLD: 0 % (ref 0–7)
ERYTHROCYTE [DISTWIDTH] IN BLOOD BY AUTOMATED COUNT: 16.7 % (ref 11.5–14.5)
GLOBULIN SER CALC-MCNC: 2.8 G/DL (ref 2–4)
GLUCOSE BLD STRIP.AUTO-MCNC: 119 MG/DL (ref 65–117)
GLUCOSE BLD STRIP.AUTO-MCNC: 128 MG/DL (ref 65–117)
GLUCOSE BLD STRIP.AUTO-MCNC: 156 MG/DL (ref 65–117)
GLUCOSE SERPL-MCNC: 118 MG/DL (ref 65–100)
HCT VFR BLD AUTO: 27.7 % (ref 36.6–50.3)
HGB BLD-MCNC: 6.9 G/DL (ref 12.1–17)
HGB BLD-MCNC: 9.7 G/DL (ref 12.1–17)
IMM GRANULOCYTES # BLD AUTO: 0 K/UL (ref 0–0.04)
IMM GRANULOCYTES NFR BLD AUTO: 0 % (ref 0–0.5)
LYMPHOCYTES # BLD: 1.67 K/UL (ref 0.8–3.5)
LYMPHOCYTES NFR BLD: 22 % (ref 12–49)
MAGNESIUM SERPL-MCNC: 1.7 MG/DL (ref 1.6–2.4)
MCH RBC QN AUTO: 29.5 PG (ref 26–34)
MCHC RBC AUTO-ENTMCNC: 35 G/DL (ref 30–36.5)
MCV RBC AUTO: 84.2 FL (ref 80–99)
MONOCYTES # BLD: 0.15 K/UL (ref 0–1)
MONOCYTES NFR BLD: 2 % (ref 5–13)
NEUTS SEG # BLD: 5.7 K/UL (ref 1.8–8)
NEUTS SEG NFR BLD: 75 % (ref 32–75)
NRBC # BLD: 0.04 K/UL (ref 0–0.01)
NRBC BLD-RTO: 0.5 PER 100 WBC
PHOSPHATE SERPL-MCNC: 3.4 MG/DL (ref 2.6–4.7)
PLATELET # BLD AUTO: 54 K/UL (ref 150–400)
PMV BLD AUTO: 10.4 FL (ref 8.9–12.9)
POTASSIUM SERPL-SCNC: 3.9 MMOL/L (ref 3.5–5.1)
PROT SERPL-MCNC: 5 G/DL (ref 6.4–8.2)
RBC # BLD AUTO: 3.29 M/UL (ref 4.1–5.7)
RBC MORPH BLD: ABNORMAL
SERVICE CMNT-IMP: ABNORMAL
SODIUM SERPL-SCNC: 144 MMOL/L (ref 136–145)
SPECIMEN EXP DATE BLD: NORMAL
UNIT DIVISION: 0
UNIT ISSUE DATE/TIME: NORMAL
WBC # BLD AUTO: 7.6 K/UL (ref 4.1–11.1)
WBC MORPH BLD: ABNORMAL

## 2025-04-14 PROCEDURE — 83735 ASSAY OF MAGNESIUM: CPT

## 2025-04-14 PROCEDURE — 6370000000 HC RX 637 (ALT 250 FOR IP): Performed by: HOSPITALIST

## 2025-04-14 PROCEDURE — 2500000003 HC RX 250 WO HCPCS: Performed by: HOSPITALIST

## 2025-04-14 PROCEDURE — 2500000003 HC RX 250 WO HCPCS: Performed by: ANESTHESIOLOGY

## 2025-04-14 PROCEDURE — 85025 COMPLETE CBC W/AUTO DIFF WBC: CPT

## 2025-04-14 PROCEDURE — 2500000003 HC RX 250 WO HCPCS: Performed by: STUDENT IN AN ORGANIZED HEALTH CARE EDUCATION/TRAINING PROGRAM

## 2025-04-14 PROCEDURE — 6370000000 HC RX 637 (ALT 250 FOR IP): Performed by: STUDENT IN AN ORGANIZED HEALTH CARE EDUCATION/TRAINING PROGRAM

## 2025-04-14 PROCEDURE — 36415 COLL VENOUS BLD VENIPUNCTURE: CPT

## 2025-04-14 PROCEDURE — 6360000002 HC RX W HCPCS: Performed by: INTERNAL MEDICINE

## 2025-04-14 PROCEDURE — 94003 VENT MGMT INPAT SUBQ DAY: CPT

## 2025-04-14 PROCEDURE — 80048 BASIC METABOLIC PNL TOTAL CA: CPT

## 2025-04-14 PROCEDURE — 6360000002 HC RX W HCPCS: Performed by: HOSPITALIST

## 2025-04-14 PROCEDURE — 6370000000 HC RX 637 (ALT 250 FOR IP): Performed by: SURGERY

## 2025-04-14 PROCEDURE — 82962 GLUCOSE BLOOD TEST: CPT

## 2025-04-14 PROCEDURE — 80076 HEPATIC FUNCTION PANEL: CPT

## 2025-04-14 PROCEDURE — 6360000002 HC RX W HCPCS: Performed by: STUDENT IN AN ORGANIZED HEALTH CARE EDUCATION/TRAINING PROGRAM

## 2025-04-14 PROCEDURE — 2500000003 HC RX 250 WO HCPCS: Performed by: INTERNAL MEDICINE

## 2025-04-14 PROCEDURE — 6360000002 HC RX W HCPCS: Performed by: NURSE PRACTITIONER

## 2025-04-14 PROCEDURE — 2580000003 HC RX 258: Performed by: HOSPITALIST

## 2025-04-14 PROCEDURE — 2000000000 HC ICU R&B

## 2025-04-14 PROCEDURE — 84100 ASSAY OF PHOSPHORUS: CPT

## 2025-04-14 PROCEDURE — 2580000003 HC RX 258: Performed by: STUDENT IN AN ORGANIZED HEALTH CARE EDUCATION/TRAINING PROGRAM

## 2025-04-14 RX ORDER — SODIUM CHLORIDE 9 MG/ML
INJECTION, SOLUTION INTRAVENOUS PRN
Status: DISCONTINUED | OUTPATIENT
Start: 2025-04-14 | End: 2025-04-19 | Stop reason: HOSPADM

## 2025-04-14 RX ORDER — DROPERIDOL 2.5 MG/ML
0.62 INJECTION, SOLUTION INTRAMUSCULAR; INTRAVENOUS
Status: DISCONTINUED | OUTPATIENT
Start: 2025-04-14 | End: 2025-04-14

## 2025-04-14 RX ORDER — HYDROMORPHONE HYDROCHLORIDE 1 MG/ML
0.5 INJECTION, SOLUTION INTRAMUSCULAR; INTRAVENOUS; SUBCUTANEOUS EVERY 5 MIN PRN
Status: DISCONTINUED | OUTPATIENT
Start: 2025-04-14 | End: 2025-04-14

## 2025-04-14 RX ORDER — HYDRALAZINE HYDROCHLORIDE 20 MG/ML
10 INJECTION INTRAMUSCULAR; INTRAVENOUS EVERY 6 HOURS PRN
Status: DISCONTINUED | OUTPATIENT
Start: 2025-04-14 | End: 2025-04-14

## 2025-04-14 RX ORDER — SODIUM CHLORIDE 0.9 % (FLUSH) 0.9 %
5-40 SYRINGE (ML) INJECTION EVERY 12 HOURS SCHEDULED
Status: DISCONTINUED | OUTPATIENT
Start: 2025-04-14 | End: 2025-04-19 | Stop reason: HOSPADM

## 2025-04-14 RX ORDER — FENTANYL CITRATE 50 UG/ML
25 INJECTION, SOLUTION INTRAMUSCULAR; INTRAVENOUS EVERY 5 MIN PRN
Status: DISCONTINUED | OUTPATIENT
Start: 2025-04-14 | End: 2025-04-14

## 2025-04-14 RX ORDER — SODIUM CHLORIDE 0.9 % (FLUSH) 0.9 %
5-40 SYRINGE (ML) INJECTION PRN
Status: DISCONTINUED | OUTPATIENT
Start: 2025-04-14 | End: 2025-04-14

## 2025-04-14 RX ORDER — SODIUM CHLORIDE 0.9 % (FLUSH) 0.9 %
5-40 SYRINGE (ML) INJECTION EVERY 12 HOURS SCHEDULED
Status: DISCONTINUED | OUTPATIENT
Start: 2025-04-14 | End: 2025-04-14

## 2025-04-14 RX ORDER — DEXMEDETOMIDINE HYDROCHLORIDE 4 UG/ML
.1-1.5 INJECTION, SOLUTION INTRAVENOUS CONTINUOUS
Status: DISCONTINUED | OUTPATIENT
Start: 2025-04-14 | End: 2025-04-15

## 2025-04-14 RX ORDER — SODIUM CHLORIDE 9 MG/ML
INJECTION, SOLUTION INTRAVENOUS PRN
Status: DISCONTINUED | OUTPATIENT
Start: 2025-04-14 | End: 2025-04-14

## 2025-04-14 RX ORDER — LIDOCAINE HYDROCHLORIDE 10 MG/ML
1 INJECTION, SOLUTION EPIDURAL; INFILTRATION; INTRACAUDAL; PERINEURAL
Status: DISCONTINUED | OUTPATIENT
Start: 2025-04-14 | End: 2025-04-14

## 2025-04-14 RX ORDER — HYDRALAZINE HYDROCHLORIDE 20 MG/ML
10 INJECTION INTRAMUSCULAR; INTRAVENOUS EVERY 6 HOURS PRN
Status: DISCONTINUED | OUTPATIENT
Start: 2025-04-14 | End: 2025-04-15

## 2025-04-14 RX ORDER — SODIUM CHLORIDE, SODIUM LACTATE, POTASSIUM CHLORIDE, CALCIUM CHLORIDE 600; 310; 30; 20 MG/100ML; MG/100ML; MG/100ML; MG/100ML
INJECTION, SOLUTION INTRAVENOUS CONTINUOUS
Status: DISCONTINUED | OUTPATIENT
Start: 2025-04-14 | End: 2025-04-14

## 2025-04-14 RX ORDER — FENTANYL CITRATE-0.9 % NACL/PF 20 MCG/2ML
25 SYRINGE (ML) INTRAVENOUS EVERY 30 MIN PRN
Refills: 0 | Status: DISCONTINUED | OUTPATIENT
Start: 2025-04-14 | End: 2025-04-15

## 2025-04-14 RX ORDER — ONDANSETRON 2 MG/ML
4 INJECTION INTRAMUSCULAR; INTRAVENOUS
Status: DISCONTINUED | OUTPATIENT
Start: 2025-04-14 | End: 2025-04-14

## 2025-04-14 RX ADMIN — DEXMEDETOMIDINE HYDROCHLORIDE 0.2 MCG/KG/HR: 400 INJECTION, SOLUTION INTRAVENOUS at 18:55

## 2025-04-14 RX ADMIN — ACETAMINOPHEN 650 MG: 325 TABLET ORAL at 00:13

## 2025-04-14 RX ADMIN — ALVIMOPAN 12 MG: 12 CAPSULE ORAL at 20:50

## 2025-04-14 RX ADMIN — CHLORHEXIDINE GLUCONATE 15 ML: 1.2 RINSE ORAL at 20:50

## 2025-04-14 RX ADMIN — SODIUM CHLORIDE, PRESERVATIVE FREE 10 ML: 5 INJECTION INTRAVENOUS at 20:49

## 2025-04-14 RX ADMIN — NICARDIPINE HYDROCHLORIDE 12.5 MG/HR: 25 INJECTION INTRAVENOUS at 18:24

## 2025-04-14 RX ADMIN — Medication 1 AMPULE: at 08:33

## 2025-04-14 RX ADMIN — NICARDIPINE HYDROCHLORIDE 5 MG/HR: 25 INJECTION INTRAVENOUS at 12:35

## 2025-04-14 RX ADMIN — NICARDIPINE HYDROCHLORIDE 7.5 MG/HR: 25 INJECTION INTRAVENOUS at 23:35

## 2025-04-14 RX ADMIN — HYDRALAZINE HYDROCHLORIDE 10 MG: 20 INJECTION INTRAMUSCULAR; INTRAVENOUS at 04:24

## 2025-04-14 RX ADMIN — Medication 1 AMPULE: at 20:50

## 2025-04-14 RX ADMIN — SODIUM CHLORIDE, PRESERVATIVE FREE 10 ML: 5 INJECTION INTRAVENOUS at 08:21

## 2025-04-14 RX ADMIN — NICARDIPINE HYDROCHLORIDE 12.5 MG/HR: 25 INJECTION INTRAVENOUS at 20:59

## 2025-04-14 RX ADMIN — PROPOFOL 40 MCG/KG/MIN: 10 INJECTION, EMULSION INTRAVENOUS at 03:34

## 2025-04-14 RX ADMIN — PANTOPRAZOLE SODIUM 40 MG: 40 INJECTION, POWDER, LYOPHILIZED, FOR SOLUTION INTRAVENOUS at 08:13

## 2025-04-14 RX ADMIN — PROPOFOL 40 MCG/KG/MIN: 10 INJECTION, EMULSION INTRAVENOUS at 08:33

## 2025-04-14 RX ADMIN — ALVIMOPAN 12 MG: 12 CAPSULE ORAL at 10:38

## 2025-04-14 RX ADMIN — PIPERACILLIN AND TAZOBACTAM 3375 MG: 3; .375 INJECTION, POWDER, LYOPHILIZED, FOR SOLUTION INTRAVENOUS at 08:21

## 2025-04-14 RX ADMIN — NICARDIPINE HYDROCHLORIDE 10 MG/HR: 25 INJECTION INTRAVENOUS at 15:45

## 2025-04-14 RX ADMIN — HYDRALAZINE HYDROCHLORIDE 10 MG: 20 INJECTION INTRAMUSCULAR; INTRAVENOUS at 11:58

## 2025-04-14 RX ADMIN — PIPERACILLIN AND TAZOBACTAM 3375 MG: 3; .375 INJECTION, POWDER, LYOPHILIZED, FOR SOLUTION INTRAVENOUS at 16:46

## 2025-04-14 RX ADMIN — CHLORHEXIDINE GLUCONATE 15 ML: 1.2 RINSE ORAL at 08:16

## 2025-04-14 RX ADMIN — FENTANYL CITRATE 75 MCG/HR: at 01:19

## 2025-04-14 ASSESSMENT — PAIN SCALES - GENERAL
PAINLEVEL_OUTOF10: 0

## 2025-04-14 ASSESSMENT — PULMONARY FUNCTION TESTS
PIF_VALUE: 15
PIF_VALUE: 15
PIF_VALUE: 18
PIF_VALUE: 10
PIF_VALUE: 31

## 2025-04-14 NOTE — FLOWSHEET NOTE
CRRT Machine collected:   04/14/25 0805   Treatment   Machine #   (PM-05)   CRRT Activities   Intervention Discontinued   Ultrafiltrate Assessment   Ultrafiltrate Color Yellow/straw   Ultrafiltrate Appearance Clear   Hemodialysis Central Access - Temporary Right Neck   Placement Date/Time: 04/12/25 1700   Orientation: Right  Access Location: Neck   Continued need for line? Yes   Site Assessment Clean, dry & intact   Blue Lumen Status Dead end cap   Red Lumen Status Dead end cap   Dressing Type Antimicrobial;Transparent   Date of Last Dressing Change 04/12/25   Dressing Status Clean, dry & intact     Primary RN SBAR: JUAN Landrum RN  Incapacitated Nurse [facility]. provided: N/A  Patient Education provided: N/A  Preferred Education method and Primary language: verbal, English  Hospital General Consent Verified: yes  Hospital associated wait time; reason: N/A  Hepatitis B Surface Ag   Date/Time Value Ref Range Status   04/12/2025 09:52 PM 0.43 Index Final     Hep B S Ag Interp   Date/Time Value Ref Range Status   04/12/2025 09:52 PM Negative NEG   Final     Hep B S Ab   Date/Time Value Ref Range Status   04/12/2025 09:52 .39 mIU/mL Final     Hep B S Ab Interp   Date/Time Value Ref Range Status   04/12/2025 09:52 PM REACTIVE (A) NR   Final     CRRT discontinued per MD orders yesterday. Old set discarded in red bio-hazard bag. Machine externally disinfected per policy & procedure and returned to supply closet.

## 2025-04-14 NOTE — PROGRESS NOTES
Critical Care Progress Note  Brenden Mayorga MD          Date of Service:  2025  NAME:  Yefri Vigil  :  1955  MRN:  [id number]      Subjective/Hospital course:      : Patient continued to have ongoing bleeding and received multiple blood productes overnight. Eventually taken back to OR this morning and underwent re exploration, found to have multiple areas of bleeding.  : Patient remains intubated, sedation is being weaned off.  Hemodynamically stable.  Off of pressors.       Active Problems Being Managed:     --Hemorrhagic shock/?septic shock.  -- Acute renal failure on CKD stage III.  - Severe lactic acidosis.  --Acute hypoxemic respiratory failure.  - Elevated liver enzymes.  - Mildly elevated troponin.     Assessment/Plan:     Hemorrhagic shock/?septic shock.  --Patient had elective transverse colectomy on .  --Patient was emergently taken to OR on  and found to have ongoing intra abdominal bleeding, continued to have ongoing bleeding overnight and taken to OR again today, found to have multiple areas of bleeding, hemostasis achieved.  --Continue vol resuscitation as tolerated and transfuse for Hb <8.  --Monitor SHIRA output.  - Continue Zosyn for now.  Discontinue vancomycin.    Acute renal failure.   --Likely ATN etiology, started on CRRT.  --Monitor renal functions.    Acute hypoxemic respiratory failure.  --In setting of shock.  --Intubated for emergent surgery.  --lung protective ventilation.  --Dailyl SAT and SBT once stability achieved.  Started pressure support ventilation trial today, requiring PSV of 10/5.    Lactic acidosis.  --Sec to shock  --Optimize perfusion, maintain MAP >65.  --Serial lactate monitoring.       Code status: Full code.  SUP: Protonix.  DVT prophylaxis: SCD. Holding AC.  PUSHPAK     Jules Vigil (Child)  [phone] (Mobile)       Care Plan discussed with: Patient/Family and Nurse      I personally spent 40 minutes of critical care time.   ([id number])  2.5-15 mg/hr IntraVENous Continuous    alcohol 62% (NOZIN) nasal  1 ampule  1 ampule Nasal Q12H    pantoprazole (PROTONIX) 40 mg in sodium chloride (PF) 0.9 % 10 mL injection  40 mg IntraVENous Daily    chlorhexidine (PERIDEX) 0.12 % solution 15 mL  15 mL Mouth/Throat BID    fentaNYL (SUBLIMAZE) infusion 1000 mcg/100mL   mcg/hr IntraVENous Continuous    propofol infusion  5-50 mcg/kg/min IntraVENous Continuous    insulin lispro (HUMALOG,ADMELOG) injection vial 0-4 Units  0-4 Units SubCUTAneous Q6H    [Held by provider] amLODIPine (NORVASC) tablet 10 mg  10 mg Oral Daily    [Held by provider] atorvastatin (LIPITOR) tablet 40 mg  40 mg Oral Daily    [Held by provider] hydrALAZINE (APRESOLINE) tablet 25 mg  25 mg Oral TID    [Held by provider] metoprolol tartrate (LOPRESSOR) tablet 25 mg  25 mg Oral BID    sodium chloride flush 0.9 % injection 5-40 mL  5-40 mL IntraVENous PRN    [Held by provider] acetaminophen (TYLENOL) tablet 650 mg  650 mg Oral Q6H    alvimopan (ENTEREG) capsule 12 mg  12 mg Oral BID    glucose chewable tablet 16 g  4 tablet Oral PRN    dextrose bolus 10% 125 mL  125 mL IntraVENous PRN    Or    dextrose bolus 10% 250 mL  250 mL IntraVENous PRN    glucagon injection 1 mg  1 mg SubCUTAneous PRN    dextrose 10 % infusion   IntraVENous Continuous PRN     ______________________________________________________________________  EXPECTED LENGTH OF STAY: 3  ACTUAL LENGTH OF STAY:          3                 Brenden Mayorga MD   Pulmonary/[facility] Critical [facility]  [phone]  4/14/2025

## 2025-04-14 NOTE — PROGRESS NOTES
CRS Note    Postoperative bleeding requiring return to OR x2 over the weekend for control. Hbg stable since surgery yesterday. Minimal output from drain and minimal staining of surgical dressing.    Vitals stable, no pressors    Intubated, sedated but opens eyes to voice and nods yes/no to questions  Abd soft, minimally distended, surgical dressing with minimal staining, SHIRA bloody but minimal output  NGT bilious   Fitzgerald with clear yellow urine    Labs reviewed - hbg stable and Cr improving    A/P:  69 y.o. M s/p robotic extended RHC with postoperative bleeding possibly in setting of coagulopathy requiring return to OR x2, now in stable condition with no evidence of further bleeding  - greatly appreciate care from Evelin Reyes and [place]  - appreciate ICU care, continue supportive care  - wean to extubate as able  - trend labs  - will hold off on IV nutrition for now as I anticipate being able to advance his diet once extubated      Katrina Goddard MD   [facility]  [phone]

## 2025-04-14 NOTE — CARE COORDINATION
Care Management Initial Assessment       RUR:21%  Readmission? No  1st IM letter given? Yes - 4/11/2025  1st  letter given: No    Spoke with Jules son - matt lives alone and independent with ADL', IADLs and driving prior to admit - has shower chair and rollator - has been at [facility] and had HH in the past at son's home but agency not known - has 3 children but only 2 sons have been in contact with patient not the daughter- both son's work full time so unable to provide 24/7 care upon discharge- will await patients progress with RN and if needed PT/OT and MD reccommendations regarding disposition plans at this time. SAURABH MILLER, MSW         04/14/25 1530   Service Assessment   Patient Orientation Unresponsive;Sedated   Cognition Other (see comment)  (vented)   History Provided By Child/Family  (Jules Vigil son)   Primary Caregiver Family   Support Systems Children  (has 3 adult children - 2 sons are most involved - dtr has not been involved for a long time)   Patient's Healthcare Decision Maker is: Legal Next of Kin   PCP Verified by CM Yes   Last Visit to PCP Within last 3 months   Prior Functional Level Independent in ADLs/IADLs   Current Functional Level Assistance with the following:;Bathing;Dressing;Toileting;Feeding;Cooking;Housework;Shopping;Mobility  (will need RN then PT/OT to assess for disposition needs)   Can patient return to prior living arrangement Unknown at present   Ability to make needs known: Unable  (ventedsedated)   Family able to assist with home care needs: Other (comment)  (sons work full time so it is difficult for them to provide 24/7 assist if needed)   Would you like for me to discuss the discharge plan with any other family members/significant others, and if so, who? Yes  (Jules Vigil [phone] son)   Financial Resources Medicare  ([employer])   Social/Functional History   Lives With Alone   Type of Home House   Home Layout One level  (3 steps to enter)   Bathroom Equipment Shower

## 2025-04-14 NOTE — PLAN OF CARE
Problem: Safety - Medical Restraint  Goal: Remains free of injury from restraints (Restraint for Interference with Medical Device)  Description: INTERVENTIONS:1. Determine that other, less restrictive measures have been tried or would not be effective before applying the restraint2. Evaluate the patient's condition at the time of restraint application3. Inform patient/family regarding the reason for restraint4. Q2H: Monitor safety, psychosocial status, comfort, nutrition and hydration  Outcome: Progressing  Flowsheets (Taken 4/13/2025 2045)  Remains free of injury from restraints (restraint for interference with medical device):   Evaluate the patient's condition at the time of restraint application   Inform patient/family regarding the reason for restraint   Every 2 hours: Monitor safety, psychosocial status, comfort, nutrition and hydration   Determine that other, less restrictive measures have been tried or would not be effective before applying the restraint

## 2025-04-14 NOTE — INTERDISCIPLINARY ROUNDS
Interdisciplinary team rounds were held 4/14/2025 with the following team members: Physician, Nursing, Dietitian, Pharmacist, , and the CCU Charge RN.    Plan of care discussed. See clinical pathway and/or care plan for interventions and desired outcomes.    Goals of the Day: Continue with brooks catheter for strict I/Os and D/C CVC.

## 2025-04-14 NOTE — PROGRESS NOTES
Name: Yefri Vigil   MRN: [id number]  : 1955    Nephrology Progress Note    Assessment:  TERESA on CKD-3b: serum Cr bump to 3.7mg/dl. Pre-renal vs ATN (hypotension). Celebrex x 1 dose () and IV contrast exposure  and .  Non-oliguric. Underlying DM kidney disease. Baseline Cr 1.4-1.8mg/dl.     Hemorrhagic shock: secondary to gastroepiploic artery bleed. Needed to go back to OR for ex lap . multiple units of PRBCs periop. Again taken to OR this morning to explore ongoing bleeding     Hyperkalemia: better with CRRT     Metabolic acidosis: severe lactic acidosis-> improving. S/p IV bicarbonate/CRRT     Robotic transverse colectomy: 25     DM2: stable control    Plan/Recommendations:  .  CRRT was stopped.    Creatinine improving-2.8 => 2.6 today  Urine output close to 1.8 L yesterday and 675 mL today  No indication for RRT  Extubation soon hopefully  Will continue to follow.      Subjective:  Seen and examined.  About to be extubated.  Hemodynamically stable  Good urine output  Discussed with ICU RN    ROS:   UTO    Exam:  BP (!) 153/58   Pulse 89   Temp 98.8 °F (37.1 °C)   Resp 16   Ht 1.676 m (5' 6\")   Wt 82.8 kg (182 lb 8.7 oz)   SpO2 96%   BMI 29.46 kg/m²     Gen: Intubated/sedated  HEENT: AT/NC, +ETT  Neck: Curtis  Lungs/Chest wall: Clear. Equal BS.   Cardiovascular: Regular rate, normal rhythm.   Abdomen/: Soft, NT,  BS+  Ext: No peripheral edema       Current Facility-Administered Medications   Medication Dose Route Frequency Provider Last Rate Last Admin    piperacillin-tazobactam (ZOSYN) 3,375 mg in sodium chloride 0.9 % 50 mL IVPB (addEASE)  3,375 mg IntraVENous Q8H Debbie Russell MD   Stopped at 25 1221    fentaNYL (SUBLIMAZE) bolus from bag  25 mcg IntraVENous Q30 Min PRN Debbie Russell MD        0.9 % sodium

## 2025-04-14 NOTE — PROGRESS NOTES
0700 - Bedside report received from CHARIS Melgar. Patient on vent, no command following, eyes opening to pain, pt withdrawing from pain, lungs diminished, pulses palpable. Pt on fentanyl and propofol.    1135 - Propofol and fentanyl stopped per Dr. Mayorga for SAT.    1200 - Pt hypertensive in 200s. PRN hydralazine 10mg ordered per Dr. Mayorga. PT noding appropriately. Pt denies pain.  CVC discontinued per order. Pressure held for 5 min. No bleeding noted.    1205 - PT on SBT.  PT drowsy. Some command following but inconsistent. Pt pulling TV>300.      1230 - Cardene gtt started per intensivist to maintain SBP< 160.  Pt bathed per protocol.    1300  - Pt slow to arouse, but nodding appropriately. No eye opening. Patient's son updated at bedside.      1800 - Pt more alert now, following commands. Dr. Betancur at bedside to see pt. Pressure support increased back to 8, will try SBT in the morning.      1850 - Precedex gtt started per Dr. Betancur.      Bedside and Verbal shift change report given to CHARIS Ruiz (oncoming nurse) by CHARIS Mak (offgoing nurse). Report included the following information Nurse Handoff Report, Index, Intake/Output, MAR, Recent Results, Med Rec Status, and Cardiac Rhythm NSR .

## 2025-04-14 NOTE — PROGRESS NOTES
Patient did not pass SAT.  Unable to follow commands, not a candidate for extubation.  Do not proceed with SBT.     dmg

## 2025-04-15 LAB
ANION GAP SERPL CALC-SCNC: 4 MMOL/L (ref 2–12)
BASOPHILS # BLD: 0 K/UL (ref 0–0.1)
BASOPHILS NFR BLD: 0 % (ref 0–1)
BUN SERPL-MCNC: 32 MG/DL (ref 6–20)
BUN/CREAT SERPL: 14 (ref 12–20)
CALCIUM SERPL-MCNC: 7.7 MG/DL (ref 8.5–10.1)
CHLORIDE SERPL-SCNC: 115 MMOL/L (ref 97–108)
CO2 SERPL-SCNC: 27 MMOL/L (ref 21–32)
CREAT SERPL-MCNC: 2.32 MG/DL (ref 0.7–1.3)
DIFFERENTIAL METHOD BLD: ABNORMAL
EOSINOPHIL # BLD: 0 K/UL (ref 0–0.4)
EOSINOPHIL NFR BLD: 0 % (ref 0–7)
ERYTHROCYTE [DISTWIDTH] IN BLOOD BY AUTOMATED COUNT: 16.8 % (ref 11.5–14.5)
ERYTHROCYTE [DISTWIDTH] IN BLOOD BY AUTOMATED COUNT: 16.9 % (ref 11.5–14.5)
ERYTHROCYTE [DISTWIDTH] IN BLOOD BY AUTOMATED COUNT: 17 % (ref 11.5–14.5)
GLUCOSE BLD STRIP.AUTO-MCNC: 135 MG/DL (ref 65–117)
GLUCOSE BLD STRIP.AUTO-MCNC: 138 MG/DL (ref 65–117)
GLUCOSE BLD STRIP.AUTO-MCNC: 162 MG/DL (ref 65–117)
GLUCOSE BLD STRIP.AUTO-MCNC: 187 MG/DL (ref 65–117)
GLUCOSE SERPL-MCNC: 164 MG/DL (ref 65–100)
HCT VFR BLD AUTO: 25.9 % (ref 36.6–50.3)
HCT VFR BLD AUTO: 27.1 % (ref 36.6–50.3)
HCT VFR BLD AUTO: 30.7 % (ref 36.6–50.3)
HGB BLD-MCNC: 10.1 G/DL (ref 12.1–17)
HGB BLD-MCNC: 8.8 G/DL (ref 12.1–17)
HGB BLD-MCNC: 9 G/DL (ref 12.1–17)
IMM GRANULOCYTES # BLD AUTO: 0 K/UL (ref 0–0.04)
IMM GRANULOCYTES NFR BLD AUTO: 0 % (ref 0–0.5)
INR PPP: 1 (ref 0.9–1.1)
LYMPHOCYTES # BLD: 0.65 K/UL (ref 0.8–3.5)
LYMPHOCYTES NFR BLD: 8 % (ref 12–49)
MAGNESIUM SERPL-MCNC: 2.2 MG/DL (ref 1.6–2.4)
MCH RBC QN AUTO: 28.6 PG (ref 26–34)
MCH RBC QN AUTO: 28.7 PG (ref 26–34)
MCH RBC QN AUTO: 29.2 PG (ref 26–34)
MCHC RBC AUTO-ENTMCNC: 32.9 G/DL (ref 30–36.5)
MCHC RBC AUTO-ENTMCNC: 33.2 G/DL (ref 30–36.5)
MCHC RBC AUTO-ENTMCNC: 34 G/DL (ref 30–36.5)
MCV RBC AUTO: 86 FL (ref 80–99)
MCV RBC AUTO: 86.3 FL (ref 80–99)
MCV RBC AUTO: 87 FL (ref 80–99)
MONOCYTES # BLD: 0.24 K/UL (ref 0–1)
MONOCYTES NFR BLD: 3 % (ref 5–13)
NEUTS BAND NFR BLD MANUAL: 1 %
NEUTS SEG # BLD: 7.21 K/UL (ref 1.8–8)
NEUTS SEG NFR BLD: 88 % (ref 32–75)
NRBC # BLD: 0.03 K/UL (ref 0–0.01)
NRBC # BLD: 0.05 K/UL (ref 0–0.01)
NRBC # BLD: 0.06 K/UL (ref 0–0.01)
NRBC BLD-RTO: 0.3 PER 100 WBC
NRBC BLD-RTO: 0.4 PER 100 WBC
NRBC BLD-RTO: 0.7 PER 100 WBC
PHOSPHATE SERPL-MCNC: 3.1 MG/DL (ref 2.6–4.7)
PLATELET # BLD AUTO: 41 K/UL (ref 150–400)
PLATELET # BLD AUTO: 42 K/UL (ref 150–400)
PLATELET # BLD AUTO: 67 K/UL (ref 150–400)
PLATELET COMMENT: ABNORMAL
PMV BLD AUTO: 10.4 FL (ref 8.9–12.9)
PMV BLD AUTO: 10.5 FL (ref 8.9–12.9)
PMV BLD AUTO: 9.9 FL (ref 8.9–12.9)
POTASSIUM SERPL-SCNC: 4 MMOL/L (ref 3.5–5.1)
PROTHROMBIN TIME: 10.6 SEC (ref 9.2–11.2)
RBC # BLD AUTO: 3.01 M/UL (ref 4.1–5.7)
RBC # BLD AUTO: 3.14 M/UL (ref 4.1–5.7)
RBC # BLD AUTO: 3.53 M/UL (ref 4.1–5.7)
RBC MORPH BLD: ABNORMAL
SERVICE CMNT-IMP: ABNORMAL
SODIUM SERPL-SCNC: 146 MMOL/L (ref 136–145)
TRIGL SERPL-MCNC: 198 MG/DL
WBC # BLD AUTO: 13.4 K/UL (ref 4.1–11.1)
WBC # BLD AUTO: 8.1 K/UL (ref 4.1–11.1)
WBC # BLD AUTO: 9.1 K/UL (ref 4.1–11.1)

## 2025-04-15 PROCEDURE — 80048 BASIC METABOLIC PNL TOTAL CA: CPT

## 2025-04-15 PROCEDURE — 6360000002 HC RX W HCPCS: Performed by: SURGERY

## 2025-04-15 PROCEDURE — P9073 PLATELETS PHERESIS PATH REDU: HCPCS

## 2025-04-15 PROCEDURE — 85610 PROTHROMBIN TIME: CPT

## 2025-04-15 PROCEDURE — 82962 GLUCOSE BLOOD TEST: CPT

## 2025-04-15 PROCEDURE — 94003 VENT MGMT INPAT SUBQ DAY: CPT

## 2025-04-15 PROCEDURE — 84478 ASSAY OF TRIGLYCERIDES: CPT

## 2025-04-15 PROCEDURE — 83735 ASSAY OF MAGNESIUM: CPT

## 2025-04-15 PROCEDURE — 6370000000 HC RX 637 (ALT 250 FOR IP): Performed by: SURGERY

## 2025-04-15 PROCEDURE — 6370000000 HC RX 637 (ALT 250 FOR IP): Performed by: NURSE PRACTITIONER

## 2025-04-15 PROCEDURE — 36430 TRANSFUSION BLD/BLD COMPNT: CPT

## 2025-04-15 PROCEDURE — 6360000002 HC RX W HCPCS: Performed by: HOSPITALIST

## 2025-04-15 PROCEDURE — 99024 POSTOP FOLLOW-UP VISIT: CPT | Performed by: NURSE PRACTITIONER

## 2025-04-15 PROCEDURE — 85025 COMPLETE CBC W/AUTO DIFF WBC: CPT

## 2025-04-15 PROCEDURE — 2580000003 HC RX 258: Performed by: SURGERY

## 2025-04-15 PROCEDURE — 6360000002 HC RX W HCPCS: Performed by: INTERNAL MEDICINE

## 2025-04-15 PROCEDURE — 2580000003 HC RX 258: Performed by: HOSPITALIST

## 2025-04-15 PROCEDURE — 2700000000 HC OXYGEN THERAPY PER DAY

## 2025-04-15 PROCEDURE — 2500000003 HC RX 250 WO HCPCS: Performed by: INTERNAL MEDICINE

## 2025-04-15 PROCEDURE — 2580000003 HC RX 258: Performed by: STUDENT IN AN ORGANIZED HEALTH CARE EDUCATION/TRAINING PROGRAM

## 2025-04-15 PROCEDURE — 2500000003 HC RX 250 WO HCPCS: Performed by: HOSPITALIST

## 2025-04-15 PROCEDURE — 85027 COMPLETE CBC AUTOMATED: CPT

## 2025-04-15 PROCEDURE — 6360000002 HC RX W HCPCS: Performed by: STUDENT IN AN ORGANIZED HEALTH CARE EDUCATION/TRAINING PROGRAM

## 2025-04-15 PROCEDURE — 84100 ASSAY OF PHOSPHORUS: CPT

## 2025-04-15 PROCEDURE — 36415 COLL VENOUS BLD VENIPUNCTURE: CPT

## 2025-04-15 PROCEDURE — 2000000000 HC ICU R&B

## 2025-04-15 RX ORDER — SODIUM CHLORIDE 9 MG/ML
INJECTION, SOLUTION INTRAVENOUS PRN
Status: DISCONTINUED | OUTPATIENT
Start: 2025-04-15 | End: 2025-04-19 | Stop reason: HOSPADM

## 2025-04-15 RX ORDER — HYDRALAZINE HYDROCHLORIDE 20 MG/ML
20 INJECTION INTRAMUSCULAR; INTRAVENOUS EVERY 6 HOURS PRN
Status: DISCONTINUED | OUTPATIENT
Start: 2025-04-15 | End: 2025-04-17

## 2025-04-15 RX ORDER — BUMETANIDE 0.25 MG/ML
2 INJECTION, SOLUTION INTRAMUSCULAR; INTRAVENOUS ONCE
Status: COMPLETED | OUTPATIENT
Start: 2025-04-15 | End: 2025-04-15

## 2025-04-15 RX ADMIN — CHLORHEXIDINE GLUCONATE 15 ML: 1.2 RINSE ORAL at 09:06

## 2025-04-15 RX ADMIN — PANTOPRAZOLE SODIUM 40 MG: 40 INJECTION, POWDER, LYOPHILIZED, FOR SOLUTION INTRAVENOUS at 09:18

## 2025-04-15 RX ADMIN — NICARDIPINE HYDROCHLORIDE 2.5 MG/HR: 25 INJECTION INTRAVENOUS at 09:17

## 2025-04-15 RX ADMIN — SODIUM CHLORIDE, PRESERVATIVE FREE 10 ML: 5 INJECTION INTRAVENOUS at 09:21

## 2025-04-15 RX ADMIN — HYDROMORPHONE HYDROCHLORIDE 0.5 MG: 1 INJECTION, SOLUTION INTRAMUSCULAR; INTRAVENOUS; SUBCUTANEOUS at 14:58

## 2025-04-15 RX ADMIN — NICARDIPINE HYDROCHLORIDE 15 MG/HR: 25 INJECTION INTRAVENOUS at 19:59

## 2025-04-15 RX ADMIN — BUMETANIDE 2 MG: 0.25 INJECTION INTRAMUSCULAR; INTRAVENOUS at 13:18

## 2025-04-15 RX ADMIN — NICARDIPINE HYDROCHLORIDE 15 MG/HR: 25 INJECTION INTRAVENOUS at 15:58

## 2025-04-15 RX ADMIN — NICARDIPINE HYDROCHLORIDE 12.5 MG/HR: 25 INJECTION INTRAVENOUS at 22:09

## 2025-04-15 RX ADMIN — HYDRALAZINE HYDROCHLORIDE 10 MG: 20 INJECTION INTRAMUSCULAR; INTRAVENOUS at 13:10

## 2025-04-15 RX ADMIN — HYDRALAZINE HYDROCHLORIDE 10 MG: 20 INJECTION INTRAMUSCULAR; INTRAVENOUS at 17:42

## 2025-04-15 RX ADMIN — NICARDIPINE HYDROCHLORIDE 15 MG/HR: 25 INJECTION INTRAVENOUS at 13:57

## 2025-04-15 RX ADMIN — PIPERACILLIN AND TAZOBACTAM 3375 MG: 3; .375 INJECTION, POWDER, LYOPHILIZED, FOR SOLUTION INTRAVENOUS at 16:00

## 2025-04-15 RX ADMIN — ALVIMOPAN 12 MG: 12 CAPSULE ORAL at 09:58

## 2025-04-15 RX ADMIN — Medication 1 AMPULE: at 09:22

## 2025-04-15 RX ADMIN — PIPERACILLIN AND TAZOBACTAM 3375 MG: 3; .375 INJECTION, POWDER, LYOPHILIZED, FOR SOLUTION INTRAVENOUS at 08:38

## 2025-04-15 RX ADMIN — Medication 1 AMPULE: at 20:03

## 2025-04-15 RX ADMIN — NICARDIPINE HYDROCHLORIDE 5 MG/HR: 25 INJECTION INTRAVENOUS at 03:52

## 2025-04-15 RX ADMIN — PIPERACILLIN AND TAZOBACTAM 3375 MG: 3; .375 INJECTION, POWDER, LYOPHILIZED, FOR SOLUTION INTRAVENOUS at 23:10

## 2025-04-15 RX ADMIN — NICARDIPINE HYDROCHLORIDE 15 MG/HR: 25 INJECTION INTRAVENOUS at 17:59

## 2025-04-15 RX ADMIN — SODIUM CHLORIDE, PRESERVATIVE FREE 10 ML: 5 INJECTION INTRAVENOUS at 20:02

## 2025-04-15 RX ADMIN — DEXMEDETOMIDINE HYDROCHLORIDE 0.4 MCG/KG/HR: 400 INJECTION, SOLUTION INTRAVENOUS at 03:54

## 2025-04-15 RX ADMIN — PIPERACILLIN AND TAZOBACTAM 3375 MG: 3; .375 INJECTION, POWDER, LYOPHILIZED, FOR SOLUTION INTRAVENOUS at 00:09

## 2025-04-15 RX ADMIN — INSULIN LISPRO 1 UNITS: 100 INJECTION, SOLUTION INTRAVENOUS; SUBCUTANEOUS at 00:11

## 2025-04-15 ASSESSMENT — PULMONARY FUNCTION TESTS
PIF_VALUE: 10
PIF_VALUE: 15
PIF_VALUE: 15

## 2025-04-15 ASSESSMENT — PAIN SCALES - GENERAL
PAINLEVEL_OUTOF10: 0

## 2025-04-15 NOTE — PROGRESS NOTES
04/15/25 0558   B: Both Spontaneous Awakening and Breathing Trials   Safety Screening Spontaneous Breathing Trial (SBT) Proceed with SBT - no exclusion criteria met   RT Notified Ready for SBT Yes   Spontaneous  mL   RSBI Calculated 73.09   Spontaneous Breathing Trial (SBT) Outcome SBT Passed   Patient Meet Extubation Criteria Yes   Provider Ordered Extubation No  (Per provider pt is at risk for bleeding.)     SBT passed on PS 5/+5/30%, pt follows all commands off of sedation and has a positive cuff leak test. Provider does not want to extubate at this time due to pt being at risk for bleeding. Pressure support increased to 8/+5/30% after SBT. Per provider [place] leave pt on pressure support as tolerated.

## 2025-04-15 NOTE — PROGRESS NOTES
Interdisciplinary team rounds were held 4/15/2025 with the following team members: physician, nursing, pharmacy, nutrition, .    Plan of care discussed. See clinical pathway and/or care plan for interventions and desired outcomes.    Goals of the Day: extubated today, platelets transfusion, remove dialysis catheter, remove art line.

## 2025-04-15 NOTE — PLAN OF CARE
Problem: Chronic Conditions and Co-morbidities  Goal: Patient's chronic conditions and co-morbidity symptoms are monitored and maintained or improved  Outcome: Progressing  Flowsheets (Taken 4/14/2025 1930)  Care Plan - Patient's Chronic Conditions and Co-Morbidity Symptoms are Monitored and Maintained or Improved:   Monitor and assess patient's chronic conditions and comorbid symptoms for stability, deterioration, or improvement   Collaborate with multidisciplinary team to address chronic and comorbid conditions and prevent exacerbation or deterioration   Update acute care plan with appropriate goals if chronic or comorbid symptoms are exacerbated and prevent overall improvement and discharge     Problem: Pain  Goal: Verbalizes/displays adequate comfort level or baseline comfort level  Outcome: Progressing  Flowsheets (Taken 4/14/2025 2000)  Verbalizes/displays adequate comfort level or baseline comfort level:   Encourage patient to monitor pain and request assistance   Assess pain using appropriate pain scale   Administer analgesics based on type and severity of pain and evaluate response   Implement non-pharmacological measures as appropriate and evaluate response     Problem: Safety - Adult  Goal: Free from fall injury  Outcome: Progressing     Problem: Safety - Medical Restraint  Goal: Remains free of injury from restraints (Restraint for Interference with Medical Device)  Description: INTERVENTIONS:1. Determine that other, less restrictive measures have been tried or would not be effective before applying the restraint2. Evaluate the patient's condition at the time of restraint application3. Inform patient/family regarding the reason for restraint4. Q2H: Monitor safety, psychosocial status, comfort, nutrition and hydration  Outcome: Progressing  Flowsheets (Taken 4/14/2025 2000)  Remains free of injury from restraints (restraint for interference with medical device):   Determine that other, less restrictive  Assess oral mucosa and hygiene practices   Implement preventative oral hygiene regimen   Implement oral medicated treatments as ordered     Problem: Gastrointestinal - Adult  Goal: Minimal or absence of nausea and vomiting  Outcome: Progressing  Flowsheets (Taken 4/14/2025 2000)  Minimal or absence of nausea and vomiting:   Administer IV fluids as ordered to ensure adequate hydration   Maintain NPO status until nausea and vomiting are resolved   Nasogastric tube to low intermittent suction as ordered   Administer ordered antiemetic medications as needed   Provide nonpharmacologic comfort measures as appropriate   Nutrition consult to assist patient with adequate nutrition and appropriate food choices  Goal: Maintains or returns to baseline bowel function  Outcome: Progressing  Flowsheets (Taken 4/14/2025 2000)  Maintains or returns to baseline bowel function:   Assess bowel function   Administer IV fluids as ordered to ensure adequate hydration   Administer ordered medications as needed  Goal: Maintains adequate nutritional intake  Outcome: Progressing  Flowsheets (Taken 4/14/2025 2000)  Maintains adequate nutritional intake: Monitor intake and output, weight and lab values     Problem: Genitourinary - Adult  Goal: Absence of urinary retention  Outcome: Progressing  Flowsheets (Taken 4/14/2025 1930)  Absence of urinary retention:   Assess patient’s ability to void and empty bladder   Monitor intake/output and perform bladder scan as needed  Goal: Urinary catheter remains patent  Outcome: Progressing  Flowsheets (Taken 4/14/2025 1930)  Urinary catheter remains patent: Assess patency of urinary catheter     Problem: Metabolic/Fluid and Electrolytes - Adult  Goal: Electrolytes maintained within normal limits  Outcome: Progressing  Flowsheets (Taken 4/14/2025 1930)  Electrolytes maintained within normal limits:   Monitor labs and assess patient for signs and symptoms of electrolyte imbalances   Administer electrolyte

## 2025-04-15 NOTE — PROGRESS NOTES
0700; Bedside shift report received from CHARIS Ruiz    0800: Shift assessment completed. Pt intubated, sedated, follows commands, diminished lung sounds, hypoactive bowel sounds, weak pedal pulses, abd incision, [facility] drain/NGT/Ett/brooks in place.    0957: Rt at bedside. Pt extubated. On 2L NC    1010: IDR completed. Pt goals:  -PLT admin  -Art/ trialysis line removal  -Gen surg to decide diet.     1200: Reassessment completed. Pt drowsy, follows commands, hoarse voice.On 2L NC. No Changes to other previous assessments. See flow sheet/MAR for details.     1600: Reassessment completed. No changes to previous assessment. See flow sheet/MAR for details.     1900: Bedside shift report given to CHARIS Amaya

## 2025-04-15 NOTE — PROGRESS NOTES
Critical Care Progress Note  Akshat Ruelas MD          Date of Service:  4/15/2025  NAME:  Yefri Vigil  :  1955  MRN:  [id number]      Subjective/Hospital course:      : Patient continued to have ongoing bleeding and received multiple blood productes overnight. Eventually taken back to OR this morning and underwent re exploration, found to have multiple areas of bleeding.  : Patient remains intubated, sedation is being weaned off.  Hemodynamically stable.  Off of pressors.   4/15: Melena x1 overnight. Platelets ordered. Passed SBT and extubated.     Active Problems Being Managed:     --Hemorrhagic shock/?septic shock.  -- Acute renal failure on CKD stage III.  - Severe lactic acidosis.  --Acute hypoxemic respiratory failure.  - Elevated liver enzymes.  - Mildly elevated troponin.     Assessment/Plan:     Hemorrhagic shock/septic shock.  --Patient had elective transverse colectomy on .  --Patient was emergently taken to OR on  and found to have ongoing intra abdominal bleeding, continued to have ongoing bleeding overnight and taken to OR again , found to have multiple areas of bleeding, hemostasis achieved.  --Continue vol resuscitation as tolerated and transfuse for Hb <8.  --Monitor SHIRA output.  - Continue Zosyn   - Pt is s/p multiple[e transfusions.   - 1 unit of PRBC ordered due to thrombocytopenia and melena     Acute renal failure.   --Likely ATN etiology, Initially started on CRRT. Now off CRRT   --Monitor renal functions.    Acute hypoxemic respiratory failure.  --In setting of shock.  --Intubated for emergent surgery.  -- Passed SBT and extubated today     Lactic acidosis.  --Sec to shock  --Optimize perfusion, maintain MAP >65.  --lactate cleared       Code status: Full code.  SUP: Protonix.  DVT prophylaxis: SCD. Holding AC.  NOK     Jules Vigil (Child)  [phone] (Mobile)       Care Plan discussed with: Patient/Family and Nurse      I personally spent 40  chloride infusion   IntraVENous PRN    piperacillin-tazobactam (ZOSYN) 3,375 mg in sodium chloride 0.9 % 50 mL IVPB (addEASE)  3,375 mg IntraVENous Q8H    fentaNYL (SUBLIMAZE) bolus from bag  25 mcg IntraVENous Q30 Min PRN    0.9 % sodium chloride infusion   IntraVENous PRN    sodium chloride flush 0.9 % injection 5-40 mL  5-40 mL IntraVENous 2 times per day    hydrALAZINE (APRESOLINE) injection 10 mg  10 mg IntraVENous Q6H PRN    niCARdipine (CARDENE) 25 mg in sodium chloride 0.9 % 250 mL infusion ([id number])  2.5-15 mg/hr IntraVENous Continuous    dexmedeTOMIDine (PRECEDEX) 400 mcg in sodium chloride 0.9 % 100 mL infusion  0.1-1.5 mcg/kg/hr IntraVENous Continuous    alcohol 62% (NOZIN) nasal  1 ampule  1 ampule Nasal Q12H    pantoprazole (PROTONIX) 40 mg in sodium chloride (PF) 0.9 % 10 mL injection  40 mg IntraVENous Daily    chlorhexidine (PERIDEX) 0.12 % solution 15 mL  15 mL Mouth/Throat BID    fentaNYL (SUBLIMAZE) infusion 1000 mcg/100mL   mcg/hr IntraVENous Continuous    propofol infusion  5-50 mcg/kg/min IntraVENous Continuous    insulin lispro (HUMALOG,ADMELOG) injection vial 0-4 Units  0-4 Units SubCUTAneous Q6H    [Held by provider] amLODIPine (NORVASC) tablet 10 mg  10 mg Oral Daily    [Held by provider] atorvastatin (LIPITOR) tablet 40 mg  40 mg Oral Daily    [Held by provider] hydrALAZINE (APRESOLINE) tablet 25 mg  25 mg Oral TID    [Held by provider] metoprolol tartrate (LOPRESSOR) tablet 25 mg  25 mg Oral BID    sodium chloride flush 0.9 % injection 5-40 mL  5-40 mL IntraVENous PRN    [Held by provider] acetaminophen (TYLENOL) tablet 650 mg  650 mg Oral Q6H    alvimopan (ENTEREG) capsule 12 mg  12 mg Oral BID    glucose chewable tablet 16 g  4 tablet Oral PRN    dextrose bolus 10% 125 mL  125 mL IntraVENous PRN    Or    dextrose bolus 10% 250 mL  250 mL IntraVENous PRN    glucagon injection 1 mg  1 mg SubCUTAneous PRN    dextrose 10 % infusion   IntraVENous Continuous PRN

## 2025-04-15 NOTE — PROGRESS NOTES
1910 Bedside and Verbal shift change report given to CHARIS Ruiz (oncoming nurse) by CHARIS Mak (offgoing nurse). Report included the following information Nurse Handoff Report, Intake/Output, MAR, Recent Results, Cardiac Rhythm NSR, and Alarm Parameters.      1930 Shift assessment done. Patient intubated and sedated, RASS -1, follows simple commands. Patient on soft bilateral wrist restraints. NSR on cardiac monitor, pulses are palpable on all distal pulses. NGT noted at patient's left nares secured at 65cm, connected to low intermittent suction, bilious output noted. SHIRA drain at negative pressure with un measurable bloody output noted. Patient voiding via temp sensing brooks to yellow, clear output. See flowsheets for details.    0000 Reassessment done. See flowsheets for details.    0200 Patient had large, loose, red/brown BM. BRAYAN Love made aware. STAT labs ordered, drawn and tubed to lab. Incontinence care done.    0253 Critical lab value of platelets 42. Hgb 8.8 from 9.7. BRAYAN Love made aware. Repeat CBC at 6am ordered.    0400 Reassessment done. No change to previous assessment.    0509 RT at bedside. Precedex drip decreased to 0.2mcg/kg/hr. Patient for SAT/SBT. SAT passed.    0558 SBT passed. BRAYAN Love made aware by RT Maame. NP does not want to extubate at this time due to patient being at risk for bleeding.    0600 Repeat CBC drawn and tubed to lab.    0620 Critical lab result of Platelets 41. BRAYAN Love made aware.    0710 Bedside and Verbal shift change report given to CHARIS Storey (oncoming nurse) by CHARIS Ruiz (offgoing nurse). Report included the following information Nurse Handoff Report, Intake/Output, MAR, Recent Results, Cardiac Rhythm NSR/Sinus Bradycardia, and Alarm Parameters.

## 2025-04-15 NOTE — PROGRESS NOTES
Name: Yefri Vigil   MRN: [id number]  : 1955    Nephrology Progress Note    Assessment:  TERESA on CKD-3b: serum Cr bump to 3.7mg/dl. Pre-renal vs ATN (hypotension). Celebrex x 1 dose () and IV contrast exposure  and .  Non-oliguric. Underlying DM kidney disease. Baseline Cr 1.4-1.8mg/dl.     Encephalopathy  Hypertensive urgency urgency/encephalopathy  S/p extubation     Hyperkalemia: better with CRRT     Metabolic acidosis: severe lactic acidosis-> improving. S/p IV bicarbonate/CRRT     Robotic transverse colectomy: 25     DM2: stable control    Plan/Recommendations:  .  CRRT was stopped.  Still on Cardene drip-attempt to wean and diurese  Bumex 2 mg IV x 1    Creatinine improving-2.8 => 2.6 => 2.3 to today  Urine output 1.5 L yesterday    No indication for RRT  We will continue to follow.  Right IJ trialysis line in place  Grace Best MD  [facility] ([facility])        Subjective:  Seen and examined.  Extubated, eyes closed, nonverbal    ROS:   UTO    Exam:  BP (!) 150/66   Pulse 65   Temp 98.4 °F (36.9 °C)   Resp 19   Ht 1.676 m (5' 6\")   Wt 82.8 kg (182 lb 8.7 oz)   SpO2 97%   BMI 29.46 kg/m²     Gen: Intubated/sedated  HEENT: AT/NC, +ETT  Neck: Curtis  Lungs/Chest wall: Clear. Equal BS.   Cardiovascular: Regular rate, normal rhythm.   Abdomen/: Soft, NT,  BS+  Ext: No peripheral edema       Current Facility-Administered Medications   Medication Dose Route Frequency Provider Last Rate Last Admin    0.9 % sodium chloride infusion   IntraVENous PRN Akshat Ruelas MD        piperacillin-tazobactam (ZOSYN) 3,375 mg in sodium chloride 0.9 % 50 mL IVPB (addEASE)  3,375 mg IntraVENous Q8H Debbie Russell MD 12.5 mL/hr at 04/15/25 0838 3,375 mg at 04/15/25 0838    fentaNYL (SUBLIMAZE) bolus from bag  25 mcg IntraVENous Q30 Min

## 2025-04-15 NOTE — PROGRESS NOTES
CRS NP Progress Note    Postoperative bleeding requiring return to OR x2 over the weekend for control. Hbg stable, platelets low. Minimal output from drain and minimal staining of surgical dressing.    Vitals stable, no pressors    Intubated, sedated but opens eyes to voice and nods yes/no to questions  Abd soft, minimally distended, surgical dressing with minimal staining, SHIRA bloody but minimal output  NGT bilious/scant  Fitzgerald with clear yellow urine    Labs reviewed     A/P:  69 y.o. M s/p robotic extended RHC with postoperative bleeding possibly in setting of coagulopathy requiring return to OR x2, now in stable condition with no evidence of further bleeding  - greatly appreciate care from Evelin Reyes and [place]  - appreciate ICU care, continue supportive care  - Ok to extubate today, NGT can be removed.   - trend labs  - Document all bowel function    MARY KATE De Anda - NP   [facility]  [phone]

## 2025-04-15 NOTE — PROGRESS NOTES
SBT started at 0510 on PS 5/+5/30%.     04/15/25 0510   Vent Information   Ventilator ID [id number]   Vent Mode CPAP/PS   Ventilator Settings   PEEP/CPAP (cmH2O) 5   FiO2  30 %   Pressure Support (cm H2O) (S)  5 cm H2O  (PS decreased to 5 cmH2O at this time for SBT)   Vent Patient Data (Readings)   Vt Spont (mL) 268 mL   Vt (Measured) 268 mL   Peak Inspiratory Pressure (cmH2O) 10 cmH2O   Rate Measured 22 br/min   Minute Volume (L/min) 5.89 Liters   Mean Airway Pressure (cmH2O) 6.9 cmH20   I:E Ratio 1:2.4   Flow Sensitivity 3 L/min   Expiratory Sensitivity (%) 25 %   Insp Rise Time (%) 70 %   Backup Apnea On   Backup Rate 12 Breaths Per Minute   Backup Vt 450   Vent Alarm Settings   High Pressure (cmH2O) 40 cmH2O   Low Minute Volume (lpm) 2 L/min   High Minute Volume (lpm) 20 L/min   Low Exhaled Vt (ml) 200 mL   High Exhaled Vt (ml) 800 mL   RR High (bpm) 40 br/min   Apnea (secs) 20 secs   Additional Respiratoray Assessments   Humidification Source Heated wire   Humidification Temp 36.9   Circuit Condensation Not drained   Ambu Bag With Mask At Bedside Yes   ETT    Placement Date/Time: 04/12/25 1500   Placed By: In surgery  Placement Verified By: Auscultation;Capnometry  Preoxygenation: Yes  Mask Ventilation: Mask ventilation not attempted (0)  Technique: Rapid sequence  Airway Type: Cuffed  Airway Tube Size: 7....   Secured At 24 cm   Measured From Teeth   ETT Placement Left   Secured By Commercial tube lua   Site Assessment Cool;Dry   Cuff Pressure   (MLT)   Tie/Lua Changed No   Ventilator Associated Pneumonia Bundle   Elevation of Head of Bed to 30-45 Degrees  Yes

## 2025-04-16 LAB
ANION GAP SERPL CALC-SCNC: 7 MMOL/L (ref 2–12)
BASOPHILS # BLD: 0.05 K/UL (ref 0–0.1)
BASOPHILS NFR BLD: 0.4 % (ref 0–1)
BLD PROD TYP BPU: NORMAL
BLOOD BANK BLOOD PRODUCT EXPIRATION DATE: NORMAL
BLOOD BANK DISPENSE STATUS: NORMAL
BLOOD BANK ISBT PRODUCT BLOOD TYPE: 7300
BLOOD BANK PRODUCT CODE: NORMAL
BLOOD BANK UNIT TYPE AND RH: NORMAL
BPU ID: NORMAL
BUN SERPL-MCNC: 29 MG/DL (ref 6–20)
BUN/CREAT SERPL: 14 (ref 12–20)
CALCIUM SERPL-MCNC: 7.8 MG/DL (ref 8.5–10.1)
CHLORIDE SERPL-SCNC: 115 MMOL/L (ref 97–108)
CO2 SERPL-SCNC: 26 MMOL/L (ref 21–32)
CREAT SERPL-MCNC: 2.06 MG/DL (ref 0.7–1.3)
DIFFERENTIAL METHOD BLD: ABNORMAL
EOSINOPHIL # BLD: 0.04 K/UL (ref 0–0.4)
EOSINOPHIL NFR BLD: 0.3 % (ref 0–7)
ERYTHROCYTE [DISTWIDTH] IN BLOOD BY AUTOMATED COUNT: 16.8 % (ref 11.5–14.5)
GLUCOSE BLD STRIP.AUTO-MCNC: 208 MG/DL (ref 65–117)
GLUCOSE BLD STRIP.AUTO-MCNC: 214 MG/DL (ref 65–117)
GLUCOSE BLD STRIP.AUTO-MCNC: 214 MG/DL (ref 65–117)
GLUCOSE BLD STRIP.AUTO-MCNC: 235 MG/DL (ref 65–117)
GLUCOSE SERPL-MCNC: 159 MG/DL (ref 65–100)
HCT VFR BLD AUTO: 31.1 % (ref 36.6–50.3)
HGB BLD-MCNC: 10.3 G/DL (ref 12.1–17)
IMM GRANULOCYTES # BLD AUTO: 0.11 K/UL (ref 0–0.04)
IMM GRANULOCYTES NFR BLD AUTO: 0.8 % (ref 0–0.5)
LYMPHOCYTES # BLD: 1.11 K/UL (ref 0.8–3.5)
LYMPHOCYTES NFR BLD: 8.4 % (ref 12–49)
MAGNESIUM SERPL-MCNC: 2.1 MG/DL (ref 1.6–2.4)
MCH RBC QN AUTO: 28.9 PG (ref 26–34)
MCHC RBC AUTO-ENTMCNC: 33.1 G/DL (ref 30–36.5)
MCV RBC AUTO: 87.4 FL (ref 80–99)
MONOCYTES # BLD: 0.83 K/UL (ref 0–1)
MONOCYTES NFR BLD: 6.3 % (ref 5–13)
NEUTS SEG # BLD: 11.06 K/UL (ref 1.8–8)
NEUTS SEG NFR BLD: 83.8 % (ref 32–75)
NRBC # BLD: 0.03 K/UL (ref 0–0.01)
NRBC BLD-RTO: 0.2 PER 100 WBC
PHOSPHATE SERPL-MCNC: 3.1 MG/DL (ref 2.6–4.7)
PLATELET # BLD AUTO: 67 K/UL (ref 150–400)
PMV BLD AUTO: 10.2 FL (ref 8.9–12.9)
POTASSIUM SERPL-SCNC: 3.5 MMOL/L (ref 3.5–5.1)
RBC # BLD AUTO: 3.56 M/UL (ref 4.1–5.7)
RBC MORPH BLD: ABNORMAL
RBC MORPH BLD: ABNORMAL
SERVICE CMNT-IMP: ABNORMAL
SODIUM SERPL-SCNC: 148 MMOL/L (ref 136–145)
UNIT DIVISION: 0
UNIT ISSUE DATE/TIME: NORMAL
WBC # BLD AUTO: 13.2 K/UL (ref 4.1–11.1)

## 2025-04-16 PROCEDURE — 2580000003 HC RX 258: Performed by: SURGERY

## 2025-04-16 PROCEDURE — 99024 POSTOP FOLLOW-UP VISIT: CPT | Performed by: NURSE PRACTITIONER

## 2025-04-16 PROCEDURE — 6360000002 HC RX W HCPCS: Performed by: STUDENT IN AN ORGANIZED HEALTH CARE EDUCATION/TRAINING PROGRAM

## 2025-04-16 PROCEDURE — 85025 COMPLETE CBC W/AUTO DIFF WBC: CPT

## 2025-04-16 PROCEDURE — 6360000002 HC RX W HCPCS: Performed by: SURGERY

## 2025-04-16 PROCEDURE — 6360000002 HC RX W HCPCS: Performed by: INTERNAL MEDICINE

## 2025-04-16 PROCEDURE — 80048 BASIC METABOLIC PNL TOTAL CA: CPT

## 2025-04-16 PROCEDURE — 83735 ASSAY OF MAGNESIUM: CPT

## 2025-04-16 PROCEDURE — 2500000003 HC RX 250 WO HCPCS: Performed by: HOSPITALIST

## 2025-04-16 PROCEDURE — 82962 GLUCOSE BLOOD TEST: CPT

## 2025-04-16 PROCEDURE — 36415 COLL VENOUS BLD VENIPUNCTURE: CPT

## 2025-04-16 PROCEDURE — 6370000000 HC RX 637 (ALT 250 FOR IP): Performed by: NURSE PRACTITIONER

## 2025-04-16 PROCEDURE — 97162 PT EVAL MOD COMPLEX 30 MIN: CPT

## 2025-04-16 PROCEDURE — 97530 THERAPEUTIC ACTIVITIES: CPT

## 2025-04-16 PROCEDURE — 2580000003 HC RX 258: Performed by: HOSPITALIST

## 2025-04-16 PROCEDURE — 6370000000 HC RX 637 (ALT 250 FOR IP): Performed by: INTERNAL MEDICINE

## 2025-04-16 PROCEDURE — 6370000000 HC RX 637 (ALT 250 FOR IP): Performed by: SURGERY

## 2025-04-16 PROCEDURE — 6360000002 HC RX W HCPCS: Performed by: HOSPITALIST

## 2025-04-16 PROCEDURE — 2060000000 HC ICU INTERMEDIATE R&B

## 2025-04-16 PROCEDURE — 97530 THERAPEUTIC ACTIVITIES: CPT | Performed by: OCCUPATIONAL THERAPIST

## 2025-04-16 PROCEDURE — 84100 ASSAY OF PHOSPHORUS: CPT

## 2025-04-16 PROCEDURE — 6360000002 HC RX W HCPCS: Performed by: NURSE PRACTITIONER

## 2025-04-16 PROCEDURE — 2580000003 HC RX 258: Performed by: STUDENT IN AN ORGANIZED HEALTH CARE EDUCATION/TRAINING PROGRAM

## 2025-04-16 PROCEDURE — 97166 OT EVAL MOD COMPLEX 45 MIN: CPT | Performed by: OCCUPATIONAL THERAPIST

## 2025-04-16 RX ORDER — HYDRALAZINE HYDROCHLORIDE 50 MG/1
50 TABLET, FILM COATED ORAL 3 TIMES DAILY
Status: DISCONTINUED | OUTPATIENT
Start: 2025-04-16 | End: 2025-04-19 | Stop reason: HOSPADM

## 2025-04-16 RX ORDER — BUMETANIDE 0.25 MG/ML
1 INJECTION, SOLUTION INTRAMUSCULAR; INTRAVENOUS ONCE
Status: COMPLETED | OUTPATIENT
Start: 2025-04-16 | End: 2025-04-16

## 2025-04-16 RX ORDER — OXYCODONE HYDROCHLORIDE 5 MG/1
5 TABLET ORAL EVERY 4 HOURS PRN
Refills: 0 | Status: DISCONTINUED | OUTPATIENT
Start: 2025-04-16 | End: 2025-04-19

## 2025-04-16 RX ADMIN — INSULIN LISPRO 1 UNITS: 100 INJECTION, SOLUTION INTRAVENOUS; SUBCUTANEOUS at 17:50

## 2025-04-16 RX ADMIN — NICARDIPINE HYDROCHLORIDE 12.5 MG/HR: 25 INJECTION INTRAVENOUS at 00:34

## 2025-04-16 RX ADMIN — HYDRALAZINE HYDROCHLORIDE 20 MG: 20 INJECTION INTRAMUSCULAR; INTRAVENOUS at 08:27

## 2025-04-16 RX ADMIN — NICARDIPINE HYDROCHLORIDE 15 MG/HR: 25 INJECTION INTRAVENOUS at 10:22

## 2025-04-16 RX ADMIN — METOPROLOL TARTRATE 25 MG: 25 TABLET, FILM COATED ORAL at 10:00

## 2025-04-16 RX ADMIN — Medication 1 AMPULE: at 08:06

## 2025-04-16 RX ADMIN — Medication 1 AMPULE: at 21:59

## 2025-04-16 RX ADMIN — OXYCODONE 5 MG: 5 TABLET ORAL at 08:04

## 2025-04-16 RX ADMIN — NICARDIPINE HYDROCHLORIDE 15 MG/HR: 25 INJECTION INTRAVENOUS at 12:35

## 2025-04-16 RX ADMIN — ACETAMINOPHEN 650 MG: 325 TABLET ORAL at 17:50

## 2025-04-16 RX ADMIN — BUMETANIDE 1 MG: 0.25 INJECTION INTRAMUSCULAR; INTRAVENOUS at 10:01

## 2025-04-16 RX ADMIN — NICARDIPINE HYDROCHLORIDE 12.5 MG/HR: 25 INJECTION INTRAVENOUS at 06:22

## 2025-04-16 RX ADMIN — HYDRALAZINE HYDROCHLORIDE 50 MG: 50 TABLET ORAL at 21:59

## 2025-04-16 RX ADMIN — NICARDIPINE HYDROCHLORIDE 15 MG/HR: 25 INJECTION INTRAVENOUS at 08:18

## 2025-04-16 RX ADMIN — ATORVASTATIN CALCIUM 40 MG: 40 TABLET, FILM COATED ORAL at 08:02

## 2025-04-16 RX ADMIN — INSULIN LISPRO 1 UNITS: 100 INJECTION, SOLUTION INTRAVENOUS; SUBCUTANEOUS at 11:19

## 2025-04-16 RX ADMIN — SODIUM CHLORIDE, PRESERVATIVE FREE 10 ML: 5 INJECTION INTRAVENOUS at 21:59

## 2025-04-16 RX ADMIN — HYDRALAZINE HYDROCHLORIDE 25 MG: 25 TABLET ORAL at 08:02

## 2025-04-16 RX ADMIN — HYDRALAZINE HYDROCHLORIDE 20 MG: 20 INJECTION INTRAMUSCULAR; INTRAVENOUS at 03:53

## 2025-04-16 RX ADMIN — HYDRALAZINE HYDROCHLORIDE 50 MG: 50 TABLET ORAL at 12:29

## 2025-04-16 RX ADMIN — INSULIN LISPRO 1 UNITS: 100 INJECTION, SOLUTION INTRAVENOUS; SUBCUTANEOUS at 23:10

## 2025-04-16 RX ADMIN — NICARDIPINE HYDROCHLORIDE 10 MG/HR: 25 INJECTION INTRAVENOUS at 03:08

## 2025-04-16 RX ADMIN — AMLODIPINE BESYLATE 10 MG: 5 TABLET ORAL at 08:02

## 2025-04-16 RX ADMIN — METOPROLOL TARTRATE 25 MG: 25 TABLET, FILM COATED ORAL at 21:58

## 2025-04-16 RX ADMIN — PIPERACILLIN AND TAZOBACTAM 3375 MG: 3; .375 INJECTION, POWDER, LYOPHILIZED, FOR SOLUTION INTRAVENOUS at 08:01

## 2025-04-16 RX ADMIN — SODIUM CHLORIDE, PRESERVATIVE FREE 10 ML: 5 INJECTION INTRAVENOUS at 08:06

## 2025-04-16 RX ADMIN — ACETAMINOPHEN 650 MG: 325 TABLET ORAL at 12:30

## 2025-04-16 RX ADMIN — PANTOPRAZOLE SODIUM 40 MG: 40 INJECTION, POWDER, LYOPHILIZED, FOR SOLUTION INTRAVENOUS at 08:09

## 2025-04-16 ASSESSMENT — PAIN SCALES - GENERAL
PAINLEVEL_OUTOF10: 5
PAINLEVEL_OUTOF10: 2
PAINLEVEL_OUTOF10: 0
PAINLEVEL_OUTOF10: 5

## 2025-04-16 ASSESSMENT — PAIN DESCRIPTION - LOCATION
LOCATION: ABDOMEN
LOCATION: ABDOMEN

## 2025-04-16 NOTE — PROGRESS NOTES
End of Shift Note    Bedside shift change report given to Amanda RN (oncoming nurse) by May Martinez RN (offgoing nurse).  Report included the following information SBAR, ED Summary, MAR, Recent Results, and Cardiac Rhythm NSR    Shift worked:  [id number]     Shift summary and any significant changes:     No significant changes.     Concerns for physician to address:  none     Zone phone for oncoming shift:   1810

## 2025-04-16 NOTE — PLAN OF CARE
Problem: Occupational Therapy - Adult  Goal: By Discharge: Performs self-care activities at highest level of function for planned discharge setting.  See evaluation for individualized goals.  Description: FUNCTIONAL STATUS PRIOR TO ADMISSION:     , Prior Level of Assist for ADLs: Independent,  ,  ,  ,  ,  , Prior Level of Assist for Homemaking: Independent,  , Prior Level of Assist for Transfers: Independent, Active : Yes .  Pt sometimes stays with his son.      HOME SUPPORT: Patient lived alone and reports a supportive son.    Occupational Therapy Goals:  Initiated 4/16/2025  1.  Patient will perform grooming in standing with Contact Guard Assist within 7 day(s).  2.  Patient will perform self-feeding with Stand by Assist within 7 day(s).  3.  Patient will perform sponge bathing with Moderate Assist within 7 day(s).  4.  Patient will perform toilet transfers with Moderate Assist  within 7 day(s).  5.  Patient will perform all aspects of toileting with Moderate Assist within 7 day(s).  6.  Patient will participate in upper extremity therapeutic exercise/activities with Supervision for 5 minutes within 7 day(s).    7.  Patient will tolerate standing adls for at least 6 minutes within 7 days.    Outcome: Not Progressing    OCCUPATIONAL THERAPY EVALUATION    Patient: Yefri Vigil (69 y.o. male)  Date: 4/16/2025  Primary Diagnosis: Colonic mass [K63.89]  Procedure(s) (LRB):  EXPLORATORY LAPAROTOMY, LIGATION OF BLEEDING VESSEL (N/A) 3 Days Post-Op     Precautions: Fall Risk                  ASSESSMENT :  The patient is limited by decreased functional mobility, independence in ADLs, high-level IADLs, ROM, strength, activity tolerance, endurance, safety awareness, attention/concentration, coordination, balance, fine-motor control, increased pain levels.  Pt is generally dependent performing adls at this time with limited tolerance for mobility, and complaining of pain in all positions.  Pt is significantly weak  in BUEs, limiting AROM and adl performance.  B hands are edematous and stiff appearing--pt was encouraged to perform simple hand exercises and elevate Ues to manage edema.  Pt verbalized understanding.    .      Based on the impairments listed above pt is functioning below his reported independent  baseline and will benefit from acute OT services.    Functional Outcome Measure:  The patient scored 20/100 on the Barthel Index outcome measure which is indicative of dependent adls.         PLAN :  Recommendations and Planned Interventions:   self care training, therapeutic activities, functional mobility training, balance training, therapeutic exercise, endurance activities, patient education, home safety training, and family training/education    Frequency/Duration: OT Plan of Care: 4 times/week    Recommendation for discharge: (in order for the patient to meet his/her long term goals):   High intensity/comprehensive skilled occupational therapy in a multidisciplinary setting as patient is working towards tolerating up to 3 hours of therapy/day 5-7x/week    Other factors to consider for discharge: lives alone, high risk for falls, not safe to be alone, and concern for safely navigating or managing the home environment    IF patient discharges home will need the following DME:  needs caregiver support--equipment to be determined        SUBJECTIVE:   Patient stated, “no, it hurts .”  OBJECTIVE DATA SUMMARY:     Past Medical History:   Diagnosis Date    Acute kidney injury 10/04/2023    Acute metabolic encephalopathy 10/07/2023    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 (1965). Functional evaluation: the Barthel Index. Md State Med J 142.  -SHARLENE Costa, RIVKA Sales (1997). The Barthel activities of daily living index: self-reporting versus actual performance in the old (> or = 75 years). Journal of American Geriatric Society 45(7), 832-836.   -ARTEM Tay, REE Solis, TOO Jansen., LAMBERTO Beck. (1999). Measuring the change in disability after inpatient rehabilitation; comparison of the responsiveness of the Barthel Index and Functional Sutter Measure. Journal of Neurology, Neurosurgery, and Psychiatry, 66(4), 480-484.  -DOC Cárdenas, CRISTINA Sanchez, & Rachel, MSandraA. (2004) Assessment of post-stroke quality of life in cost-effectiveness studies: The usefulness of the Barthel Index and the EuroQoL-5D. Quality of Life Research, 13, 427-43                                                                                                                                                                                                                                 Pain Rating:  Did not rate pain   Pain Intervention(s):       Activity Tolerance:   Fair , Poor, and requires rest breaks    After treatment:   Patient left in no apparent distress in bed and placed in chair position., Call bell within reach, Bed/ chair alarm activated, Side rails x3, Heels elevated for pressure relief, and nurse informed    COMMUNICATION/EDUCATION:   The patient's plan of care was discussed with: physical therapist and registered nurse    Patient Education  Education Given To: Patient  Education Provided: Role of Therapy;Plan of Care;ADL Adaptive Strategies;Mobility Training  Education Method: Verbal  Barriers to Learning: Readiness to Learn  Education Outcome: Verbalized understanding;Continued education needed    Thank you for this

## 2025-04-16 NOTE — PROGRESS NOTES
Hospitalist Progress Note    NAME:   Yefri Vigil   : 1955   MRN: [id number]     Date/Time: 2025 5:04 PM  Patient PCP: Edy Brandt APRN - [facility]    Estimated discharge date:  Barriers:       Assessment / Plan:    Leukocytosis  Hemorrhagic shock/septic shock.  Resolved    --Patient had elective transverse colectomy on .  --Patient was emergently taken to OR on  and found to have ongoing intra abdominal bleeding, continued to have ongoing bleeding overnight and taken to OR again , found to have multiple areas of bleeding, hemostasis achieved.  -- Monitor H&H and transfuse for Hb <7  -POA 19.7, currently 13.2  -- Blood cultures negative to date  - Completed  Zosyn 2025  - Pt is s/p multiple[e transfusions.   - 1 unit of platelets given 4/15/25 due to thrombocytopenia and melena      Hypernatremia  Acute renal failure.  Improving  CKD stage III  --Likely ATN etiology, Initially started on CRRT. Now off CRRT   --Monitor renal functions.  Monitor BMP daily     Acute hypoxemic respiratory failure.  Improving  --In setting of shock.  --Intubated for emergent surgery.  -- Passed SBT and extubated   --Doing fine since then, on room air     Lactic acidosis: Resolved   --Sec to shock  --Optimize perfusion, maintain MAP >65.  --lactate cleared     S/p Robotic extended right colectomy for colonic mass on .   -Complicated by hypotension, lactic acidosis, shock  -Requiring ICU admission, emergent laparotomy  -Surgery is following    DM2  - Continue insulin sliding scale    HTN  - C/W amlodipine, hydralazine    GERD C/W PPI      Medical Decision Making:   I personally reviewed labs:  I personally reviewed imaging:  I personally reviewed EKG:  Toxic drug monitoring:   Discussed case with:         Code Status:   DVT Prophylaxis:   GI Prophylaxis:    Subjective:     Seen and evaluated the patient at bedside, lying in bed comfortably, on room air.  Denies any fevers, chills, pain, abdominal

## 2025-04-16 NOTE — PLAN OF CARE
Problem: Physical Therapy - Adult  Goal: By Discharge: Performs mobility at highest level of function for planned discharge setting.  See evaluation for individualized goals.  Description: FUNCTIONAL STATUS PRIOR TO ADMISSION: Patient was independent and active without use of DME. Denies history of falls. Denies home O2 use. Retired.     HOME SUPPORT PRIOR TO ADMISSION: The patient lived alone. States he occasionally will stay at son's house however son works full-time.    Physical Therapy Goals  Initiated 4/16/2025  1.  Patient will move from supine to sit and sit to supine, scoot up and down, and roll side to side in bed with supervision/set-up within 7 day(s).    2.  Patient will perform sit to stand with contact guard assist within 7 day(s).  3.  Patient will transfer from bed to chair and chair to bed with contact guard assist using the least restrictive device within 7 day(s).  4.  Patient will ambulate with contact guard assist for 75 feet with the least restrictive device within 7 day(s).     Outcome: Progressing   PHYSICAL THERAPY EVALUATION    Patient: Yefri Vigil (69 y.o. male)  Date: 4/16/2025  Primary Diagnosis: Colonic mass [K63.89]  Procedure(s) (LRB):  EXPLORATORY LAPAROTOMY, LIGATION OF BLEEDING VESSEL (N/A) 3 Days Post-Op   Precautions: Restrictions/Precautions  Restrictions/Precautions: Fall Risk            ASSESSMENT :   DEFICITS/IMPAIRMENTS:   The patient is limited by decreased motivation, increased pain levels, impaired activity tolerance, generalized weakness, impaired balance, and overall impaired functional mobility. Pt required max encouragement for participation with therapy, ultimately adamantly refusing mobilization OOB to bedside chair. Overall, pt required modAx1 during bed mobility and sit<>stand transfer attempts. Pt maintained static stance x10-15 seconds before abruptly returning himself to seated position EOB. Pt adamantly refusing further mobility attempts, including  At risk for sleep apnea 12/04/2024    STOP-BANG 5    CKD (chronic kidney disease), stage III ([facility])     Diabetes mellitus, type 2 ([facility]) 2010    Diarrhea 10/07/2023    High blood cholesterol     Hypertension     Hypokalemia 03/17/2015    Non-traumatic rhabdomyolysis 10/07/2023    NSTEMI (non-ST elevated myocardial infarction) ([facility])     Pneumonia 03/15/2015    S/P drug eluting coronary stent placement     x1    Severe sepsis 10/04/2023     Past Surgical History:   Procedure Laterality Date    CARDIAC PROCEDURE N/A 10/18/2023    Left heart cath / coronary angiography performed by Rosmery Arnett MD at [facility] CARDIAC CATH LAB    CARDIAC PROCEDURE N/A 10/18/2023    Percutaneous coronary intervention performed by Rosmery Arnett MD at [facility] CARDIAC CATH LAB    CARDIAC PROCEDURE N/A 10/18/2023    Intravascular ultrasound performed by Rosmery Arnett MD at [facility] CARDIAC CATH LAB    CARDIAC PROCEDURE N/A 10/7/2024    Coronary angiography performed by Kenny Strong MD at [facility] CARDIAC CATH LAB    CARDIAC PROCEDURE N/A 10/7/2024    Aortic root aortogram performed by Kenny Strong MD at [facility] CARDIAC CATH LAB    CARDIAC PROCEDURE N/A 1/22/2025    Transcatheter aortic valve replacement performed by Kenny Strong MD at [facility] CARDIAC CATH LAB    CARDIAC PROCEDURE N/A 1/22/2025    Insert temporary pacemaker performed by Kenny Strong MD at [facility] CARDIAC CATH LAB    CARDIAC PROCEDURE N/A 1/22/2025    Aortic root aortogram performed by Kenny Strong MD at [facility] CARDIAC CATH LAB    COLONOSCOPY  2009    Had polyps told to return 5 yrs, not done    COLONOSCOPY N/A 2/17/2025    COLONOSCOPY AND UPPER ESOPHAGOGASTRODUODENOSCOPY performed by Reece Jordan MD at [facility] ENDOSCOPY    INVASIVE VASCULAR N/A 10/7/2024    Ultrasound guided vascular access performed by Kenny Strong MD at [facility] CARDIAC CATH LAB    INVASIVE VASCULAR N/A 1/22/2025    Ultrasound guided vascular access performed by Kenny Strong MD at [facility] CARDIAC CATH  <=171,2,3 had higher odds of discharging home with home health or need of SNF/IPR.    1. Karine Dobbs, Rosemary Lai, William Burns, Domonique Yo, Balta Hubbard, Kamaljit Dobbs.  Validity of the AM-PAC “6-Clicks” Inpatient Daily Activity and Basic Mobility Short Forms. Physical Therapy Mar 2014, 94 3) 379-391; DOI: 10.2522/ptj.75780408  2. Caleb OJEDA, Charlene BANEGAS, Sam BANEGAS, Germain BANEGAS. Association of AM-PAC \"6-Clicks\" Basic Mobility and Daily Activity Scores With Discharge Destination. Phys Ther. 2021 Apr 4;101(4):xtto031. doi: 10.1093/ptj/jfrm367. PMID: 00266376.  3. Vidhi BANEGAS, Cherelle D, Jennifer S, Sunshine K, Ricki S. Activity Measure for Post-Acute Care \"6-Clicks\" Basic Mobility Scores Predict Discharge Destination After Acute Care Hospitalization in Select Patient Groups: A Retrospective, Observational Study. Arch Rehabil Res Clin Transl. 2022 Jul 16;4(3):869687. doi: 10.1016/j.arrct.2022.793064. PMID: 69623162; PMCID: FWE5672561.  4. Corie Koroma S, Katharine W, Tameka P. AM-PAC Short Forms Manual 4.0. Revised 2/2020.                                                                                                                                                                                                                              Pain Rating:  Reported 8/10 abdominal pain - RN notified     Activity Tolerance:   VSS on RA however pt reported increased pain/demonstrated decreased willingness to participate    After treatment:   Patient left in no apparent distress in bed, Call bell within reach, Bed/ chair alarm activated, and Heels elevated for pressure relief    COMMUNICATION/EDUCATION:   The patient's plan of care was discussed with: occupational therapist and registered nurse    Patient Education  Education Given To: Patient  Education Provided: Role of Therapy  Education Method: Verbal  Barriers to Learning: Readiness to Learn  Education Outcome: Continued education needed;Verbalized

## 2025-04-16 NOTE — PROGRESS NOTES
Name: Yefri Vigil   MRN: [id number]  : 1955    Nephrology Progress Note    Assessment:  TERESA on CKD-3b: serum Cr bump to 3.7mg/dl. Pre-renal vs ATN (hypotension). Celebrex x 1 dose () and IV contrast exposure  and .  Non-oliguric. Underlying DM kidney disease. Baseline Cr 1.4-1.8mg/dl.     Encephalopathy  Hypertensive urgency urgency/encephalopathy  S/p extubation     Hyperkalemia: better with CRRT     Metabolic acidosis: severe lactic acidosis-> improving. S/p IV bicarbonate/CRRT     Robotic transverse colectomy: 25     DM2: stable control    Plan/Recommendations:  .  CRRT was stopped.  Still on Cardene drip-attempt to wean and diurese  Bumex 2 mg IV x 1    Creatinine improving-2.8 => 2.6 => 2.3 => 2.06 to today  Urine output 2.8 L  No indication for RRT  Okay to remove right IJ trialysis line  Grace Best MD  [facility] ([facility])        Subjective:  Seen and examined.  Lethargic.  His speech is barely audible.  However better than before    ROS:   UTO    Exam:  BP (!) 155/62   Pulse 88   Temp 100 °F (37.8 °C)   Resp 27   Ht 1.676 m (5' 6\")   Wt 82.5 kg (181 lb 14.1 oz)   SpO2 95%   BMI 29.36 kg/m²     Gen: Intubated/sedated  HEENT: AT/NC, +ETT  Neck: Curtis  Lungs/Chest wall: Clear. Equal BS.   Cardiovascular: Regular rate, normal rhythm.   Abdomen/: Soft, NT,  BS+  Ext: No peripheral edema       Current Facility-Administered Medications   Medication Dose Route Frequency Provider Last Rate Last Admin    oxyCODONE (ROXICODONE) immediate release tablet 5 mg  5 mg Oral Q4H PRN Vee Diop APRN - NP   5 mg at 25 0804    hydrALAZINE (APRESOLINE) tablet 50 mg  50 mg Oral TID Akshat Ruelas MD   50 mg at 25 1229    0.9 % sodium chloride infusion   IntraVENous PRN Akshat Ruelas MD        HYDROmorphone (DILAUDID)  injection 0.25 mg  0.25 mg IntraVENous Q3H PRN Debbie Russell MD        Or    HYDROmorphone (DILAUDID) injection 0.5 mg  0.5 mg IntraVENous Q3H PRN Debbie Russell MD   0.5 mg at 04/15/25 1458    hydrALAZINE (APRESOLINE) injection 20 mg  20 mg IntraVENous Q6H PRN Kate Gracia APRN - NP   20 mg at 04/16/25 0827    0.9 % sodium chloride infusion   IntraVENous PRN Brenden Mayorga MD        sodium chloride flush 0.9 % injection 5-40 mL  5-40 mL IntraVENous 2 times per day Brenden Mayorga MD   10 mL at 04/16/25 0806    niCARdipine (CARDENE) 25 mg in sodium chloride 0.9 % 250 mL infusion ([id number])  2.5-15 mg/hr IntraVENous Continuous Brenden Mayorga  mL/hr at 04/16/25 1022 15 mg/hr at 04/16/25 1022    alcohol 62% (NOZIN) nasal  1 ampule  1 ampule Nasal Q12H Billy Reyes MD   1 ampule at 04/16/25 0806    pantoprazole (PROTONIX) 40 mg in sodium chloride (PF) 0.9 % 10 mL injection  40 mg IntraVENous Daily Billy Reyes MD   40 mg at 04/16/25 0809    insulin lispro (HUMALOG,ADMELOG) injection vial 0-4 Units  0-4 Units SubCUTAneous Q6H Cherelle Menon APRN - CNP   1 Units at 04/16/25 1119    amLODIPine (NORVASC) tablet 10 mg  10 mg Oral Daily Vee Diop APRN - NP   10 mg at 04/16/25 0802    atorvastatin (LIPITOR) tablet 40 mg  40 mg Oral Daily Vee Diop APRN - NP   40 mg at 04/16/25 0802    metoprolol tartrate (LOPRESSOR) tablet 25 mg  25 mg Oral BID Vee Diop APRN - NP   25 mg at 04/16/25 1000    sodium chloride flush 0.9 % injection 5-40 mL  5-40 mL IntraVENous PRN Billy Reyes MD        acetaminophen (TYLENOL) tablet 650 mg  650 mg Oral Q6H Vee Diop APRN - NP   650 mg at 04/16/25 1230    glucose chewable tablet 16 g  4 tablet Oral PRN Billy Reyes MD        dextrose bolus 10% 125 mL  125 mL IntraVENous PRN Billy Reyes MD        Or    dextrose bolus 10% 250 mL  250 mL IntraVENous PRN Billy Reyes MD        glucagon injection 1 mg  1 mg SubCUTAneous PRN

## 2025-04-16 NOTE — PROGRESS NOTES
Critical Care Progress Note  Akshat Ruelas MD          Date of Service:  2025  NAME:  Yefri Vigil  :  1955  MRN:  [id number]      Subjective/Hospital course:      : Patient continued to have ongoing bleeding and received multiple blood productes overnight. Eventually taken back to OR this morning and underwent re exploration, found to have multiple areas of bleeding.  : Patient remains intubated, sedation is being weaned off.  Hemodynamically stable.  Off of pressors.   4/15: Melena x1 overnight. Platelets ordered. Passed SBT and extubated.   : No overnight events. More awake and alert. Tolerated clear diet yesterday.Advanced to full liquid today. Pt is more awake and alert.       Active Problems Being Managed:     --Hemorrhagic shock/?septic shock.  -- Acute renal failure on CKD stage III.  - Severe lactic acidosis.  --Acute hypoxemic respiratory failure.  - Elevated liver enzymes.  - Mildly elevated troponin.     Assessment/Plan:     Hemorrhagic shock/septic shock.  --Patient had elective transverse colectomy on .  --Patient was emergently taken to OR on  and found to have ongoing intra abdominal bleeding, continued to have ongoing bleeding overnight and taken to OR again , found to have multiple areas of bleeding, hemostasis achieved.  --Continue vol resuscitation as tolerated and transfuse for Hb <7  --Monitor SHIRA output.  - Completed  Zosyn today   - Pt is s/p multiple[e transfusions.   - 1 unit of platelets given yesterday due to thrombocytopenia and melena       Acute renal failure.   --Likely ATN etiology, Initially started on CRRT. Now off CRRT   --Monitor renal functions.    Acute hypoxemic respiratory failure.  --In setting of shock.  --Intubated for emergent surgery.  -- Passed SBT and extubated   --Doing fine since then     Lactic acidosis: Resolved   --Sec to shock  --Optimize perfusion, maintain MAP >65.  --lactate cleared       Code status: Full

## 2025-04-16 NOTE — PROGRESS NOTES
Critical care interdisciplinary rounds today.  Following members present: Case Management, , Clinical Lead, Diabetes Team, Nursing, Nutrition, Pharmacy, and Physician. Family invited to participate.  Plan of care discussed.  See clinical pathway for plan of care and interventions and desired outcomes.     HD and Fitzgerald to be discontinued

## 2025-04-16 NOTE — PROGRESS NOTES
Nutrition Note    Chart reviewed; RD provided a 5-day supply of Ensure Surgery/Immunonutrition (10 bottles) to the bedside per ERAS protocol and discussed the importance of adequate nutrition/supplement compliance in effort to optimize wound healing and recovery from surgery. Pt agreeable and reports that he enjoyed the Ensure Surgery pre-op. RD anticipates good compliance.      Electronically signed by Jenna Cabrera RD, [facility] on 4/16/25 at 1:42 PM EDT    Contact: ext [phone]

## 2025-04-16 NOTE — CARE COORDINATION
Talked with patient and his son that he sometimes lives with regarding dcp. They are both in agreement and would like the referral sent to [facility]. Referral sent to [facility] and informed them to have at least 3-4 other choices. Patient being transferred to 2116 and son made aware. Handoff given to unit cm,.         Missy Rowell RN BSN CRM        [phone]

## 2025-04-16 NOTE — PROGRESS NOTES
CRS NP Progress Note    Postoperative bleeding requiring return to OR x2 over the weekend for control. Hbg stable, platelets low but responding to transfusion. Minimal output from drain and minimal staining of surgical dressing.    Vitals stable, no pressors    Patient states he feels rough this morning. He is having pain. No nausea/vomiting. Drinking some fluids. Having bowel function. He asks what the plan is for the day.     Abd soft, minimally distended, surgical dressing with minimal staining, SHIRA bloody but minimal output    Labs reviewed     A/P:  69 y.o. M s/p robotic extended RHC with postoperative bleeding possibly in setting of coagulopathy requiring return to OR x2, now in stable condition with no evidence of further bleeding  - greatly appreciate care from Evelin Reyes and [place]  - appreciate ICU care, continue supportive care today. Suspect can transfer to surgery floor tomorrow  - Appreciate nephrology following. Encouraged by his improvement in this regard.   - Advance to full liquid diet with supplements  - Document all bowel function  - Resume home antihypertensives  - Add PO pain med for more consistent pain control.     MARY KATE De Anda - NP   [facility]  [phone]

## 2025-04-16 NOTE — PROGRESS NOTES
0700: Bedside shift report received from CHARIS Amaya    0715: Gen surg at bedside. Pt goals:  -Full liquid diet  -Pt/OT eval  -DC brooks  -More pain meds    0800: Shift assessment completed. Pt AxOX4, follows commands, clear diminished lung sounds, on 2L NC, hypoactive bowel sounds, weak pedal pulses. See flow sheet/MAR for details.     0930: Pt/OT/Nephro  at bedside. Pt goals  -Remove trialysis  -Diurese    1010: IDR completed. Pt goals:  -Transfer    1100: Trialysis/brooks removed. External catheter placed.     1200: Reassessment complete. No changes to previous assessment. See flow sheet/MAR for details.     1435: Tried to call [facility] to give report for a pt going to 2116. Nurse at lunch, and will call me back. Waiting for call back.     1515: Transfer report given to May [facility], RN    1535: Cardene gtt stopped. Pt moved to [facility], [id number].

## 2025-04-16 NOTE — PROGRESS NOTES
Spiritual Health History and Assessment/Progress Note  [facility]    Attempted Encounter,  ,  ,      Name: Yefri Vigil MRN: [id number]    Age: 69 y.o.     Sex: male   Language: English   Restoration: None   Colonic mass     Date: 4/16/2025            Total Time Calculated: 10 min              Spiritual Assessment began in [facility]        Referral/Consult From: Rounding   Encounter Overview/Reason: Attempted Encounter  Service Provided For: Patient not available    Kassandra, Belief, Meaning:   Patient unable to assess at this time  Family/Friends No family/friends present      Importance and Influence:  Patient unable to assess at this time  Family/Friends No family/friends present    Community:  Patient Other: unable to assess  Family/Friends No family/friends present    Assessment and Plan of Care:     Patient Interventions include: Other: patient was sleeping  Family/Friends Interventions include: No family/friends present    Patient Plan of Care: Spiritual Care available upon further referral  Family/Friends Plan of Care: No family/friends present    Electronically signed by Chaplain STEPHY ARH [facility] on 4/16/2025 at 3:16 PM

## 2025-04-17 LAB
BACTERIA SPEC CULT: NORMAL
BACTERIA SPEC CULT: NORMAL
BASOPHILS # BLD: 0.05 K/UL (ref 0–0.1)
BASOPHILS NFR BLD: 0.4 % (ref 0–1)
DIFFERENTIAL METHOD BLD: ABNORMAL
EOSINOPHIL # BLD: 0.18 K/UL (ref 0–0.4)
EOSINOPHIL NFR BLD: 1.5 % (ref 0–7)
ERYTHROCYTE [DISTWIDTH] IN BLOOD BY AUTOMATED COUNT: 16.6 % (ref 11.5–14.5)
GLUCOSE BLD STRIP.AUTO-MCNC: 144 MG/DL (ref 65–117)
GLUCOSE BLD STRIP.AUTO-MCNC: 160 MG/DL (ref 65–117)
GLUCOSE BLD STRIP.AUTO-MCNC: 242 MG/DL (ref 65–117)
GLUCOSE BLD STRIP.AUTO-MCNC: 249 MG/DL (ref 65–117)
HCT VFR BLD AUTO: 34.5 % (ref 36.6–50.3)
HGB BLD-MCNC: 11.4 G/DL (ref 12.1–17)
IMM GRANULOCYTES # BLD AUTO: 0.16 K/UL (ref 0–0.04)
IMM GRANULOCYTES NFR BLD AUTO: 1.3 % (ref 0–0.5)
LYMPHOCYTES # BLD: 1 K/UL (ref 0.8–3.5)
LYMPHOCYTES NFR BLD: 8.1 % (ref 12–49)
MAGNESIUM SERPL-MCNC: 1.9 MG/DL (ref 1.6–2.4)
MCH RBC QN AUTO: 28.9 PG (ref 26–34)
MCHC RBC AUTO-ENTMCNC: 33 G/DL (ref 30–36.5)
MCV RBC AUTO: 87.3 FL (ref 80–99)
MONOCYTES # BLD: 1.08 K/UL (ref 0–1)
MONOCYTES NFR BLD: 8.8 % (ref 5–13)
NEUTS SEG # BLD: 9.83 K/UL (ref 1.8–8)
NEUTS SEG NFR BLD: 79.9 % (ref 32–75)
NRBC # BLD: 0 K/UL (ref 0–0.01)
NRBC BLD-RTO: 0 PER 100 WBC
PHOSPHATE SERPL-MCNC: 2.5 MG/DL (ref 2.6–4.7)
PLATELET # BLD AUTO: 62 K/UL (ref 150–400)
PMV BLD AUTO: 11.4 FL (ref 8.9–12.9)
RBC # BLD AUTO: 3.95 M/UL (ref 4.1–5.7)
RBC MORPH BLD: ABNORMAL
SERVICE CMNT-IMP: ABNORMAL
SERVICE CMNT-IMP: NORMAL
SERVICE CMNT-IMP: NORMAL
WBC # BLD AUTO: 12.3 K/UL (ref 4.1–11.1)

## 2025-04-17 PROCEDURE — 6370000000 HC RX 637 (ALT 250 FOR IP): Performed by: INTERNAL MEDICINE

## 2025-04-17 PROCEDURE — 6370000000 HC RX 637 (ALT 250 FOR IP): Performed by: NURSE PRACTITIONER

## 2025-04-17 PROCEDURE — 2500000003 HC RX 250 WO HCPCS: Performed by: HOSPITALIST

## 2025-04-17 PROCEDURE — 83735 ASSAY OF MAGNESIUM: CPT

## 2025-04-17 PROCEDURE — 99024 POSTOP FOLLOW-UP VISIT: CPT | Performed by: NURSE PRACTITIONER

## 2025-04-17 PROCEDURE — 82962 GLUCOSE BLOOD TEST: CPT

## 2025-04-17 PROCEDURE — 1100000003 HC PRIVATE W/ TELEMETRY

## 2025-04-17 PROCEDURE — 6360000002 HC RX W HCPCS: Performed by: SURGERY

## 2025-04-17 PROCEDURE — 84100 ASSAY OF PHOSPHORUS: CPT

## 2025-04-17 PROCEDURE — 36415 COLL VENOUS BLD VENIPUNCTURE: CPT

## 2025-04-17 PROCEDURE — 6360000002 HC RX W HCPCS: Performed by: NURSE PRACTITIONER

## 2025-04-17 PROCEDURE — 97116 GAIT TRAINING THERAPY: CPT

## 2025-04-17 PROCEDURE — 2580000003 HC RX 258: Performed by: SURGERY

## 2025-04-17 PROCEDURE — 2700000000 HC OXYGEN THERAPY PER DAY

## 2025-04-17 PROCEDURE — 6360000002 HC RX W HCPCS: Performed by: STUDENT IN AN ORGANIZED HEALTH CARE EDUCATION/TRAINING PROGRAM

## 2025-04-17 PROCEDURE — 85025 COMPLETE CBC W/AUTO DIFF WBC: CPT

## 2025-04-17 PROCEDURE — 6370000000 HC RX 637 (ALT 250 FOR IP): Performed by: SURGERY

## 2025-04-17 PROCEDURE — 97535 SELF CARE MNGMENT TRAINING: CPT

## 2025-04-17 RX ORDER — HYDRALAZINE HYDROCHLORIDE 20 MG/ML
20 INJECTION INTRAMUSCULAR; INTRAVENOUS EVERY 4 HOURS PRN
Status: DISCONTINUED | OUTPATIENT
Start: 2025-04-17 | End: 2025-04-19 | Stop reason: HOSPADM

## 2025-04-17 RX ORDER — BUMETANIDE 1 MG/1
2 TABLET ORAL DAILY
Status: DISCONTINUED | OUTPATIENT
Start: 2025-04-17 | End: 2025-04-19 | Stop reason: HOSPADM

## 2025-04-17 RX ADMIN — PANTOPRAZOLE SODIUM 40 MG: 40 INJECTION, POWDER, LYOPHILIZED, FOR SOLUTION INTRAVENOUS at 08:26

## 2025-04-17 RX ADMIN — INSULIN LISPRO 1 UNITS: 100 INJECTION, SOLUTION INTRAVENOUS; SUBCUTANEOUS at 22:23

## 2025-04-17 RX ADMIN — HYDRALAZINE HYDROCHLORIDE 50 MG: 50 TABLET ORAL at 08:25

## 2025-04-17 RX ADMIN — BUMETANIDE 2 MG: 1 TABLET ORAL at 12:16

## 2025-04-17 RX ADMIN — HYDRALAZINE HYDROCHLORIDE 20 MG: 20 INJECTION INTRAMUSCULAR; INTRAVENOUS at 03:24

## 2025-04-17 RX ADMIN — INSULIN LISPRO 1 UNITS: 100 INJECTION, SOLUTION INTRAVENOUS; SUBCUTANEOUS at 18:16

## 2025-04-17 RX ADMIN — OXYCODONE 5 MG: 5 TABLET ORAL at 15:31

## 2025-04-17 RX ADMIN — AMLODIPINE BESYLATE 10 MG: 5 TABLET ORAL at 08:25

## 2025-04-17 RX ADMIN — SODIUM CHLORIDE, PRESERVATIVE FREE 10 ML: 5 INJECTION INTRAVENOUS at 22:25

## 2025-04-17 RX ADMIN — HYDRALAZINE HYDROCHLORIDE 20 MG: 20 INJECTION INTRAMUSCULAR; INTRAVENOUS at 16:36

## 2025-04-17 RX ADMIN — HYDROMORPHONE HYDROCHLORIDE 0.5 MG: 1 INJECTION, SOLUTION INTRAMUSCULAR; INTRAVENOUS; SUBCUTANEOUS at 22:23

## 2025-04-17 RX ADMIN — SODIUM CHLORIDE, PRESERVATIVE FREE 10 ML: 5 INJECTION INTRAVENOUS at 08:26

## 2025-04-17 RX ADMIN — HYDRALAZINE HYDROCHLORIDE 50 MG: 50 TABLET ORAL at 12:16

## 2025-04-17 RX ADMIN — ACETAMINOPHEN 650 MG: 325 TABLET ORAL at 12:16

## 2025-04-17 RX ADMIN — Medication 1 AMPULE: at 08:28

## 2025-04-17 RX ADMIN — ATORVASTATIN CALCIUM 40 MG: 40 TABLET, FILM COATED ORAL at 08:25

## 2025-04-17 RX ADMIN — ACETAMINOPHEN 650 MG: 325 TABLET ORAL at 08:25

## 2025-04-17 RX ADMIN — METOPROLOL TARTRATE 25 MG: 25 TABLET, FILM COATED ORAL at 08:24

## 2025-04-17 RX ADMIN — HYDRALAZINE HYDROCHLORIDE 50 MG: 50 TABLET ORAL at 22:22

## 2025-04-17 RX ADMIN — ACETAMINOPHEN 650 MG: 325 TABLET ORAL at 18:16

## 2025-04-17 RX ADMIN — METOPROLOL TARTRATE 25 MG: 25 TABLET, FILM COATED ORAL at 22:22

## 2025-04-17 RX ADMIN — ACETAMINOPHEN 650 MG: 325 TABLET ORAL at 03:09

## 2025-04-17 ASSESSMENT — PAIN SCALES - GENERAL
PAINLEVEL_OUTOF10: 7
PAINLEVEL_OUTOF10: 10
PAINLEVEL_OUTOF10: 3
PAINLEVEL_OUTOF10: 7
PAINLEVEL_OUTOF10: 3
PAINLEVEL_OUTOF10: 7

## 2025-04-17 ASSESSMENT — PAIN DESCRIPTION - ORIENTATION
ORIENTATION: MID;ANTERIOR
ORIENTATION: MID
ORIENTATION: MID

## 2025-04-17 ASSESSMENT — PAIN DESCRIPTION - DESCRIPTORS
DESCRIPTORS: ACHING;NAGGING
DESCRIPTORS: ACHING
DESCRIPTORS: ACHING

## 2025-04-17 ASSESSMENT — PAIN DESCRIPTION - LOCATION
LOCATION: ABDOMEN

## 2025-04-17 ASSESSMENT — PAIN - FUNCTIONAL ASSESSMENT: PAIN_FUNCTIONAL_ASSESSMENT: ACTIVITIES ARE NOT PREVENTED

## 2025-04-17 NOTE — PROGRESS NOTES
Bedside shift change report given to CHARIS Patel (oncoming nurse) by CHARIS Olvera (offgoing nurse). Report included the following information Nurse Handoff Report, Surgery Report, Intake/Output, MAR, Recent Results, and Cardiac Rhythm NSR .     End of Shift Note    Bedside shift change report given to CHARIS Omalley (oncoming nurse) by Amanda Osborn RN (offgoing nurse).  Report included the following information SBAR    Shift worked:  7p-7a     Shift summary and any significant changes:     No significant change     Concerns for physician to address:  na     Zone phone for oncoming shift:   na       Activity:  Level of Assistance: Minimal assist, patient does 75% or more  Number times ambulated in hallways past shift: 0  Number of times OOB to chair past shift: 0    Cardiac:   Cardiac Monitoring: Yes      Cardiac Rhythm: Sinus rhythm    Access:  Current line(s): PIV     Genitourinary:   Urinary Status: Voiding, External catheter    Respiratory:   O2 Device: Nasal cannula  Chronic home O2 use?: NO  Incentive spirometer at bedside: YES    GI:  Last BM (including prior to admit): 04/16/25  Current diet:  ADULT DIET; Full Liquid  ADULT ORAL NUTRITION SUPPLEMENT; Breakfast, Dinner; Other Oral Supplement; ERAS ONS dropped off by RD  Passing flatus: YES    Pain Management:   Patient states pain is manageable on current regimen: YES    Skin:  Sly Scale Score: 15  Interventions: Wound Offloading (Prevention Methods): Bed, pressure reduction mattress, Elevate heels, Pillows, Repositioning, Turning    Patient Safety:  Fall Risk: Nursing Judgement-Fall Risk High(Add Comments): Yes  Fall Risk Interventions  Nursing Judgement-Fall Risk High(Add Comments): Yes  Toilet Every 2 Hours-In Advance of Need: Yes  Hourly Visual Checks: Awake, In bed  Fall Visual Posted: Armband, Fall sign posted, Socks  Room Door Open: Yes  Alarm On: Bed  Patient Moved Closer to Nursing Station: No    Active Consults:   IP CONSULT TO DIETITIAN  IP CONSULT TO

## 2025-04-17 NOTE — PLAN OF CARE
Problem: Physical Therapy - Adult  Goal: By Discharge: Performs mobility at highest level of function for planned discharge setting.  See evaluation for individualized goals.  Description: FUNCTIONAL STATUS PRIOR TO ADMISSION: Patient was independent and active without use of DME. Denies history of falls. Denies home O2 use. Retired.     HOME SUPPORT PRIOR TO ADMISSION: The patient lived alone. States he occasionally will stay at son's house however son works full-time.    Physical Therapy Goals  Initiated 4/16/2025  1.  Patient will move from supine to sit and sit to supine, scoot up and down, and roll side to side in bed with supervision/set-up within 7 day(s).    2.  Patient will perform sit to stand with contact guard assist within 7 day(s).  3.  Patient will transfer from bed to chair and chair to bed with contact guard assist using the least restrictive device within 7 day(s).  4.  Patient will ambulate with contact guard assist for 75 feet with the least restrictive device within 7 day(s).     Outcome: Progressing   PHYSICAL THERAPY TREATMENT    Patient: Yefri Vigil (69 y.o. 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 He continues to demonstrate lethargy but is agreeable to transferring to bedside chair. Patient is provided Min A for supine to sit transfer with VC for log roll technique. He demonstrates good static sitting balance. Patient ambulates in to the restroom with HHA x2 and Min A. He demonstrates increased trunk sway and increased forward lean with fluctuating gait speed. On return to bedside chair patient is provided RW. He demonstrates the need for CGA with use of RW. VC are

## 2025-04-17 NOTE — PROGRESS NOTES
Neuropsychological Evaluation Report      Patient Name: Yefri Vigil  YOB: 1955    Age: 69 y.o.  Date of Intake: 3/12/2025   Education: 7 Date of Testing: 3/26/2025   Gender: Male Ethnicity: Black     Referring Provider: SOWMYA Rosas     REASON FOR REFERRAL AND EVALUATION PROCEDURES:  Yefri Vigil  was referred for neuropsychological evaluation by his Neurology Provider to obtain a quantitative assessment of his current level of neurocognitive functioning, assist in differential diagnosis, and aid in individualized treatment planning. The patient understood the rationale and procedures for evaluation, as well as the limits to confidentiality, and they agreed to participate. The patient consented to have this report made available to his  treating providers through his  electronic medical records. This evaluation was completed with the patient by Chico Pyle PsyD with the exception of testing by technician, which was completed by SANYA Sanchez under the supervision of Dr. Pyle.  History Sources: Patient, Medical Record, and Test Data    SUMMARY AND IMPRESSION:  The following section is a summary of the patient's pertinent test results and impressions. A more thorough review of the patient's background and test scores can be found below.    Results from the patient's neuropsychological evaluation revealed moderate to profound impairment on tests of mental processing speed.  Aspects of executive functioning (set switching and planning) were also profoundly impaired while problem-solving was within normal limits.  Visuospatial measures more heavily dependent upon executive functioning (planning/organization and reasoning) were moderately impaired while more basic measures of perception and construction were within normal limits.  Verbal list learning, memory, and recognition were significantly impaired while other verbal and visual memory testing was within normal limits.

## 2025-04-17 NOTE — PROGRESS NOTES
CRS NP Progress Note    Postoperative bleeding requiring return to OR x2 over the weekend for control. Hbg stable, platelets low but responding to transfusion. Minimal output from drain and minimal staining of surgical dressing.    Vitals stable, no pressors    Patient states he has some pain but thinks the pain med is helping. He tolerated some liquids and continues to have bowel function. He is reluctant to get moving.     Abd soft, non-distended, appropriately tender. Surgical dressing removed, SHIRA bloody but minimal output    Labs reviewed     A/P:  69 y.o. M s/p robotic extended RHC with postoperative bleeding possibly in setting of coagulopathy requiring return to OR x2, now in stable condition with no evidence of further bleeding  - greatly appreciate care from Evelin Reyes and Checo  - Transfer to surgery floor TODAY.   - Appreciate nephrology following. Encouraged by his improvement in this regard.   - Low fiber diet.   - Document all bowel function    MARY KATE De Anda - NP   [facility]  [phone]

## 2025-04-17 NOTE — PROGRESS NOTES
Hospitalist Progress Note    NAME:   Yefri Vigil   : 1955   MRN: [id number]     Date/Time: 2025 4:50 PM  Patient PCP: Edy Brandt APRN - [facility]    Estimated discharge date:  Barriers:       Assessment / Plan:    Leukocytosis  Hemorrhagic shock/septic shock.  Resolved    --Patient had elective transverse colectomy on .  --Patient was emergently taken to OR on  and found to have ongoing intra abdominal bleeding, continued to have ongoing bleeding overnight and taken to OR again , found to have multiple areas of bleeding, hemostasis achieved.  -- Monitor H&H and transfuse for Hb <7  -POA 19.7, currently 13.2  -- Blood cultures negative to date  - Completed  Zosyn 2025  - Pt is s/p multiple[e transfusions.   - 1 unit of platelets given 4/15/25 due to thrombocytopenia and melena   : H&H stable     Hypernatremia  Acute renal failure.  Improving  CKD stage III  --Likely ATN etiology, Initially started on CRRT. Now off CRRT   --Monitor renal functions.  Monitor BMP daily   ; continue diuretics.  Nephrology following  Monitor renal indicis    Acute hypoxemic respiratory failure.  Improving  --In setting of shock.  --Intubated for emergent surgery.  -- Passed SBT and extubated   --Doing fine since then, on room air    Lactic acidosis: Resolved   --Sec to shock  --Optimize perfusion, maintain MAP >65.  --lactate cleared     S/p Robotic extended right colectomy for colonic mass on .   -Complicated by hypotension, lactic acidosis, shock  -Requiring ICU admission, emergent laparotomy  -Surgery is following.  Low fiber diet  - multimodal pain regimen    DM2  - Continue insulin sliding scale    HTN  - C/W amlodipine, hydralazine  : Add hydralazine 10 mg I.V prn SBP > 160    GERD C/W PPI      Medical Decision Making:   I personally reviewed labs:  I personally reviewed imaging:  I personally reviewed EKG:  Toxic drug monitoring:   Discussed case with:         Code Status:

## 2025-04-17 NOTE — PROGRESS NOTES
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 Pre-renal vs ATN (hypotension). Celebrex x 1 dose () and IV contrast exposure  and .  Non-oliguric. Underlying DM kidney disease. Baseline Cr 1.4-1.8mg/dl.   Required CRRTBriefly-stopped .    Encephalopathy  Hypertensive urgency urgency/encephalopathy  S/p extubation     Hyperkalemia: better with CRRT     Metabolic acidosis: severe lactic acidosis-> improving. S/p IV bicarbonate/CRRT     Robotic transverse colectomy: 25     DM2: stable control    Plan/Recommendations:    Creatinine remains at close to his baseline at 2.  Urine output nonoliguric.  Blood pressure still high in the hospital-but his home medicines were only renewed recently   I would recommend continue to diurese him.  He is fluid overloaded.  Last 2 days urine output over 3 L  Grace Best MD  [facility] ([facility])        Subjective:  Seen and examined.   a bit more alert.  Speech is barely audible  ROS:   UTO    Exam:  BP (!) 187/94   Pulse 87   Temp 98.2 °F (36.8 °C) (Oral)   Resp 24   Ht 1.676 m (5' 6\")   Wt 82.5 kg (181 lb 14.1 oz)   SpO2 94%   BMI 29.36 kg/m²      awake, alert, resting in bed, not in respiratory distress,   not in pain  Plus edema      Current Facility-Administered Medications   Medication Dose Route Frequency Provider Last Rate Last Admin    oxyCODONE (ROXICODONE) immediate release tablet 5 mg  5 mg Oral Q4H PRN Vee Diop APRN - NP   5 mg at 25 0804    hydrALAZINE (APRESOLINE) tablet 50 mg  50 mg Oral TID Akshat Ruelas MD   50 mg at 25 0825    niCARdipine (CARDENE) 25 mg in sodium chloride 0.9 % 250 mL infusion ([id number])  2.5-15 mg/hr IntraVENous Continuous Akshat Ruelas MD   Stopped at

## 2025-04-17 NOTE — PLAN OF CARE
Problem: Occupational Therapy - Adult  Goal: By Discharge: Performs self-care activities at highest level of function for planned discharge setting.  See evaluation for individualized goals.  Description: FUNCTIONAL STATUS PRIOR TO ADMISSION:    , Prior Level of Assist for ADLs: Independent,  ,  ,  ,  ,  , Prior Level of Assist for Homemaking: Independent,  , Prior Level of Assist for Transfers: Independent, Active : Yes .  Pt sometimes stays with his son.      HOME SUPPORT: Patient lived alone and reports a supportive son.    Occupational Therapy Goals:  Initiated 4/16/2025  1.  Patient will perform grooming in standing with Contact Guard Assist within 7 day(s).  2.  Patient will perform self-feeding with Stand by Assist within 7 day(s).  3.  Patient will perform sponge bathing with Moderate Assist within 7 day(s).  4.  Patient will perform toilet transfers with Moderate Assist  within 7 day(s).  5.  Patient will perform all aspects of toileting with Moderate Assist within 7 day(s).  6.  Patient will participate in upper extremity therapeutic exercise/activities with Supervision for 5 minutes within 7 day(s).    7.  Patient will tolerate standing adls for at least 6 minutes within 7 days.      Outcome: Progressing    OCCUPATIONAL THERAPY TREATMENT  Patient: Yefri Vigil (69 y.o. male)  Date: 4/17/2025  Primary Diagnosis: Colonic mass [K63.89]  Procedure(s) (LRB):  EXPLORATORY LAPAROTOMY, LIGATION OF BLEEDING VESSEL (N/A) 4 Days Post-Op   Precautions: Fall Risk                Chart, occupational therapy assessment, plan of care, and goals were reviewed.    ASSESSMENT  Patient continues to benefit from skilled OT services and is slowly progressing towards goals. Patient ADLs continue to be limited by impaired balance, generalized weakness, limited lower body access, quick onset fatigue, and decreased functional activity tolerance. Patient with min A for bed mobility and initially CGA x2 for mobility with  assistance;2 Person assistance         Balance:     Balance  Sitting: Impaired  Sitting - Static: Good (unsupported)  Sitting - Dynamic: Good (unsupported);Fair (occasional)  Standing: Impaired;With support  Standing - Static: Constant support;Fair  Standing - Dynamic: Constant support;Fair    ADL Intervention:    Grooming: Contact guard assistance   Grooming Skilled Clinical Factors: standing at sink, patient leaning on sink on B elbows for support    Toileting: Stand by assistance     Pain Rating:  Reporting no pain     Pain Intervention(s):   pain is at a level acceptable to the patient    Activity Tolerance:   Fair  and requires rest breaks  Please refer to the flowsheet for vital signs taken during this treatment.    After treatment:   Patient left in no apparent distress sitting up in chair, Call bell within reach, Bed/ chair alarm activated, Updated patient's board on functional status and mobility recommendations, and RN aware    COMMUNICATION/EDUCATION:   The patient's plan of care was discussed with: physical therapist and registered nurse    Patient Education  Education Given To: Patient  Education Provided: Role of Therapy;ADL Adaptive Strategies;Transfer Training  Education Method: Verbal  Barriers to Learning: None  Education Outcome: Verbalized understanding;Continued education needed    Thank you for this referral.  Maame Natarajan OT  Minutes: 14

## 2025-04-17 NOTE — PLAN OF CARE
Problem: Chronic Conditions and Co-morbidities  Goal: Patient's chronic conditions and co-morbidity symptoms are monitored and maintained or improved  Outcome: Progressing  Flowsheets (Taken 4/16/2025 1934)  Care Plan - Patient's Chronic Conditions and Co-Morbidity Symptoms are Monitored and Maintained or Improved: Monitor and assess patient's chronic conditions and comorbid symptoms for stability, deterioration, or improvement     Problem: Pain  Goal: Verbalizes/displays adequate comfort level or baseline comfort level  Outcome: Progressing  Flowsheets (Taken 4/16/2025 1934)  Verbalizes/displays adequate comfort level or baseline comfort level:   Encourage patient to monitor pain and request assistance   Assess pain using appropriate pain scale   Administer analgesics based on type and severity of pain and evaluate response     Problem: Safety - Adult  Goal: Free from fall injury  Outcome: Progressing     Problem: Discharge Planning  Goal: Discharge to home or other facility with appropriate resources  Outcome: Progressing  Flowsheets (Taken 4/16/2025 1934)  Discharge to home or other facility with appropriate resources: Identify barriers to discharge with patient and caregiver     Problem: Skin/Tissue Integrity  Goal: Skin integrity remains intact  Description: 1.  Monitor for areas of redness and/or skin breakdown2.  Assess vascular access sites hourly3.  Every 4-6 hours minimum:  Change oxygen saturation probe site4.  Every 4-6 hours:  If on nasal continuous positive airway pressure, respiratory therapy assess nares and determine need for appliance change or resting period  Outcome: Progressing  Flowsheets (Taken 4/16/2025 1934)  Skin Integrity Remains Intact: Monitor for areas of redness and/or skin breakdown     Problem: ABCDS Injury Assessment  Goal: Absence of physical injury  Outcome: Progressing     Problem: Respiratory - Adult  Goal: Achieves optimal ventilation and oxygenation  Outcome:

## 2025-04-17 NOTE — PROGRESS NOTES
End of Shift Note    Bedside shift change report given to CHARIS Floyd (oncoming nurse) by Savanna Sin LPN (offgoing nurse).  Report included the following information SBAR    Shift worked:  7a-7p     Shift summary and any significant changes:    Pt ambulated to the bathroom and up to the chair for most of the day. Multiple loose bm noted throughout the day. Pt had some complaints of pain, prn pain meds given with positive effects. SHIRA putting out quite a lot of output, noted in chart,   Concerns for physician to address:  none     Zone phone for oncoming shift:   [phone]         Savanna Sin LPN

## 2025-04-18 LAB
ANION GAP SERPL CALC-SCNC: 5 MMOL/L (ref 2–12)
BASOPHILS # BLD: 0.03 K/UL (ref 0–0.1)
BASOPHILS NFR BLD: 0.3 % (ref 0–1)
BUN SERPL-MCNC: 21 MG/DL (ref 6–20)
BUN/CREAT SERPL: 16 (ref 12–20)
CALCIUM SERPL-MCNC: 7.8 MG/DL (ref 8.5–10.1)
CHLORIDE SERPL-SCNC: 105 MMOL/L (ref 97–108)
CO2 SERPL-SCNC: 27 MMOL/L (ref 21–32)
CREAT SERPL-MCNC: 1.35 MG/DL (ref 0.7–1.3)
DIFFERENTIAL METHOD BLD: ABNORMAL
EOSINOPHIL # BLD: 0.25 K/UL (ref 0–0.4)
EOSINOPHIL NFR BLD: 2.9 % (ref 0–7)
ERYTHROCYTE [DISTWIDTH] IN BLOOD BY AUTOMATED COUNT: 15.9 % (ref 11.5–14.5)
GLUCOSE BLD STRIP.AUTO-MCNC: 169 MG/DL (ref 65–117)
GLUCOSE BLD STRIP.AUTO-MCNC: 207 MG/DL (ref 65–117)
GLUCOSE BLD STRIP.AUTO-MCNC: 207 MG/DL (ref 65–117)
GLUCOSE BLD STRIP.AUTO-MCNC: 293 MG/DL (ref 65–117)
GLUCOSE SERPL-MCNC: 187 MG/DL (ref 65–100)
HCT VFR BLD AUTO: 34.4 % (ref 36.6–50.3)
HGB BLD-MCNC: 11.6 G/DL (ref 12.1–17)
IMM GRANULOCYTES # BLD AUTO: 0.09 K/UL (ref 0–0.04)
IMM GRANULOCYTES NFR BLD AUTO: 1 % (ref 0–0.5)
LYMPHOCYTES # BLD: 1.12 K/UL (ref 0.8–3.5)
LYMPHOCYTES NFR BLD: 13 % (ref 12–49)
MCH RBC QN AUTO: 28.8 PG (ref 26–34)
MCHC RBC AUTO-ENTMCNC: 33.7 G/DL (ref 30–36.5)
MCV RBC AUTO: 85.4 FL (ref 80–99)
MONOCYTES # BLD: 1.01 K/UL (ref 0–1)
MONOCYTES NFR BLD: 11.7 % (ref 5–13)
NEUTS SEG # BLD: 6.1 K/UL (ref 1.8–8)
NEUTS SEG NFR BLD: 71.1 % (ref 32–75)
NRBC # BLD: 0 K/UL (ref 0–0.01)
NRBC BLD-RTO: 0 PER 100 WBC
PLATELET # BLD AUTO: 68 K/UL (ref 150–400)
POTASSIUM SERPL-SCNC: 3.4 MMOL/L (ref 3.5–5.1)
RBC # BLD AUTO: 4.03 M/UL (ref 4.1–5.7)
RBC MORPH BLD: ABNORMAL
SERVICE CMNT-IMP: ABNORMAL
SODIUM SERPL-SCNC: 137 MMOL/L (ref 136–145)
WBC # BLD AUTO: 8.6 K/UL (ref 4.1–11.1)

## 2025-04-18 PROCEDURE — 2500000003 HC RX 250 WO HCPCS: Performed by: HOSPITALIST

## 2025-04-18 PROCEDURE — 6360000002 HC RX W HCPCS: Performed by: SURGERY

## 2025-04-18 PROCEDURE — 97530 THERAPEUTIC ACTIVITIES: CPT | Performed by: OCCUPATIONAL THERAPIST

## 2025-04-18 PROCEDURE — 6370000000 HC RX 637 (ALT 250 FOR IP): Performed by: INTERNAL MEDICINE

## 2025-04-18 PROCEDURE — 97530 THERAPEUTIC ACTIVITIES: CPT

## 2025-04-18 PROCEDURE — 6370000000 HC RX 637 (ALT 250 FOR IP): Performed by: NURSE PRACTITIONER

## 2025-04-18 PROCEDURE — 80048 BASIC METABOLIC PNL TOTAL CA: CPT

## 2025-04-18 PROCEDURE — 85025 COMPLETE CBC W/AUTO DIFF WBC: CPT

## 2025-04-18 PROCEDURE — 6370000000 HC RX 637 (ALT 250 FOR IP): Performed by: SURGERY

## 2025-04-18 PROCEDURE — 97116 GAIT TRAINING THERAPY: CPT

## 2025-04-18 PROCEDURE — 2580000003 HC RX 258: Performed by: SURGERY

## 2025-04-18 PROCEDURE — 6360000002 HC RX W HCPCS: Performed by: STUDENT IN AN ORGANIZED HEALTH CARE EDUCATION/TRAINING PROGRAM

## 2025-04-18 PROCEDURE — 36415 COLL VENOUS BLD VENIPUNCTURE: CPT

## 2025-04-18 PROCEDURE — 82962 GLUCOSE BLOOD TEST: CPT

## 2025-04-18 PROCEDURE — 97535 SELF CARE MNGMENT TRAINING: CPT | Performed by: OCCUPATIONAL THERAPIST

## 2025-04-18 PROCEDURE — 6360000002 HC RX W HCPCS: Performed by: HOSPITALIST

## 2025-04-18 PROCEDURE — 1100000003 HC PRIVATE W/ TELEMETRY

## 2025-04-18 RX ORDER — HEPARIN SODIUM 5000 [USP'U]/ML
5000 INJECTION, SOLUTION INTRAVENOUS; SUBCUTANEOUS EVERY 8 HOURS SCHEDULED
Status: DISCONTINUED | OUTPATIENT
Start: 2025-04-18 | End: 2025-04-19 | Stop reason: HOSPADM

## 2025-04-18 RX ORDER — HEPARIN SODIUM 5000 [USP'U]/ML
5000 INJECTION, SOLUTION INTRAVENOUS; SUBCUTANEOUS EVERY 8 HOURS SCHEDULED
Status: DISCONTINUED | OUTPATIENT
Start: 2025-04-18 | End: 2025-04-18 | Stop reason: SDUPTHER

## 2025-04-18 RX ADMIN — INSULIN LISPRO 1 UNITS: 100 INJECTION, SOLUTION INTRAVENOUS; SUBCUTANEOUS at 11:54

## 2025-04-18 RX ADMIN — PANTOPRAZOLE SODIUM 40 MG: 40 INJECTION, POWDER, LYOPHILIZED, FOR SOLUTION INTRAVENOUS at 10:09

## 2025-04-18 RX ADMIN — ACETAMINOPHEN 650 MG: 325 TABLET ORAL at 22:09

## 2025-04-18 RX ADMIN — INSULIN LISPRO 2 UNITS: 100 INJECTION, SOLUTION INTRAVENOUS; SUBCUTANEOUS at 17:56

## 2025-04-18 RX ADMIN — INSULIN LISPRO 1 UNITS: 100 INJECTION, SOLUTION INTRAVENOUS; SUBCUTANEOUS at 22:09

## 2025-04-18 RX ADMIN — BUMETANIDE 2 MG: 1 TABLET ORAL at 10:09

## 2025-04-18 RX ADMIN — HYDRALAZINE HYDROCHLORIDE 20 MG: 20 INJECTION INTRAMUSCULAR; INTRAVENOUS at 02:31

## 2025-04-18 RX ADMIN — SODIUM CHLORIDE, PRESERVATIVE FREE 10 ML: 5 INJECTION INTRAVENOUS at 10:10

## 2025-04-18 RX ADMIN — ACETAMINOPHEN 650 MG: 325 TABLET ORAL at 16:00

## 2025-04-18 RX ADMIN — Medication 1 AMPULE: at 10:10

## 2025-04-18 RX ADMIN — HYDROMORPHONE HYDROCHLORIDE 0.5 MG: 1 INJECTION, SOLUTION INTRAMUSCULAR; INTRAVENOUS; SUBCUTANEOUS at 06:08

## 2025-04-18 RX ADMIN — HYDRALAZINE HYDROCHLORIDE 50 MG: 50 TABLET ORAL at 16:00

## 2025-04-18 RX ADMIN — HEPARIN SODIUM 5000 UNITS: 5000 INJECTION INTRAVENOUS; SUBCUTANEOUS at 16:00

## 2025-04-18 RX ADMIN — ACETAMINOPHEN 650 MG: 325 TABLET ORAL at 02:28

## 2025-04-18 RX ADMIN — METOPROLOL TARTRATE 25 MG: 25 TABLET, FILM COATED ORAL at 22:09

## 2025-04-18 RX ADMIN — METOPROLOL TARTRATE 25 MG: 25 TABLET, FILM COATED ORAL at 10:08

## 2025-04-18 RX ADMIN — HYDRALAZINE HYDROCHLORIDE 50 MG: 50 TABLET ORAL at 22:09

## 2025-04-18 RX ADMIN — HYDRALAZINE HYDROCHLORIDE 50 MG: 50 TABLET ORAL at 10:08

## 2025-04-18 RX ADMIN — ATORVASTATIN CALCIUM 40 MG: 40 TABLET, FILM COATED ORAL at 10:09

## 2025-04-18 RX ADMIN — SODIUM CHLORIDE, PRESERVATIVE FREE 10 ML: 5 INJECTION INTRAVENOUS at 22:11

## 2025-04-18 RX ADMIN — ACETAMINOPHEN 650 MG: 325 TABLET ORAL at 10:08

## 2025-04-18 RX ADMIN — AMLODIPINE BESYLATE 10 MG: 5 TABLET ORAL at 10:08

## 2025-04-18 RX ADMIN — HEPARIN SODIUM 5000 UNITS: 5000 INJECTION INTRAVENOUS; SUBCUTANEOUS at 10:09

## 2025-04-18 ASSESSMENT — PAIN DESCRIPTION - DESCRIPTORS
DESCRIPTORS: ACHING

## 2025-04-18 ASSESSMENT — PAIN - FUNCTIONAL ASSESSMENT
PAIN_FUNCTIONAL_ASSESSMENT: ACTIVITIES ARE NOT PREVENTED
PAIN_FUNCTIONAL_ASSESSMENT: PREVENTS OR INTERFERES SOME ACTIVE ACTIVITIES AND ADLS
PAIN_FUNCTIONAL_ASSESSMENT: ACTIVITIES ARE NOT PREVENTED
PAIN_FUNCTIONAL_ASSESSMENT: ACTIVITIES ARE NOT PREVENTED

## 2025-04-18 ASSESSMENT — PAIN SCALES - GENERAL
PAINLEVEL_OUTOF10: 0
PAINLEVEL_OUTOF10: 2
PAINLEVEL_OUTOF10: 4
PAINLEVEL_OUTOF10: 2
PAINLEVEL_OUTOF10: 5
PAINLEVEL_OUTOF10: 7

## 2025-04-18 ASSESSMENT — PAIN DESCRIPTION - LOCATION
LOCATION: ABDOMEN
LOCATION: GENERALIZED
LOCATION: ABDOMEN

## 2025-04-18 ASSESSMENT — PAIN DESCRIPTION - ORIENTATION
ORIENTATION: MID
ORIENTATION: MID

## 2025-04-18 ASSESSMENT — PAIN SCALES - WONG BAKER
WONGBAKER_NUMERICALRESPONSE: NO HURT

## 2025-04-18 NOTE — PLAN OF CARE
Problem: Occupational Therapy - Adult  Goal: By Discharge: Performs self-care activities at highest level of function for planned discharge setting.  See evaluation for individualized goals.  Description: FUNCTIONAL STATUS PRIOR TO ADMISSION:     , Prior Level of Assist for ADLs: Independent,  ,  ,  ,  ,  , Prior Level of Assist for Homemaking: Independent,  , Prior Level of Assist for Transfers: Independent, Active : Yes .  Pt sometimes stays with his son.      HOME SUPPORT: Patient lived alone and reports a supportive son.    Occupational Therapy Goals:  Initiated 4/16/2025  1.  Patient will perform grooming in standing with Contact Guard Assist within 7 day(s).  2.  Patient will perform self-feeding with Stand by Assist within 7 day(s).  3.  Patient will perform sponge bathing with Moderate Assist within 7 day(s).  4.  Patient will perform toilet transfers with Moderate Assist  within 7 day(s).  5.  Patient will perform all aspects of toileting with Moderate Assist within 7 day(s).  6.  Patient will participate in upper extremity therapeutic exercise/activities with Supervision for 5 minutes within 7 day(s).    7.  Patient will tolerate standing adls for at least 6 minutes within 7 days.    Outcome: Progressing     OCCUPATIONAL THERAPY TREATMENT  Patient: Yefri Vigil (69 y.o. male)  Date: 4/18/2025  Primary Diagnosis: Colonic mass [K63.89]  Procedure(s) (LRB):  EXPLORATORY LAPAROTOMY, LIGATION OF BLEEDING VESSEL (N/A) 5 Days Post-Op   Precautions: Fall Risk, Bed Alarm                Chart, occupational therapy assessment, plan of care, and goals were reviewed.    ASSESSMENT  Patient continues to benefit from skilled OT services and is progressing towards goals. Pt was in bathroom upon arrival and had PT with pt.  He was reluctant to let go of Rw due to fear of falling.  Min assist for toilet transfer from standard toilet.  Total assist for BM clean up in standing.    Mobilized to bedside chair with RW  kit to improve independence and reduce pain, pt is interested in attempting use of the tools    Toileting: Maximum assistance  Toileting Skilled Clinical Factors: declined BM clean up in standing due to abdominal pain and fear of falling         Pain Rating:  Moderate report of abdominal pain, nurse notified    Activity Tolerance:   Fair  and requires rest breaks  Please refer to the flowsheet for vital signs taken during this treatment.    After treatment:   Patient left in no apparent distress sitting up in chair, Call bell within reach, Bed/ chair alarm activated, Updated patient's board on functional status and mobility recommendations, and reclined in recliner    COMMUNICATION/EDUCATION:   The patient's plan of care was discussed with: physical therapist, registered nurse, and patient    Patient Education  Education Given To: Patient  Education Provided: Role of Therapy;ADL Adaptive Strategies;Mobility Training;Fall Prevention Strategies;Transfer Training  Education Method: Verbal;Demonstration  Barriers to Learning: None  Education Outcome: Continued education needed;Verbalized understanding    Thank you for this referral.  Gloria Son OTR/L  Minutes: 23

## 2025-04-18 NOTE — PLAN OF CARE
Problem: Physical Therapy - Adult  Goal: By Discharge: Performs mobility at highest level of function for planned discharge setting.  See evaluation for individualized goals.  Description: FUNCTIONAL STATUS PRIOR TO ADMISSION: Patient was independent and active without use of DME. Denies history of falls. Denies home O2 use. Retired.     HOME SUPPORT PRIOR TO ADMISSION: The patient lived alone. States he occasionally will stay at son's house however son works full-time.    Physical Therapy Goals  Initiated 4/16/2025  1.  Patient will move from supine to sit and sit to supine, scoot up and down, and roll side to side in bed with supervision/set-up within 7 day(s).    2.  Patient will perform sit to stand with contact guard assist within 7 day(s).  3.  Patient will transfer from bed to chair and chair to bed with contact guard assist using the least restrictive device within 7 day(s).  4.  Patient will ambulate with contact guard assist for 75 feet with the least restrictive device within 7 day(s).     Outcome: Progressing   PHYSICAL THERAPY TREATMENT    Patient: Yefri Vigil (69 y.o. male)  Date: 4/18/2025  Diagnosis: Colonic mass [K63.89] Colonic mass  Procedure(s) (LRB):  EXPLORATORY LAPAROTOMY, LIGATION OF BLEEDING VESSEL (N/A) 5 Days Post-Op  Precautions: Restrictions/Precautions  Restrictions/Precautions: Fall Risk, Bed Alarm            ASSESSMENT:  Patient continues to benefit from skilled PT services and is progressing towards goals. Pt received in bed, alarm on; SHIRA drain has been removed. Pt cooperative. Overall CGA for bed mobility, CGA to min A for transfers with the RW, CGA to min A of 1 for amb with the rolling walker for 12' and 25' with seated rest on toilet. Pt had no c/o but did appear slightly SOB at end of activity. Sats 94% on room air. Pt overall c/o \"stomach\" pain. RN aware. Pt left in chair with alarm on and call bell in reach. Pt is still well below his baseline functioning, has supportive

## 2025-04-18 NOTE — PROGRESS NOTES
CRS Note    Sore but pain controlled with pain meds. Tolerating GIS diet, ongoing bowel function.     Abd soft, non-distended, appropriately tender, incision c/d/I, SHIRA ss    Labs reviewed     A/P:  69 y.o. M s/p robotic extended RHC with postoperative bleeding possibly in setting of coagulopathy requiring return to OR x2, now in stable condition with no evidence of further bleeding  - continue GIS diet  - dc drain  - OOB/ambulate  - restart dvt ppx  - dc planning    Katrina Goddard MD   [facility]  [phone]

## 2025-04-18 NOTE — PROGRESS NOTES
End of Shift Note    Bedside shift change report given to CHARIS Floyd (oncoming nurse) by CHARLIE SHAH RN (offgoing nurse).  Report included the following information SBAR, Kardex, and MAR    Shift worked:  7am-7pm     Shift summary and any significant changes:     Pt tolerated care fairly well. Medications were given and education was provided. Hourly rounding completed. Pt complaints of pain were treated with scheduled pain medications (See MAR). Pt up x1 to the BR; urinal present at bedside. Pt worked well with PT/OT; pt sat in chair for a few hours during this shift. SHIRA drain removed per MD order; pt tolerated it well. Pt had multiple small loose BM's during this shift.      Concerns for physician to address:  N/A     [employer] phone for oncoming shift:   [phone]       Activity:  Level of Assistance: Moderate assist, patient does 50-74%  Number times ambulated in hallways past shift: 0  Number of times OOB to chair past shift: 1    Cardiac:   Cardiac Monitoring: Yes      Cardiac Rhythm: Sinus rhythm    Access:  Current line(s): PIV     Genitourinary:   Urinary Status: Voiding, External catheter    Respiratory:   O2 Device: None (Room air)  Chronic home O2 use?: NO  Incentive spirometer at bedside: N/A    GI:  Last BM (including prior to admit): 04/17/25  Current diet:  ADULT DIET; Regular; Low Fiber  ADULT ORAL NUTRITION SUPPLEMENT; Breakfast, Dinner; Low Calorie/High Protein Oral Supplement  Passing flatus: YES    Pain Management:   Patient states pain is manageable on current regimen: YES    Skin:  Sly Scale Score: 19  Interventions: Wound Offloading (Prevention Methods): Bed, pressure reduction mattress, Elevate heels, Pillows, Repositioning, Turning    Patient Safety:  Fall Risk: Nursing Judgement-Fall Risk High(Add Comments): Yes  Fall Risk Interventions  Nursing Judgement-Fall Risk High(Add Comments): Yes  Toilet Every 2 Hours-In Advance of Need: Yes  Hourly Visual Checks: Awake, In bed  Fall Visual Posted:  Fall sign posted, Socks  Room Door Open: Yes  Alarm On: Bed, Chair  Patient Moved Closer to Nursing Station: No    Active Consults:   IP CONSULT TO DIETITIAN  IP CONSULT TO INTERNAL MEDICINE  IP CONSULT TO PHARMACY    Length of Stay:  Expected LOS: 10  Actual LOS: 7    CHARLIE SHAH RN

## 2025-04-18 NOTE — CARE COORDINATION
Transition of Care Plan:    RUR: 14%  Prior Level of Functioning:   Disposition: SNF  BRENNEN:   If SNF or IPR: Date FOC offered:   Date FOC received:   Accepting facility: [facility]  Date authorization started with reference number:   Date authorization received and expires:   Follow up appointments:   DME needed: n/a  Transportation at discharge:   IM/IMM Medicare/ letter given: 2nd IM needed  Is patient a  and connected with [facility]?    If yes, was [facility] transfer form completed and [facility] notified?   Caregiver Contact: sonYefri [phone]  Discharge Caregiver contacted prior to discharge?   Care Conference needed?   Barriers to discharge:  Auth    Per pt's request, CM contacted his son Yefri to discuses SNF choices.  Per son's request, CM [email or web address]) SNF list & requested he call CM with three choices.  CM will continue to follow.    11:43  Per pt's sonYefri's request, CM will send referrals to [facility].    4:40  CM initiated [employer] Auth.    Emani Gaytan  Ext [phone]

## 2025-04-18 NOTE — DISCHARGE INSTRUCTIONS
Eleanor Jaffe MD, FACS  Be Burris MD, FACS  MD Simone Navarro MD J.J. Coury, MD Kathryn Chuquin, MD      Discharge Instructions for [facility] Colorectal Surgery Patients       Do not lift any objects weighing more than 10 pounds for 4 weeks.    Do not do any housework including vacuuming, scrubbing or yardwork for 4 weeks.    Do not drive for two weeks or while taking sedating medications.    You may walk as desired and go up and down stairs as needed.    You may shower.  Do not take tub baths, swim or use hot tubs for 4 weeks.    Leave steri-strips on incision. They will fall off on their own.   You may have dermabond on your incisions which is surgical glue.   This will dissolve over time.  Do not scrub around incisions.    Follow low-fiber diet for 2 weeks. (See handbook for additional details).     Drink nutritional supplements 2 times per day until your diet and appetite are back to normal. Diabetic patients should drink one-half bottle 4 times daily.     Multiple bowel movements are normal each day.   Contact your surgeon’s office for any concerns.    Take Acetaminophen 1000mg every 6 hours for 24 hours, then as needed for pain, Ibuprofen 200-400 mg every 8 hours as needed for pain, and Oxycodone (per prescription instructions)    Follow up with providers as scheduled.    If your surgery involves an ostomy bag, please bring your supplies to the first office visit with your surgeon.     If you experience fever (greater than 100.5), chills, vomiting or redness or drainage at surgical site, please contact your surgeon’s office.    For questions regarding quality or quantity of ostomy output, refer to [facility] handbook or call surgeon’s office.    Please see handbook for additional instructions. If you have further questions, please call your surgeon’s office.

## 2025-04-18 NOTE — PROGRESS NOTES
Name: Yefri Vigil   MRN: [id number]  : 1955    Nephrology Progress Note    Assessment:  TERESA on CKD-3b: serum Cr bump to 3.7mg/dl. Pre-renal vs ATN (hypotension). Celebrex x 1 dose () and IV contrast exposure  and .  Non-oliguric. Underlying DM kidney disease. Baseline Cr 1.4-1.8mg/dl.   Required CRRTBriefly-stopped .    Encephalopathy  Hypertensive urgency urgency/encephalopathy  S/p extubation     Hyperkalemia: better with CRRT     Metabolic acidosis: severe lactic acidosis-> improving. S/p IV bicarbonate/CRRT     Robotic transverse colectomy: 25     DM2: stable control    Plan/Recommendations:  Creatinine improved to 1.35  Urine output is good  Potassium was 3.4 and it was replaced.  Blood pressure better now  On Bumex 2 mg p.o. daily  We will see him back on Monday if he is still there.    Grace Best MD  [facility] ([facility])        Subjective:  Seen and examined.  Doing better, working with physical therapy  ROS:   UTO    Exam:  BP (!) 155/80   Pulse 75   Temp 97.5 °F (36.4 °C)   Resp 24   Ht 1.676 m (5' 5.98\")   Wt 82.5 kg (181 lb 14.1 oz)   SpO2 98%   BMI 29.37 kg/m²      awake, alert, working with physical therapy   not in respiratory distress,   not in pain  Urine looks clear      Current Facility-Administered Medications   Medication Dose Route Frequency Provider Last Rate Last Admin    heparin (porcine) injection 5,000 Units  5,000 Units SubCUTAneous 3 times per day Katrina Goddard MD   5,000 Units at 25 1600    bumetanide (BUMEX) tablet 2 mg  2 mg Oral Daily Grace Best MD   2 mg at 25 1009    hydrALAZINE (APRESOLINE) injection 20 mg  20 mg IntraVENous Q4H PRN Reese Marquis MD   20 mg at 25 0231    oxyCODONE (ROXICODONE) immediate release tablet 5 mg  5 mg Oral Q4H PRN Jadon  oriented to person, place and time MARY KATE Lai - NP   5 mg at 04/17/25 1531    hydrALAZINE (APRESOLINE) tablet 50 mg  50 mg Oral TID Akshat Ruelas MD   50 mg at 04/18/25 1600    0.9 % sodium chloride infusion   IntraVENous PRN Akshat Ruelas MD        HYDROmorphone (DILAUDID) injection 0.25 mg  0.25 mg IntraVENous Q3H PRN Debbie Russell MD        Or    HYDROmorphone (DILAUDID) injection 0.5 mg  0.5 mg IntraVENous Q3H PRN Debbie Russell MD   0.5 mg at 04/18/25 0608    0.9 % sodium chloride infusion   IntraVENous PRN Brenden Mayorga MD        sodium chloride flush 0.9 % injection 5-40 mL  5-40 mL IntraVENous 2 times per day Brenden Mayorga MD   10 mL at 04/18/25 1010    pantoprazole (PROTONIX) 40 mg in sodium chloride (PF) 0.9 % 10 mL injection  40 mg IntraVENous Daily Billy Reyes MD   40 mg at 04/18/25 1009    insulin lispro (HUMALOG,ADMELOG) injection vial 0-4 Units  0-4 Units SubCUTAneous Q6H Cherelle Menon APRN - CNP   1 Units at 04/18/25 1154    amLODIPine (NORVASC) tablet 10 mg  10 mg Oral Daily Vee Diop APRN - NP   10 mg at 04/18/25 1008    atorvastatin (LIPITOR) tablet 40 mg  40 mg Oral Daily Vee Diop APRN - NP   40 mg at 04/18/25 1009    metoprolol tartrate (LOPRESSOR) tablet 25 mg  25 mg Oral BID Vee Diop APRN - NP   25 mg at 04/18/25 1008    sodium chloride flush 0.9 % injection 5-40 mL  5-40 mL IntraVENous PRN Billy Reyes MD        acetaminophen (TYLENOL) tablet 650 mg  650 mg Oral Q6H Vee Diop APRN - NP   650 mg at 04/18/25 1600    glucose chewable tablet 16 g  4 tablet Oral PRN Billy Reyes MD        dextrose bolus 10% 125 mL  125 mL IntraVENous PRN Billy Reyes MD        Or    dextrose bolus 10% 250 mL  250 mL IntraVENous PRN Billy Reyes MD        glucagon injection 1 mg  1 mg SubCUTAneous PRN Billy Reyes MD        dextrose 10 % infusion   IntraVENous Continuous PRN Billy Reyes MD            Labs/Data:    Lab Results   Component Value Date    WBC 8.6 04/18/2025    HGB 11.6 (L)

## 2025-04-18 NOTE — PROGRESS NOTES
Hospitalist Progress Note    NAME:   Yefri Vigil   : 1955   MRN: [id number]     Date/Time: 2025 6:41 PM  Patient PCP: Edy Brandt APRN - NP    Estimated discharge date:  Barriers:       Assessment / Plan:    S/p Robotic extended right colectomy for colonic mass on .   -Complicated by hypotension, lactic acidosis, shock  -Requiring ICU admission, emergent laparotomy  -Surgery is following    Leukocytosis, resolved  Hemorrhagic shock/septic shock.  Resolved  --Patient had elective transverse colectomy on .  --Patient was emergently taken to OR on  and found to have ongoing intra abdominal bleeding, continued to have ongoing bleeding overnight and taken to OR again , found to have multiple areas of bleeding, hemostasis achieved.  -- Monitor H&H and transfuse for Hb <7  -POA 19.7, currently 13.2  -- Blood cultures negative to date  - Completed  Zosyn 2025  - Pt is s/p multiple[e transfusions.   - 1 unit of platelets given 4/15/25 due to thrombocytopenia and melena     Hypernatremia, resolved  Hypokalemia  Acute renal failure.  Improving  CKD stage III  --Likely ATN etiology, Initially started on CRRT. Now off CRRT   --Monitor renal functions.  Monitor BMP daily     Acute hypoxemic respiratory failure.  Resolved  --In setting of shock.  --Intubated for emergent surgery.  -- Passed SBT and extubated   --Doing fine since then, on room air     Lactic acidosis: Resolved   --Sec to shock  --Optimize perfusion, maintain MAP >65.  --lactate cleared       DM2  - Continue insulin sliding scale    HTN  - C/W amlodipine, hydralazine    GERD C/W PPI      Medical Decision Making:   I personally reviewed labs: CBC, BMP      Discussed case with: Patient        Code Status: Full code  DVT Prophylaxis: Heparin  GI Prophylaxis: PPI    Subjective:     Seen and evaluated the patient at bedside, sitting after but he denies any pain, abdominal pain, fevers or chills      Objective:

## 2025-04-18 NOTE — PROGRESS NOTES
Comprehensive Nutrition Assessment    Type and Reason for Visit:  Initial, LOS    Nutrition Recommendations/Plan:   Continue current diet  Ensure HP 2x/day  Encourage PO intakes, honor preferences as able, provide assistance PRN  Monitor and record PO intakes, supplement acceptance, and Bms in I/Os     Malnutrition Assessment:  Malnutrition Status:  At risk for malnutrition (04/18/25 5653)    Context:  Acute Illness     Findings of the 6 clinical characteristics of malnutrition:  Energy Intake:  50% or less of estimated energy requirements for 5 or more days  Weight Loss:  No weight loss     Body Fat Loss:  No body fat loss     Muscle Mass Loss:  No muscle mass loss    Fluid Accumulation:  No fluid accumulation     Strength:  Not Performed    Nutrition Assessment:    Admitted for colonic mass. S/p colectomy. PMHx includes T2DM, HTN, NSTEMI. Screened for LOS. Pt with very poor appetite/intakes, generally <25%. He did report that he was able to eat more at breakfast this AM. Noted unfinished pile of ensure surgery at bedside. Pt agreeable to ensure HP, cold from kitchen. His only reported preference was for popcicles - brought one for pt.    Nutrition Related Findings:    Labs: Na 137, K 3.4, BUN 21, Creat 1.35, Gluc 207. Meds: atorvastatin, bumetanide, insulin lispro, pantoprazole. Trace generalized edema. BM 4/17. Wound Type: Surgical Incision       Current Nutrition Intake & Therapies:    Average Meal Intake: 1-25%, 26-50%  Average Supplements Intake: 26-50%  ADULT DIET; Regular; Low Fiber  ADULT ORAL NUTRITION SUPPLEMENT; Breakfast, Dinner; Low Calorie/High Protein Oral Supplement    Anthropometric Measures:  Height: 167.6 cm (5' 5.98\")  Ideal Body Weight (IBW): 142 lbs (65 kg)    Current Body Weight: 82.5 kg (181 lb 14.1 oz), 128.1 % IBW. Weight Source: Bed scale  Current BMI (kg/m2): 29.4  BMI Categories: Overweight (BMI 25.0-29.9)    Estimated Daily Nutrient Needs:  Energy Requirements Based On:

## 2025-04-18 NOTE — PROGRESS NOTES
End of Shift Note    Bedside shift change report given to (oncoming nurse) by Mariel Urbano RN (offgoing nurse).  Report included the following information SBAR, Intake/Output, MAR, and Recent Results    Shift worked:  [id number]     Shift summary and any significant changes:     Patient had elevated BP. Still lethargic and weak in the BLUE. Treated for pain x 2. SHIRA drain emptied frequently. Fills within an hour or two after emptying. Bloody/Serosang drainage. Patient given bed bath and repositioned frequently. No coverage for BS this morning.      Concerns for physician to address:  ***     [employer] phone for oncoming shift:   ***       Activity:  Level of Assistance: Minimal assist, patient does 75% or more  Number times ambulated in hallways past shift: 0  Number of times OOB to chair past shift: 0    Cardiac:   Cardiac Monitoring: Yes      Cardiac Rhythm: Sinus rhythm    Access:  Current line(s): PIV     Genitourinary:   Urinary Status: Voiding, Bathroom privileges, External catheter    Respiratory:   O2 Device: None (Room air)  Chronic home O2 use?: NO  Incentive spirometer at bedside: N/A    GI:  Last BM (including prior to admit): 04/17/25  Current diet:  ADULT ORAL NUTRITION SUPPLEMENT; Breakfast, Dinner; Other Oral Supplement; [facility] ONS dropped off by RD  ADULT DIET; Regular; Low Fiber  Passing flatus: YES    Pain Management:   Patient states pain is manageable on current regimen: YES    Skin:  Sly Scale Score: 19  Interventions: Wound Offloading (Prevention Methods): Bed, pressure reduction mattress, Elevate heels, Pillows, Repositioning, Turning    Patient Safety:  Fall Risk: Nursing Judgement-Fall Risk High(Add Comments): Yes  Fall Risk Interventions  Nursing Judgement-Fall Risk High(Add Comments): Yes  Toilet Every 2 Hours-In Advance of Need: Yes  Hourly Visual Checks: In bed, Awake, Quiet  Fall Visual Posted: Fall sign posted, Socks  Room Door Open: Deferred to promote rest  Alarm On: Bed  Patient Moved

## 2025-04-19 VITALS
SYSTOLIC BLOOD PRESSURE: 156 MMHG | HEART RATE: 69 BPM | DIASTOLIC BLOOD PRESSURE: 75 MMHG | WEIGHT: 181.88 LBS | TEMPERATURE: 98.2 F | BODY MASS INDEX: 29.23 KG/M2 | OXYGEN SATURATION: 100 % | HEIGHT: 66 IN | RESPIRATION RATE: 20 BRPM

## 2025-04-19 LAB
ANION GAP SERPL CALC-SCNC: 5 MMOL/L (ref 2–12)
BASOPHILS # BLD: 0.03 K/UL (ref 0–0.1)
BASOPHILS NFR BLD: 0.5 % (ref 0–1)
BUN SERPL-MCNC: 21 MG/DL (ref 6–20)
BUN/CREAT SERPL: 15 (ref 12–20)
CALCIUM SERPL-MCNC: 7.9 MG/DL (ref 8.5–10.1)
CHLORIDE SERPL-SCNC: 104 MMOL/L (ref 97–108)
CO2 SERPL-SCNC: 28 MMOL/L (ref 21–32)
CREAT SERPL-MCNC: 1.37 MG/DL (ref 0.7–1.3)
DIFFERENTIAL METHOD BLD: ABNORMAL
EOSINOPHIL # BLD: 0.23 K/UL (ref 0–0.4)
EOSINOPHIL NFR BLD: 3.5 % (ref 0–7)
ERYTHROCYTE [DISTWIDTH] IN BLOOD BY AUTOMATED COUNT: 15.8 % (ref 11.5–14.5)
GLUCOSE BLD STRIP.AUTO-MCNC: 218 MG/DL (ref 65–117)
GLUCOSE BLD STRIP.AUTO-MCNC: 222 MG/DL (ref 65–117)
GLUCOSE SERPL-MCNC: 204 MG/DL (ref 65–100)
HCT VFR BLD AUTO: 29 % (ref 36.6–50.3)
HGB BLD-MCNC: 10 G/DL (ref 12.1–17)
IMM GRANULOCYTES # BLD AUTO: 0.05 K/UL (ref 0–0.04)
IMM GRANULOCYTES NFR BLD AUTO: 0.8 % (ref 0–0.5)
LYMPHOCYTES # BLD: 0.99 K/UL (ref 0.8–3.5)
LYMPHOCYTES NFR BLD: 15.2 % (ref 12–49)
MCH RBC QN AUTO: 28.7 PG (ref 26–34)
MCHC RBC AUTO-ENTMCNC: 34.5 G/DL (ref 30–36.5)
MCV RBC AUTO: 83.3 FL (ref 80–99)
MONOCYTES # BLD: 0.78 K/UL (ref 0–1)
MONOCYTES NFR BLD: 12 % (ref 5–13)
NEUTS SEG # BLD: 4.42 K/UL (ref 1.8–8)
NEUTS SEG NFR BLD: 68 % (ref 32–75)
NRBC # BLD: 0 K/UL (ref 0–0.01)
NRBC BLD-RTO: 0 PER 100 WBC
PLATELET # BLD AUTO: 83 K/UL (ref 150–400)
POTASSIUM SERPL-SCNC: 3 MMOL/L (ref 3.5–5.1)
RBC # BLD AUTO: 3.48 M/UL (ref 4.1–5.7)
RBC MORPH BLD: ABNORMAL
SERVICE CMNT-IMP: ABNORMAL
SERVICE CMNT-IMP: ABNORMAL
SODIUM SERPL-SCNC: 137 MMOL/L (ref 136–145)
WBC # BLD AUTO: 6.5 K/UL (ref 4.1–11.1)

## 2025-04-19 PROCEDURE — 85025 COMPLETE CBC W/AUTO DIFF WBC: CPT

## 2025-04-19 PROCEDURE — 36415 COLL VENOUS BLD VENIPUNCTURE: CPT

## 2025-04-19 PROCEDURE — 6370000000 HC RX 637 (ALT 250 FOR IP): Performed by: INTERNAL MEDICINE

## 2025-04-19 PROCEDURE — 80048 BASIC METABOLIC PNL TOTAL CA: CPT

## 2025-04-19 PROCEDURE — 6370000000 HC RX 637 (ALT 250 FOR IP): Performed by: NURSE PRACTITIONER

## 2025-04-19 PROCEDURE — 6360000002 HC RX W HCPCS: Performed by: SURGERY

## 2025-04-19 PROCEDURE — 2580000003 HC RX 258: Performed by: SURGERY

## 2025-04-19 PROCEDURE — 6360000002 HC RX W HCPCS: Performed by: STUDENT IN AN ORGANIZED HEALTH CARE EDUCATION/TRAINING PROGRAM

## 2025-04-19 PROCEDURE — 2500000003 HC RX 250 WO HCPCS: Performed by: HOSPITALIST

## 2025-04-19 PROCEDURE — 6370000000 HC RX 637 (ALT 250 FOR IP): Performed by: STUDENT IN AN ORGANIZED HEALTH CARE EDUCATION/TRAINING PROGRAM

## 2025-04-19 PROCEDURE — 82962 GLUCOSE BLOOD TEST: CPT

## 2025-04-19 RX ORDER — POTASSIUM CHLORIDE 1.5 G/1.58G
40 POWDER, FOR SOLUTION ORAL ONCE
Status: COMPLETED | OUTPATIENT
Start: 2025-04-19 | End: 2025-04-19

## 2025-04-19 RX ORDER — PANTOPRAZOLE SODIUM 40 MG/1
40 TABLET, DELAYED RELEASE ORAL
Status: DISCONTINUED | OUTPATIENT
Start: 2025-04-20 | End: 2025-04-19 | Stop reason: HOSPADM

## 2025-04-19 RX ORDER — OXYCODONE HYDROCHLORIDE 5 MG/1
5 TABLET ORAL EVERY 8 HOURS PRN
Refills: 0 | Status: DISCONTINUED | OUTPATIENT
Start: 2025-04-19 | End: 2025-04-19 | Stop reason: HOSPADM

## 2025-04-19 RX ORDER — INSULIN LISPRO 100 [IU]/ML
0-16 INJECTION, SOLUTION INTRAVENOUS; SUBCUTANEOUS
Status: DISCONTINUED | OUTPATIENT
Start: 2025-04-19 | End: 2025-04-19 | Stop reason: HOSPADM

## 2025-04-19 RX ADMIN — HEPARIN SODIUM 5000 UNITS: 5000 INJECTION INTRAVENOUS; SUBCUTANEOUS at 01:04

## 2025-04-19 RX ADMIN — HEPARIN SODIUM 5000 UNITS: 5000 INJECTION INTRAVENOUS; SUBCUTANEOUS at 09:07

## 2025-04-19 RX ADMIN — AMLODIPINE BESYLATE 10 MG: 5 TABLET ORAL at 09:05

## 2025-04-19 RX ADMIN — SODIUM CHLORIDE, PRESERVATIVE FREE 10 ML: 5 INJECTION INTRAVENOUS at 09:10

## 2025-04-19 RX ADMIN — ATORVASTATIN CALCIUM 40 MG: 40 TABLET, FILM COATED ORAL at 09:06

## 2025-04-19 RX ADMIN — HYDRALAZINE HYDROCHLORIDE 50 MG: 50 TABLET ORAL at 09:07

## 2025-04-19 RX ADMIN — POTASSIUM CHLORIDE 40 MEQ: 1.5 FOR SOLUTION ORAL at 10:16

## 2025-04-19 RX ADMIN — METOPROLOL TARTRATE 25 MG: 25 TABLET, FILM COATED ORAL at 09:05

## 2025-04-19 RX ADMIN — BUMETANIDE 2 MG: 1 TABLET ORAL at 09:06

## 2025-04-19 RX ADMIN — PANTOPRAZOLE SODIUM 40 MG: 40 INJECTION, POWDER, LYOPHILIZED, FOR SOLUTION INTRAVENOUS at 09:09

## 2025-04-19 RX ADMIN — INSULIN LISPRO 1 UNITS: 100 INJECTION, SOLUTION INTRAVENOUS; SUBCUTANEOUS at 06:12

## 2025-04-19 RX ADMIN — ACETAMINOPHEN 650 MG: 325 TABLET ORAL at 06:08

## 2025-04-19 ASSESSMENT — PAIN DESCRIPTION - DESCRIPTORS: DESCRIPTORS: ACHING

## 2025-04-19 ASSESSMENT — PAIN SCALES - GENERAL: PAINLEVEL_OUTOF10: 5

## 2025-04-19 ASSESSMENT — PAIN - FUNCTIONAL ASSESSMENT: PAIN_FUNCTIONAL_ASSESSMENT: ACTIVITIES ARE NOT PREVENTED

## 2025-04-19 ASSESSMENT — PAIN DESCRIPTION - LOCATION: LOCATION: ABDOMEN

## 2025-04-19 NOTE — CARE COORDINATION
Transition of Care Plan:     RUR: 14%    Prior Level of Functioning: matt lives alone and independent with ADL', IADLs and driving prior to admit - has :shower chair and rollator - has been at [facility] and had HH in the past at son's home but agency not known - has 3 children but only 2 sons have been in contact with patient not the daughter- both son's work full time so unable to provide 24/7 care upon discharge-     Disposition: SNF- [facility]  Room 411  RN to call report [phone]  Family to transport- Jules will be here at 2 PM to transport.     BRENNEN: 4/19/25    11:30 AM  DC order is in.   Talked to Merrill with [facility] and they can accept today.  Pt will go to Room 411 Private Room and will move to semi private when one is available.  (Pt is aware)   Son will be here around 2 PM to transport Pt.     9:54 AM  Hospitalist stated ok.. if surgery clears.     CM called surgery and Bob stated stable for DC.     CM called [facility] at [facility] cell [phone] and he is going to check with Manager on Duty to verify DC and times and get back with me.     Issued 2nd IM.  Ok with DC.      CM talked to Jules Gu and he will be able to transport     Waiting to see if [facility] has a bed today and at what time.       9:40 AM  CM received a call from [facility] regarding approved authorization for SNF.     Handoff from CM indicated that Pt is ready for DC and [facility] should have a bed on Saturday.      CM sent a perfect serve to hospitalist to see if stable for DC.   - Hospitalist stated if clear from Surgery they are the primary MD.  CM will try to find out.     If SNF or IPR: Date FOC offered: 4/18/25  Date FOC received: 4/18/25  Accepting facility: [facility]  Date authorization started with reference number: 4/18/25  Date authorization received and expires: 4/19/25  Auth [id number]  Reference number [id number]  Auth good from 4/19/25 to 4/22/25  Updates to be sent to Yanira Fontenot  Fax: [phone]    Follow up appointments:

## 2025-04-19 NOTE — PROGRESS NOTES
Hospitalist Progress Note    NAME:   Yefri Vigil   : 1955   MRN: [id number]     Date/Time: 2025 10:00 AM  Patient PCP: Edy Brandt APRN - [facility]    Estimated discharge date:  Barriers:       Assessment / Plan:    S/p Robotic extended right colectomy for colonic mass on .   -Complicated by hypotension, lactic acidosis, shock  -Requiring ICU admission, emergent laparotomy  - H&H has been stable around 10, currently on the medicine floor, stable  -Surgery is following as a primary team, looks like patient will be discharged today to rehab.  -Hospitalist service will sign off at this time.    Leukocytosis, resolved  Hemorrhagic shock/septic shock.  Resolved  --Patient had elective transverse colectomy on .  --Patient was emergently taken to OR on  and found to have ongoing intra abdominal bleeding, continued to have ongoing bleeding overnight and taken to OR again , found to have multiple areas of bleeding, hemostasis achieved.  -- H&H currently 10/29, monitor H&H and transfuse for Hb <7      Hypernatremia, resolved  Hypokalemia  Acute renal failure.  Improving  CKD stage III  --Likely ATN etiology, Initially started on CRRT. Now off CRRT   --Monitor renal functions.  Monitor BMP daily     Acute hypoxemic respiratory failure.  Resolved  -- on room air     Lactic acidosis: Resolved     DM2  - Continue insulin sliding scale, will escalate to high-dose sliding scale    HTN  - C/W amlodipine, Bumex, hydralazine    GERD C/W PPI      Medical Decision Making:   I personally reviewed labs: CBC, BMP      Discussed case with: Patient        Code Status: Full code  DVT Prophylaxis: Heparin  GI Prophylaxis: PPI    Subjective:     Seen and evaluated the patient at bedside, sitting after but he denies any pain, abdominal pain, fevers or chills      Objective:     VITALS:   Last 24hrs VS reviewed since prior progress note. Most recent are:  Patient Vitals for the past 24 hrs:   BP Temp Temp  src Pulse Resp SpO2 Height   04/19/25 0751 (!) 158/82 97.2 °F (36.2 °C) Oral 70 20 100 % --   04/19/25 0521 (!) 157/67 -- -- 71 -- -- --   04/19/25 0519 (!) 173/86 -- -- 73 -- 97 % --   04/18/25 2209 (!) 165/82 -- -- 80 -- -- --   04/18/25 2000 (!) 165/82 98.4 °F (36.9 °C) Oral 80 18 97 % --   04/18/25 1600 (!) 155/80 -- -- 75 -- -- --   04/18/25 1516 (!) 163/75 97.5 °F (36.4 °C) -- 75 24 98 % --   04/18/25 1459 -- -- -- -- -- -- 1.676 m (5' 5.98\")   04/18/25 1220 138/75 98.1 °F (36.7 °C) -- 78 22 99 % --   04/18/25 1100 -- -- -- -- 18 -- --         Intake/Output Summary (Last 24 hours) at 4/19/2025 1000  Last data filed at 4/19/2025 0521  Gross per 24 hour   Intake 1650 ml   Output 115 ml   Net 1535 ml        I had a face to face encounter and independently examined this patient on 4/19/2025, as outlined below:  PHYSICAL EXAM:  General: Alert, cooperative  EENT:  EOMI. Anicteric sclerae.  Resp:  CTA bilaterally, no wheezing or rales.  No accessory muscle use  CV:  Regular  rhythm,  No edema  GI:  Bowel sounds positive, abdominal distention, no tenderness.  Surgical scars appreciated  Neurologic:  Alert and oriented X 3, normal speech,   Psych:   Good insight. Not anxious nor agitated  Skin:  No rashes.  No jaundice    Reviewed most current lab test results and cultures  YES  Reviewed most current radiology test results   YES  Review and summation of old records today    NO  Reviewed patient's current orders and MAR    YES  PMH/SH reviewed - no change compared to H&P    Procedures: see electronic medical records for all procedures/Xrays and details which were not copied into this note but were reviewed prior to creation of Plan.      LABS:  I reviewed today's most current labs and imaging studies.  Pertinent labs include:  Recent Labs     04/17/25  0436 04/18/25  0506 04/19/25  0604   WBC 12.3* 8.6 6.5   HGB 11.4* 11.6* 10.0*   HCT 34.5* 34.4* 29.0*   PLT 62* 68* 83*     Recent Labs     04/17/25  0436 04/18/25  0506  04/19/25  0604   NA  --  137 137   K  --  3.4* 3.0*   CL  --  105 104   CO2  --  27 28   GLUCOSE  --  187* 204*   BUN  --  21* 21*   CREATININE  --  1.35* 1.37*   CALCIUM  --  7.8* 7.9*   MG 1.9  --   --    PHOS 2.5*  --   --        Signed: Fabio Lofton MD

## 2025-04-19 NOTE — PROGRESS NOTES
DISCHARGE NOTE FROM SURGICAL-TELEMETRY NURSE    Patient determined to be stable for discharge by attending provider. I have reviewed the discharge instructions and follow-up appointments with the Patient. They verbalized understanding and all questions were answered to their satisfaction. No complaints or further questions were expressed.      No new medication scripts Appropriate educational materials and medication side effect teaching were provided.      PIV were removed prior to discharge.     Patient did not discharge with any line, broosk, or drain.    All personal items collected during admission were returned to the patient prior to discharge.    Post-op patient: Yes - Patient given post-op discharge kit and instructed on use.      Carolina Montes LPN

## 2025-04-19 NOTE — PROGRESS NOTES
Report called and given to CHANTELL Mc at [facility]. Time for questions and clarifications was provided. No further questions or concerns expressed at this time. Family to transport, expected to be here at 1400.

## 2025-04-19 NOTE — PROGRESS NOTES
No complaints  Vitals:    04/19/25 0751   BP: (!) 158/82   Pulse: 70   Resp: 20   Temp: 97.2 °F (36.2 °C)   SpO2: 100%       Intake/Output Summary (Last 24 hours) at 4/19/2025 1039  Last data filed at 4/19/2025 0521  Gross per 24 hour   Intake 1650 ml   Output 115 ml   Net 1535 ml     A/p to rehab today

## 2025-04-21 ENCOUNTER — OFFICE VISIT (OUTPATIENT)
Facility: CLINIC | Age: 70
End: 2025-04-21
Payer: MEDICARE

## 2025-04-21 VITALS
DIASTOLIC BLOOD PRESSURE: 65 MMHG | BODY MASS INDEX: 28.52 KG/M2 | SYSTOLIC BLOOD PRESSURE: 139 MMHG | RESPIRATION RATE: 18 BRPM | WEIGHT: 176.6 LBS | TEMPERATURE: 97.5 F | HEART RATE: 85 BPM | OXYGEN SATURATION: 98 %

## 2025-04-21 DIAGNOSIS — I10 PRIMARY HYPERTENSION: ICD-10-CM

## 2025-04-21 DIAGNOSIS — D62 ACUTE BLOOD LOSS ANEMIA: Primary | ICD-10-CM

## 2025-04-21 DIAGNOSIS — R19.7 DIARRHEA OF PRESUMED INFECTIOUS ORIGIN: ICD-10-CM

## 2025-04-21 DIAGNOSIS — Z90.49 S/P PARTIAL COLECTOMY: ICD-10-CM

## 2025-04-21 DIAGNOSIS — E11.8 CONTROLLED TYPE 2 DIABETES MELLITUS WITH COMPLICATION, WITHOUT LONG-TERM CURRENT USE OF INSULIN (HCC): ICD-10-CM

## 2025-04-21 PROBLEM — A92.30: Status: RESOLVED | Noted: 2023-10-11 | Resolved: 2025-04-21

## 2025-04-21 PROBLEM — G93.40 ENCEPHALOPATHY: Status: RESOLVED | Noted: 2024-05-06 | Resolved: 2025-04-21

## 2025-04-21 PROBLEM — E87.20 LACTIC ACIDOSIS: Status: RESOLVED | Noted: 2025-04-13 | Resolved: 2025-04-21

## 2025-04-21 PROCEDURE — 3044F HG A1C LEVEL LT 7.0%: CPT | Performed by: FAMILY MEDICINE

## 2025-04-21 PROCEDURE — 99306 1ST NF CARE HIGH MDM 50: CPT | Performed by: FAMILY MEDICINE

## 2025-04-21 PROCEDURE — 1123F ACP DISCUSS/DSCN MKR DOCD: CPT | Performed by: FAMILY MEDICINE

## 2025-04-21 NOTE — ASSESSMENT & PLAN NOTE
Will obtain stool studies and discontinue metformin to see if this is the cause.  It appears this has been noted in the past, but no stool studies done. He is reluctant to eat or drink as a result. His last A1c was 6.6

## 2025-04-21 NOTE — PROGRESS NOTES
[facility] SENIOR CARE SERVICES    Skilled Nursing Facility Admission History and Physical Examination    PLACE OF SERVICE:  [facility] [address] [place] [address]    Name: Yefri Vigil      Room: 411  YOB: 1955  MRN: [id number]    ASSESSMENT/PLAN:    Assessment & Plan  Acute blood loss anemia     Will check labs whilst here.   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 Needs f/u with surgeon scheduled for staple removal     Primary hypertension     At goal  Continue amlodipine 10mg, hydralazine 50mg tid, metoprolol 25mg bid       Controlled type 2 diabetes mellitus with complication, without long-term current use of insulin (HCC)     Will discontinue the metformin to see if that might be the reason for the diarrhea. If so, we can consider SGLT2 instead.       Diarrhea of presumed infectious origin     Will obtain stool studies and discontinue metformin to see if this is the cause.  It appears this has been noted in the past, but no stool studies done. He is reluctant to eat or drink as a result. His last A1c was 6.6     The patient is admitted to the SNF for rehabilitation for multiple medical issues as listed above.  will be consulted for discharge planning.     SUBJECTIVE:    Chief Complaint:  Chief Complaint   Patient presents with    New Patient     Admitted after partial colectomy with complications of hypotension, hemorrhagic shock 4/11 - 4/18/25       History of Present Illness:    Yefri Vigil is a 69 y.o. male who presents for admission to [facility] [place] following a recent admission to the hospital for several medical issues including extended transverse colectomy for large colon polyp. This was complicated by post-op hemorrhage, hypotension and lactic acidosis. Subsequent blood cultures

## 2025-04-21 NOTE — ASSESSMENT & PLAN NOTE
Will discontinue the metformin to see if that might be the reason for the diarrhea. If so, we can consider SGLT2 instead.

## 2025-04-22 ENCOUNTER — OFFICE VISIT (OUTPATIENT)
Facility: CLINIC | Age: 70
End: 2025-04-22
Payer: MEDICARE

## 2025-04-22 VITALS
SYSTOLIC BLOOD PRESSURE: 168 MMHG | BODY MASS INDEX: 28.52 KG/M2 | WEIGHT: 176.6 LBS | OXYGEN SATURATION: 99 % | TEMPERATURE: 97.3 F | DIASTOLIC BLOOD PRESSURE: 80 MMHG | HEART RATE: 66 BPM | RESPIRATION RATE: 17 BRPM

## 2025-04-22 DIAGNOSIS — R19.7 DIARRHEA OF PRESUMED INFECTIOUS ORIGIN: ICD-10-CM

## 2025-04-22 DIAGNOSIS — R53.1 WEAKNESS: ICD-10-CM

## 2025-04-22 DIAGNOSIS — I10 PRIMARY HYPERTENSION: ICD-10-CM

## 2025-04-22 DIAGNOSIS — E11.29 TYPE 2 DIABETES MELLITUS WITH OTHER DIABETIC KIDNEY COMPLICATION (HCC): ICD-10-CM

## 2025-04-22 DIAGNOSIS — Z90.49 S/P PARTIAL COLECTOMY: ICD-10-CM

## 2025-04-22 DIAGNOSIS — G89.18 POSTOPERATIVE PAIN: ICD-10-CM

## 2025-04-22 DIAGNOSIS — D62 ACUTE BLOOD LOSS ANEMIA: Primary | ICD-10-CM

## 2025-04-22 PROCEDURE — 3044F HG A1C LEVEL LT 7.0%: CPT | Performed by: NURSE PRACTITIONER

## 2025-04-22 PROCEDURE — 1123F ACP DISCUSS/DSCN MKR DOCD: CPT | Performed by: NURSE PRACTITIONER

## 2025-04-22 PROCEDURE — 99309 SBSQ NF CARE MODERATE MDM 30: CPT | Performed by: NURSE PRACTITIONER

## 2025-04-22 RX ORDER — TRAMADOL HYDROCHLORIDE 50 MG/1
50 TABLET ORAL EVERY 6 HOURS PRN
Qty: 20 TABLET | Refills: 0 | Status: SHIPPED | OUTPATIENT
Start: 2025-04-22 | End: 2025-04-25 | Stop reason: SDUPTHER

## 2025-04-22 NOTE — PROGRESS NOTES
[facility] [place] [address] [place] [address]  Phone: [phone]  Fax: [phone]  Also available via Perfect Serve     PLACE OF SERVICE:  [facility] [address] [place] [address]    SKILLED VISIT    Chief Complaint:   Chief Complaint   Patient presents with    Follow-up         HPI : Yefri Vigil is a 69 y.o. male here for follow up.    Previous history prior to admission:  Patient was admitted to [facility] for robotic extended right colectomy for right colonic mass on 4/11/2025 this was complicated by hypotension lactic acidosis and shock where he was admitted to the ICU for emergent laparotomy where he was found to have ongoing intra-abdominal bleeding and hemostasis was achieved.  During hospitalization he also had hyponatremia hypokalemia acute renal failure on chronic kidney disease stage III.  He required CRRT but is now off of this and stable his renal function improved.  He has other past medical history as of diabetes mellitus hypertension and GERD.  He will need follow-up with Dr. Wheatley colorectal surgery.  He was discharged to [facility] and rehabilitation for strengthening and conditioning.    Current visit:  04/22/2025 -patient is lying in bed today he is awake alert affect is very flat.  Vital signs have been reviewed blood pressures have been elevated since admission to facility and appears per review of hospital blood pressures have been elevated prior to discharge.  Patient is currently on hydralazine 50 mg p.o. 3 times daily, lisinopril 20 mg daily, metoprolol 25 mg p.o. twice daily and amlodipine 10 mg daily.  At this time we will increase hydralazine to 75 mg p.o. 3 times daily and monitor blood pressures.  He is afebrile heart rate is rate controlled he denies any chest pain palpitations.  He has been eating and drinking about 50 to 100%.  His last bowel movement is 4/22/2025 and has

## 2025-04-24 ENCOUNTER — OFFICE VISIT (OUTPATIENT)
Facility: CLINIC | Age: 70
End: 2025-04-24

## 2025-04-24 VITALS
BODY MASS INDEX: 28.6 KG/M2 | DIASTOLIC BLOOD PRESSURE: 65 MMHG | OXYGEN SATURATION: 95 % | SYSTOLIC BLOOD PRESSURE: 132 MMHG | RESPIRATION RATE: 17 BRPM | TEMPERATURE: 97.8 F | HEART RATE: 76 BPM | WEIGHT: 177.1 LBS

## 2025-04-24 DIAGNOSIS — I10 PRIMARY HYPERTENSION: ICD-10-CM

## 2025-04-24 DIAGNOSIS — D62 ACUTE BLOOD LOSS ANEMIA: Primary | ICD-10-CM

## 2025-04-24 DIAGNOSIS — K90.9 DIARRHEA DUE TO MALABSORPTION: ICD-10-CM

## 2025-04-24 DIAGNOSIS — R19.7 DIARRHEA DUE TO MALABSORPTION: ICD-10-CM

## 2025-04-24 DIAGNOSIS — Z90.49 S/P PARTIAL COLECTOMY: ICD-10-CM

## 2025-04-24 DIAGNOSIS — G89.18 POSTOPERATIVE PAIN: ICD-10-CM

## 2025-04-24 DIAGNOSIS — R53.1 WEAKNESS: ICD-10-CM

## 2025-04-24 DIAGNOSIS — E11.8 CONTROLLED TYPE 2 DIABETES MELLITUS WITH COMPLICATION, WITHOUT LONG-TERM CURRENT USE OF INSULIN (HCC): ICD-10-CM

## 2025-04-24 DIAGNOSIS — E11.29 TYPE 2 DIABETES MELLITUS WITH OTHER DIABETIC KIDNEY COMPLICATION (HCC): ICD-10-CM

## 2025-04-24 RX ORDER — METFORMIN HYDROCHLORIDE 500 MG/1
500 TABLET, EXTENDED RELEASE ORAL 2 TIMES DAILY
COMMUNITY
End: 2025-04-25 | Stop reason: SDUPTHER

## 2025-04-24 RX ORDER — HYDRALAZINE HYDROCHLORIDE 50 MG/1
75 TABLET, FILM COATED ORAL 3 TIMES DAILY
COMMUNITY
End: 2025-04-25 | Stop reason: SDUPTHER

## 2025-04-24 RX ORDER — LOPERAMIDE HYDROCHLORIDE 2 MG/1
2 CAPSULE ORAL 4 TIMES DAILY PRN
COMMUNITY
End: 2025-04-25 | Stop reason: SDUPTHER

## 2025-04-24 RX ORDER — CHOLESTYRAMINE 4 G/9G
1 POWDER, FOR SUSPENSION ORAL DAILY
COMMUNITY
End: 2025-04-25 | Stop reason: SDUPTHER

## 2025-04-24 NOTE — PROGRESS NOTES
[facility]  [address] [place] [address]  Phone: [phone]  Fax: [phone]  Also available via Perfect Serve     PLACE OF SERVICE:  [facility] [address] [place] [address]    SKILLED VISIT    Chief Complaint:   Chief Complaint   Patient presents with    Follow-up         HPI : Yefri Vigil is a 69 y.o. male here for follow up.    Previous history prior to admission:  Patient was admitted to [facility] for robotic extended right colectomy for right colonic mass on 4/11/2025 this was complicated by hypotension lactic acidosis and shock where he was admitted to the ICU for emergent laparotomy where he was found to have ongoing intra-abdominal bleeding and hemostasis was achieved.  During hospitalization he also had hyponatremia hypokalemia acute renal failure on chronic kidney disease stage III.  He required CRRT but is now off of this and stable his renal function improved.  He has other past medical history as of diabetes mellitus hypertension and GERD.  He will need follow-up with Dr. Wheatley colorectal surgery.  He was discharged to [facility] and rehabilitation for strengthening and conditioning.    Current visit:  04/24/2025 -patient is lying in bed today he is awake alert affect is very flat.  Vital signs have been reviewed.  Visit patient's blood pressures were elevated and hydralazine was increased to 75 mg p.o. 3 times daily.  Patient is currently on hydralazine 75 mg p.o. 3 times daily, lisinopril 20 mg daily, metoprolol 25 mg p.o. twice daily and amlodipine 10 mg daily.  Only several blood pressure readings since adjustment and are under 150 systolic we will continue current regimen..  He is afebrile heart rate is rate controlled he denies any chest pain palpitations.  He has been eating and drinking about 50 to 100%.  His last bowel movement is 4/24/2025 notes this was loose but has had less

## 2025-04-24 NOTE — OP NOTE
[facility]              [address] [place]  [address]                            OPERATIVE REPORT      PATIENT NAME: ESTELLA DA SILVA              : 1955  MED REC NO: [id number]                       ROOM: 3123  ACCOUNT NO: [id number]                       ADMIT DATE: 2025  PROVIDER: Simone Kessler MD    DATE OF SERVICE:  2025    PREOPERATIVE DIAGNOSES:  Intraabdominal hemorrhage.    POSTOPERATIVE DIAGNOSES:  Intraabdominal hemorrhage.    PROCEDURES PERFORMED:       1. Exploratory laparotomy.     2. Suture ligation of bleeding vessel.    SURGEON:  Simone Kessler MD    ASSISTANT:  None.    ANESTHESIA:  General anesthesia.    ESTIMATED BLOOD LOSS:  20 mL of new blood, 1 L of old blood.    SPECIMENS REMOVED:  None.    INTRAOPERATIVE FINDINGS:  There was general oozing and arterial bleeding noted along multiple areas of the skin incision and also along the retroperitoneum, raising the question of coagulopathic bleeding.  He did have 1 vessel in the retroperitoneum, which appeared to be bleeding significantly and this was suture ligated.     COMPLICATIONS:  None.    IMPLANTS:  None.    INDICATIONS:  The patient is a 69-year-old male who recently underwent right colectomy and on postoperative day #1, the patient was noted to have elevated lactate, low blood pressure necessitating pressure support.  His hemoglobin and CT scan indicated a possible intraabdominal bleed.  He was taken back to the operating room by Dr. Reyes and exploration revealed general oozing and some bleeding from a vessel on the right side of the retroperitoneum.  He continued to remain unstable overnight, requiring multiple transfusions and maximum pressure support.  Given that the bleeding seemed to have persisted and that his coagulation studies were normal, we ordered a CTA, which revealed what appeared to be active bleeding along the retroperitoneum.  The decision was made to

## 2025-04-25 ENCOUNTER — OFFICE VISIT (OUTPATIENT)
Facility: CLINIC | Age: 70
End: 2025-04-25

## 2025-04-25 VITALS
HEART RATE: 65 BPM | OXYGEN SATURATION: 99 % | TEMPERATURE: 97.8 F | DIASTOLIC BLOOD PRESSURE: 68 MMHG | BODY MASS INDEX: 28.6 KG/M2 | WEIGHT: 177.1 LBS | RESPIRATION RATE: 18 BRPM | SYSTOLIC BLOOD PRESSURE: 146 MMHG

## 2025-04-25 DIAGNOSIS — R19.7 DIARRHEA DUE TO MALABSORPTION: ICD-10-CM

## 2025-04-25 DIAGNOSIS — E78.2 MIXED HYPERLIPIDEMIA: ICD-10-CM

## 2025-04-25 DIAGNOSIS — I10 PRIMARY HYPERTENSION: ICD-10-CM

## 2025-04-25 DIAGNOSIS — Z90.49 S/P PARTIAL COLECTOMY: ICD-10-CM

## 2025-04-25 DIAGNOSIS — E11.29 TYPE 2 DIABETES MELLITUS WITH OTHER DIABETIC KIDNEY COMPLICATION (HCC): ICD-10-CM

## 2025-04-25 DIAGNOSIS — G89.18 POSTOPERATIVE PAIN: ICD-10-CM

## 2025-04-25 DIAGNOSIS — D62 ACUTE BLOOD LOSS ANEMIA: Primary | ICD-10-CM

## 2025-04-25 DIAGNOSIS — K90.9 DIARRHEA DUE TO MALABSORPTION: ICD-10-CM

## 2025-04-25 DIAGNOSIS — R53.1 WEAKNESS: ICD-10-CM

## 2025-04-25 RX ORDER — TRAMADOL HYDROCHLORIDE 50 MG/1
50 TABLET ORAL EVERY 6 HOURS PRN
Qty: 20 TABLET | Refills: 0 | Status: SHIPPED | OUTPATIENT
Start: 2025-04-25 | End: 2025-04-30

## 2025-04-25 RX ORDER — ATORVASTATIN CALCIUM 40 MG/1
40 TABLET, FILM COATED ORAL DAILY
Qty: 90 TABLET | Refills: 0 | Status: SHIPPED | OUTPATIENT
Start: 2025-04-25

## 2025-04-25 RX ORDER — PANTOPRAZOLE SODIUM 40 MG/1
40 TABLET, DELAYED RELEASE ORAL
Qty: 90 TABLET | Refills: 0 | Status: SHIPPED | OUTPATIENT
Start: 2025-04-25

## 2025-04-25 RX ORDER — LOPERAMIDE HYDROCHLORIDE 2 MG/1
2 CAPSULE ORAL 4 TIMES DAILY PRN
Qty: 60 CAPSULE | Refills: 0 | Status: SHIPPED | OUTPATIENT
Start: 2025-04-25

## 2025-04-25 RX ORDER — METFORMIN HYDROCHLORIDE 500 MG/1
500 TABLET, EXTENDED RELEASE ORAL 2 TIMES DAILY
Qty: 180 TABLET | Refills: 0 | Status: SHIPPED | OUTPATIENT
Start: 2025-04-25

## 2025-04-25 RX ORDER — ASPIRIN 81 MG/1
81 TABLET, CHEWABLE ORAL DAILY
Qty: 90 TABLET | Refills: 0 | Status: SHIPPED | OUTPATIENT
Start: 2025-04-25

## 2025-04-25 RX ORDER — LISINOPRIL 20 MG/1
20 TABLET ORAL DAILY
Qty: 90 TABLET | Refills: 0 | Status: SHIPPED | OUTPATIENT
Start: 2025-04-25

## 2025-04-25 RX ORDER — CHOLESTYRAMINE 4 G/9G
1 POWDER, FOR SUSPENSION ORAL DAILY
Qty: 90 PACKET | Refills: 0 | Status: SHIPPED | OUTPATIENT
Start: 2025-04-25

## 2025-04-25 RX ORDER — HYDRALAZINE HYDROCHLORIDE 50 MG/1
75 TABLET, FILM COATED ORAL 3 TIMES DAILY
Qty: 405 TABLET | Refills: 0 | Status: SHIPPED | OUTPATIENT
Start: 2025-04-25

## 2025-04-25 RX ORDER — METOPROLOL TARTRATE 25 MG/1
25 TABLET, FILM COATED ORAL 2 TIMES DAILY
Qty: 180 TABLET | Refills: 0 | Status: SHIPPED | OUTPATIENT
Start: 2025-04-25

## 2025-04-25 RX ORDER — AMLODIPINE BESYLATE 10 MG/1
10 TABLET ORAL DAILY
Qty: 90 TABLET | Refills: 0 | Status: SHIPPED | OUTPATIENT
Start: 2025-04-25

## 2025-04-25 RX ORDER — FERROUS SULFATE 325(65) MG
325 TABLET ORAL
Qty: 90 TABLET | Refills: 0 | Status: SHIPPED | OUTPATIENT
Start: 2025-04-25 | End: 2025-07-24

## 2025-04-25 NOTE — PROGRESS NOTES
[facility] [place] [address]  [place] [place] [address]  Phone: [phone]  Fax: [phone]  Also available via Perfect Serve     Place of Service:  [facility] [address] [place] [address]                                                                     Discharge Summary       Admit Dx:  anemia, right colectomy to remove right colonic mass on 04/11/25 with postoperative complication of hypotension and lactic acidosis    Disposition: Home and Home Health Services    Presentation:  Patient was admitted to [facility] for robotic extended right colectomy for right colonic mass on 4/11/2025 this was complicated by hypotension lactic acidosis and shock where he was admitted to the ICU for emergent laparotomy where he was found to have ongoing intra-abdominal bleeding and hemostasis was achieved. During hospitalization he also had hyponatremia hypokalemia acute renal failure on chronic kidney disease stage III. He required CRRT but is now off of this and stable his renal function improved. He has other past medical history as of diabetes mellitus hypertension and GERD. He will need follow-up with Dr. Wheatley colorectal surgery. He was discharged to [facility] and rehabilitation for strengthening and conditioning.     Discharge time : > 30 mins    Brief SNF Course:  This is an 69 y.o. male in rehabilitation from 4/19/2025 and will be discharged on 4/27/2025.  He was admitted after he had had scheduled surgery for right colectomy with right colonic mass on 4/11/2025 that unfortunately had complications of hypotension lactic acidosis and ICU admission for emergent laparotomy where he was having intra-abdominal bleeding.  He improved he was discharged to [facility] and since that time he has been participatory with physical therapy.  Initially during stay patient's blood pressures were running high and his hydralazine has

## 2025-04-28 ENCOUNTER — OFFICE VISIT (OUTPATIENT)
Age: 70
End: 2025-04-28
Payer: MEDICARE

## 2025-04-28 VITALS
OXYGEN SATURATION: 100 % | TEMPERATURE: 97.2 F | RESPIRATION RATE: 16 BRPM | HEIGHT: 66 IN | DIASTOLIC BLOOD PRESSURE: 82 MMHG | SYSTOLIC BLOOD PRESSURE: 145 MMHG | HEART RATE: 70 BPM | BODY MASS INDEX: 26.55 KG/M2 | WEIGHT: 165.2 LBS

## 2025-04-28 DIAGNOSIS — F43.23 ADJUSTMENT DISORDER WITH MIXED ANXIETY AND DEPRESSED MOOD: ICD-10-CM

## 2025-04-28 DIAGNOSIS — F02.80 MAJOR NEUROCOGNITIVE DISORDER DUE TO MULTIPLE ETIOLOGIES (HCC): Primary | ICD-10-CM

## 2025-04-28 PROCEDURE — 1036F TOBACCO NON-USER: CPT | Performed by: PSYCHIATRY & NEUROLOGY

## 2025-04-28 PROCEDURE — G8427 DOCREV CUR MEDS BY ELIG CLIN: HCPCS | Performed by: PSYCHIATRY & NEUROLOGY

## 2025-04-28 PROCEDURE — G8419 CALC BMI OUT NRM PARAM NOF/U: HCPCS | Performed by: PSYCHIATRY & NEUROLOGY

## 2025-04-28 PROCEDURE — 3017F COLORECTAL CA SCREEN DOC REV: CPT | Performed by: PSYCHIATRY & NEUROLOGY

## 2025-04-28 PROCEDURE — 1159F MED LIST DOCD IN RCRD: CPT | Performed by: PSYCHIATRY & NEUROLOGY

## 2025-04-28 PROCEDURE — 3077F SYST BP >= 140 MM HG: CPT | Performed by: PSYCHIATRY & NEUROLOGY

## 2025-04-28 PROCEDURE — 1160F RVW MEDS BY RX/DR IN RCRD: CPT | Performed by: PSYCHIATRY & NEUROLOGY

## 2025-04-28 PROCEDURE — 3079F DIAST BP 80-89 MM HG: CPT | Performed by: PSYCHIATRY & NEUROLOGY

## 2025-04-28 PROCEDURE — 1123F ACP DISCUSS/DSCN MKR DOCD: CPT | Performed by: PSYCHIATRY & NEUROLOGY

## 2025-04-28 PROCEDURE — 99203 OFFICE O/P NEW LOW 30 MIN: CPT | Performed by: PSYCHIATRY & NEUROLOGY

## 2025-04-28 PROCEDURE — 1111F DSCHRG MED/CURRENT MED MERGE: CPT | Performed by: PSYCHIATRY & NEUROLOGY

## 2025-04-28 ASSESSMENT — PATIENT HEALTH QUESTIONNAIRE - PHQ9
1. LITTLE INTEREST OR PLEASURE IN DOING THINGS: NOT AT ALL
SUM OF ALL RESPONSES TO PHQ QUESTIONS 1-9: 0
2. FEELING DOWN, DEPRESSED OR HOPELESS: NOT AT ALL
SUM OF ALL RESPONSES TO PHQ QUESTIONS 1-9: 0

## 2025-04-28 NOTE — ASSESSMENT & PLAN NOTE
Supported by recent neuropsych testing.  Recommend patient consider getting some counseling and a list of local and online platforms have been printed on his AVS and perhaps PCP can further assess to determine whether he would benefit from some low-dose medication.      Patient to continue to be followed by PCP

## 2025-04-28 NOTE — PROGRESS NOTES
Identified pt with two pt identifiers(name and ). Reviewed record in preparation for visit and have obtained necessary documentation. All patient medications has been reviewed.  Chief Complaint   Patient presents with    Follow-up           Wt Readings from Last 3 Encounters:   25 74.9 kg (165 lb 3.2 oz)   25 80.3 kg (177 lb 1.6 oz)   25 80.3 kg (177 lb 1.6 oz)     Temp Readings from Last 3 Encounters:   25 97.2 °F (36.2 °C) (Temporal)   25 97.8 °F (36.6 °C) (Oral)   25 97.8 °F (36.6 °C) (Oral)     BP Readings from Last 3 Encounters:   25 (!) 145/82   25 (!) 146/68   25 132/65     Pulse Readings from Last 3 Encounters:   25 70   25 65   25 76       \"Have you been to the ER, urgent care clinic since your last visit?  Hospitalized since your last visit?\"   NO    \"Have you seen or consulted any other health care providers outside of [facility] since your last visit?\"   NO    
negative anti-dsDNA negative  HIV negative  Hepatitis serology negative  Lyme serology negative  Status post ampicillin ceftriaxone and vancomycin x 7 days, stopped ~ 7/11/2023  PT eval carried out-advised to continue physical therapy  Case management-working for [employer]  MS slowly improved during admit              I only saw on day of discharge  Patient's mental status is much improved.               Slow but oriented to [place], place and city, year slow but eventually got it              President Oleksandr  Discharge to [facility]    MRI of the brain completed 10/4/2023 did not show any acute process but was limited due to patient not tolerating the exam  EEG completed 10/6/2023: Abnormal with diffuse attenuated amplitudes and severe slowing consistent with global cerebral dysfunction which can be suggestive of toxic-metabolic encephalopathies.  There is no seizure or seizure-like activity noted    Patient is not really clear why he is here today.  And it is not clear to me either other than this is a hospital follow-up from when he was hospitalized in October 2023 as stated above  He still feels as though he is not back to baseline from when he was in the hospital  Gets a little bit more confused especially with complicated issues such as disability paperwork which his son is helping him with  He still drives but only to places that are familiar to him  He spends most of his time at his son's house who helps him routinely    Patient was working in construction  He was out on short-term disability which has been used up and he is working on long-term disability    He states that his PCP heard something on his heart and wants him to go back to see the heart doctor    Neurological ROS: positive for - sleepiness and fatigue, getting confused with paperwork, son helping him with bills etc.  Neurological ROS: negative for - behavioral changes, bowel and bladder control changes, dizziness, gait disturbance, headaches,

## 2025-04-28 NOTE — ASSESSMENT & PLAN NOTE
Patient has neurocognitive disorder due to multiple etiologies with associated adjustment disorder anxiety and depression.    This is generally a stagnant process.  No further intervention from neurologic perspective is needed.    I would recommend some counseling for the anxiety and depression which might improve cognition    Nonetheless the patient remains incapable of employment and needs to be on long-term disability lifelong as this condition in general is not going to significantly improve    If his symptoms should start to progress he will need to be seen back for reassessment if there is acute decline he will need to go to the emergency room

## 2025-04-28 NOTE — PATIENT INSTRUCTIONS
As per discussion  At this time you have major neurocognitive disorder due to multiple medical conditions.  There is also some issues related to anxiety and depression that may be helpful if you get some counseling and below is listed some online as well as in person resources should you consider that.    Neurocognitive disorder is generally stagnant process you do not have dementia or Alzheimer's disease.  However if there should be some significant decline please notify the office for reevaluation      I also recommend doing some exercises for your brain such as word search puzzles reading and that type of thing when you are feeling better after recovery from your most recent surgery.    At this time there is nothing further for us to do at this time I do agree with you continuing on disability long-term we will just see you back as needed.  You can follow-up with your primary care and all your other specialists as appropriate      Mental health resources online and locally:  Online resources  American Psychological Association's Psychologist  (.apa.org), therapist  at psychology today (therapists.psychologytoday.com), and the American Board of Professional Psychology (ABPP.org).   Other online platforms include [facility] and [facility] Outpatient Mental Health Providers     Accepts Insured Patients Only:  [facility]  [address]                                                                              [phone]          Near the [facility] and [facility] [place] [facility] hospitals [place] the [place] in the near [place] end [facility].  Accepts most insurance including Medicaid/Medicare.  No psychiatry.  On the [facility] bus line.     [facility]  [address] ([place])                                                       [phone] [facility]                                                      [phone]

## 2025-05-09 NOTE — DISCHARGE SUMMARY
Physician Discharge Summary     Patient ID:  Yefri Vigil  [id number]  69 y.o.  1955    Admit date: 4/11/2025    Discharge date and time: 4/19/2025  2:41 PM     Admitting Physician: Katrina Goddard MD     Discharge Physician: Ld    Admission Diagnoses: Colonic mass [K63.89]    Discharge Diagnoses: same    Admission Condition: good    Discharged Condition: fair    Indication for Admission: postop extended right colectomy    Hospital Course: The patient was admitted after robotic extended right colectomy on 4/11/25. Postoperative course was complicated by bleeding requiring takeback x2 by Evelin Reyes and [place]. Patient ultimately recovered after an ICU stay and was discharged to rehab in fair condition.        Outstanding Order Results       No orders found from 3/13/2025 to 4/12/2025.                Discharge Exam:  Gen: NAD  Psych: normal judgement and insight  Neuro: no focal deficits   HEENT: NCAT  Resp: normal WOB  Card: clinically well-perfused  Abd: soft, nontender, nondistended, surgical staples in place  Ext: well perfused, no edema  Skin: no obvious rashes or lesions  Anorectal: Deferred        Disposition: rehab    Patient Instructions:   [unfilled]  Activity: no lifting, Driving, or Strenuous exercise for 6 weeks  Diet:  low fiber diet  Wound Care: keep wound clean and dry    Follow-up with Katrina Goddard MD  in 2 weeks.    Signed:  Katrina Goddard MD  5/9/2025  8:09 AM

## 2025-05-16 ENCOUNTER — TRANSCRIBE ORDERS (OUTPATIENT)
Facility: HOSPITAL | Age: 70
End: 2025-05-16

## 2025-05-16 DIAGNOSIS — R80.9 PROTEINURIA, UNSPECIFIED TYPE: ICD-10-CM

## 2025-05-16 DIAGNOSIS — N18.31 STAGE 3A CHRONIC KIDNEY DISEASE (HCC): Primary | ICD-10-CM

## 2025-05-16 DIAGNOSIS — I12.9 RENAL HYPERTENSION: ICD-10-CM

## 2025-05-16 DIAGNOSIS — N18.31 CHRONIC KIDNEY DISEASE (CKD) STAGE G3A/A1, MODERATELY DECREASED GLOMERULAR FILTRATION RATE (GFR) BETWEEN 45-59 ML/MIN/1.73 SQUARE METER AND ALBUMINURIA CREATININE RATIO LESS THAN 30 MG/G (HCC): ICD-10-CM

## 2025-05-29 ENCOUNTER — HOSPITAL ENCOUNTER (INPATIENT)
Facility: HOSPITAL | Age: 70
LOS: 2 days | Discharge: HOME | End: 2025-05-31
Attending: EMERGENCY MEDICINE | Admitting: STUDENT IN AN ORGANIZED HEALTH CARE EDUCATION/TRAINING PROGRAM
Payer: MEDICARE

## 2025-05-29 ENCOUNTER — APPOINTMENT (OUTPATIENT)
Facility: HOSPITAL | Age: 70
End: 2025-05-29
Payer: MEDICARE

## 2025-05-29 DIAGNOSIS — D38.1 NEOPLASM OF UNCERTAIN BEHAVIOR OF TRACHEA, BRONCHUS, AND LUNG: ICD-10-CM

## 2025-05-29 DIAGNOSIS — R10.9 ACUTE ABDOMINAL PAIN: Primary | ICD-10-CM

## 2025-05-29 DIAGNOSIS — K59.00 CONSTIPATION, UNSPECIFIED CONSTIPATION TYPE: ICD-10-CM

## 2025-05-29 DIAGNOSIS — R79.89 ELEVATED TROPONIN: ICD-10-CM

## 2025-05-29 DIAGNOSIS — N18.9 CHRONIC KIDNEY DISEASE, UNSPECIFIED CKD STAGE: ICD-10-CM

## 2025-05-29 DIAGNOSIS — Z86.79 HISTORY OF CAD (CORONARY ARTERY DISEASE): ICD-10-CM

## 2025-05-29 LAB
ALBUMIN SERPL-MCNC: 2.9 G/DL (ref 3.5–5)
ALBUMIN/GLOB SERPL: 0.7 (ref 1.1–2.2)
ALP SERPL-CCNC: 86 U/L (ref 45–117)
ALT SERPL-CCNC: 21 U/L (ref 12–78)
ANION GAP SERPL CALC-SCNC: 5 MMOL/L (ref 2–12)
APPEARANCE UR: CLEAR
APTT PPP: 25.5 SEC (ref 22.1–31)
AST SERPL-CCNC: 23 U/L (ref 15–37)
BACTERIA URNS QL MICRO: NEGATIVE /HPF
BASOPHILS # BLD: 0.02 K/UL (ref 0–0.1)
BASOPHILS NFR BLD: 0.4 % (ref 0–1)
BILIRUB SERPL-MCNC: 0.7 MG/DL (ref 0.2–1)
BILIRUB UR QL: NEGATIVE
BUN SERPL-MCNC: 23 MG/DL (ref 6–20)
BUN/CREAT SERPL: 14 (ref 12–20)
CALCIUM SERPL-MCNC: 9 MG/DL (ref 8.5–10.1)
CHLORIDE SERPL-SCNC: 105 MMOL/L (ref 97–108)
CO2 SERPL-SCNC: 28 MMOL/L (ref 21–32)
COLOR UR: ABNORMAL
CREAT SERPL-MCNC: 1.64 MG/DL (ref 0.7–1.3)
DIFFERENTIAL METHOD BLD: ABNORMAL
EKG ATRIAL RATE: 56 BPM
EKG ATRIAL RATE: 58 BPM
EKG DIAGNOSIS: NORMAL
EKG DIAGNOSIS: NORMAL
EKG P AXIS: 59 DEGREES
EKG P AXIS: 70 DEGREES
EKG P-R INTERVAL: 196 MS
EKG P-R INTERVAL: 202 MS
EKG Q-T INTERVAL: 414 MS
EKG Q-T INTERVAL: 428 MS
EKG QRS DURATION: 100 MS
EKG QRS DURATION: 94 MS
EKG QTC CALCULATION (BAZETT): 406 MS
EKG QTC CALCULATION (BAZETT): 413 MS
EKG R AXIS: -17 DEGREES
EKG R AXIS: -5 DEGREES
EKG T AXIS: 28 DEGREES
EKG T AXIS: 34 DEGREES
EKG VENTRICULAR RATE: 56 BPM
EKG VENTRICULAR RATE: 58 BPM
EOSINOPHIL # BLD: 0.09 K/UL (ref 0–0.4)
EOSINOPHIL NFR BLD: 1.6 % (ref 0–7)
EPITH CASTS URNS QL MICRO: ABNORMAL /LPF
ERYTHROCYTE [DISTWIDTH] IN BLOOD BY AUTOMATED COUNT: 14.7 % (ref 11.5–14.5)
ERYTHROCYTE [DISTWIDTH] IN BLOOD BY AUTOMATED COUNT: 14.7 % (ref 11.5–14.5)
GLOBULIN SER CALC-MCNC: 4.3 G/DL (ref 2–4)
GLUCOSE BLD STRIP.AUTO-MCNC: 88 MG/DL (ref 65–117)
GLUCOSE SERPL-MCNC: 143 MG/DL (ref 65–100)
GLUCOSE UR STRIP.AUTO-MCNC: 100 MG/DL
HCT VFR BLD AUTO: 33.4 % (ref 36.6–50.3)
HCT VFR BLD AUTO: 35 % (ref 36.6–50.3)
HGB BLD-MCNC: 11.1 G/DL (ref 12.1–17)
HGB BLD-MCNC: 11.6 G/DL (ref 12.1–17)
HGB UR QL STRIP: NEGATIVE
HYALINE CASTS URNS QL MICRO: ABNORMAL /LPF (ref 0–2)
IMM GRANULOCYTES # BLD AUTO: 0.01 K/UL (ref 0–0.04)
IMM GRANULOCYTES NFR BLD AUTO: 0.2 % (ref 0–0.5)
INR PPP: 1.1 (ref 0.9–1.1)
KETONES UR QL STRIP.AUTO: NEGATIVE MG/DL
LEUKOCYTE ESTERASE UR QL STRIP.AUTO: NEGATIVE
LIPASE SERPL-CCNC: 62 U/L (ref 13–75)
LYMPHOCYTES # BLD: 1.2 K/UL (ref 0.8–3.5)
LYMPHOCYTES NFR BLD: 21.1 % (ref 12–49)
MAGNESIUM SERPL-MCNC: 1.4 MG/DL (ref 1.6–2.4)
MCH RBC QN AUTO: 28 PG (ref 26–34)
MCH RBC QN AUTO: 28.2 PG (ref 26–34)
MCHC RBC AUTO-ENTMCNC: 33.1 G/DL (ref 30–36.5)
MCHC RBC AUTO-ENTMCNC: 33.2 G/DL (ref 30–36.5)
MCV RBC AUTO: 84.3 FL (ref 80–99)
MCV RBC AUTO: 85 FL (ref 80–99)
MONOCYTES # BLD: 0.6 K/UL (ref 0–1)
MONOCYTES NFR BLD: 10.5 % (ref 5–13)
NEUTS SEG # BLD: 3.78 K/UL (ref 1.8–8)
NEUTS SEG NFR BLD: 66.2 % (ref 32–75)
NITRITE UR QL STRIP.AUTO: NEGATIVE
NRBC # BLD: 0 K/UL (ref 0–0.01)
NRBC # BLD: 0 K/UL (ref 0–0.01)
NRBC BLD-RTO: 0 PER 100 WBC
NRBC BLD-RTO: 0 PER 100 WBC
NT PRO BNP: 1037 PG/ML
PH UR STRIP: 5.5 (ref 5–8)
PHOSPHATE SERPL-MCNC: 3.4 MG/DL (ref 2.6–4.7)
PLATELET # BLD AUTO: 172 K/UL (ref 150–400)
PLATELET # BLD AUTO: 186 K/UL (ref 150–400)
PMV BLD AUTO: 9.7 FL (ref 8.9–12.9)
PMV BLD AUTO: 9.8 FL (ref 8.9–12.9)
POTASSIUM SERPL-SCNC: 4 MMOL/L (ref 3.5–5.1)
PROT SERPL-MCNC: 7.2 G/DL (ref 6.4–8.2)
PROT UR STRIP-MCNC: >300 MG/DL
PROTHROMBIN TIME: 11.3 SEC (ref 9.2–11.2)
RBC # BLD AUTO: 3.93 M/UL (ref 4.1–5.7)
RBC # BLD AUTO: 4.15 M/UL (ref 4.1–5.7)
RBC #/AREA URNS HPF: ABNORMAL /HPF (ref 0–5)
SERVICE CMNT-IMP: NORMAL
SODIUM SERPL-SCNC: 138 MMOL/L (ref 136–145)
SP GR UR REFRACTOMETRY: 1.02
THERAPEUTIC RANGE: NORMAL SECS (ref 58–77)
TROPONIN I SERPL HS-MCNC: 447 NG/L (ref 0–76)
TROPONIN I SERPL HS-MCNC: 520 NG/L (ref 0–76)
TROPONIN I SERPL HS-MCNC: 521 NG/L (ref 0–76)
TSH SERPL DL<=0.05 MIU/L-ACNC: 1.18 UIU/ML (ref 0.36–3.74)
UFH PPP CHRO-ACNC: 1.43 IU/ML
UFH PPP CHRO-ACNC: <0.1 IU/ML
URINE CULTURE IF INDICATED: ABNORMAL
UROBILINOGEN UR QL STRIP.AUTO: 0.2 EU/DL (ref 0.2–1)
WBC # BLD AUTO: 5.7 K/UL (ref 4.1–11.1)
WBC # BLD AUTO: 7 K/UL (ref 4.1–11.1)
WBC URNS QL MICRO: ABNORMAL /HPF (ref 0–4)

## 2025-05-29 PROCEDURE — 6360000002 HC RX W HCPCS: Performed by: PHYSICIAN ASSISTANT

## 2025-05-29 PROCEDURE — 99285 EMERGENCY DEPT VISIT HI MDM: CPT

## 2025-05-29 PROCEDURE — 96375 TX/PRO/DX INJ NEW DRUG ADDON: CPT

## 2025-05-29 PROCEDURE — 83735 ASSAY OF MAGNESIUM: CPT

## 2025-05-29 PROCEDURE — 84100 ASSAY OF PHOSPHORUS: CPT

## 2025-05-29 PROCEDURE — 85520 HEPARIN ASSAY: CPT

## 2025-05-29 PROCEDURE — 36415 COLL VENOUS BLD VENIPUNCTURE: CPT

## 2025-05-29 PROCEDURE — 1100000003 HC PRIVATE W/ TELEMETRY

## 2025-05-29 PROCEDURE — 81001 URINALYSIS AUTO W/SCOPE: CPT

## 2025-05-29 PROCEDURE — 96374 THER/PROPH/DIAG INJ IV PUSH: CPT

## 2025-05-29 PROCEDURE — 82962 GLUCOSE BLOOD TEST: CPT

## 2025-05-29 PROCEDURE — 6370000000 HC RX 637 (ALT 250 FOR IP): Performed by: STUDENT IN AN ORGANIZED HEALTH CARE EDUCATION/TRAINING PROGRAM

## 2025-05-29 PROCEDURE — 85025 COMPLETE CBC W/AUTO DIFF WBC: CPT

## 2025-05-29 PROCEDURE — 83036 HEMOGLOBIN GLYCOSYLATED A1C: CPT

## 2025-05-29 PROCEDURE — 93005 ELECTROCARDIOGRAM TRACING: CPT | Performed by: PHYSICIAN ASSISTANT

## 2025-05-29 PROCEDURE — 2500000003 HC RX 250 WO HCPCS: Performed by: STUDENT IN AN ORGANIZED HEALTH CARE EDUCATION/TRAINING PROGRAM

## 2025-05-29 PROCEDURE — 83880 ASSAY OF NATRIURETIC PEPTIDE: CPT

## 2025-05-29 PROCEDURE — 85730 THROMBOPLASTIN TIME PARTIAL: CPT

## 2025-05-29 PROCEDURE — 74177 CT ABD & PELVIS W/CONTRAST: CPT

## 2025-05-29 PROCEDURE — 80061 LIPID PANEL: CPT

## 2025-05-29 PROCEDURE — 83690 ASSAY OF LIPASE: CPT

## 2025-05-29 PROCEDURE — 6370000000 HC RX 637 (ALT 250 FOR IP): Performed by: PHYSICIAN ASSISTANT

## 2025-05-29 PROCEDURE — 6360000004 HC RX CONTRAST MEDICATION: Performed by: RADIOLOGY

## 2025-05-29 PROCEDURE — 80053 COMPREHEN METABOLIC PANEL: CPT

## 2025-05-29 PROCEDURE — 84484 ASSAY OF TROPONIN QUANT: CPT

## 2025-05-29 PROCEDURE — 6370000000 HC RX 637 (ALT 250 FOR IP)

## 2025-05-29 PROCEDURE — 85027 COMPLETE CBC AUTOMATED: CPT

## 2025-05-29 PROCEDURE — 84443 ASSAY THYROID STIM HORMONE: CPT

## 2025-05-29 PROCEDURE — 85610 PROTHROMBIN TIME: CPT

## 2025-05-29 RX ORDER — ACETAMINOPHEN 325 MG/1
650 TABLET ORAL EVERY 6 HOURS PRN
Status: DISCONTINUED | OUTPATIENT
Start: 2025-05-29 | End: 2025-05-31 | Stop reason: HOSPADM

## 2025-05-29 RX ORDER — ASPIRIN 81 MG/1
81 TABLET, CHEWABLE ORAL DAILY
Status: DISCONTINUED | OUTPATIENT
Start: 2025-05-30 | End: 2025-05-31 | Stop reason: HOSPADM

## 2025-05-29 RX ORDER — BISACODYL 10 MG
10 SUPPOSITORY, RECTAL RECTAL DAILY
Status: DISCONTINUED | OUTPATIENT
Start: 2025-05-30 | End: 2025-05-31 | Stop reason: HOSPADM

## 2025-05-29 RX ORDER — MORPHINE SULFATE 2 MG/ML
2 INJECTION, SOLUTION INTRAMUSCULAR; INTRAVENOUS
Refills: 0 | Status: COMPLETED | OUTPATIENT
Start: 2025-05-29 | End: 2025-05-29

## 2025-05-29 RX ORDER — GLUCAGON 1 MG/ML
1 KIT INJECTION PRN
Status: DISCONTINUED | OUTPATIENT
Start: 2025-05-29 | End: 2025-05-31 | Stop reason: HOSPADM

## 2025-05-29 RX ORDER — HEPARIN SODIUM 1000 [USP'U]/ML
4000 INJECTION, SOLUTION INTRAVENOUS; SUBCUTANEOUS PRN
Status: DISCONTINUED | OUTPATIENT
Start: 2025-05-29 | End: 2025-05-30 | Stop reason: ALTCHOICE

## 2025-05-29 RX ORDER — ONDANSETRON 2 MG/ML
4 INJECTION INTRAMUSCULAR; INTRAVENOUS EVERY 6 HOURS PRN
Status: DISCONTINUED | OUTPATIENT
Start: 2025-05-29 | End: 2025-05-31 | Stop reason: HOSPADM

## 2025-05-29 RX ORDER — PANTOPRAZOLE SODIUM 40 MG/1
40 TABLET, DELAYED RELEASE ORAL
Status: DISCONTINUED | OUTPATIENT
Start: 2025-05-30 | End: 2025-05-31 | Stop reason: HOSPADM

## 2025-05-29 RX ORDER — IOPAMIDOL 755 MG/ML
100 INJECTION, SOLUTION INTRAVASCULAR
Status: COMPLETED | OUTPATIENT
Start: 2025-05-29 | End: 2025-05-29

## 2025-05-29 RX ORDER — DEXTROSE MONOHYDRATE 100 MG/ML
INJECTION, SOLUTION INTRAVENOUS CONTINUOUS PRN
Status: DISCONTINUED | OUTPATIENT
Start: 2025-05-29 | End: 2025-05-31 | Stop reason: HOSPADM

## 2025-05-29 RX ORDER — INSULIN LISPRO 100 [IU]/ML
0-4 INJECTION, SOLUTION INTRAVENOUS; SUBCUTANEOUS
Status: DISCONTINUED | OUTPATIENT
Start: 2025-05-29 | End: 2025-05-31 | Stop reason: HOSPADM

## 2025-05-29 RX ORDER — ONDANSETRON 4 MG/1
4 TABLET, ORALLY DISINTEGRATING ORAL EVERY 8 HOURS PRN
Status: DISCONTINUED | OUTPATIENT
Start: 2025-05-29 | End: 2025-05-31 | Stop reason: HOSPADM

## 2025-05-29 RX ORDER — SODIUM CHLORIDE 0.9 % (FLUSH) 0.9 %
5-40 SYRINGE (ML) INJECTION EVERY 12 HOURS SCHEDULED
Status: DISCONTINUED | OUTPATIENT
Start: 2025-05-29 | End: 2025-05-31 | Stop reason: HOSPADM

## 2025-05-29 RX ORDER — CHOLESTYRAMINE LIGHT 4 G/5.7G
1 POWDER, FOR SUSPENSION ORAL DAILY
Status: DISCONTINUED | OUTPATIENT
Start: 2025-05-30 | End: 2025-05-31 | Stop reason: HOSPADM

## 2025-05-29 RX ORDER — ACETAMINOPHEN 650 MG/1
650 SUPPOSITORY RECTAL EVERY 6 HOURS PRN
Status: DISCONTINUED | OUTPATIENT
Start: 2025-05-29 | End: 2025-05-31 | Stop reason: HOSPADM

## 2025-05-29 RX ORDER — METOPROLOL TARTRATE 25 MG/1
25 TABLET, FILM COATED ORAL 2 TIMES DAILY
Status: DISCONTINUED | OUTPATIENT
Start: 2025-05-29 | End: 2025-05-31 | Stop reason: HOSPADM

## 2025-05-29 RX ORDER — POTASSIUM CHLORIDE 7.45 MG/ML
10 INJECTION INTRAVENOUS PRN
Status: DISCONTINUED | OUTPATIENT
Start: 2025-05-29 | End: 2025-05-31 | Stop reason: HOSPADM

## 2025-05-29 RX ORDER — HEPARIN SODIUM 1000 [USP'U]/ML
2000 INJECTION, SOLUTION INTRAVENOUS; SUBCUTANEOUS PRN
Status: DISCONTINUED | OUTPATIENT
Start: 2025-05-29 | End: 2025-05-30 | Stop reason: ALTCHOICE

## 2025-05-29 RX ORDER — POLYETHYLENE GLYCOL 3350 17 G/17G
17 POWDER, FOR SOLUTION ORAL 2 TIMES DAILY
Status: DISCONTINUED | OUTPATIENT
Start: 2025-05-29 | End: 2025-05-31 | Stop reason: HOSPADM

## 2025-05-29 RX ORDER — HYDRALAZINE HYDROCHLORIDE 50 MG/1
75 TABLET, FILM COATED ORAL
Status: COMPLETED | OUTPATIENT
Start: 2025-05-29 | End: 2025-05-29

## 2025-05-29 RX ORDER — LISINOPRIL 20 MG/1
20 TABLET ORAL DAILY
Status: DISCONTINUED | OUTPATIENT
Start: 2025-05-30 | End: 2025-05-31 | Stop reason: HOSPADM

## 2025-05-29 RX ORDER — FERROUS SULFATE 325(65) MG
325 TABLET ORAL
Status: DISCONTINUED | OUTPATIENT
Start: 2025-05-30 | End: 2025-05-31 | Stop reason: HOSPADM

## 2025-05-29 RX ORDER — HEPARIN SODIUM 1000 [USP'U]/ML
4000 INJECTION, SOLUTION INTRAVENOUS; SUBCUTANEOUS ONCE
Status: COMPLETED | OUTPATIENT
Start: 2025-05-29 | End: 2025-05-29

## 2025-05-29 RX ORDER — INSULIN GLARGINE 100 [IU]/ML
10 INJECTION, SOLUTION SUBCUTANEOUS DAILY
Status: DISCONTINUED | OUTPATIENT
Start: 2025-05-30 | End: 2025-05-31 | Stop reason: HOSPADM

## 2025-05-29 RX ORDER — ONDANSETRON 2 MG/ML
4 INJECTION INTRAMUSCULAR; INTRAVENOUS EVERY 6 HOURS PRN
Status: DISCONTINUED | OUTPATIENT
Start: 2025-05-29 | End: 2025-05-29 | Stop reason: SDUPTHER

## 2025-05-29 RX ORDER — ATORVASTATIN CALCIUM 40 MG/1
80 TABLET, FILM COATED ORAL NIGHTLY
Status: DISCONTINUED | OUTPATIENT
Start: 2025-05-29 | End: 2025-05-31 | Stop reason: HOSPADM

## 2025-05-29 RX ORDER — THIAMINE HYDROCHLORIDE 100 MG/ML
100 INJECTION, SOLUTION INTRAMUSCULAR; INTRAVENOUS DAILY
Status: DISCONTINUED | OUTPATIENT
Start: 2025-05-29 | End: 2025-05-31 | Stop reason: HOSPADM

## 2025-05-29 RX ORDER — SODIUM CHLORIDE 9 MG/ML
INJECTION, SOLUTION INTRAVENOUS PRN
Status: DISCONTINUED | OUTPATIENT
Start: 2025-05-29 | End: 2025-05-31 | Stop reason: HOSPADM

## 2025-05-29 RX ORDER — SENNA AND DOCUSATE SODIUM 50; 8.6 MG/1; MG/1
2 TABLET, FILM COATED ORAL 2 TIMES DAILY
Status: DISCONTINUED | OUTPATIENT
Start: 2025-05-29 | End: 2025-05-31

## 2025-05-29 RX ORDER — AMLODIPINE BESYLATE 5 MG/1
10 TABLET ORAL DAILY
Status: DISCONTINUED | OUTPATIENT
Start: 2025-05-30 | End: 2025-05-31 | Stop reason: HOSPADM

## 2025-05-29 RX ORDER — POTASSIUM CHLORIDE 1500 MG/1
40 TABLET, EXTENDED RELEASE ORAL PRN
Status: DISCONTINUED | OUTPATIENT
Start: 2025-05-29 | End: 2025-05-31 | Stop reason: HOSPADM

## 2025-05-29 RX ORDER — POLYETHYLENE GLYCOL 3350 17 G/17G
17 POWDER, FOR SOLUTION ORAL DAILY PRN
Status: DISCONTINUED | OUTPATIENT
Start: 2025-05-29 | End: 2025-05-31 | Stop reason: HOSPADM

## 2025-05-29 RX ORDER — HEPARIN SODIUM 10000 [USP'U]/100ML
5-30 INJECTION, SOLUTION INTRAVENOUS CONTINUOUS
Status: DISCONTINUED | OUTPATIENT
Start: 2025-05-29 | End: 2025-05-30

## 2025-05-29 RX ORDER — SODIUM CHLORIDE 0.9 % (FLUSH) 0.9 %
5-40 SYRINGE (ML) INJECTION PRN
Status: DISCONTINUED | OUTPATIENT
Start: 2025-05-29 | End: 2025-05-31 | Stop reason: HOSPADM

## 2025-05-29 RX ORDER — ATORVASTATIN CALCIUM 40 MG/1
40 TABLET, FILM COATED ORAL DAILY
Status: DISCONTINUED | OUTPATIENT
Start: 2025-05-29 | End: 2025-05-29

## 2025-05-29 RX ORDER — MAGNESIUM SULFATE IN WATER 40 MG/ML
2000 INJECTION, SOLUTION INTRAVENOUS PRN
Status: DISCONTINUED | OUTPATIENT
Start: 2025-05-29 | End: 2025-05-31 | Stop reason: HOSPADM

## 2025-05-29 RX ADMIN — LIDOCAINE HYDROCHLORIDE 40 ML: 20 SOLUTION ORAL at 22:56

## 2025-05-29 RX ADMIN — HEPARIN SODIUM 4000 UNITS: 1000 INJECTION INTRAVENOUS; SUBCUTANEOUS at 19:27

## 2025-05-29 RX ADMIN — IOPAMIDOL 100 ML: 755 INJECTION, SOLUTION INTRAVENOUS at 15:00

## 2025-05-29 RX ADMIN — SENNOSIDES AND DOCUSATE SODIUM 2 TABLET: 50; 8.6 TABLET ORAL at 22:29

## 2025-05-29 RX ADMIN — MORPHINE SULFATE 2 MG: 2 INJECTION, SOLUTION INTRAMUSCULAR; INTRAVENOUS at 18:41

## 2025-05-29 RX ADMIN — HYDRALAZINE HYDROCHLORIDE 75 MG: 50 TABLET ORAL at 16:26

## 2025-05-29 RX ADMIN — ATORVASTATIN CALCIUM 80 MG: 40 TABLET, FILM COATED ORAL at 22:29

## 2025-05-29 RX ADMIN — POLYETHYLENE GLYCOL 3350 17 G: 17 POWDER, FOR SOLUTION ORAL at 22:28

## 2025-05-29 RX ADMIN — HYDRALAZINE HYDROCHLORIDE 75 MG: 50 TABLET ORAL at 22:28

## 2025-05-29 RX ADMIN — METOPROLOL TARTRATE 25 MG: 25 TABLET, FILM COATED ORAL at 22:29

## 2025-05-29 RX ADMIN — ACETAMINOPHEN 650 MG: 325 TABLET ORAL at 22:58

## 2025-05-29 RX ADMIN — ONDANSETRON 4 MG: 2 INJECTION, SOLUTION INTRAMUSCULAR; INTRAVENOUS at 18:41

## 2025-05-29 RX ADMIN — HEPARIN SODIUM AND DEXTROSE 12 UNITS/KG/HR: 10000; 5 INJECTION INTRAVENOUS at 19:29

## 2025-05-29 RX ADMIN — MELATONIN 3 MG: at 22:29

## 2025-05-29 RX ADMIN — SODIUM CHLORIDE, PRESERVATIVE FREE 10 ML: 5 INJECTION INTRAVENOUS at 22:29

## 2025-05-29 ASSESSMENT — PAIN DESCRIPTION - PAIN TYPE
TYPE: ACUTE PAIN

## 2025-05-29 ASSESSMENT — PAIN DESCRIPTION - FREQUENCY
FREQUENCY: CONTINUOUS
FREQUENCY: CONTINUOUS

## 2025-05-29 ASSESSMENT — PAIN DESCRIPTION - LOCATION
LOCATION: ABDOMEN
LOCATION: ABDOMEN
LOCATION: FLANK
LOCATION: FLANK
LOCATION: ABDOMEN

## 2025-05-29 ASSESSMENT — PAIN DESCRIPTION - ORIENTATION
ORIENTATION: LEFT

## 2025-05-29 ASSESSMENT — LIFESTYLE VARIABLES
HOW MANY STANDARD DRINKS CONTAINING ALCOHOL DO YOU HAVE ON A TYPICAL DAY: PATIENT DOES NOT DRINK
HOW OFTEN DO YOU HAVE A DRINK CONTAINING ALCOHOL: NEVER

## 2025-05-29 ASSESSMENT — PAIN DESCRIPTION - DESCRIPTORS
DESCRIPTORS: CRAMPING;DISCOMFORT
DESCRIPTORS: CRAMPING;DISCOMFORT
DESCRIPTORS: SHARP

## 2025-05-29 ASSESSMENT — PAIN - FUNCTIONAL ASSESSMENT
PAIN_FUNCTIONAL_ASSESSMENT: PREVENTS OR INTERFERES SOME ACTIVE ACTIVITIES AND ADLS
PAIN_FUNCTIONAL_ASSESSMENT: ACTIVITIES ARE NOT PREVENTED
PAIN_FUNCTIONAL_ASSESSMENT: PREVENTS OR INTERFERES SOME ACTIVE ACTIVITIES AND ADLS

## 2025-05-29 ASSESSMENT — PAIN SCALES - GENERAL
PAINLEVEL_OUTOF10: 7
PAINLEVEL_OUTOF10: 3
PAINLEVEL_OUTOF10: 8
PAINLEVEL_OUTOF10: 5
PAINLEVEL_OUTOF10: 5
PAINLEVEL_OUTOF10: 7

## 2025-05-29 NOTE — ED NOTES
Bedside shift change report given to CHARIS Hsieh (oncoming nurse) by CHARIS Pacheco (offgoing nurse). Report included the following information Nurse Handoff Report, ED Encounter Summary, ED SBAR, and MAR.

## 2025-05-29 NOTE — ACP (ADVANCE CARE PLANNING)
Advanced care planning    Demographics    Patient Name  Yefri Vigil   Date of Birth 1955   Medical Record Number  [id number]      Age  69 y.o.   PCP Edy Brandt APRN - NP   Admit date:  5/29/2025  1:29 PM     Room Number  ER21/21  @ [facility]       Patient is capable of making informed decision about their healthcare and end of life care     We discussed the patient's current medical conditions, risks, benefits, outcomes and goals of care on the basis of patient's past medical history, including but not limited to His chronic medical condition(s).    Within the range and scope of current medical situation I answered all the questions from the patient/family.     This was a face-to-face encounter.     Outcome of the discussion.   Continue all advanced life support: CPR, intubation, invasive procedures    30 minutes were spent for the above discussion around advanced care plan decision.      Fabio Lofton MD  5/29/2025

## 2025-05-29 NOTE — ED PROVIDER NOTES
[facility] EMERGENCY DEPARTMENT  EMERGENCY DEPARTMENT ENCOUNTER       Pt Name: Yefri Vigil  MRN: [id number]  Birthdate 1955  Date of evaluation: 5/29/2025  Provider: PRISCILLA Jacobson   PCP: Edy Brandt APRN - NP  Note Started: 1:34 PM EDT 5/29/25     CHIEF COMPLAINT       Chief Complaint   Patient presents with    Flank Pain     Pt ambulatory to triage cc of left flank pain that radiates to abdomen. Denies dysuria, NVDC. Hx colon surgery on 4/11.         HISTORY OF PRESENT ILLNESS: 1 or more elements      History From: Patient  HPI Limitations: None     Yefri Vigil is a 69 y.o. male who presents ambulatory with intermittent pain of his left upper abdomen over the past 3 days.  He tells me the pain \"comes and goes\".  He tells me he did have pain earlier.  He tells me the pain has been worsening over the last 3 days.  He tells me he did have some loose stool about 3 days ago.  He denies any fever.  Denies any chest pain or shortness of breath.  He denies any dysuria, urgency or frequency.  At the time my interview, he tells me he has no pain.  He tells me he did have surgery in April to remove a \"mass\" from his colon.  He tells me he does take a blood thinner and believes it is aspirin.     Nursing Notes were all reviewed and agreed with or any disagreements were addressed in the HPI.     REVIEW OF SYSTEMS      Review of Systems     Positives and Pertinent negatives as per HPI.    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  Given his elevated troponin, we expect to begin heparin therapy.  I ask the clinical pharmacist if the patient takes clopidogrel should I withhold the initial bolus.  Clinical pharmacist states he would begin the initial bolus and drip.  Heparin is ordered. [EJ]   1852 Hospitalist replies to consult requesting consult to cardiology.  I submit an IP consult to cardiology. [EJ]   1917 I just spoke to the on-call hospitalist from Dr. Vishal Baptiste's office who agrees to see.  He tells me to make the patient n.p.o. [EJ]      ED Course User Index  [EJ] Piyush Nails PA       Disposition Considerations (Tests not done, Shared Decision Making, Pt Expectation of Test or Tx.):      FINAL IMPRESSION     1. Acute abdominal pain    2. Elevated troponin    3. History of CAD (coronary artery disease)    4. Chronic kidney disease, unspecified CKD stage    5. Constipation, unspecified constipation type          DISPOSITION/PLAN   DISPOSITION         Admit Note: Pt is being admitted by Hospitalist . The results of their tests and reason(s) for their admission have been discussed with pt and/or available family. They convey agreement and understanding for the need to be admitted and for the admission diagnosis.     I have seen and evaluated the patient autonomously. My supervision physician was on site and available for consultation if needed.     I am the Primary Clinician of Record.   PRISCLILA Jacobson (electronically signed)    (Please note that parts of this dictation were completed with voice recognition software. Quite often unanticipated grammatical, syntax, homophones, and other interpretive errors are inadvertently transcribed by the computer software. Please disregards these errors. Please excuse any errors that have escaped final proofreading.)       Piyush Nails PA  05/29/25 1937

## 2025-05-29 NOTE — ED NOTES
Pt urinated on self. Pt assisted to bathroom and cleaned. Pt placed in hospital gown and provided brief. Pt bed cleaned and new linens applied. Pt back in bed, bed wheels locked, bed in lowest position, side rails in upright position x2, pt on the monitor x3, and call bell within reach.

## 2025-05-29 NOTE — H&P
Hospitalist Admission Note    NAME:   Yefri Vigil   : 1955   MRN: [id number]     Date/Time: 2025 7:41 PM    Patient PCP: Edy Brandt APRN - NP    ______________________________________________________________________  Given the patient's current clinical presentation, I have a high level of concern for decompensation if discharged from the emergency department.  Complex decision making was performed, which includes reviewing the patient's available past medical records, laboratory results, and x-ray films.       My assessment of this patient's clinical condition and my plan of care is as follows.    Assessment / Plan:    NSTEMI  Hx NSTEMI  CAD  Hx bioprosthetic valve  -Presents with intermittent chest pain  - Troponins 520, repeat 521  - EKG shows sinus bradycardia  -Last echo from 2025 shows EF of 55%, normal wall motion normal wall thickness  -Start heparin drip,  - C/W aspirin Plavix  - Cardiology consult      Constipation  Abdominal pain evaluation  Recent right colectomy for colon mass  - Patient had robotic extended right colectomy on 2025 for colon mass  -CT scan of the abdomen and pelvis negative for acute pathology but does show fecal stasis  - Has not had a bowel movement in a few days, will start bowel regimen as ordered     CKD stage III  TERESA  - Last baseline 1.37, POA 1.64  -UA shows proteinuria, negative for infections  - Avoid nephrotoxins, monitor BMP daily,     Increased risk for hospital delirium  - Delirium precautions ordered  - Melatonin nightly, thiamine daily     High risk for aspiration  -   aspiration precautions ordered    High risk for falls  - PT OT, fall precautions ordered    DM2  HTN  -C/W home medication      Medical Decision Making:   I personally reviewed labs: Troponin CBC CMP  I personally reviewed imaging: CT abdomen/pelvis  I personally reviewed EKG:  Toxic drug monitoring: N/A  Discussed case with: ED provider. After discussion I am  inversion no longer evident in Inferior leads  Nonspecific T wave abnormality now evident in Lateral leads  QT has shortened  Confirmed by MD Crum William ([id number]) on 5/29/2025 7:03:59 PM     Troponin    Collection Time: 05/29/25  2:07 PM   Result Value Ref Range    Troponin, High Sensitivity 520 (HH) 0 - 76 ng/L   EKG 12 Lead    Collection Time: 05/29/25  3:27 PM   Result Value Ref Range    Ventricular Rate 56 BPM    Atrial Rate 56 BPM    P-R Interval 202 ms    QRS Duration 100 ms    Q-T Interval 428 ms    QTc Calculation (Bazett) 413 ms    P Axis 59 degrees    R Axis -17 degrees    T Axis 34 degrees    Diagnosis       Sinus bradycardia  Nonspecific T wave abnormality  Abnormal ECG  When compared with ECG of 29-MAY-2025 14:01,  Not significantly changed  Confirmed by MD Crum William ([id number]) on 5/29/2025 6:11:04 PM     Troponin    Collection Time: 05/29/25  3:30 PM   Result Value Ref Range    Troponin, High Sensitivity 521 (HH) 0 - 76 ng/L         CT ABDOMEN PELVIS W IV CONTRAST Additional Contrast? None  Result Date: 5/29/2025  EXAM: CT ABDOMEN PELVIS W IV CONTRAST CLINICAL HISTORY: left sided abdominal pain INDICATION: left sided abdominal pain COMPARISON: [4/13/2025]. CONTRAST: [100] ml Isovue 370 TECHNIQUE: Multislice helical CT was performed of the abdomen and pelvis  following uneventful rapid bolus intravenous contrast administration.  Oral contrast was not administered. Contiguous 5 mm axial images were reconstructed and lung and soft tissue windows were generated. Coronal and sagittal reformations were generated.  CT dose reduction was achieved through use of a standardized protocol tailored for this examination and automatic exposure control for dose modulation.   FINDINGS: LUNG BASES: Coronary vascular calcification and cardiomegaly. LIVER/GALLBLADDER: Hepatic steatosis. There is no intrahepatic duct dilatation. There is no hepatic parenchymal mass. Hepatic enhancement pattern is within normal limits.

## 2025-05-29 NOTE — ED NOTES
Assisted pt to bathroom. Pt back in bed, bed wheels locked, bed in lowest position, side rails in upright position x2, pt on the monitor x3, and call bell within reach.

## 2025-05-30 LAB
ANION GAP SERPL CALC-SCNC: 6 MMOL/L (ref 2–12)
BUN SERPL-MCNC: 20 MG/DL (ref 6–20)
BUN/CREAT SERPL: 14 (ref 12–20)
CALCIUM SERPL-MCNC: 8.7 MG/DL (ref 8.5–10.1)
CHLORIDE SERPL-SCNC: 106 MMOL/L (ref 97–108)
CHOLEST SERPL-MCNC: 81 MG/DL
CO2 SERPL-SCNC: 26 MMOL/L (ref 21–32)
CREAT SERPL-MCNC: 1.43 MG/DL (ref 0.7–1.3)
ERYTHROCYTE [DISTWIDTH] IN BLOOD BY AUTOMATED COUNT: 14.8 % (ref 11.5–14.5)
EST. AVERAGE GLUCOSE BLD GHB EST-MCNC: 114 MG/DL
GLUCOSE BLD STRIP.AUTO-MCNC: 108 MG/DL (ref 65–117)
GLUCOSE BLD STRIP.AUTO-MCNC: 128 MG/DL (ref 65–117)
GLUCOSE BLD STRIP.AUTO-MCNC: 72 MG/DL (ref 65–117)
GLUCOSE BLD STRIP.AUTO-MCNC: 83 MG/DL (ref 65–117)
GLUCOSE SERPL-MCNC: 77 MG/DL (ref 65–100)
HBA1C MFR BLD: 5.6 % (ref 4–5.6)
HCT VFR BLD AUTO: 32.2 % (ref 36.6–50.3)
HDLC SERPL-MCNC: 45 MG/DL
HDLC SERPL: 1.8 (ref 0–5)
HGB BLD-MCNC: 10.8 G/DL (ref 12.1–17)
LDLC SERPL CALC-MCNC: 31.2 MG/DL (ref 0–100)
MAGNESIUM SERPL-MCNC: 1.5 MG/DL (ref 1.6–2.4)
MCH RBC QN AUTO: 28.1 PG (ref 26–34)
MCHC RBC AUTO-ENTMCNC: 33.5 G/DL (ref 30–36.5)
MCV RBC AUTO: 83.9 FL (ref 80–99)
NRBC # BLD: 0 K/UL (ref 0–0.01)
NRBC BLD-RTO: 0 PER 100 WBC
PHOSPHATE SERPL-MCNC: 4.3 MG/DL (ref 2.6–4.7)
PLATELET # BLD AUTO: 171 K/UL (ref 150–400)
PMV BLD AUTO: 9.4 FL (ref 8.9–12.9)
POTASSIUM SERPL-SCNC: 3.9 MMOL/L (ref 3.5–5.1)
RBC # BLD AUTO: 3.84 M/UL (ref 4.1–5.7)
SERVICE CMNT-IMP: ABNORMAL
SERVICE CMNT-IMP: NORMAL
SODIUM SERPL-SCNC: 138 MMOL/L (ref 136–145)
TRIGL SERPL-MCNC: 24 MG/DL
UFH PPP CHRO-ACNC: 0.45 IU/ML
UFH PPP CHRO-ACNC: 0.5 IU/ML
VLDLC SERPL CALC-MCNC: 4.8 MG/DL
WBC # BLD AUTO: 6.7 K/UL (ref 4.1–11.1)

## 2025-05-30 PROCEDURE — 6370000000 HC RX 637 (ALT 250 FOR IP): Performed by: STUDENT IN AN ORGANIZED HEALTH CARE EDUCATION/TRAINING PROGRAM

## 2025-05-30 PROCEDURE — 97165 OT EVAL LOW COMPLEX 30 MIN: CPT

## 2025-05-30 PROCEDURE — 36415 COLL VENOUS BLD VENIPUNCTURE: CPT

## 2025-05-30 PROCEDURE — 6360000002 HC RX W HCPCS: Performed by: STUDENT IN AN ORGANIZED HEALTH CARE EDUCATION/TRAINING PROGRAM

## 2025-05-30 PROCEDURE — 84100 ASSAY OF PHOSPHORUS: CPT

## 2025-05-30 PROCEDURE — 97116 GAIT TRAINING THERAPY: CPT

## 2025-05-30 PROCEDURE — 1100000003 HC PRIVATE W/ TELEMETRY

## 2025-05-30 PROCEDURE — 83735 ASSAY OF MAGNESIUM: CPT

## 2025-05-30 PROCEDURE — 80048 BASIC METABOLIC PNL TOTAL CA: CPT

## 2025-05-30 PROCEDURE — 97161 PT EVAL LOW COMPLEX 20 MIN: CPT

## 2025-05-30 PROCEDURE — 2500000003 HC RX 250 WO HCPCS: Performed by: STUDENT IN AN ORGANIZED HEALTH CARE EDUCATION/TRAINING PROGRAM

## 2025-05-30 PROCEDURE — 85520 HEPARIN ASSAY: CPT

## 2025-05-30 PROCEDURE — 82962 GLUCOSE BLOOD TEST: CPT

## 2025-05-30 PROCEDURE — 97535 SELF CARE MNGMENT TRAINING: CPT

## 2025-05-30 PROCEDURE — 85027 COMPLETE CBC AUTOMATED: CPT

## 2025-05-30 RX ORDER — CYCLOBENZAPRINE HCL 10 MG
10 TABLET ORAL 3 TIMES DAILY PRN
Status: DISCONTINUED | OUTPATIENT
Start: 2025-05-30 | End: 2025-05-31 | Stop reason: HOSPADM

## 2025-05-30 RX ADMIN — SODIUM CHLORIDE, PRESERVATIVE FREE 10 ML: 5 INJECTION INTRAVENOUS at 20:55

## 2025-05-30 RX ADMIN — AMLODIPINE BESYLATE 10 MG: 5 TABLET ORAL at 09:15

## 2025-05-30 RX ADMIN — SENNOSIDES AND DOCUSATE SODIUM 2 TABLET: 50; 8.6 TABLET ORAL at 09:15

## 2025-05-30 RX ADMIN — SENNOSIDES AND DOCUSATE SODIUM 2 TABLET: 50; 8.6 TABLET ORAL at 20:54

## 2025-05-30 RX ADMIN — MELATONIN 3 MG: at 20:54

## 2025-05-30 RX ADMIN — THIAMINE HYDROCHLORIDE 100 MG: 100 INJECTION, SOLUTION INTRAMUSCULAR; INTRAVENOUS at 09:15

## 2025-05-30 RX ADMIN — LISINOPRIL 20 MG: 20 TABLET ORAL at 09:15

## 2025-05-30 RX ADMIN — POLYETHYLENE GLYCOL 3350 17 G: 17 POWDER, FOR SOLUTION ORAL at 09:15

## 2025-05-30 RX ADMIN — CYCLOBENZAPRINE 10 MG: 10 TABLET, FILM COATED ORAL at 11:17

## 2025-05-30 RX ADMIN — ATORVASTATIN CALCIUM 80 MG: 40 TABLET, FILM COATED ORAL at 20:54

## 2025-05-30 RX ADMIN — HYDRALAZINE HYDROCHLORIDE 75 MG: 50 TABLET ORAL at 20:54

## 2025-05-30 RX ADMIN — SODIUM CHLORIDE, PRESERVATIVE FREE 10 ML: 5 INJECTION INTRAVENOUS at 09:16

## 2025-05-30 RX ADMIN — METOPROLOL TARTRATE 25 MG: 25 TABLET, FILM COATED ORAL at 09:15

## 2025-05-30 RX ADMIN — HYDRALAZINE HYDROCHLORIDE 75 MG: 50 TABLET ORAL at 15:00

## 2025-05-30 RX ADMIN — POLYETHYLENE GLYCOL 3350 17 G: 17 POWDER, FOR SOLUTION ORAL at 20:55

## 2025-05-30 RX ADMIN — CHOLESTYRAMINE 4 G: 4 POWDER, FOR SUSPENSION ORAL at 09:15

## 2025-05-30 RX ADMIN — HYDRALAZINE HYDROCHLORIDE 75 MG: 50 TABLET ORAL at 09:15

## 2025-05-30 RX ADMIN — INSULIN GLARGINE 10 UNITS: 100 INJECTION, SOLUTION SUBCUTANEOUS at 09:14

## 2025-05-30 RX ADMIN — ACETAMINOPHEN 650 MG: 325 TABLET ORAL at 17:50

## 2025-05-30 RX ADMIN — FERROUS SULFATE TAB 325 MG (65 MG ELEMENTAL FE) 325 MG: 325 (65 FE) TAB at 09:15

## 2025-05-30 RX ADMIN — METOPROLOL TARTRATE 25 MG: 25 TABLET, FILM COATED ORAL at 20:54

## 2025-05-30 RX ADMIN — ASPIRIN 81 MG: 81 TABLET, CHEWABLE ORAL at 09:14

## 2025-05-30 RX ADMIN — PANTOPRAZOLE SODIUM 40 MG: 40 TABLET, DELAYED RELEASE ORAL at 05:29

## 2025-05-30 RX ADMIN — ACETAMINOPHEN 650 MG: 325 TABLET ORAL at 09:14

## 2025-05-30 ASSESSMENT — PAIN DESCRIPTION - DESCRIPTORS
DESCRIPTORS: SORE
DESCRIPTORS: SORE

## 2025-05-30 ASSESSMENT — PAIN SCALES - GENERAL
PAINLEVEL_OUTOF10: 0
PAINLEVEL_OUTOF10: 0
PAINLEVEL_OUTOF10: 3
PAINLEVEL_OUTOF10: 3
PAINLEVEL_OUTOF10: 0
PAINLEVEL_OUTOF10: 3
PAINLEVEL_OUTOF10: 0

## 2025-05-30 ASSESSMENT — PAIN DESCRIPTION - LOCATION
LOCATION: ABDOMEN
LOCATION: ABDOMEN

## 2025-05-30 ASSESSMENT — PAIN DESCRIPTION - ORIENTATION: ORIENTATION: MID

## 2025-05-30 NOTE — PROGRESS NOTES
End of Shift Note    Bedside shift change report given to CHARIS Lancaster (oncoming nurse) by Sylvain Thompson RN (offgoing nurse).  Report included the following information SBAR, Intake/Output, MAR, Recent Results, Cardiac Rhythm NSR, and Quality Measures    Shift worked:  7P-7A     Shift summary and any significant changes:     No acute events overnight.     Concerns for physician to address:  None     Zone phone for oncoming shift:   [phone]       Activity:  Level of Assistance: Independent  Number times ambulated in hallways past shift: 0  Number of times OOB to chair past shift: 2    Cardiac:   Cardiac Monitoring: Yes      Cardiac Rhythm: Sinus rhythm    Access:  Current line(s): PIV     Genitourinary:        Respiratory:   O2 Device: None (Room air)  Chronic home O2 use?: NO  Incentive spirometer at bedside: NO    GI:     Current diet:  ADULT DIET; Clear Liquid; No Caffeine  Passing flatus: YES    Pain Management:   Patient states pain is manageable on current regimen: YES    Skin:  Sly Scale Score: 21  Interventions: Wound Offloading (Prevention Methods): Bed, pressure reduction mattress, Elevate heels, Repositioning, Pillows    Patient Safety:  Fall Risk:    Fall Risk Interventions  Toilet Every 2 Hours-In Advance of Need: Yes  Hourly Visual Checks: In bed, Awake, Quiet  Fall Visual Posted: Armband, Socks, Fall sign posted  Room Door Open: Yes  Alarm On: Bed    Active Consults:   IP CONSULT TO HOSPITALIST  IP CONSULT TO CARDIOLOGY  IP CONSULT TO CARDIOLOGY    Length of Stay:  Expected LOS: 3  Actual LOS: 1    Sylvain Thompson RN

## 2025-05-30 NOTE — CONSULTS
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 Trending down. EKG SB, rate 56 bpm, Cordelia 202 ms,  ms, Qtc 413 ms, nonischemic. TTE  with LVEF 55-60%, stable TAVR valve, MG 8 mmHg, MAC. LHC 10/23 single vessel CAD and LAD KIKA x 1.  Continue ASA, Statin, BB. Bump likely due to HTN Urgency yesterday. Asymptomatic and reproducible LUQ pain. He can eat from our standpoint. Not sure if needs any other GI mata     [x]       High complexity decision making was performed in this patient at high risk for decompensation with multiple organ involvement.      Subjective:   CHIEF COMPLAINT: NSTEMI    HISTORY OF PRESENT ILLNESS:     Yefri is a 69 y.o.   male who presented  on  with intermittent LUQ pain for three days and loose bms. He recently had right colectomy for colonic mass AND REQUIRED REENTRY DUE TO RETOPERITONEAL BLEEDING. His Abd/pelvis CT shows fecal stasis and he says this is how he feels when constipated but does say the LUQ is painful to touch just under his ribs. He  discharged on 2025. He denies chest pain, shortness of breath, palpitations, orthopnea, Pnd, or lightheadedness, syncope.       We were asked to consult for work up and evaluation of the above problems.     Past Medical  Cosme Hudson PCT    Anti-Xa, Unfractionated Heparin    Collection Time: 05/30/25  1:14 AM   Result Value Ref Range    Heparin Xa,LMWH and Unfrac 0.50 IU/mL   CBC    Collection Time: 05/30/25  1:14 AM   Result Value Ref Range    WBC 6.7 4.1 - 11.1 K/uL    RBC 3.84 (L) 4.10 - 5.70 M/uL    Hemoglobin 10.8 (L) 12.1 - 17.0 g/dL    Hematocrit 32.2 (L) 36.6 - 50.3 %    MCV 83.9 80.0 - 99.0 FL    MCH 28.1 26.0 - 34.0 PG    MCHC 33.5 30.0 - 36.5 g/dL    RDW 14.8 (H) 11.5 - 14.5 %    Platelets 171 150 - 400 K/uL    MPV 9.4 8.9 - 12.9 FL    Nucleated RBCs 0.0 0  WBC    nRBC 0.00 0.00 - 0.01 K/uL   Basic Metabolic Panel    Collection Time: 05/30/25  1:14 AM   Result Value Ref Range    Sodium 138 136 - 145 mmol/L    Potassium 3.9 3.5 - 5.1 mmol/L    Chloride 106 97 - 108 mmol/L    CO2 26 21 - 32 mmol/L    Anion Gap 6 2 - 12 mmol/L    Glucose 77 65 - 100 mg/dL    BUN 20 6 - 20 MG/DL    Creatinine 1.43 (H) 0.70 - 1.30 MG/DL    BUN/Creatinine Ratio 14 12 - 20      Est, Glom Filt Rate 54 (L) >60 ml/min/1.73m2    Calcium 8.7 8.5 - 10.1 MG/DL   Phosphorus    Collection Time: 05/30/25  1:14 AM   Result Value Ref Range    Phosphorus 4.3 2.6 - 4.7 MG/DL   Magnesium    Collection Time: 05/30/25  1:14 AM   Result Value Ref Range    Magnesium 1.5 (L) 1.6 - 2.4 mg/dL   Anti-XA, Heparin    Collection Time: 05/30/25  6:21 AM   Result Value Ref Range    Heparin Xa,LMWH and Unfrac 0.45 IU/mL   POCT Glucose    Collection Time: 05/30/25  7:51 AM   Result Value Ref Range    POC Glucose 83 65 - 117 mg/dL    Performed by: Araseli Ruiz PCT

## 2025-05-30 NOTE — PROGRESS NOTES
PHYSICAL THERAPY EVALUATION/DISCHARGE    Patient: Yefri Vigil (69 y.o. male)  Date: 5/30/2025  Primary Diagnosis: Acute abdominal pain [R10.9]  Elevated troponin [R79.89]  NSTEMI (non-ST elevated myocardial infarction) (HCC) [I21.4]  Constipation, unspecified constipation type [K59.00]  Chronic kidney disease, unspecified CKD stage [N18.9]  History of CAD (coronary artery disease) [Z86.79]       Precautions: Restrictions/Precautions  Restrictions/Precautions: Bed Alarm            ASSESSMENT AND RECOMMENDATIONS:  Based on the objective data below, the patient is at his functional baseline. He was received in supine and cleared by nursing to mobilize. Vitals stable during session. He performed all mobility at a supervision level or higher. Ambulated into the braden and had a few minor path deviations but no loss of balance.     Functional Outcome Measure:  The patient scored 24/24 on the [facility] outcome measure which is indicative of good functional mobility.          Further skilled acute physical therapy is not indicated at this time.       PLAN :  Recommendation for discharge: (in order for the patient to meet his/her long term goals):   No skilled physical therapy    Other factors to consider for discharge: no additional factors    IF patient discharges home will need the following DME: none       SUBJECTIVE:   Patient stated “ye aok.”    OBJECTIVE DATA SUMMARY:     Past Medical History:   Diagnosis Date    Acute kidney injury 10/04/2023    Acute metabolic encephalopathy 10/07/2023    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  Was followed by ID in the hospital as well as other specialties    Alcohol abuse 10/07/2023    Arthritis     back    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

## 2025-05-30 NOTE — PROGRESS NOTES
0700: Bedside and Verbal shift change report given to Anisha Diamond RN (oncoming nurse) by CHARIS Narayan (offgoing nurse). Report included the following information Nurse Handoff Report, Index, Intake/Output, MAR, Recent Results, and Cardiac Rhythm NSR .     0914: Pt complaining of mild abdominal soreness. PRN tylenol given.     0930: Pt working with PT/OT, ambulating in hallway tolerating ambulation well.     0940: Heparin gtt verified with pharmacy. Pts Anti-Xa is 0.45, therapeutic at this time, no rate change needed. This is the second therapeutic, next lab draw with AM labs.     1117: Pt complaining of abdominal cramping, PRN flexeril given.     1750: Pt complaining of abdominal pain, PRN tylenol given.     1900:     End of Shift Note    Bedside shift change report given to CHARIS Narayan (oncoming nurse) by Anisha Diamond RN (offgoing nurse).  Report included the following information SBAR, Kardex, Intake/Output, MAR, Recent Results, and Cardiac Rhythm NSR to SB    Shift worked:  [id number]     Shift summary and any significant changes:     See above      Concerns for physician to address:  none     Zone phone for oncoming shift:          Activity:  Level of Assistance: Standby assist, set-up cues, supervision of patient - no hands on  Number times ambulated in hallways past shift: 0  Number of times OOB to chair past shift: 1    Cardiac:   Cardiac Monitoring: Yes      Cardiac Rhythm: Sinus lydia    Access:  Current line(s): PIV     Genitourinary:        Respiratory:   O2 Device: None (Room air)  Chronic home O2 use?: NO  Incentive spirometer at bedside: NO    GI:     Current diet:  ADULT DIET; Clear Liquid; No Caffeine  Passing flatus: YES    Pain Management:   Patient states pain is manageable on current regimen: YES    Skin:  Sly Scale Score: 21  Interventions: Wound Offloading (Prevention Methods): Repositioning    Patient Safety:  Fall Risk: Nursing Judgement-Fall Risk High(Add Comments): Yes  Fall Risk

## 2025-05-30 NOTE — PROGRESS NOTES
Attempted to schedule hospital follow up PCP appointment. Office  will contact the patient with appointment information per office protocol. . Pending patient discharge. Melany Villeda, Care Management Assistant

## 2025-05-30 NOTE — PROGRESS NOTES
OCCUPATIONAL THERAPY EVALUATION/DISCHARGE  Patient: Yefri Vigil (69 y.o. male)  Date: 5/30/2025  Primary Diagnosis: Acute abdominal pain [R10.9]  Elevated troponin [R79.89]  NSTEMI (non-ST elevated myocardial infarction) (HCC) [I21.4]  Constipation, unspecified constipation type [K59.00]  Chronic kidney disease, unspecified CKD stage [N18.9]  History of CAD (coronary artery disease) [Z86.79]         Precautions: Bed Alarm                  ASSESSMENT :  Based on the objective data below, the patient is independent in ADLs and functional mobility. Patient received semisupine in bed and cleared for therapy by nursing. He completed all transfers/mobility without assist and demonstrated intact balance, strength and good endurance. Patient walked into bathroom without assist and completed grooming tasks while standing, tolerating well and demonstrating good safety awareness. Patient reported no concerns with completing ADLs once home. He was left sitting in recliner chair with all needs met, VSS, and chair alarmed.     Functional Outcome Measure:  The patient scored 24/24 on the [facility] outcome measure which is indicative of patient is functioning at his baseline for ADLs.      Further skilled acute occupational therapy is not indicated at this time.     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

## 2025-05-30 NOTE — CARE COORDINATION
Patient is clear from a CM standpoint.    Care Management Initial Assessment       RUR: 18% (moderate RUR)  Readmission? No  1st IM letter given? Yes - IM done 5/29  1st  letter given: No    1119 - CM met with patient at bedside to complete initial assessment.  Patient's preferred pharmacy is: [facility] [id number] [place] [id number] - [place] [phone] - F [phone].     05/30/25 1120   Service Assessment   Patient Orientation Alert and Oriented   Cognition Alert   History Provided By Patient   Primary Caregiver Self   Accompanied By/Relationship N/A   Support Systems Children   Patient's Healthcare Decision Maker is: Legal Next of Kin   PCP Verified by CM Yes   Last Visit to PCP Within last 3 months  (1-2 months ago)   Prior Functional Level Independent in ADLs/IADLs   Current Functional Level Independent in ADLs/IADLs   Can patient return to prior living arrangement Yes   Ability to make needs known: Good   Family able to assist with home care needs: Yes   Would you like for me to discuss the discharge plan with any other family members/significant others, and if so, who? No   Financial Resources Medicare   Community Resources None   Social/Functional History   Lives With Alone   Type of Home House   Home Layout One level  (3 BONY)   Home Equipment None   Prior Level of Assist for ADLs Independent   Prior Level of Assist for Homemaking Independent   Ambulation Assistance Independent   Prior Level of Assist for Transfers Independent   Active  Yes   Mode of Transportation Car   Discharge Planning   Type of Residence House   Living Arrangements Children   Current Services Prior To Admission None   Potential Assistance Needed N/A   DME Ordered? No   Potential Assistance Purchasing Medications No   Type of Home Care Services None   Patient expects to be discharged to: House   Services At/After Discharge   Transition of Care Consult (CM Consult) N/A   Services At/After Discharge None

## 2025-05-30 NOTE — ED NOTES
Called hospitalist regarding the critical Anti-Xa. The tech that nasrin the repeat troponin saw the [employer] label for the anti-Xa but didn't see that it was due at 0100. He nasrin the lab without stopping the heparin infusion for 10 minutes prior to collecting, nor did he waste enough to compensate for the suggested wait time. MD acknowledged and advised that he is okay with leaving the Heparin infusing running at this point and will keep the 0100 repeat in place. Primary RN notified.

## 2025-05-30 NOTE — ED NOTES
Report given to CHARIS Narayan. Nurse was informed of reason for arrival, vitals, labs, medications, orders, procedures, results, anything left pending and current plan of action. Questions were asked and received prior to departure from the patient.

## 2025-05-30 NOTE — PROGRESS NOTES
Orders received, chart reviewed and patient evaluated by occupational therapy. Pending progression with skilled acute occupational therapy, recommend:    No skilled occupational therapy    Recommend with nursing patient to complete as able in order to maintain strength, endurance and independence: OOB to chair 3x/day, ADLs with independence and performing toileting independently. Thank you for your assistance.     Full evaluation to follow.     Brissa Timmons, OTR/L, OTD

## 2025-05-31 VITALS
OXYGEN SATURATION: 97 % | SYSTOLIC BLOOD PRESSURE: 149 MMHG | HEIGHT: 66 IN | WEIGHT: 159.83 LBS | TEMPERATURE: 97.5 F | BODY MASS INDEX: 25.69 KG/M2 | DIASTOLIC BLOOD PRESSURE: 96 MMHG | HEART RATE: 54 BPM | RESPIRATION RATE: 18 BRPM

## 2025-05-31 LAB
ANION GAP SERPL CALC-SCNC: 5 MMOL/L (ref 2–12)
BUN SERPL-MCNC: 21 MG/DL (ref 6–20)
BUN/CREAT SERPL: 13 (ref 12–20)
CALCIUM SERPL-MCNC: 8.6 MG/DL (ref 8.5–10.1)
CHLORIDE SERPL-SCNC: 103 MMOL/L (ref 97–108)
CO2 SERPL-SCNC: 27 MMOL/L (ref 21–32)
CREAT SERPL-MCNC: 1.66 MG/DL (ref 0.7–1.3)
ERYTHROCYTE [DISTWIDTH] IN BLOOD BY AUTOMATED COUNT: 14.7 % (ref 11.5–14.5)
GLUCOSE BLD STRIP.AUTO-MCNC: 166 MG/DL (ref 65–117)
GLUCOSE BLD STRIP.AUTO-MCNC: 83 MG/DL (ref 65–117)
GLUCOSE BLD STRIP.AUTO-MCNC: 90 MG/DL (ref 65–117)
GLUCOSE SERPL-MCNC: 110 MG/DL (ref 65–100)
HCT VFR BLD AUTO: 30.7 % (ref 36.6–50.3)
HGB BLD-MCNC: 10.3 G/DL (ref 12.1–17)
MAGNESIUM SERPL-MCNC: 1.8 MG/DL (ref 1.6–2.4)
MCH RBC QN AUTO: 28.3 PG (ref 26–34)
MCHC RBC AUTO-ENTMCNC: 33.6 G/DL (ref 30–36.5)
MCV RBC AUTO: 84.3 FL (ref 80–99)
NRBC # BLD: 0 K/UL (ref 0–0.01)
NRBC BLD-RTO: 0 PER 100 WBC
PHOSPHATE SERPL-MCNC: 4.6 MG/DL (ref 2.6–4.7)
PLATELET # BLD AUTO: 164 K/UL (ref 150–400)
PMV BLD AUTO: 9.7 FL (ref 8.9–12.9)
POTASSIUM SERPL-SCNC: 3.8 MMOL/L (ref 3.5–5.1)
RBC # BLD AUTO: 3.64 M/UL (ref 4.1–5.7)
SERVICE CMNT-IMP: ABNORMAL
SERVICE CMNT-IMP: NORMAL
SERVICE CMNT-IMP: NORMAL
SODIUM SERPL-SCNC: 135 MMOL/L (ref 136–145)
WBC # BLD AUTO: 5.4 K/UL (ref 4.1–11.1)

## 2025-05-31 PROCEDURE — 36415 COLL VENOUS BLD VENIPUNCTURE: CPT

## 2025-05-31 PROCEDURE — 84100 ASSAY OF PHOSPHORUS: CPT

## 2025-05-31 PROCEDURE — 2500000003 HC RX 250 WO HCPCS: Performed by: STUDENT IN AN ORGANIZED HEALTH CARE EDUCATION/TRAINING PROGRAM

## 2025-05-31 PROCEDURE — 80048 BASIC METABOLIC PNL TOTAL CA: CPT

## 2025-05-31 PROCEDURE — 6370000000 HC RX 637 (ALT 250 FOR IP): Performed by: STUDENT IN AN ORGANIZED HEALTH CARE EDUCATION/TRAINING PROGRAM

## 2025-05-31 PROCEDURE — 6360000002 HC RX W HCPCS: Performed by: STUDENT IN AN ORGANIZED HEALTH CARE EDUCATION/TRAINING PROGRAM

## 2025-05-31 PROCEDURE — 85027 COMPLETE CBC AUTOMATED: CPT

## 2025-05-31 PROCEDURE — 82962 GLUCOSE BLOOD TEST: CPT

## 2025-05-31 PROCEDURE — 83735 ASSAY OF MAGNESIUM: CPT

## 2025-05-31 RX ORDER — METFORMIN HYDROCHLORIDE 500 MG/1
500 TABLET, EXTENDED RELEASE ORAL 2 TIMES DAILY
Qty: 180 TABLET | Refills: 1 | Status: SHIPPED | OUTPATIENT
Start: 2025-05-31

## 2025-05-31 RX ORDER — LIDOCAINE 4 G/G
1 PATCH TOPICAL DAILY
Status: DISCONTINUED | OUTPATIENT
Start: 2025-05-31 | End: 2025-05-31 | Stop reason: HOSPADM

## 2025-05-31 RX ORDER — SENNA AND DOCUSATE SODIUM 50; 8.6 MG/1; MG/1
1 TABLET, FILM COATED ORAL DAILY PRN
Status: DISCONTINUED | OUTPATIENT
Start: 2025-05-31 | End: 2025-05-31 | Stop reason: HOSPADM

## 2025-05-31 RX ORDER — OXYCODONE HYDROCHLORIDE 5 MG/1
5 TABLET ORAL EVERY 6 HOURS PRN
Qty: 12 TABLET | Refills: 0 | Status: SHIPPED | OUTPATIENT
Start: 2025-05-31 | End: 2025-06-03

## 2025-05-31 RX ADMIN — THIAMINE HYDROCHLORIDE 100 MG: 100 INJECTION, SOLUTION INTRAMUSCULAR; INTRAVENOUS at 09:17

## 2025-05-31 RX ADMIN — POLYETHYLENE GLYCOL 3350 17 G: 17 POWDER, FOR SOLUTION ORAL at 09:40

## 2025-05-31 RX ADMIN — ASPIRIN 81 MG: 81 TABLET, CHEWABLE ORAL at 09:19

## 2025-05-31 RX ADMIN — SODIUM CHLORIDE, PRESERVATIVE FREE 5 ML: 5 INJECTION INTRAVENOUS at 09:20

## 2025-05-31 RX ADMIN — FERROUS SULFATE TAB 325 MG (65 MG ELEMENTAL FE) 325 MG: 325 (65 FE) TAB at 09:18

## 2025-05-31 RX ADMIN — HYDRALAZINE HYDROCHLORIDE 75 MG: 50 TABLET ORAL at 15:28

## 2025-05-31 RX ADMIN — HYDRALAZINE HYDROCHLORIDE 75 MG: 50 TABLET ORAL at 09:18

## 2025-05-31 RX ADMIN — INSULIN GLARGINE 10 UNITS: 100 INJECTION, SOLUTION SUBCUTANEOUS at 09:15

## 2025-05-31 RX ADMIN — AMLODIPINE BESYLATE 10 MG: 5 TABLET ORAL at 09:18

## 2025-05-31 RX ADMIN — CHOLESTYRAMINE 4 G: 4 POWDER, FOR SUSPENSION ORAL at 09:15

## 2025-05-31 RX ADMIN — PANTOPRAZOLE SODIUM 40 MG: 40 TABLET, DELAYED RELEASE ORAL at 06:13

## 2025-05-31 RX ADMIN — LISINOPRIL 20 MG: 20 TABLET ORAL at 09:18

## 2025-05-31 ASSESSMENT — PAIN SCALES - GENERAL
PAINLEVEL_OUTOF10: 0
PAINLEVEL_OUTOF10: 5

## 2025-05-31 NOTE — PROGRESS NOTES
End of Shift Note    Bedside shift change report given to CHARIS Hooper (oncoming nurse) by Sylvain Thompson RN (offgoing nurse).  Report included the following information SBAR, Recent Results, Med Rec Status, Cardiac Rhythm NSR, and Quality Measures    Shift worked:  7p-7a     Shift summary and any significant changes:     No acute events overnight.     Concerns for physician to address:  Diet orders.     Zone phone for oncoming shift:   [phone]       Activity:  Level of Assistance: Standby assist, set-up cues, supervision of patient - no hands on  Number times ambulated in hallways past shift: 0  Number of times OOB to chair past shift: 3    Cardiac:   Cardiac Monitoring: Yes      Cardiac Rhythm: Sinus rhythm    Access:  Current line(s): PIV     Genitourinary:        Respiratory:   O2 Device: None (Room air)  Chronic home O2 use?: NO  Incentive spirometer at bedside: NO    GI:  Last BM (including prior to admit): 05/31/25  Current diet:  ADULT DIET; Clear Liquid; No Caffeine  Passing flatus: YES    Pain Management:   Patient states pain is manageable on current regimen: N/A    Skin:  Sly Scale Score: 23  Interventions: Wound Offloading (Prevention Methods): Bed, pressure redistribution/air, Elevate heels, Repositioning, Pillows    Patient Safety:  Fall Risk: Nursing Judgement-Fall Risk High(Add Comments): Yes  Fall Risk Interventions  Nursing Judgement-Fall Risk High(Add Comments): Yes  Toilet Every 2 Hours-In Advance of Need: Yes  Hourly Visual Checks: In bed, Awake, Quiet  Fall Visual Posted: Armband, Socks, Fall sign posted  Room Door Open: Yes  Alarm On: Bed  Patient Moved Closer to Nursing Station: Yes    Active Consults:   IP CONSULT TO HOSPITALIST  IP CONSULT TO CARDIOLOGY  IP CONSULT TO CARDIOLOGY  IP CONSULT TO COLORECTAL SURGERY    Length of Stay:  Expected LOS: 2  Actual LOS: 2    Sylvain Thompson RN

## 2025-05-31 NOTE — PROGRESS NOTES
1)   [x] Drugs/treatments that require intensive monitoring for toxicity include:    [] IV ABX requiring serial renal monitoring for nephrotoxicity:     [] IV Narcotic analgesia for adverse drug reaction  [] Aggressive IV diuresis requiring serial monitoring for renal impairment and electrolyte derangements  [] Critical electrolyte abnormalities requiring IV replacement and close serial monitoring  [] Insulin - monitoring serial FSBS for Hypoglycemic adverse drug reaction  [x] Other -heparin drip, started for concerns for ACS-recent bleed, monitor for labs and toxicity.  Serial abdominal exams to monitor for bleeding.  [] Change in code status:    [] Decision to escalate care:    [] Major surgery/procedure with associated risk factors:    ----------------------------------------------------------------------  C. Data (any 2)  [] Discussed current management and discharge planning options with Case Management.  [] Discussed management of the case with:    [] Telemetry personally reviewed and interpreted as documented above    [] Imaging personally reviewed and interpreted, includes:     [x] Data Review (any 3)  [x] All available Consultant notes from yesterday/today were reviewed  [x] All current labs were reviewed and interpreted for clinical significance   [x] Appropriate follow-up labs were ordered  [] Collateral history obtained from:            Code Status: Full code  DVT Prophylaxis: SCD  GI Prophylaxis:    Subjective:     Chief Complaint / Reason for Physician Visit  Doing well, concerned about persistent pain.  Discussed with RN events overnight.       Objective:     VITALS:   Last 24hrs VS reviewed since prior progress note. Most recent are:  Patient Vitals for the past 24 hrs:   BP Temp Temp src Pulse Resp SpO2   05/30/25 2000 (!) 176/81 98.1 °F (36.7 °C) Oral 55 18 97 %   05/30/25 1500 (!) 165/78 97.8 °F (36.6 °C) Oral 54 14 98 %   05/30/25 1115 114/66 98 °F (36.7 °C) Oral 55 16 97 %   05/30/25 0715 137/79  97.9 °F (36.6 °C) Oral 54 11 98 %   05/30/25 0400 136/68 97.8 °F (36.6 °C) Oral 56 14 95 %   05/29/25 2215 (!) 185/79 97.9 °F (36.6 °C) Oral 63 17 98 %   05/29/25 2136 (!) 167/75 -- -- 66 17 95 %       No intake or output data in the 24 hours ending 05/30/25 2133     I had a face to face encounter and independently examined this patient on 5/30/2025, as outlined below:  PHYSICAL EXAM:  General: Alert, cooperative  EENT:  EOMI. Anicteric sclerae.  Resp:  CTA bilaterally, no wheezing or rales.  No accessory muscle use  CV:  Regular  rhythm,  No edema  GI:  Soft, Non distended, Non tender.  +Bowel sounds  Neurologic:  Alert and oriented X 3, normal speech,   Psych:   Not anxious nor agitated  Skin:  No rashes.  No jaundice    Reviewed most current lab test results and cultures  YES  Reviewed most current radiology test results   YES  Review and summation of old records today    NO  Reviewed patient's current orders and MAR    YES  PMH/SH reviewed - no change compared to H&P    Procedures: see electronic medical records for all procedures/Xrays and details which were not copied into this note but were reviewed prior to creation of Plan.      LABS:  I reviewed today's most current labs and imaging studies.  Pertinent labs include:  Recent Labs     05/29/25  1309 05/29/25  2040 05/30/25  0114   WBC 5.7 7.0 6.7   HGB 11.1* 11.6* 10.8*   HCT 33.4* 35.0* 32.2*    186 171     Recent Labs     05/29/25  1309 05/29/25  1901 05/30/25  0114     --  138   K 4.0  --  3.9     --  106   CO2 28  --  26   GLUCOSE 143*  --  77   BUN 23*  --  20   CREATININE 1.64*  --  1.43*   CALCIUM 9.0  --  8.7   MG 1.4*  --  1.5*   PHOS 3.4  --  4.3   BILITOT 0.7  --   --    AST 23  --   --    ALT 21  --   --    INR  --  1.1  --        Signed: Taylor Rincon MD

## 2025-05-31 NOTE — CARE COORDINATION
Transition of Care Plan:    RUR: 18  Prior Level of Functioning: I W ADLs  Disposition: Home  BRENNEN: 5/31/25    11:57 AM  CM noted DC order.   Son will transport Pt home.   No further CM needs.     If SNF or IPR: Date FOC offered:   Date FOC received:   Accepting facility:   Date authorization started with reference number:   Date authorization received and expires:     Follow up appointments: Weekend CM unable to make appt.  Pt will have to call and make appt  DME needed: n/a  Transportation at discharge: Son   IM/IMM Medicare/ letter given: 2nd IM letter 5/31/25  Is patient a  and connected with [facility]? N/a   If yes, was  transfer form completed and [facility] notified?   Caregiver Contact: Son: Jules Vigil  Discharge Caregiver contacted prior to discharge? N/a  Care Conference needed? N/a  Barriers to discharge: n/a    Anisha Chew, Weekend CM  [id number]         05/31/25 1155   Services At/After Discharge   Transition of Care Consult (CM Consult) N/A   Services At/After Discharge None   [place] Resource Information Provided? No   Mode of Transport at Discharge Other (see comment)  (Son)   Confirm Follow Up Transport Family   Condition of Participation: Discharge Planning   The Plan for Transition of Care is related to the following treatment goals: Home   The Patient and/or Patient Representative was provided with a Choice of Provider? Patient   The Patient and/Or Patient Representative agree with the Discharge Plan? Yes   Freedom of Choice list was provided with basic dialogue that supports the patient's individualized plan of care/goals, treatment preferences, and shares the quality data associated with the providers?  Yes

## 2025-05-31 NOTE — PROGRESS NOTES
CRS Note    Admission reviewed - presented for abd pain; CT without acute findings, but concern for cardiac issues given elevated trop. Looks like he has been cleared by cardiology and vascular surgery.     As far as the abd pain, it is left lateral pain right at his lower ribs - says this hurts with coughing and can localize pinpoint pain. CT not revealing of a cause, just some mild constipation. Question MSK etiology. He reports having a BM last night.     From my standpoint, ok to advance diet as tolerated and dc when medically ready. Agree with bowel regimen, added a one time dose of milk of mag today. Added lidocaine patch to be placed over the area of pain for what I suspect is musculoskeletal in nature.     Katrina Goddard MD   [facility]  [phone]

## 2025-05-31 NOTE — PLAN OF CARE
Problem: Chronic Conditions and Co-morbidities  Goal: Patient's chronic conditions and co-morbidity symptoms are monitored and maintained or improved  5/31/2025 1820 by Sandie Trinh, RN  Outcome: Adequate for Discharge  5/31/2025 1024 by Sandie Trinh, RN  Outcome: Progressing

## 2025-05-31 NOTE — DISCHARGE SUMMARY
Discharge Summary    Name: Yefri Vigil  [id number]  YOB: 1955 (Age: 69 y.o.)   Date of Admission: 5/29/2025  Date of Discharge: 5/31/2025  Attending Physician: Reese aMrquis MD    Discharge Diagnosis:   T2MI, elevated troponin likely secondary to post op stress  CAD  Hx bioprosthetic valve  S/p recent right colectomy for colon mass with complication of bleeding  Consultations:  IP CONSULT TO HOSPITALIST  IP CONSULT TO CARDIOLOGY  IP CONSULT TO CARDIOLOGY  IP CONSULT TO COLORECTAL SURGERY      Brief Admission History/Reason for Admission Per Fabio Lofton MD:   Yefri Vigil is a 69 y.o.  male with PMHx significant for DM2, HTN, recent right colectomy for right colon mass on 4/11/2025 comes in with left-sided abdominal pain.  Patient reports that this been going on for about 4 days, radiates to the back.  Patient reports he had a recent colectomy a month ago.he rates the pain as 9 out of 10, sharp in quality, worse when he walks around.  His pain started while he was going for his walk 4 days ago.  Denies any fevers or chills does endorse constipation, reports he had had a bowel movement a couple days.      In addition patient reports intermittent chest pain associated with shortness of breath on exertion.  Denies any orthopnea, leg swelling, but does endorse palpitation as well.     Brief Hospital Course by Main Problems:   T2MI, elevated troponin likely secondary to post op stress  CAD  Hx bioprosthetic valve  -Presents with intermittent chest/ abdominal pain  - Troponins 520, repeat 521  - EKG shows sinus bradycardia  -Last echo from January 2025 shows EF of 55%, normal wall motion normal wall thickness   - Patient was cleared from cardiology  - C/W aspirin Plavix     Constipation  Abdominal pain evaluation  Recent right colectomy for colon mass with complication of bleeding  - Patient had robotic extended right colectomy on 4/11/2025 for colon mass.  -CT  Amount: 20 mg            CHANGE how you take these medications      metFORMIN 500 MG extended release tablet  Commonly known as: GLUCOPHAGE-XR  Take 1 tablet by mouth in the morning and at bedtime  What changed: how much to take            CONTINUE taking these medications      amLODIPine 10 MG tablet  Commonly known as: NORVASC  Take 1 tablet by mouth daily     aspirin 81 MG chewable tablet  Take 1 tablet by mouth daily     atorvastatin 40 MG tablet  Commonly known as: LIPITOR  Take 1 tablet by mouth daily     cholestyramine 4 g packet  Commonly known as: QUESTRAN  Take 1 packet by mouth daily     ferrous sulfate 325 (65 Fe) MG tablet  Commonly known as: IRON 325  Take 1 tablet by mouth daily (with breakfast)     hydrALAZINE 50 MG tablet  Commonly known as: APRESOLINE  Take 1.5 tablets by mouth 3 times daily     lisinopril 20 MG tablet  Commonly known as: PRINIVIL;ZESTRIL  Take 1 tablet by mouth daily     loperamide 2 MG capsule  Commonly known as: IMODIUM  Take 1 capsule by mouth 4 times daily as needed for Diarrhea     metoprolol tartrate 25 MG tablet  Commonly known as: LOPRESSOR  Take 1 tablet by mouth 2 times daily     pantoprazole 40 MG tablet  Commonly known as: PROTONIX  Take 1 tablet by mouth every morning (before breakfast)               Where to Get Your Medications        These medications were sent to [facility] [id number] [facility] [address] [facility] [phone] -  [phone] [address] [place] [id number]      Phone: [phone]   metFORMIN 500 MG extended release tablet  oxyCODONE 5 MG immediate release tablet             DISPOSITION:    Home with Family: x      Home with HH/PT/OT/RN:    SNF/LTC:    [facility]:    OTHER:            Code status: Full  Recommended diet: cardiac diet  Recommended activity: activity as tolerated  Wound care: None      Follow up with:   PCP : Edy Brandt APRN - NP Rasmussen, Kyle Robert, APRN - NP  [id number]

## 2025-05-31 NOTE — PLAN OF CARE
Problem: Chronic Conditions and Co-morbidities  Goal: Patient's chronic conditions and co-morbidity symptoms are monitored and maintained or improved  5/31/2025 1024 by Sandie Trinh, RN  Outcome: Progressing  5/30/2025 2231 by Sylvain Thompson, RN  Outcome: Progressing  Flowsheets (Taken 5/30/2025 2000)  Care Plan - Patient's Chronic Conditions and Co-Morbidity Symptoms are Monitored and Maintained or Improved:   Monitor and assess patient's chronic conditions and comorbid symptoms for stability, deterioration, or improvement   Collaborate with multidisciplinary team to address chronic and comorbid conditions and prevent exacerbation or deterioration   Update acute care plan with appropriate goals if chronic or comorbid symptoms are exacerbated and prevent overall improvement and discharge

## 2025-05-31 NOTE — PLAN OF CARE
Problem: Chronic Conditions and Co-morbidities  Goal: Patient's chronic conditions and co-morbidity symptoms are monitored and maintained or improved  Outcome: Progressing     Problem: Discharge Planning  Goal: Discharge to home or other facility with appropriate resources  Outcome: Progressing     Problem: Pain  Goal: Verbalizes/displays adequate comfort level or baseline comfort level  Outcome: Progressing     Problem: Cardiovascular - Adult  Goal: Maintains optimal cardiac output and hemodynamic stability  Outcome: Progressing     Problem: Skin/Tissue Integrity - Adult  Goal: Skin integrity remains intact  Outcome: Progressing     Problem: Gastrointestinal - Adult  Goal: Minimal or absence of nausea and vomiting  Outcome: Progressing  Goal: Maintains or returns to baseline bowel function  Outcome: Progressing     Problem: Metabolic/Fluid and Electrolytes - Adult  Goal: Electrolytes maintained within normal limits  Outcome: Progressing  Goal: Hemodynamic stability and optimal renal function maintained  Outcome: Progressing     Problem: Safety - Adult  Goal: Free from fall injury  Outcome: Progressing

## 2025-06-05 ENCOUNTER — HOSPITAL ENCOUNTER (OUTPATIENT)
Facility: HOSPITAL | Age: 70
Discharge: HOME | End: 2025-06-08
Attending: INTERNAL MEDICINE
Payer: MEDICARE

## 2025-06-05 DIAGNOSIS — N18.31 STAGE 3A CHRONIC KIDNEY DISEASE (HCC): ICD-10-CM

## 2025-06-05 DIAGNOSIS — R80.9 PROTEINURIA, UNSPECIFIED TYPE: ICD-10-CM

## 2025-06-05 DIAGNOSIS — I12.9 RENAL HYPERTENSION: ICD-10-CM

## 2025-06-05 DIAGNOSIS — N18.31 CHRONIC KIDNEY DISEASE (CKD) STAGE G3A/A1, MODERATELY DECREASED GLOMERULAR FILTRATION RATE (GFR) BETWEEN 45-59 ML/MIN/1.73 SQUARE METER AND ALBUMINURIA CREATININE RATIO LESS THAN 30 MG/G (HCC): ICD-10-CM

## 2025-06-05 PROCEDURE — 76770 US EXAM ABDO BACK WALL COMP: CPT

## 2025-07-31 ENCOUNTER — HOSPITAL ENCOUNTER (OUTPATIENT)
Facility: HOSPITAL | Age: 70
Setting detail: RECURRING SERIES
Discharge: HOME | End: 2025-08-03
Payer: MEDICARE

## 2025-07-31 PROCEDURE — 93798 PHYS/QHP OP CAR RHAB W/ECG: CPT

## (undated) DEVICE — CATHETER COR DIAG 4.0 5FR 100CM 0 SIDE H

## (undated) DEVICE — OPTIFOAM GENTLE SA, POSTOP, 4X12: Brand: MEDLINE

## (undated) DEVICE — BLADELESS OBTURATOR: Brand: WECK VISTA

## (undated) DEVICE — Device

## (undated) DEVICE — SKIN PREP TRAY 4 COMPARTM TRAY: Brand: MEDLINE INDUSTRIES, INC.

## (undated) DEVICE — TROCAR LAP L100MM DIA5MM BLDELSS W/ STBL SL ENDOPATH XCEL

## (undated) DEVICE — SYRINGE ANGIO 10 CC BRL STD PRNT POLYCARB LT BLU MEDALLION

## (undated) DEVICE — STAPLER 60: Brand: SUREFORM

## (undated) DEVICE — SUTURE VICRYL SZ 0 L18IN ABSRB UD POLYGLACTIN 910 BRAID TIE J912G

## (undated) DEVICE — VESSEL SEALER: Brand: ENDOWRIST

## (undated) DEVICE — CORONARY IMAGING CATHETER: Brand: OPTICROSS 6

## (undated) DEVICE — SYRINGE KIT,PACKAGED,,150FT,MK 7(ANGIO-ARTERION, 150ML SYR KIT W/QFT,MC)(60729385): Brand: MEDRAD® MARK 7 ARTERION DISPOSABLE SYRINGE 150 ML WITH QUICK FILL TUBE

## (undated) DEVICE — PROVE COVER: Brand: UNBRANDED

## (undated) DEVICE — GLOVE ORANGE PI 7 1/2   MSG9075

## (undated) DEVICE — ELECTRODE PT RET AD L9FT HI MOIST COND ADH HYDRGEL CORDED

## (undated) DEVICE — GLOVE SURG SZ 65 L12IN FNGR THK79MIL GRN LTX FREE

## (undated) DEVICE — WOUND RETRACTOR AND PROTECTOR: Brand: ALEXIS O WOUND PROTECTOR-RETRACTOR

## (undated) DEVICE — MANIFOLD REPROC SUCT 4 PRT F/NEPTUNE 2 WST MGMT SYS

## (undated) DEVICE — SPONGE,LAP,18"X18",STD,XR,ST,5/PK,40PK/C: Brand: MEDLINE

## (undated) DEVICE — SUTURE PERMAHAND SZ 3-0 L18IN NONABSORBABLE BLK L26MM SH C013D

## (undated) DEVICE — SOLUTION IRRIG 1000ML H2O PIC PLAS SHATTERPROOF CONTAINER

## (undated) DEVICE — SOLUTION IRRIG 1000ML 09% SOD CHL USP PIC PLAS CONTAINER

## (undated) DEVICE — BLADE,CARBON-STEEL,15,STRL,DISPOSABLE,TB: Brand: MEDLINE

## (undated) DEVICE — STAPLER 60 RELOAD BLUE: Brand: SUREFORM

## (undated) DEVICE — GLOVE ORTHO 7 1/2   MSG9475

## (undated) DEVICE — SEALER ENDOSCP NANO COAT OPN DIV CRV L JAW LIGASURE IMPACT

## (undated) DEVICE — BOWL MED L 32OZ PLAS W/ MOLD GRAD EZ OPN PEEL PCH

## (undated) DEVICE — FILTER CLP DISP FOR 5513E CLIPVAC

## (undated) DEVICE — YANKAUER,POOLE TIP,STERILE,50/CS: Brand: MEDLINE

## (undated) DEVICE — SUTURE VICRYL + SZ 0 L27IN ABSRB VLT L26MM UR-6 5/8 CIR VCP603H

## (undated) DEVICE — DRAPE, SLUSH XL, 44X66, STERILE: Brand: MEDLINE

## (undated) DEVICE — TR BAND RADIAL ARTERY COMPRESSION DEVICE: Brand: TR BAND

## (undated) DEVICE — GAUZE,SPONGE,4"X4",12PLY,STRL,LF,10/TRAY: Brand: MEDLINE

## (undated) DEVICE — GRASPER ENDOSCP TIP L10MM ANVIL ROT RATCH HNDL DISP

## (undated) DEVICE — WOUND RETRACTOR AND PROTECTOR: Brand: ALEXIS WOUND PROTECTOR-RETRACTOR

## (undated) DEVICE — GUIDEWIRE VASC L260CM DIA0.035IN L7CM DIA3MM J TIP AMPLATZ SUPER STIFF

## (undated) DEVICE — TUBING PRSS MON L6IN PVC M FEM CONN

## (undated) DEVICE — 3M™ TEGADERM™ TRANSPARENT FILM DRESSING FRAME STYLE, 1626W, 4 IN X 4-3/4 IN (10 CM X 12 CM), 50/CT 4CT/CASE: Brand: 3M™ TEGADERM™

## (undated) DEVICE — HI-TORQUE VERSACORE FLOPPY GUIDE WIRE SYSTEM 145 CM: Brand: HI-TORQUE VERSACORE

## (undated) DEVICE — SC 3W HP RA OFF NB - PG: Brand: NAMIC

## (undated) DEVICE — LIQUIBAND RAPID ADHESIVE 36/CS 0.8ML: Brand: MEDLINE

## (undated) DEVICE — PENCIL ES CRD L10FT HND SWCHING ROCK SWCH W/ EDGE COAT BLDE

## (undated) DEVICE — SUTURE PDS II SZ 1 L96IN ABSRB VLT TP-1 L65MM 1/2 CIR Z880G

## (undated) DEVICE — GUIDEWIRE VASC L260CM 0.035IN J TIP L3MM PTFE FIX COR NAMIC

## (undated) DEVICE — SUTURE VICRYL + SZ 4-0 L27IN ABSRB UD PS-2 3/8 CIR REV CUT VCP426H

## (undated) DEVICE — SYRINGE IRRIG 60ML SFT PLIABLE BLB EZ TO GRP 1 HND USE W/

## (undated) DEVICE — GUIDEWIRE VASC L260CM DIA0.035IN BRECKER FOR COREVLV

## (undated) DEVICE — ACCESS PLATFORM FOR MINIMALLY INVASIVE SURGERY: Brand: GELPOINT®  MINI ADVANCED ACCESS PLATFORM

## (undated) DEVICE — PINNACLE INTRODUCER SHEATH: Brand: PINNACLE

## (undated) DEVICE — CATHETER IV 20GA L1.16IN OD1.0414-1.1176MM ID0.762-0.8382MM

## (undated) DEVICE — SUTURE ETHILON SZ 2-0 L18IN NONABSORBABLE BLK L19MM PS-2 PRIM 593H

## (undated) DEVICE — SOLUTION ANTIFOG VIS SYS CLEARIFY LAPSCP

## (undated) DEVICE — CATHETER DIAG 5FR L100CM LUMN ID0.047IN JR4 CRV 0 SIDE H

## (undated) DEVICE — 3M™ IOBAN™ 2 ANTIMICROBIAL INCISE DRAPE 6650EZ: Brand: IOBAN™ 2

## (undated) DEVICE — GLOVE SURG SZ 65 THK91MIL LTX FREE SYN POLYISOPRENE

## (undated) DEVICE — CATHETER ETER CARD MULTIPAK MULTIPAK 5FR PERFORMA

## (undated) DEVICE — RUNTHROUGH NS EXTRA FLOPPY PTCA GUIDEWIRE: Brand: RUNTHROUGH

## (undated) DEVICE — TIP COVER ACCESSORY

## (undated) DEVICE — CATHETER COR DIAG PIGTAILS PIG 145 CRV 5FR 110CM 6 SIDE H

## (undated) DEVICE — HYPODERMIC SAFETY NEEDLE: Brand: MAGELLAN

## (undated) DEVICE — BENTSON GUIDEWIRE ANGIO L150CM OD0.035IN STR FIX PTFE SLD SUPP

## (undated) DEVICE — DRAIN SURG 19FR L025IN DIA63MM SIL CHN RND FULL FLUT CLS

## (undated) DEVICE — SUTURE PERMAHAND SZ 2-0 L30IN NONABSORBABLE BLK SH L26MM C016D

## (undated) DEVICE — ARM DRAPE

## (undated) DEVICE — STAPLER EXT 65MM S STL AUTO DISP PURSTRING

## (undated) DEVICE — SNARE VASC L240CM LOOP W10MM SHTH DIA2.4MM RND STIFF CLD

## (undated) DEVICE — CATHETER PACE 5FR L110CM 6FR INTRO D10CM 1MM SPACE POLYUR

## (undated) DEVICE — SYRINGE MED 10ML LUERLOCK TIP W/O SFTY DISP

## (undated) DEVICE — STAPLER INT DIA29MM CLS STPL H1.5-2.2MM OPN LEG L5.2MM 26

## (undated) DEVICE — PICO 7 10CM X 30CM: Brand: PICO™ 7

## (undated) DEVICE — TOWEL,OR,DSP,ST,BLUE,STD,4/PK,20PK/CS: Brand: MEDLINE

## (undated) DEVICE — SUTURE PDS II SZ 0 L36IN ABSRB VLT L36MM CT-1 1/2 CIR Z346H

## (undated) DEVICE — RADIFOCUS GLIDECATH: Brand: GLIDECATH

## (undated) DEVICE — CATH BLLN ANGIO 3.50X20MM NC EUPHORIA RX

## (undated) DEVICE — GLIDESHEATH SLENDER ACCESS KIT: Brand: GLIDESHEATH SLENDER

## (undated) DEVICE — PACK PROCEDURE SURG HRT CATH

## (undated) DEVICE — MAJOR LAPAROTOMY-MRMC: Brand: MEDLINE INDUSTRIES, INC.

## (undated) DEVICE — DRAPE,ROBOTICS,STERILE: Brand: MEDLINE

## (undated) DEVICE — GUIDEWIRE VASCULAR L145CM 0.035IN J TIP L3MM PTFE FIX COR NAMIC

## (undated) DEVICE — DRESSING HEMOSTATIC SFT INTVENT W/O SLT DBL WRP QUIKCLOT LF

## (undated) DEVICE — KIT ACCS INTRO 4FR L10CM NDL 21GA L7CM GWIRE L40CM

## (undated) DEVICE — TOWEL,OR,DSP,ST,BLUE,STD,2/PK,40PK/CS: Brand: MEDLINE

## (undated) DEVICE — SUTURE PERMAHAND SZ 3-0 L30IN NONABSORBABLE BLK SILK BRAID A304H

## (undated) DEVICE — SPLINT WR VELC FOAM NEUT POS DISP FOR RAD ART ACC SFT STRP

## (undated) DEVICE — TAPE ADH W6XL72IN SFT CLTH STRETCHABLE CNFRM EZ TEAR PERF

## (undated) DEVICE — AGENT HEMOSTATIC SURG ORIGINAL ABS 4X8IN LOOSE KNIT 12/CA

## (undated) DEVICE — NEEDLE SCLERO 25GA L240CM OD0.51MM ID0.24MM EXTN L4MM SHTH

## (undated) DEVICE — FLOSEAL WITH RECOTHROM - 10ML.: Brand: FLOSEAL HEMOSTATIC MATRIX

## (undated) DEVICE — DRAPE FLD WRM W44XL66IN C6L FOR INTRATEMP SYS THERMABASIN

## (undated) DEVICE — 1LYRTR 16FR10ML100%SIL UMS SNP: Brand: MEDLINE INDUSTRIES, INC.

## (undated) DEVICE — LEGGINGS: Brand: CONVERTORS

## (undated) DEVICE — GOWN,SIRUS,FABRNF,XL,20/CS: Brand: MEDLINE

## (undated) DEVICE — PERCLOSE™ PROSTYLE™ SUTURE-MEDIATED CLOSURE AND REPAIR SYSTEM: Brand: PERCLOSE™ PROSTYLE™

## (undated) DEVICE — TREK CORONARY DILATATION CATHETER 2.50 MM X 15 MM / RAPID-EXCHANGE: Brand: TREK

## (undated) DEVICE — Device: Brand: SPOT ENDOSOPIC MARKER

## (undated) DEVICE — VALVE BLEEDBK CTRL SGL UNIT COPILOT

## (undated) DEVICE — SEAL

## (undated) DEVICE — TRI-LUMEN FILTERED TUBE SET WITH ACTIVATED CHARCOAL FILTER: Brand: AIRSEAL

## (undated) DEVICE — GENERAL LAPAROSCOPY-MRMC: Brand: MEDLINE INDUSTRIES, INC.

## (undated) DEVICE — AIRSEAL 5 MM ACCESS PORT AND LOW PROFILE OBTURATOR WITH BLADELESS OPTICAL TIP, 100 MM LENGTH: Brand: AIRSEAL

## (undated) DEVICE — BLUNTFILL: Brand: MONOJECT

## (undated) DEVICE — SUTURE STRATAFIX SYMMETRIC SZ 1 L18IN ABSRB VLT CT1 L36CM SXPP1A404

## (undated) DEVICE — SUTURE PERMAHAND SZ 2-0 L30IN NONABSORBABLE BLK SILK W/O A305H

## (undated) DEVICE — HEART CATH-MRMC: Brand: MEDLINE INDUSTRIES, INC.

## (undated) DEVICE — CATHETER GUID 6FR L100CM DIA0.071IN NYL SHFT EBU3.5

## (undated) DEVICE — Z CONVERTED USE 2916815 PAD PT POS 36 IN SURGYPAD DISP

## (undated) DEVICE — COLUMN DRAPE

## (undated) DEVICE — CUSTOM KT PTCA INFL DEV K05 00053H

## (undated) DEVICE — RADIFOCUS GLIDEWIRE: Brand: GLIDEWIRE